# Patient Record
Sex: MALE | Race: WHITE | NOT HISPANIC OR LATINO
[De-identification: names, ages, dates, MRNs, and addresses within clinical notes are randomized per-mention and may not be internally consistent; named-entity substitution may affect disease eponyms.]

---

## 2017-03-20 ENCOUNTER — RX RENEWAL (OUTPATIENT)
Age: 82
End: 2017-03-20

## 2017-05-01 ENCOUNTER — NON-APPOINTMENT (OUTPATIENT)
Age: 82
End: 2017-05-01

## 2017-05-01 ENCOUNTER — APPOINTMENT (OUTPATIENT)
Dept: CARDIOLOGY | Facility: CLINIC | Age: 82
End: 2017-05-01

## 2017-05-01 VITALS
SYSTOLIC BLOOD PRESSURE: 147 MMHG | OXYGEN SATURATION: 94 % | BODY MASS INDEX: 28.23 KG/M2 | WEIGHT: 227 LBS | HEIGHT: 75 IN | HEART RATE: 68 BPM | DIASTOLIC BLOOD PRESSURE: 79 MMHG

## 2017-05-01 VITALS — SYSTOLIC BLOOD PRESSURE: 130 MMHG | DIASTOLIC BLOOD PRESSURE: 70 MMHG

## 2017-05-01 DIAGNOSIS — R01.1 CARDIAC MURMUR, UNSPECIFIED: ICD-10-CM

## 2017-05-01 DIAGNOSIS — I65.29 OCCLUSION AND STENOSIS OF UNSPECIFIED CAROTID ARTERY: ICD-10-CM

## 2017-05-04 ENCOUNTER — APPOINTMENT (OUTPATIENT)
Dept: CARDIOLOGY | Facility: CLINIC | Age: 82
End: 2017-05-04

## 2017-05-06 LAB
ALBUMIN SERPL ELPH-MCNC: 4.4 G/DL
ALP BLD-CCNC: 92 U/L
ALT SERPL-CCNC: 17 U/L
ANION GAP SERPL CALC-SCNC: 17 MMOL/L
AST SERPL-CCNC: 20 U/L
BILIRUB SERPL-MCNC: 0.4 MG/DL
BUN SERPL-MCNC: 25 MG/DL
CALCIUM SERPL-MCNC: 10.3 MG/DL
CHLORIDE SERPL-SCNC: 105 MMOL/L
CHOLEST SERPL-MCNC: 152 MG/DL
CHOLEST/HDLC SERPL: 3 RATIO
CO2 SERPL-SCNC: 22 MMOL/L
CREAT SERPL-MCNC: 1.58 MG/DL
GLUCOSE SERPL-MCNC: 98 MG/DL
HBA1C MFR BLD HPLC: 5.9 %
HDLC SERPL-MCNC: 50 MG/DL
LDLC SERPL CALC-MCNC: 79 MG/DL
POTASSIUM SERPL-SCNC: 5.3 MMOL/L
PROT SERPL-MCNC: 8.1 G/DL
SODIUM SERPL-SCNC: 144 MMOL/L
TRIGL SERPL-MCNC: 115 MG/DL

## 2017-07-07 ENCOUNTER — APPOINTMENT (OUTPATIENT)
Dept: CARDIOLOGY | Facility: CLINIC | Age: 82
End: 2017-07-07

## 2017-09-20 ENCOUNTER — RX RENEWAL (OUTPATIENT)
Age: 82
End: 2017-09-20

## 2018-09-04 ENCOUNTER — APPOINTMENT (OUTPATIENT)
Dept: ORTHOPEDIC SURGERY | Facility: CLINIC | Age: 83
End: 2018-09-04
Payer: MEDICARE

## 2018-09-04 VITALS
DIASTOLIC BLOOD PRESSURE: 75 MMHG | WEIGHT: 205 LBS | HEIGHT: 72 IN | BODY MASS INDEX: 27.77 KG/M2 | SYSTOLIC BLOOD PRESSURE: 124 MMHG | HEART RATE: 75 BPM

## 2018-09-04 DIAGNOSIS — M89.9 DISORDER OF BONE, UNSPECIFIED: ICD-10-CM

## 2018-09-04 PROCEDURE — 99204 OFFICE O/P NEW MOD 45 MIN: CPT

## 2018-09-07 ENCOUNTER — OUTPATIENT (OUTPATIENT)
Dept: OUTPATIENT SERVICES | Facility: HOSPITAL | Age: 83
LOS: 1 days | End: 2018-09-07
Payer: MEDICARE

## 2018-09-07 VITALS
SYSTOLIC BLOOD PRESSURE: 130 MMHG | WEIGHT: 194.01 LBS | HEART RATE: 71 BPM | TEMPERATURE: 97 F | DIASTOLIC BLOOD PRESSURE: 76 MMHG | RESPIRATION RATE: 14 BRPM | HEIGHT: 69.5 IN | OXYGEN SATURATION: 97 %

## 2018-09-07 DIAGNOSIS — M89.9 DISORDER OF BONE, UNSPECIFIED: ICD-10-CM

## 2018-09-07 DIAGNOSIS — I10 ESSENTIAL (PRIMARY) HYPERTENSION: ICD-10-CM

## 2018-09-07 DIAGNOSIS — Z84.89 FAMILY HISTORY OF OTHER SPECIFIED CONDITIONS: Chronic | ICD-10-CM

## 2018-09-07 DIAGNOSIS — M25.559 PAIN IN UNSPECIFIED HIP: Chronic | ICD-10-CM

## 2018-09-07 LAB
BLD GP AB SCN SERPL QL: NEGATIVE — SIGNIFICANT CHANGE UP
BUN SERPL-MCNC: 28 MG/DL — HIGH (ref 7–23)
CALCIUM SERPL-MCNC: 9.7 MG/DL — SIGNIFICANT CHANGE UP (ref 8.4–10.5)
CHLORIDE SERPL-SCNC: 99 MMOL/L — SIGNIFICANT CHANGE UP (ref 98–107)
CO2 SERPL-SCNC: 24 MMOL/L — SIGNIFICANT CHANGE UP (ref 22–31)
CREAT SERPL-MCNC: 1.79 MG/DL — HIGH (ref 0.5–1.3)
GLUCOSE SERPL-MCNC: 86 MG/DL — SIGNIFICANT CHANGE UP (ref 70–99)
HCT VFR BLD CALC: 39.1 % — SIGNIFICANT CHANGE UP (ref 39–50)
HGB BLD-MCNC: 12.5 G/DL — LOW (ref 13–17)
MCHC RBC-ENTMCNC: 29.5 PG — SIGNIFICANT CHANGE UP (ref 27–34)
MCHC RBC-ENTMCNC: 32 % — SIGNIFICANT CHANGE UP (ref 32–36)
MCV RBC AUTO: 92.2 FL — SIGNIFICANT CHANGE UP (ref 80–100)
NRBC # FLD: 0 — SIGNIFICANT CHANGE UP
PLATELET # BLD AUTO: 315 K/UL — SIGNIFICANT CHANGE UP (ref 150–400)
PMV BLD: 9.3 FL — SIGNIFICANT CHANGE UP (ref 7–13)
POTASSIUM SERPL-MCNC: 4.5 MMOL/L — SIGNIFICANT CHANGE UP (ref 3.5–5.3)
POTASSIUM SERPL-SCNC: 4.5 MMOL/L — SIGNIFICANT CHANGE UP (ref 3.5–5.3)
RBC # BLD: 4.24 M/UL — SIGNIFICANT CHANGE UP (ref 4.2–5.8)
RBC # FLD: 14.7 % — HIGH (ref 10.3–14.5)
RH IG SCN BLD-IMP: POSITIVE — SIGNIFICANT CHANGE UP
SODIUM SERPL-SCNC: 136 MMOL/L — SIGNIFICANT CHANGE UP (ref 135–145)
WBC # BLD: 8.92 K/UL — SIGNIFICANT CHANGE UP (ref 3.8–10.5)
WBC # FLD AUTO: 8.92 K/UL — SIGNIFICANT CHANGE UP (ref 3.8–10.5)

## 2018-09-07 PROCEDURE — 93010 ELECTROCARDIOGRAM REPORT: CPT

## 2018-09-07 NOTE — H&P PST ADULT - NEGATIVE GENERAL SYMPTOMS
no polydipsia/no fever/no polyphagia/no polyuria/no chills/no sweating/no anorexia/no weight gain/no weight loss/no fatigue

## 2018-09-07 NOTE — H&P PST ADULT - NSANTHOSAYNRD_GEN_A_CORE
No. ADRY screening performed.  STOP BANG Legend: 0-2 = LOW Risk; 3-4 = INTERMEDIATE Risk; 5-8 = HIGH Risk

## 2018-09-07 NOTE — H&P PST ADULT - NEGATIVE ENMT SYMPTOMS
no hearing difficulty/no nasal congestion/no nasal obstruction/no ear pain/no sinus symptoms/no nasal discharge/no recurrent cold sores/no abnormal taste sensation/no gum bleeding/no throat pain/no nose bleeds/no dry mouth/no vertigo/no dysphagia/no post-nasal discharge/no tinnitus

## 2018-09-07 NOTE — H&P PST ADULT - MUSCULOSKELETAL
details… detailed exam Right upper extremity/decreased ROM due to pain/no calf tenderness/no joint warmth/no joint erythema/normal strength

## 2018-09-07 NOTE — H&P PST ADULT - RS GEN PE MLT RESP DETAILS PC
respirations non-labored/good air movement/airway patent/no wheezes/no rales/clear to auscultation bilaterally/no rhonchi/breath sounds equal

## 2018-09-07 NOTE — H&P PST ADULT - NEGATIVE GENERAL GENITOURINARY SYMPTOMS
no dysuria/no bladder infections/no flank pain R/no incontinence/normal urinary frequency/no urinary hesitancy/no flank pain L/no hematuria

## 2018-09-07 NOTE — H&P PST ADULT - PROBLEM SELECTOR PLAN 1
Patient is scheduled for exploration wide resection right lower scapula scheduled on 9/14/2018.   Preop instructions, pepcid, surgical scrub provided. Pt stated understanding.    Pending medical evaluation - per surgeon request.   Pending cardiac clearance - Per medical Doctor's request - patient has an appointment 9/13. Abnormal EKG - from PST -( [facility] Parkinson [facility] ) case reviewed with Dr. Nieves- Pending Cardiac clearance, last echo results and stress results.  Comparison EKG in chart.     ADRY precautions, OR booking notified.  Per Patient - Patient stopped taking Asprin as on 9/6/2018. Patient stated that Dr. Ravi and Dr. Foreman informed him to stop.

## 2018-09-07 NOTE — H&P PST ADULT - NEGATIVE ALLERGY TYPES
no outdoor environmental allergies/no indoor environmental allergies/no reactions to food/no reactions to insect bites/no reactions to medicines/no reactions to animals

## 2018-09-07 NOTE — H&P PST ADULT - NEGATIVE NEUROLOGICAL SYMPTOMS
no headache/no paresthesias/no hemiparesis/no facial palsy/no transient paralysis/no syncope/no focal seizures/no tremors/no vertigo/no weakness/no generalized seizures/no confusion/no difficulty walking/no loss of sensation/no loss of consciousness

## 2018-09-07 NOTE — H&P PST ADULT - TOBACCO FREE, LONGEST PERIOD, PROFILE
Quit smoking cigareete in 1979- 3 packs a day for 10 years Quit smoking cigarettes in 1979- 3 packs a day for 10 years

## 2018-09-07 NOTE — H&P PST ADULT - NEGATIVE OPHTHALMOLOGIC SYMPTOMS
no blurred vision L/no blurred vision R/no discharge L/no diplopia/no photophobia/no irritation L/no pain R/no irritation R/no discharge R/no pain L/no lacrimation L/no lacrimation R

## 2018-09-07 NOTE — H&P PST ADULT - HISTORY OF PRESENT ILLNESS
85 year old male with a history of " mass" on the right lower scapula. Patient is s/p X-ray and MRI with abnormal results. Patient was referred to Dr. Ravi for an evaluation. Patient is scheduled for exploration wide resection right lower scapula scheduled on 9/14/2018.

## 2018-09-07 NOTE — H&P PST ADULT - PMH
Hypertension Arteriosclerotic heart disease (ASHD)    Coronary artery disease    Disorder of bone, unspecified    Dyslipidemia    Hypertension

## 2018-09-07 NOTE — H&P PST ADULT - NEGATIVE MUSCULOSKELETAL SYMPTOMS
no muscle weakness/no back pain/no leg pain R/no myalgia/no muscle cramps/no neck pain/no leg pain L/no joint swelling

## 2018-09-13 ENCOUNTER — NON-APPOINTMENT (OUTPATIENT)
Age: 83
End: 2018-09-13

## 2018-09-13 ENCOUNTER — APPOINTMENT (OUTPATIENT)
Dept: CARDIOLOGY | Facility: CLINIC | Age: 83
End: 2018-09-13
Payer: MEDICARE

## 2018-09-13 VITALS
DIASTOLIC BLOOD PRESSURE: 78 MMHG | WEIGHT: 192 LBS | HEIGHT: 72 IN | HEART RATE: 80 BPM | SYSTOLIC BLOOD PRESSURE: 151 MMHG | OXYGEN SATURATION: 86 % | BODY MASS INDEX: 26.01 KG/M2

## 2018-09-13 VITALS — DIASTOLIC BLOOD PRESSURE: 70 MMHG | SYSTOLIC BLOOD PRESSURE: 130 MMHG

## 2018-09-13 DIAGNOSIS — I25.10 ATHEROSCLEROTIC HEART DISEASE OF NATIVE CORONARY ARTERY W/OUT ANGINA PECTORIS: ICD-10-CM

## 2018-09-13 DIAGNOSIS — Z01.810 ENCOUNTER FOR PREPROCEDURAL CARDIOVASCULAR EXAMINATION: ICD-10-CM

## 2018-09-13 DIAGNOSIS — R94.31 ENCOUNTER FOR PREPROCEDURAL CARDIOVASCULAR EXAMINATION: ICD-10-CM

## 2018-09-13 DIAGNOSIS — I35.0 NONRHEUMATIC AORTIC (VALVE) STENOSIS: ICD-10-CM

## 2018-09-13 DIAGNOSIS — I10 ESSENTIAL (PRIMARY) HYPERTENSION: ICD-10-CM

## 2018-09-13 DIAGNOSIS — E78.5 HYPERLIPIDEMIA, UNSPECIFIED: ICD-10-CM

## 2018-09-13 PROBLEM — M89.9 DISORDER OF BONE, UNSPECIFIED: Chronic | Status: ACTIVE | Noted: 2018-09-07

## 2018-09-13 LAB
ALBUMIN SERPL ELPH-MCNC: 4 G/DL
ALP BLD-CCNC: 100 U/L
ALT SERPL-CCNC: 13 U/L
ANION GAP SERPL CALC-SCNC: 15 MMOL/L
AST SERPL-CCNC: 20 U/L
BILIRUB SERPL-MCNC: 0.3 MG/DL
BUN SERPL-MCNC: 23 MG/DL
CALCIUM SERPL-MCNC: 10.5 MG/DL
CHLORIDE SERPL-SCNC: 97 MMOL/L
CHOLEST SERPL-MCNC: 134 MG/DL
CHOLEST/HDLC SERPL: 2.6 RATIO
CO2 SERPL-SCNC: 24 MMOL/L
CREAT SERPL-MCNC: 1.56 MG/DL
GLUCOSE SERPL-MCNC: 100 MG/DL
HBA1C MFR BLD HPLC: 5.9 %
HDLC SERPL-MCNC: 52 MG/DL
LDLC SERPL CALC-MCNC: 62 MG/DL
POTASSIUM SERPL-SCNC: 5.8 MMOL/L
PROT SERPL-MCNC: 8 G/DL
SODIUM SERPL-SCNC: 136 MMOL/L
TRIGL SERPL-MCNC: 102 MG/DL

## 2018-09-13 PROCEDURE — 99215 OFFICE O/P EST HI 40 MIN: CPT

## 2018-09-13 PROCEDURE — 93000 ELECTROCARDIOGRAM COMPLETE: CPT

## 2018-09-13 RX ORDER — FOLIC ACID 20 MG
CAPSULE ORAL
Refills: 0 | Status: ACTIVE | COMMUNITY

## 2018-09-13 RX ORDER — CHROMIUM 200 MCG
TABLET ORAL
Refills: 0 | Status: ACTIVE | COMMUNITY

## 2018-09-13 RX ORDER — MULTIVITAMIN
TABLET ORAL
Refills: 0 | Status: ACTIVE | COMMUNITY

## 2018-09-14 ENCOUNTER — APPOINTMENT (OUTPATIENT)
Dept: ORTHOPEDIC SURGERY | Facility: HOSPITAL | Age: 83
End: 2018-09-14

## 2018-09-15 ENCOUNTER — APPOINTMENT (OUTPATIENT)
Dept: CARDIOLOGY | Facility: CLINIC | Age: 83
End: 2018-09-15
Payer: MEDICARE

## 2018-09-15 PROCEDURE — 93306 TTE W/DOPPLER COMPLETE: CPT

## 2018-09-17 ENCOUNTER — APPOINTMENT (OUTPATIENT)
Dept: CARDIOLOGY | Facility: CLINIC | Age: 83
End: 2018-09-17
Payer: MEDICARE

## 2018-09-17 PROCEDURE — 78452 HT MUSCLE IMAGE SPECT MULT: CPT

## 2018-09-17 PROCEDURE — 93015 CV STRESS TEST SUPVJ I&R: CPT

## 2018-09-17 PROCEDURE — A9500: CPT

## 2018-10-03 ENCOUNTER — TRANSCRIPTION ENCOUNTER (OUTPATIENT)
Age: 83
End: 2018-10-03

## 2018-10-03 NOTE — ASU PATIENT PROFILE, ADULT - PMH
Arteriosclerotic heart disease (ASHD)    Coronary artery disease    Disorder of bone, unspecified    Dyslipidemia    Hypertension

## 2018-10-04 ENCOUNTER — INPATIENT (INPATIENT)
Facility: HOSPITAL | Age: 83
LOS: 3 days | Discharge: HOME | End: 2018-10-08
Attending: ORTHOPAEDIC SURGERY | Admitting: ORTHOPAEDIC SURGERY
Payer: MEDICARE

## 2018-10-04 ENCOUNTER — RESULT REVIEW (OUTPATIENT)
Age: 83
End: 2018-10-04

## 2018-10-04 ENCOUNTER — APPOINTMENT (OUTPATIENT)
Dept: ORTHOPEDIC SURGERY | Facility: HOSPITAL | Age: 83
End: 2018-10-04

## 2018-10-04 VITALS
RESPIRATION RATE: 18 BRPM | DIASTOLIC BLOOD PRESSURE: 69 MMHG | TEMPERATURE: 98 F | SYSTOLIC BLOOD PRESSURE: 163 MMHG | HEIGHT: 69.5 IN | WEIGHT: 194.01 LBS | HEART RATE: 76 BPM | OXYGEN SATURATION: 93 %

## 2018-10-04 DIAGNOSIS — M89.9 DISORDER OF BONE, UNSPECIFIED: ICD-10-CM

## 2018-10-04 DIAGNOSIS — M25.559 PAIN IN UNSPECIFIED HIP: Chronic | ICD-10-CM

## 2018-10-04 DIAGNOSIS — Z84.89 FAMILY HISTORY OF OTHER SPECIFIED CONDITIONS: Chronic | ICD-10-CM

## 2018-10-04 LAB
BASE EXCESS BLDA CALC-SCNC: -0.7 MMOL/L — SIGNIFICANT CHANGE UP
BLD GP AB SCN SERPL QL: NEGATIVE — SIGNIFICANT CHANGE UP
CA-I BLDA-SCNC: 1.21 MMOL/L — SIGNIFICANT CHANGE UP (ref 1.15–1.29)
GLUCOSE BLDA-MCNC: 116 MG/DL — HIGH (ref 70–99)
HCO3 BLDA-SCNC: 24 MMOL/L — SIGNIFICANT CHANGE UP (ref 22–26)
HCT VFR BLDA CALC: 35.7 % — LOW (ref 39–51)
HGB BLDA-MCNC: 11.6 G/DL — LOW (ref 13–17)
PCO2 BLDA: 41 MMHG — SIGNIFICANT CHANGE UP (ref 35–48)
PH BLDA: 7.38 PH — SIGNIFICANT CHANGE UP (ref 7.35–7.45)
PO2 BLDA: 237 MMHG — HIGH (ref 83–108)
POTASSIUM BLDA-SCNC: 4.7 MMOL/L — HIGH (ref 3.4–4.5)
RH IG SCN BLD-IMP: POSITIVE — SIGNIFICANT CHANGE UP
SAO2 % BLDA: 99.4 % — HIGH (ref 95–99)
SODIUM BLDA-SCNC: 130 MMOL/L — LOW (ref 136–146)

## 2018-10-04 PROCEDURE — 88309 TISSUE EXAM BY PATHOLOGIST: CPT | Mod: 26

## 2018-10-04 PROCEDURE — 88341 IMHCHEM/IMCYTCHM EA ADD ANTB: CPT | Mod: 26

## 2018-10-04 PROCEDURE — 88342 IMHCHEM/IMCYTCHM 1ST ANTB: CPT | Mod: 26

## 2018-10-04 PROCEDURE — 88305 TISSUE EXAM BY PATHOLOGIST: CPT | Mod: 26

## 2018-10-04 PROCEDURE — 88331 PATH CONSLTJ SURG 1 BLK 1SPC: CPT | Mod: 26

## 2018-10-04 RX ORDER — AMLODIPINE BESYLATE 2.5 MG/1
10 TABLET ORAL DAILY
Qty: 0 | Refills: 0 | Status: DISCONTINUED | OUTPATIENT
Start: 2018-10-04 | End: 2018-10-05

## 2018-10-04 RX ORDER — CEFAZOLIN SODIUM 1 G
2000 VIAL (EA) INJECTION EVERY 8 HOURS
Qty: 0 | Refills: 0 | Status: COMPLETED | OUTPATIENT
Start: 2018-10-05 | End: 2018-10-05

## 2018-10-04 RX ORDER — OXYCODONE HYDROCHLORIDE 5 MG/1
10 TABLET ORAL EVERY 4 HOURS
Qty: 0 | Refills: 0 | Status: DISCONTINUED | OUTPATIENT
Start: 2018-10-04 | End: 2018-10-05

## 2018-10-04 RX ORDER — SODIUM CHLORIDE 9 MG/ML
1000 INJECTION, SOLUTION INTRAVENOUS
Qty: 0 | Refills: 0 | Status: DISCONTINUED | OUTPATIENT
Start: 2018-10-04 | End: 2018-10-05

## 2018-10-04 RX ORDER — DOCUSATE SODIUM 100 MG
100 CAPSULE ORAL THREE TIMES A DAY
Qty: 0 | Refills: 0 | Status: DISCONTINUED | OUTPATIENT
Start: 2018-10-04 | End: 2018-10-08

## 2018-10-04 RX ORDER — HYDROMORPHONE HYDROCHLORIDE 2 MG/ML
0.25 INJECTION INTRAMUSCULAR; INTRAVENOUS; SUBCUTANEOUS
Qty: 0 | Refills: 0 | Status: DISCONTINUED | OUTPATIENT
Start: 2018-10-04 | End: 2018-10-05

## 2018-10-04 RX ORDER — ATORVASTATIN CALCIUM 80 MG/1
20 TABLET, FILM COATED ORAL AT BEDTIME
Qty: 0 | Refills: 0 | Status: DISCONTINUED | OUTPATIENT
Start: 2018-10-04 | End: 2018-10-08

## 2018-10-04 RX ORDER — METOPROLOL TARTRATE 50 MG
50 TABLET ORAL DAILY
Qty: 0 | Refills: 0 | Status: DISCONTINUED | OUTPATIENT
Start: 2018-10-04 | End: 2018-10-05

## 2018-10-04 RX ORDER — ACETAMINOPHEN 500 MG
650 TABLET ORAL EVERY 6 HOURS
Qty: 0 | Refills: 0 | Status: DISCONTINUED | OUTPATIENT
Start: 2018-10-04 | End: 2018-10-05

## 2018-10-04 RX ORDER — OXYCODONE HYDROCHLORIDE 5 MG/1
5 TABLET ORAL EVERY 4 HOURS
Qty: 0 | Refills: 0 | Status: DISCONTINUED | OUTPATIENT
Start: 2018-10-04 | End: 2018-10-05

## 2018-10-04 RX ORDER — ONDANSETRON 8 MG/1
4 TABLET, FILM COATED ORAL EVERY 6 HOURS
Qty: 0 | Refills: 0 | Status: DISCONTINUED | OUTPATIENT
Start: 2018-10-04 | End: 2018-10-08

## 2018-10-04 RX ORDER — MORPHINE SULFATE 50 MG/1
4 CAPSULE, EXTENDED RELEASE ORAL EVERY 4 HOURS
Qty: 0 | Refills: 0 | Status: DISCONTINUED | OUTPATIENT
Start: 2018-10-04 | End: 2018-10-05

## 2018-10-04 RX ORDER — ASPIRIN/CALCIUM CARB/MAGNESIUM 324 MG
81 TABLET ORAL DAILY
Qty: 0 | Refills: 0 | Status: DISCONTINUED | OUTPATIENT
Start: 2018-10-04 | End: 2018-10-08

## 2018-10-04 RX ORDER — LATANOPROST 0.05 MG/ML
1 SOLUTION/ DROPS OPHTHALMIC; TOPICAL AT BEDTIME
Qty: 0 | Refills: 0 | Status: DISCONTINUED | OUTPATIENT
Start: 2018-10-04 | End: 2018-10-08

## 2018-10-04 RX ORDER — OXYCODONE HYDROCHLORIDE 5 MG/1
1 TABLET ORAL
Qty: 30 | Refills: 0 | OUTPATIENT
Start: 2018-10-04 | End: 2018-10-08

## 2018-10-04 RX ORDER — ONDANSETRON 8 MG/1
4 TABLET, FILM COATED ORAL ONCE
Qty: 0 | Refills: 0 | Status: DISCONTINUED | OUTPATIENT
Start: 2018-10-04 | End: 2018-10-05

## 2018-10-04 RX ORDER — MAGNESIUM HYDROXIDE 400 MG/1
30 TABLET, CHEWABLE ORAL DAILY
Qty: 0 | Refills: 0 | Status: DISCONTINUED | OUTPATIENT
Start: 2018-10-04 | End: 2018-10-08

## 2018-10-04 RX ADMIN — HYDROMORPHONE HYDROCHLORIDE 0.25 MILLIGRAM(S): 2 INJECTION INTRAMUSCULAR; INTRAVENOUS; SUBCUTANEOUS at 23:35

## 2018-10-04 RX ADMIN — SODIUM CHLORIDE 50 MILLILITER(S): 9 INJECTION, SOLUTION INTRAVENOUS at 23:41

## 2018-10-04 RX ADMIN — HYDROMORPHONE HYDROCHLORIDE 0.25 MILLIGRAM(S): 2 INJECTION INTRAMUSCULAR; INTRAVENOUS; SUBCUTANEOUS at 23:57

## 2018-10-04 RX ADMIN — HYDROMORPHONE HYDROCHLORIDE 0.25 MILLIGRAM(S): 2 INJECTION INTRAMUSCULAR; INTRAVENOUS; SUBCUTANEOUS at 23:50

## 2018-10-04 RX ADMIN — HYDROMORPHONE HYDROCHLORIDE 0.25 MILLIGRAM(S): 2 INJECTION INTRAMUSCULAR; INTRAVENOUS; SUBCUTANEOUS at 23:47

## 2018-10-04 NOTE — ASU DISCHARGE PLAN (ADULT/PEDIATRIC). - MEDICATION SUMMARY - MEDICATIONS TO TAKE
I will START or STAY ON the medications listed below when I get home from the hospital:    aspirin 81 mg oral tablet  -- 1 tab(s) by mouth once a day last dose 9/6  -- Indication: For home med    oxyCODONE 5 mg oral tablet  -- 1 tab(s) by mouth every 4 to 6 hours, As Needed -for severe pain MDD:6 tabs  -- Caution federal law prohibits the transfer of this drug to any person other  than the person for whom it was prescribed.  It is very important that you take or use this exactly as directed.  Do not skip doses or discontinue unless directed by your doctor.  May cause drowsiness.  Alcohol may intensify this effect.  Use care when operating dangerous machinery.  This prescription cannot be refilled.  Using more of this medication than prescribed may cause serious breathing problems.    -- Indication: For pain prn    Lipitor 20 mg oral tablet  -- 1 tab(s) by mouth once a day (at bedtime)  -- Indication: For home med    Zetia 10 mg oral tablet  -- 1 tab(s) by mouth once a day (at bedtime)  -- Indication: For home med    Toprol-XL 50 mg oral tablet, extended release  -- 1 tab(s) by mouth once a day  -- Indication: For home med    Norvasc 10 mg oral tablet  -- 1 tab(s) by mouth once a day  -- Indication: For home med    CoQ10 300 mg oral capsule  -- 1 cap(s) by mouth once a day last dose 9/6  -- Indication: For home med    Travatan 0.004% ophthalmic solution  -- 1 drop(s) to each affected eye once a day (in the evening)  -- Indication: For home med    Multiple Vitamins oral tablet  -- 1 tab(s) by mouth once a day last dose 9/6  -- Indication: For home med    folic acid 1 mg oral tablet  -- 1 tab(s) by mouth once a day  -- Indication: For home med    Vitamin C  -- orally once a day  -- Indication: For home med    Vitamin D3  -- orally once a day  -- Indication: For home med

## 2018-10-04 NOTE — ASU DISCHARGE PLAN (ADULT/PEDIATRIC). - SPECIAL INSTRUCTIONS
please call office for follow up appointment; please rest ice the affected area; keep dressing clean dry intact; do not get incision wet for 5 days; keep incision site covered at all times; sling for comfort

## 2018-10-04 NOTE — BRIEF OPERATIVE NOTE - PROCEDURE
<<-----Click on this checkbox to enter Procedure Excision of right scapula, open approach  10/04/2018    Active  [facility]STOCKTON

## 2018-10-04 NOTE — ASU DISCHARGE PLAN (ADULT/PEDIATRIC). - NOTIFY
Numbness, color, or temperature change to extremity/Pain not relieved by Medications/Swelling that continues/Persistent Nausea and Vomiting/Bleeding that does not stop

## 2018-10-05 ENCOUNTER — TRANSCRIPTION ENCOUNTER (OUTPATIENT)
Age: 83
End: 2018-10-05

## 2018-10-05 DIAGNOSIS — E87.5 HYPERKALEMIA: ICD-10-CM

## 2018-10-05 DIAGNOSIS — I25.10 ATHEROSCLEROTIC HEART DISEASE OF NATIVE CORONARY ARTERY WITHOUT ANGINA PECTORIS: ICD-10-CM

## 2018-10-05 DIAGNOSIS — I10 ESSENTIAL (PRIMARY) HYPERTENSION: ICD-10-CM

## 2018-10-05 DIAGNOSIS — N17.9 ACUTE KIDNEY FAILURE, UNSPECIFIED: ICD-10-CM

## 2018-10-05 LAB
APPEARANCE UR: CLEAR — SIGNIFICANT CHANGE UP
BASOPHILS # BLD AUTO: 0.01 K/UL — SIGNIFICANT CHANGE UP (ref 0–0.2)
BASOPHILS NFR BLD AUTO: 0.1 % — SIGNIFICANT CHANGE UP (ref 0–2)
BILIRUB UR-MCNC: NEGATIVE — SIGNIFICANT CHANGE UP
BLOOD UR QL VISUAL: NEGATIVE — SIGNIFICANT CHANGE UP
BUN SERPL-MCNC: 28 MG/DL — HIGH (ref 7–23)
BUN SERPL-MCNC: 33 MG/DL — HIGH (ref 7–23)
BUN SERPL-MCNC: 38 MG/DL — HIGH (ref 7–23)
CALCIUM SERPL-MCNC: 8.6 MG/DL — SIGNIFICANT CHANGE UP (ref 8.4–10.5)
CALCIUM SERPL-MCNC: 8.9 MG/DL — SIGNIFICANT CHANGE UP (ref 8.4–10.5)
CALCIUM SERPL-MCNC: 9.5 MG/DL — SIGNIFICANT CHANGE UP (ref 8.4–10.5)
CHLORIDE SERPL-SCNC: 95 MMOL/L — LOW (ref 98–107)
CHLORIDE SERPL-SCNC: 95 MMOL/L — LOW (ref 98–107)
CHLORIDE SERPL-SCNC: 97 MMOL/L — LOW (ref 98–107)
CO2 SERPL-SCNC: 20 MMOL/L — LOW (ref 22–31)
CO2 SERPL-SCNC: 20 MMOL/L — LOW (ref 22–31)
CO2 SERPL-SCNC: 21 MMOL/L — LOW (ref 22–31)
COLOR SPEC: YELLOW — SIGNIFICANT CHANGE UP
CREAT ?TM UR-MCNC: 144.4 MG/DL — SIGNIFICANT CHANGE UP
CREAT SERPL-MCNC: 2.14 MG/DL — HIGH (ref 0.5–1.3)
CREAT SERPL-MCNC: 2.62 MG/DL — HIGH (ref 0.5–1.3)
CREAT SERPL-MCNC: 2.66 MG/DL — HIGH (ref 0.5–1.3)
EOSINOPHIL # BLD AUTO: 0 K/UL — SIGNIFICANT CHANGE UP (ref 0–0.5)
EOSINOPHIL NFR BLD AUTO: 0 % — SIGNIFICANT CHANGE UP (ref 0–6)
EPI CELLS # UR: SIGNIFICANT CHANGE UP
GLUCOSE SERPL-MCNC: 111 MG/DL — HIGH (ref 70–99)
GLUCOSE SERPL-MCNC: 176 MG/DL — HIGH (ref 70–99)
GLUCOSE SERPL-MCNC: 194 MG/DL — HIGH (ref 70–99)
GLUCOSE UR-MCNC: NEGATIVE — SIGNIFICANT CHANGE UP
HCT VFR BLD CALC: 29.5 % — LOW (ref 39–50)
HCT VFR BLD CALC: 33.5 % — LOW (ref 39–50)
HGB BLD-MCNC: 10.8 G/DL — LOW (ref 13–17)
HGB BLD-MCNC: 9.5 G/DL — LOW (ref 13–17)
IMM GRANULOCYTES # BLD AUTO: 0.08 # — SIGNIFICANT CHANGE UP
IMM GRANULOCYTES NFR BLD AUTO: 0.4 % — SIGNIFICANT CHANGE UP (ref 0–1.5)
KETONES UR-MCNC: 10 — SIGNIFICANT CHANGE UP
LEUKOCYTE ESTERASE UR-ACNC: SIGNIFICANT CHANGE UP
LYMPHOCYTES # BLD AUTO: 1.05 K/UL — SIGNIFICANT CHANGE UP (ref 1–3.3)
LYMPHOCYTES # BLD AUTO: 5.9 % — LOW (ref 13–44)
MCHC RBC-ENTMCNC: 29.4 PG — SIGNIFICANT CHANGE UP (ref 27–34)
MCHC RBC-ENTMCNC: 29.5 PG — SIGNIFICANT CHANGE UP (ref 27–34)
MCHC RBC-ENTMCNC: 32.2 % — SIGNIFICANT CHANGE UP (ref 32–36)
MCHC RBC-ENTMCNC: 32.2 % — SIGNIFICANT CHANGE UP (ref 32–36)
MCV RBC AUTO: 91.3 FL — SIGNIFICANT CHANGE UP (ref 80–100)
MCV RBC AUTO: 91.6 FL — SIGNIFICANT CHANGE UP (ref 80–100)
MONOCYTES # BLD AUTO: 0.96 K/UL — HIGH (ref 0–0.9)
MONOCYTES NFR BLD AUTO: 5.4 % — SIGNIFICANT CHANGE UP (ref 2–14)
MUCOUS THREADS # UR AUTO: SIGNIFICANT CHANGE UP
NEUTROPHILS # BLD AUTO: 15.68 K/UL — HIGH (ref 1.8–7.4)
NEUTROPHILS NFR BLD AUTO: 88.2 % — HIGH (ref 43–77)
NITRITE UR-MCNC: NEGATIVE — SIGNIFICANT CHANGE UP
NRBC # FLD: 0 — SIGNIFICANT CHANGE UP
NRBC # FLD: 0 — SIGNIFICANT CHANGE UP
PH UR: 6 — SIGNIFICANT CHANGE UP (ref 5–8)
PLATELET # BLD AUTO: 261 K/UL — SIGNIFICANT CHANGE UP (ref 150–400)
PLATELET # BLD AUTO: 307 K/UL — SIGNIFICANT CHANGE UP (ref 150–400)
PMV BLD: 9 FL — SIGNIFICANT CHANGE UP (ref 7–13)
PMV BLD: 9.2 FL — SIGNIFICANT CHANGE UP (ref 7–13)
POTASSIUM SERPL-MCNC: 5.5 MMOL/L — HIGH (ref 3.5–5.3)
POTASSIUM SERPL-MCNC: 6.1 MMOL/L — HIGH (ref 3.5–5.3)
POTASSIUM SERPL-MCNC: 6.5 MMOL/L — CRITICAL HIGH (ref 3.5–5.3)
POTASSIUM SERPL-SCNC: 5.5 MMOL/L — HIGH (ref 3.5–5.3)
POTASSIUM SERPL-SCNC: 6.1 MMOL/L — HIGH (ref 3.5–5.3)
POTASSIUM SERPL-SCNC: 6.5 MMOL/L — CRITICAL HIGH (ref 3.5–5.3)
PROT UR-MCNC: 100 — HIGH
RBC # BLD: 3.22 M/UL — LOW (ref 4.2–5.8)
RBC # BLD: 3.67 M/UL — LOW (ref 4.2–5.8)
RBC # FLD: 14.2 % — SIGNIFICANT CHANGE UP (ref 10.3–14.5)
RBC # FLD: 14.3 % — SIGNIFICANT CHANGE UP (ref 10.3–14.5)
RBC CASTS # UR COMP ASSIST: SIGNIFICANT CHANGE UP (ref 0–?)
SODIUM SERPL-SCNC: 131 MMOL/L — LOW (ref 135–145)
SODIUM SERPL-SCNC: 132 MMOL/L — LOW (ref 135–145)
SODIUM SERPL-SCNC: 132 MMOL/L — LOW (ref 135–145)
SODIUM UR-SCNC: 49 MMOL/L — SIGNIFICANT CHANGE UP
SP GR SPEC: 1.03 — SIGNIFICANT CHANGE UP (ref 1–1.04)
UROBILINOGEN FLD QL: NORMAL — SIGNIFICANT CHANGE UP
WBC # BLD: 15.63 K/UL — HIGH (ref 3.8–10.5)
WBC # BLD: 17.78 K/UL — HIGH (ref 3.8–10.5)
WBC # FLD AUTO: 15.63 K/UL — HIGH (ref 3.8–10.5)
WBC # FLD AUTO: 17.78 K/UL — HIGH (ref 3.8–10.5)
WBC UR QL: SIGNIFICANT CHANGE UP (ref 0–?)

## 2018-10-05 PROCEDURE — 76770 US EXAM ABDO BACK WALL COMP: CPT | Mod: 26

## 2018-10-05 PROCEDURE — 99223 1ST HOSP IP/OBS HIGH 75: CPT

## 2018-10-05 PROCEDURE — 93010 ELECTROCARDIOGRAM REPORT: CPT

## 2018-10-05 RX ORDER — INFLUENZA VIRUS VACCINE 15; 15; 15; 15 UG/.5ML; UG/.5ML; UG/.5ML; UG/.5ML
0.5 SUSPENSION INTRAMUSCULAR ONCE
Qty: 0 | Refills: 0 | Status: DISCONTINUED | OUTPATIENT
Start: 2018-10-05 | End: 2018-10-08

## 2018-10-05 RX ORDER — DEXTROSE 50 % IN WATER 50 %
50 SYRINGE (ML) INTRAVENOUS ONCE
Qty: 0 | Refills: 0 | Status: COMPLETED | OUTPATIENT
Start: 2018-10-05 | End: 2018-10-05

## 2018-10-05 RX ORDER — SODIUM POLYSTYRENE SULFONATE 4.1 MEQ/G
30 POWDER, FOR SUSPENSION ORAL ONCE
Qty: 0 | Refills: 0 | Status: COMPLETED | OUTPATIENT
Start: 2018-10-05 | End: 2018-10-05

## 2018-10-05 RX ORDER — OXYCODONE HYDROCHLORIDE 5 MG/1
2.5 TABLET ORAL EVERY 4 HOURS
Qty: 0 | Refills: 0 | Status: DISCONTINUED | OUTPATIENT
Start: 2018-10-05 | End: 2018-10-08

## 2018-10-05 RX ORDER — INFLUENZA VIRUS VACCINE 15; 15; 15; 15 UG/.5ML; UG/.5ML; UG/.5ML; UG/.5ML
0.5 SUSPENSION INTRAMUSCULAR ONCE
Qty: 0 | Refills: 0 | Status: DISCONTINUED | OUTPATIENT
Start: 2018-10-05 | End: 2018-10-05

## 2018-10-05 RX ORDER — METOPROLOL TARTRATE 50 MG
50 TABLET ORAL DAILY
Qty: 0 | Refills: 0 | Status: DISCONTINUED | OUTPATIENT
Start: 2018-10-05 | End: 2018-10-08

## 2018-10-05 RX ORDER — MORPHINE SULFATE 50 MG/1
2 CAPSULE, EXTENDED RELEASE ORAL EVERY 4 HOURS
Qty: 0 | Refills: 0 | Status: DISCONTINUED | OUTPATIENT
Start: 2018-10-05 | End: 2018-10-08

## 2018-10-05 RX ORDER — SODIUM CHLORIDE 9 MG/ML
500 INJECTION, SOLUTION INTRAVENOUS ONCE
Qty: 0 | Refills: 0 | Status: COMPLETED | OUTPATIENT
Start: 2018-10-05 | End: 2018-10-05

## 2018-10-05 RX ORDER — FAMOTIDINE 10 MG/ML
20 INJECTION INTRAVENOUS
Qty: 0 | Refills: 0 | Status: DISCONTINUED | OUTPATIENT
Start: 2018-10-05 | End: 2018-10-08

## 2018-10-05 RX ORDER — SENNA PLUS 8.6 MG/1
2 TABLET ORAL AT BEDTIME
Qty: 0 | Refills: 0 | Status: DISCONTINUED | OUTPATIENT
Start: 2018-10-05 | End: 2018-10-08

## 2018-10-05 RX ORDER — INSULIN HUMAN 100 [IU]/ML
10 INJECTION, SOLUTION SUBCUTANEOUS ONCE
Qty: 0 | Refills: 0 | Status: COMPLETED | OUTPATIENT
Start: 2018-10-05 | End: 2018-10-05

## 2018-10-05 RX ORDER — SODIUM CHLORIDE 9 MG/ML
1000 INJECTION INTRAMUSCULAR; INTRAVENOUS; SUBCUTANEOUS
Qty: 0 | Refills: 0 | Status: DISCONTINUED | OUTPATIENT
Start: 2018-10-05 | End: 2018-10-08

## 2018-10-05 RX ORDER — ACETAMINOPHEN 500 MG
650 TABLET ORAL EVERY 6 HOURS
Qty: 0 | Refills: 0 | Status: COMPLETED | OUTPATIENT
Start: 2018-10-05 | End: 2018-10-07

## 2018-10-05 RX ORDER — POLYETHYLENE GLYCOL 3350 17 G/17G
17 POWDER, FOR SOLUTION ORAL DAILY
Qty: 0 | Refills: 0 | Status: DISCONTINUED | OUTPATIENT
Start: 2018-10-05 | End: 2018-10-08

## 2018-10-05 RX ORDER — OXYCODONE HYDROCHLORIDE 5 MG/1
5 TABLET ORAL EVERY 4 HOURS
Qty: 0 | Refills: 0 | Status: DISCONTINUED | OUTPATIENT
Start: 2018-10-05 | End: 2018-10-08

## 2018-10-05 RX ORDER — AMLODIPINE BESYLATE 2.5 MG/1
10 TABLET ORAL DAILY
Qty: 0 | Refills: 0 | Status: DISCONTINUED | OUTPATIENT
Start: 2018-10-05 | End: 2018-10-08

## 2018-10-05 RX ADMIN — OXYCODONE HYDROCHLORIDE 5 MILLIGRAM(S): 5 TABLET ORAL at 23:57

## 2018-10-05 RX ADMIN — AMLODIPINE BESYLATE 10 MILLIGRAM(S): 2.5 TABLET ORAL at 05:13

## 2018-10-05 RX ADMIN — Medication 81 MILLIGRAM(S): at 12:02

## 2018-10-05 RX ADMIN — SENNA PLUS 2 TABLET(S): 8.6 TABLET ORAL at 21:29

## 2018-10-05 RX ADMIN — Medication 100 MILLIGRAM(S): at 12:02

## 2018-10-05 RX ADMIN — SODIUM POLYSTYRENE SULFONATE 30 GRAM(S): 4.1 POWDER, FOR SUSPENSION ORAL at 14:52

## 2018-10-05 RX ADMIN — OXYCODONE HYDROCHLORIDE 5 MILLIGRAM(S): 5 TABLET ORAL at 13:01

## 2018-10-05 RX ADMIN — OXYCODONE HYDROCHLORIDE 5 MILLIGRAM(S): 5 TABLET ORAL at 12:02

## 2018-10-05 RX ADMIN — Medication 100 MILLIGRAM(S): at 12:01

## 2018-10-05 RX ADMIN — Medication 650 MILLIGRAM(S): at 17:36

## 2018-10-05 RX ADMIN — FAMOTIDINE 20 MILLIGRAM(S): 10 INJECTION INTRAVENOUS at 17:36

## 2018-10-05 RX ADMIN — Medication 650 MILLIGRAM(S): at 23:57

## 2018-10-05 RX ADMIN — Medication 100 MILLIGRAM(S): at 05:13

## 2018-10-05 RX ADMIN — Medication 650 MILLIGRAM(S): at 12:02

## 2018-10-05 RX ADMIN — OXYCODONE HYDROCHLORIDE 10 MILLIGRAM(S): 5 TABLET ORAL at 00:45

## 2018-10-05 RX ADMIN — OXYCODONE HYDROCHLORIDE 5 MILLIGRAM(S): 5 TABLET ORAL at 19:18

## 2018-10-05 RX ADMIN — Medication 100 MILLIGRAM(S): at 21:29

## 2018-10-05 RX ADMIN — ATORVASTATIN CALCIUM 20 MILLIGRAM(S): 80 TABLET, FILM COATED ORAL at 21:29

## 2018-10-05 RX ADMIN — Medication 50 MILLILITER(S): at 15:53

## 2018-10-05 RX ADMIN — OXYCODONE HYDROCHLORIDE 10 MILLIGRAM(S): 5 TABLET ORAL at 06:10

## 2018-10-05 RX ADMIN — INSULIN HUMAN 10 UNIT(S): 100 INJECTION, SOLUTION SUBCUTANEOUS at 15:53

## 2018-10-05 RX ADMIN — POLYETHYLENE GLYCOL 3350 17 GRAM(S): 17 POWDER, FOR SOLUTION ORAL at 12:02

## 2018-10-05 RX ADMIN — Medication 100 MILLIGRAM(S): at 04:28

## 2018-10-05 RX ADMIN — OXYCODONE HYDROCHLORIDE 10 MILLIGRAM(S): 5 TABLET ORAL at 00:13

## 2018-10-05 RX ADMIN — SODIUM CHLORIDE 1000 MILLILITER(S): 9 INJECTION, SOLUTION INTRAVENOUS at 10:12

## 2018-10-05 RX ADMIN — OXYCODONE HYDROCHLORIDE 10 MILLIGRAM(S): 5 TABLET ORAL at 05:13

## 2018-10-05 RX ADMIN — Medication 50 MILLIGRAM(S): at 05:13

## 2018-10-05 NOTE — CONSULT NOTE ADULT - PROBLEM SELECTOR RECOMMENDATION 9
Pt w/o EKG changes  Likely due to GERMAN. Cannot exclude RTA, type 4 (but given acuity --> lower on differential at this time).   - give regular insulin 10 units/amp D50 x1 dose  - kayexalate  - repeat BMP this evening

## 2018-10-05 NOTE — DISCHARGE NOTE ADULT - PLAN OF CARE
resection of neoplasmy weight bear as tolerated right upper extremity   resume same diet as prior to surgery   Dr Ravi 1 week call for appt [phone] pain control -> pain med may cause drowsiness, refrain from activities require decision making; physical therapy to help resume activities of daily living weight bear as tolerated right upper extremity   call Surgeon's office to make appointment in 1 week Dr. Ravi [phone] must follow up with a Nephrologist call Internist to monitor elevated Creatinine and referral to see the specialist - Nephologist

## 2018-10-05 NOTE — CONSULT NOTE ADULT - PROBLEM SELECTOR RECOMMENDATION 4
- continue home regimen of metoprolol succinate 50mg daily  - continue home regimen of atorvastatin 20mg daily

## 2018-10-05 NOTE — OCCUPATIONAL THERAPY INITIAL EVALUATION ADULT - PLANNED THERAPY INTERVENTIONS, OT EVAL
fine motor coordination training/bed mobility training/ROM/strengthening/ADL retraining/balance training

## 2018-10-05 NOTE — DISCHARGE NOTE ADULT - CONDITIONS AT DISCHARGE
stable stable. Pt  right scapular incision with dressing intact, sling in place for support. positive NV status, fingers pink warm and mobile. VS stable Afebrile. pt faisal po diet, voiding without difficulty. Pain assessed Q4HR, pt reports relief of post-op discomfort with prn medications.

## 2018-10-05 NOTE — DISCHARGE NOTE ADULT - MEDICATION SUMMARY - MEDICATIONS TO TAKE
I will START or STAY ON the medications listed below when I get home from the hospital:    oxyCODONE 5 mg oral tablet  -- 1 tab(s) by mouth every 6 hours as needed for moderate to severe  begin tapering off as pain decreases  MDD:6 tabs   -- Caution federal law prohibits the transfer of this drug to any person other  than the person for whom it was prescribed.  It is very important that you take or use this exactly as directed.  Do not skip doses or discontinue unless directed by your doctor.  May cause drowsiness.  Alcohol may intensify this effect.  Use care when operating dangerous machinery.  This prescription cannot be refilled.  Using more of this medication than prescribed may cause serious breathing problems.    -- Indication: For pain control for moderate/severe pain    acetaminophen 325 mg oral tablet  -- 2 tab(s) by mouth every 6 hours as needed for Mild pain  begin tapering off as pain decreases  MDD Twelve tabs  -- Indication: For pain control for mild pain    aspirin 81 mg oral tablet  -- 1 tab(s) by mouth once a day MUST TAKE WITH FOOD  -- Indication: For Home med MUST TAKE WITH FOOD    Lipitor 20 mg oral tablet  -- 1 tab(s) by mouth once a day (at bedtime)  -- Indication: For Home med    Zetia 10 mg oral tablet  -- 1 tab(s) by mouth once a day (at bedtime)  -- Indication: For Home med    Toprol-XL 50 mg oral tablet, extended release  -- 1 tab(s) by mouth once a day  -- Indication: For Home med    Norvasc 10 mg oral tablet  -- 1 tab(s) by mouth once a day  -- Indication: For Home med    senna oral tablet  -- 2 tab(s) by mouth once a day (at bedtime)  over the counter for constipation caused by pain meds, hold off taking if loose BM  -- Indication: For bowel stimulant    docusate sodium 100 mg oral capsule  -- 1 cap(s) by mouth 3 times a day'  over the counter for constipation caused by pain meds    -- Indication: For stool softener    CoQ10 300 mg oral capsule  -- 1 cap(s) by mouth once a day  -- Indication: For Home med    Travatan 0.004% ophthalmic solution  -- 1 drop(s) to each affected eye once a day (in the evening)  -- Indication: For Home med    Multiple Vitamins oral tablet  -- 1 tab(s) by mouth once a day   -- Indication: For Home med    folic acid 1 mg oral tablet  -- 1 tab(s) by mouth once a day  -- Indication: For Home med    Vitamin C  -- orally once a day  -- Indication: For Home med    Vitamin D3  -- orally once a day  -- Indication: For Home med

## 2018-10-05 NOTE — DISCHARGE NOTE ADULT - INSTRUCTIONS
weight bear as tolerated right upper extremity in sling   resume same diet as prior to surgery   Dr Ravi 1 week call for appt [phone] Maintain scapular incision and dressing clean and dry with sling in place for elevation and support. Call MD with any signs of infection ie fever, redness or drainage, or with signs of bleeding, increased unrelieved pain or persistent nausea. Continue to drink plenty of fluids and avoid strenuous activity and constipation which may be a side effect from taking narcotic pain meds. Follow-up in MD and PMD office as instructed. follow activity restrictions as per surgeon and PT. resume same diet as prior to surgery

## 2018-10-05 NOTE — OCCUPATIONAL THERAPY INITIAL EVALUATION ADULT - RANGE OF MOTION EXAMINATION, UPPER EXTREMITY
Right UE--->shoulder not tested, elbow/wrist/hand AROM WFL/Left UE Active ROM was WFL (within functional limits)

## 2018-10-05 NOTE — DISCHARGE NOTE ADULT - HOSPITAL COURSE
84 yo is s/p  above without any intraoperative complications.  Pt is weight bear as tolerated right upper extremity in sling doing well and stable for discharge home. call surgeon's office one week to make appt. D/C sutures/staples POD 14 Pt on ECASA 81 mg po daily for anticoagulation 86yo male is s/p elective Right scapular partial resection of neoplasm 10/4/2018 without any intraoperative complications.  Patient is doing well and stable for discharge.  Patient is tolerating physical therapy: weight bearing to Right arm, in sling for comfort.  Keep dressing on as is until day of staple removal.  Staples/sutures to be removed in Dr. Ravi's office 14 days from date of surgery.  Patient is on home med Aspirin (MUST TAKE WITH FOOD) for anticoagulation.  Orthopaedic Surgeon Dr. Ravi would like patient to call private MD/Internist for appointment postop to maintain continuity of care.  Follow up with Dr. Ravi's office in 1-2 weeks. 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  Patient is doing well and stable for discharge.  Patient is tolerating physical therapy: weight bearing to Right arm, in sling for comfort.  Keep dressing on as is until day of staple removal.  Staples/sutures to be removed in Dr. Ravi's office 14 days from date of surgery.  Patient is on home med Aspirin (MUST TAKE WITH FOOD) for anticoagulation.  Orthopaedic Surgeon Dr. Ravi would like patient to call private MD/Internist for appointment postop to maintain continuity of care, as well as a Nephrologist for elevated Creatinine.  Follow up with Dr. Ravi's office in 1-2 weeks.

## 2018-10-05 NOTE — CHART NOTE - NSCHARTNOTEFT_GEN_A_CORE
Orthopedic Surgery Postop Check  S: Patient underwent  and tolerated procedure without  issue and sent to PACU.  Patient denies chest pain, shortness of breath, nausea, vomiting, lightheadedness, or dizziness.  Pain was well controlled.      O:T(C): 36.5 (10-04-18 @ 22:00), Max: 36.8 (10-04-18 @ 17:40)  HR: 72 (10-05-18 @ 00:00) (70 - 76)  BP: 140/65 (10-05-18 @ 00:00) (140/65 - 163/69)  RR: 12 (10-05-18 @ 00:00) (12 - 20)  SpO2: 97% (10-05-18 @ 00:00) (93% - 98%)  Wt(kg): --    Gen: NAD  RUE   Dressing C/D/I  AIN/PIN/U intact  SILT M/U/R  WWP distally              Assessment/Plan:  85y Male s/p Excision of right scapula, open approach    Pain control  Reg Diet  DVT PPX  PT/OOB  Incentive spirometry  Dispo Planning

## 2018-10-05 NOTE — PHYSICAL THERAPY INITIAL EVALUATION ADULT - GENERAL OBSERVATIONS, REHAB EVAL
Consult received, chart reviewed. Patient received supine in bed, NAD, + XX, +XX, +XX. Patient agreed to EVALUATION from Physical Therapist. Consult received, chart reviewed. Patient received supine in bed, NAD, +Right UE sling. Patient agreed to EVALUATION from Physical Therapist.

## 2018-10-05 NOTE — PHYSICAL THERAPY INITIAL EVALUATION ADULT - PLANNED THERAPY INTERVENTIONS, PT EVAL
balance training/bed mobility training/transfer training/ROM/strengthening/gait training/postural re-education/stair training

## 2018-10-05 NOTE — PHYSICAL THERAPY INITIAL EVALUATION ADULT - CRITERIA FOR SKILLED THERAPEUTIC INTERVENTIONS
predicted duration of therapy intervention/impairments found/therapy frequency/rehab potential/anticipated equipment needs at discharge/anticipated discharge recommendation

## 2018-10-05 NOTE — CONSULT NOTE ADULT - ASSESSMENT
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

## 2018-10-05 NOTE — DISCHARGE NOTE ADULT - NS AS DC FOLLOWUP STROKE INST
Smoking Cessation/Medline and carenotes for surgical procedure as well as DC Medications and side effects literature for patient reference.

## 2018-10-05 NOTE — CHART NOTE - NSCHARTNOTEFT_GEN_A_CORE
Reviewed repeat BMP and urine test results. Improvement in hyperkalemia s/p medical therapy. Cr now at 2.66. FENa noted to be 0.7%, suggestive of prerenal etiology and pt with mild hyponatremia. Given pt was previously NPO before surgery and low FENa, will do trial of IV fluids. D/c lactated ringers. Start IV normal saline at 100cc/hr. Repeat BMP in the AM.

## 2018-10-05 NOTE — CONSULT NOTE ADULT - SUBJECTIVE AND OBJECTIVE BOX
CHIEF COMPLAINT: Patient is a 85y old  Male who presents with a chief complaint of s/p resection right scapular neoplasm 10/4/18 (05 Oct 2018 06:27)    HPI:   Mr. 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 Patient reports hx of hyperkalemia in the past. He states his PMD was recommending he follow-up with a nephrologist for further w/u. However, he pursued recent procedure instead. Patient is not on ACEi or ARB as an outpatient. Patient reports hx of urinary hesitancy but reports complete emptying with urination. No fever, chills. No gross hematuria. Pain is well controlled      Allergies  No Known Allergies    HOME MEDICATIONS: [x] Reviewed    MEDICATIONS  (STANDING):  acetaminophen   Tablet .. 650 milliGRAM(s) Oral every 6 hours  amLODIPine   Tablet 10 milliGRAM(s) Oral daily  aspirin enteric coated 81 milliGRAM(s) Oral daily  atorvastatin 20 milliGRAM(s) Oral at bedtime  docusate sodium 100 milliGRAM(s) Oral three times a day  famotidine    Tablet 20 milliGRAM(s) Oral two times a day  influenza  Vaccine (HIGH DOSE) 0.5 milliLiter(s) IntraMuscular once  lactated ringers. 1000 milliLiter(s) (50 mL/Hr) IV Continuous <Continuous>  latanoprost 0.005% Ophthalmic Solution 1 Drop(s) Both EYES at bedtime  metoprolol succinate ER 50 milliGRAM(s) Oral daily  polyethylene glycol 3350 17 Gram(s) Oral daily  senna 2 Tablet(s) Oral at bedtime    MEDICATIONS  (PRN):  magnesium hydroxide Suspension 30 milliLiter(s) Oral daily PRN Constipation  morphine  - Injectable 2 milliGRAM(s) IV Push every 4 hours PRN breakthrough pain  ondansetron Injectable 4 milliGRAM(s) IV Push every 6 hours PRN Nausea and/or Vomiting  oxyCODONE    IR 5 milliGRAM(s) Oral every 4 hours PRN Moderate to Severe pain  oxyCODONE    IR 2.5 milliGRAM(s) Oral every 4 hours PRN Mild Pain (1 - 3)      PAST MEDICAL & SURGICAL HISTORY:  Disorder of bone, unspecified  Coronary artery disease  Dyslipidemia  Arteriosclerotic heart disease (ASHD)  Hypertension  Hip pain: Right hip replacement in 2008  FH: CABG (coronary artery bypass surgery): 1994  [x ] Reviewed     SOCIAL HISTORY:  [x] Substance abuse: None  [x] Alcohol use: none  [x] Tobacco: none    FAMILY HISTORY:  No pertinent family history in first degree relatives  [x] No pertinent family history in first degree relatives     REVIEW OF SYSTEMS:  [x] All other ROS negative  [  ] Unable to obtain due to poor mental status    Vital Signs Last 24 Hrs  T(C): 36.8 (05 Oct 2018 17:07), Max: 36.8 (05 Oct 2018 16:25)  T(F): 98.3 (05 Oct 2018 17:07), Max: 98.3 (05 Oct 2018 17:07)  HR: 75 (05 Oct 2018 17:07) (70 - 86)  BP: 120/65 (05 Oct 2018 17:07) (115/58 - 158/66)  BP(mean): --  RR: 17 (05 Oct 2018 17:07) (12 - 20)  SpO2: 96% (05 Oct 2018 17:07) (94% - 99%)    PHYSICAL EXAM:  GENERAL: Elderly man in NAD  EYES: EOMI, normal conjunctiva and sclera clear  RESPIRATORY: Clear to auscultation bilaterally; No rales, rhonchi, wheezing, or rubs  CARDIOVASCULAR: Regular rate and rhythm; No murmurs, rubs, or gallops  GASTROINTESTINAL: Soft, Nontender, Nondistended; Bowel sounds present  BACK: R posterior shoulder dressing c/d/i and Hemovac drain in place  EXTREMITIES:  2+ Peripheral Pulses, No clubbing, cyanosis, or edema. R arm in sling  NERVOUS SYSTEM:  Alert & Oriented X3; Moving all extremities, except R arm which has limited ROM due to R sling. No gross sensory deficits      LABS:                        9.5    15.63 )-----------( 261      ( 05 Oct 2018 13:30 )             29.5     Hemoglobin: 9.5 g/dL (10-05 @ 13:30)  Hemoglobin: 10.8 g/dL (10-05 @ 07:54)    10-05    132<L>  |  95<L>  |  33<H>  ----------------------------<  194<H>  6.5<HH>   |  21<L>  |  2.62<H>    Ca    8.6      05 Oct 2018 13:30        EKG:   Personally Reviewed:  [x] YES   10/5 EKG done - NSR with sinus arrhythmia. HR 70. LAD. No peaked T waves. normal DC and QRS intervals. Q waves in leads II, III, aVF (unchanged from prior EKG 9/2018).    Imaging:   Personally Reviewed:  [x] YES               [ ] Consultant(s) Notes Reviewed  [x] Care Discussed with Consultants/Other Providers: Ortho PA - discussed

## 2018-10-05 NOTE — DISCHARGE NOTE ADULT - CARE PROVIDER_API CALL
Esau Ravi), Orthopedics  [address]  [place] [address]  Phone: [phone]  Fax: [phone]

## 2018-10-05 NOTE — DISCHARGE NOTE ADULT - ADDITIONAL INSTRUCTIONS
weight bear as tolerated right upper extremity   resume same diet as prior to surgery   Dr Ravi 1 week call for appt [phone] post surgical care  86yo male is s/p elective Right scapular partial resection of neoplasm 10/4/2018 without any intraoperative complications.  Patient is doing well and stable for discharge.  Patient is tolerating physical therapy: weight bearing to Right arm, in sling for comfort.  Keep dressing on as is until day of staple removal.  Staples/sutures to be removed in Dr. Ravi's office 14 days from date of surgery.  Patient is on home med Aspirin (MUST TAKE WITH FOOD) for anticoagulation.  Orthopaedic Surgeon Dr. Ravi would like patient to call private MD/Internist for appointment postop to maintain continuity of care.  Follow up with Dr. Ravi's office in 1-2 weeks. post surgical care  84yo male is s/p elective Right scapular partial resection of neoplasm 10/4/2018 without any intraoperative complications.  Patient is doing well and stable for discharge.  Patient is tolerating physical therapy: weight bearing to Right arm, in sling for comfort.  Keep dressing on as is until day of staple removal.  Staples/sutures to be removed in Dr. Ravi's office 14 days from date of surgery.  Patient is on home med Aspirin (MUST TAKE WITH FOOD) for anticoagulation.  Orthopaedic Surgeon Dr. Ravi would like patient to call private MD/Internist for appointment postop to maintain continuity of care, as well as a Nephrologist for elevated Creatinine.  Follow up with Dr. Ravi's office in 1-2 weeks.

## 2018-10-05 NOTE — PHYSICAL THERAPY INITIAL EVALUATION ADULT - ADDITIONAL COMMENTS
Pt has straight cane if needed.     Patient left sitting in chair bedside, NAD, call bell in reach, all lines/devices intact, RN aware.

## 2018-10-05 NOTE — DISCHARGE NOTE ADULT - PATIENT PORTAL LINK FT
You can access the [facility] Portal, offered by [facility], by registering with the following website: [email or web address]

## 2018-10-05 NOTE — DISCHARGE NOTE ADULT - CARE PLAN
Principal Discharge DX:	Disorder of bone, unspecified  Goal:	resection of neoplasmy  Assessment and plan of treatment:	weight bear as tolerated right upper extremity   resume same diet as prior to surgery   Dr Ravi 1 week call for appt [phone] Principal Discharge DX:	Disorder of bone, unspecified  Goal:	pain control -> pain med may cause drowsiness, refrain from activities require decision making; physical therapy to help resume activities of daily living  Assessment and plan of treatment:	weight bear as tolerated right upper extremity   call Surgeon's office to make appointment in 1 week Dr. Ravi [phone] Principal Discharge DX:	Disorder of bone, unspecified  Goal:	pain control -> pain med may cause drowsiness, refrain from activities require decision making; physical therapy to help resume activities of daily living  Assessment and plan of treatment:	weight bear as tolerated right upper extremity   call Surgeon's office to make appointment in 1 week Dr. Ravi [phone]  Secondary Diagnosis:	Creatinine elevation  Goal:	must follow up with a Nephrologist  Assessment and plan of treatment:	call Internist to monitor elevated Creatinine and referral to see the specialist - Nephologist

## 2018-10-05 NOTE — CONSULT NOTE ADULT - PROBLEM SELECTOR RECOMMENDATION 2
Unclear etiology. Reviewed intraop report - no documented hypotensive episodes, no administration of overt nephrotoxic meds. Bedside bladder sono unremarkable  - strict I&Os  - obtain renal and bladder US  - hold further IV hydration for now  - obtain urine studies - urine sodium, urine cr, urine potassium

## 2018-10-06 LAB
BUN SERPL-MCNC: 43 MG/DL — HIGH (ref 7–23)
CALCIUM SERPL-MCNC: 8.6 MG/DL — SIGNIFICANT CHANGE UP (ref 8.4–10.5)
CHLORIDE SERPL-SCNC: 99 MMOL/L — SIGNIFICANT CHANGE UP (ref 98–107)
CO2 SERPL-SCNC: 24 MMOL/L — SIGNIFICANT CHANGE UP (ref 22–31)
CREAT SERPL-MCNC: 2.66 MG/DL — HIGH (ref 0.5–1.3)
GLUCOSE SERPL-MCNC: 109 MG/DL — HIGH (ref 70–99)
POTASSIUM SERPL-MCNC: 5.3 MMOL/L — SIGNIFICANT CHANGE UP (ref 3.5–5.3)
POTASSIUM SERPL-SCNC: 5.3 MMOL/L — SIGNIFICANT CHANGE UP (ref 3.5–5.3)
POTASSIUM UR-SCNC: 59 MMOL/L — SIGNIFICANT CHANGE UP
SODIUM SERPL-SCNC: 135 MMOL/L — SIGNIFICANT CHANGE UP (ref 135–145)

## 2018-10-06 PROCEDURE — 99233 SBSQ HOSP IP/OBS HIGH 50: CPT

## 2018-10-06 RX ADMIN — SODIUM CHLORIDE 100 MILLILITER(S): 9 INJECTION INTRAMUSCULAR; INTRAVENOUS; SUBCUTANEOUS at 21:31

## 2018-10-06 RX ADMIN — LATANOPROST 1 DROP(S): 0.05 SOLUTION/ DROPS OPHTHALMIC; TOPICAL at 21:31

## 2018-10-06 RX ADMIN — OXYCODONE HYDROCHLORIDE 5 MILLIGRAM(S): 5 TABLET ORAL at 23:50

## 2018-10-06 RX ADMIN — OXYCODONE HYDROCHLORIDE 5 MILLIGRAM(S): 5 TABLET ORAL at 12:50

## 2018-10-06 RX ADMIN — Medication 50 MILLIGRAM(S): at 05:30

## 2018-10-06 RX ADMIN — Medication 650 MILLIGRAM(S): at 11:58

## 2018-10-06 RX ADMIN — Medication 650 MILLIGRAM(S): at 23:50

## 2018-10-06 RX ADMIN — Medication 81 MILLIGRAM(S): at 11:59

## 2018-10-06 RX ADMIN — OXYCODONE HYDROCHLORIDE 5 MILLIGRAM(S): 5 TABLET ORAL at 05:29

## 2018-10-06 RX ADMIN — SODIUM CHLORIDE 100 MILLILITER(S): 9 INJECTION INTRAMUSCULAR; INTRAVENOUS; SUBCUTANEOUS at 11:59

## 2018-10-06 RX ADMIN — FAMOTIDINE 20 MILLIGRAM(S): 10 INJECTION INTRAVENOUS at 05:30

## 2018-10-06 RX ADMIN — OXYCODONE HYDROCHLORIDE 5 MILLIGRAM(S): 5 TABLET ORAL at 06:00

## 2018-10-06 RX ADMIN — AMLODIPINE BESYLATE 10 MILLIGRAM(S): 2.5 TABLET ORAL at 05:30

## 2018-10-06 RX ADMIN — OXYCODONE HYDROCHLORIDE 5 MILLIGRAM(S): 5 TABLET ORAL at 19:00

## 2018-10-06 RX ADMIN — Medication 100 MILLIGRAM(S): at 05:30

## 2018-10-06 RX ADMIN — ATORVASTATIN CALCIUM 20 MILLIGRAM(S): 80 TABLET, FILM COATED ORAL at 21:30

## 2018-10-06 RX ADMIN — FAMOTIDINE 20 MILLIGRAM(S): 10 INJECTION INTRAVENOUS at 17:22

## 2018-10-06 RX ADMIN — OXYCODONE HYDROCHLORIDE 5 MILLIGRAM(S): 5 TABLET ORAL at 11:59

## 2018-10-06 RX ADMIN — OXYCODONE HYDROCHLORIDE 5 MILLIGRAM(S): 5 TABLET ORAL at 18:27

## 2018-10-06 RX ADMIN — POLYETHYLENE GLYCOL 3350 17 GRAM(S): 17 POWDER, FOR SOLUTION ORAL at 11:58

## 2018-10-06 RX ADMIN — Medication 650 MILLIGRAM(S): at 17:22

## 2018-10-06 RX ADMIN — Medication 650 MILLIGRAM(S): at 05:30

## 2018-10-06 RX ADMIN — OXYCODONE HYDROCHLORIDE 5 MILLIGRAM(S): 5 TABLET ORAL at 00:40

## 2018-10-07 LAB
BLD GP AB SCN SERPL QL: NEGATIVE — SIGNIFICANT CHANGE UP
BUN SERPL-MCNC: 38 MG/DL — HIGH (ref 7–23)
CALCIUM SERPL-MCNC: 8.4 MG/DL — SIGNIFICANT CHANGE UP (ref 8.4–10.5)
CHLORIDE SERPL-SCNC: 100 MMOL/L — SIGNIFICANT CHANGE UP (ref 98–107)
CO2 SERPL-SCNC: 22 MMOL/L — SIGNIFICANT CHANGE UP (ref 22–31)
CREAT SERPL-MCNC: 2.08 MG/DL — HIGH (ref 0.5–1.3)
GLUCOSE SERPL-MCNC: 92 MG/DL — SIGNIFICANT CHANGE UP (ref 70–99)
HCT VFR BLD CALC: 24.4 % — LOW (ref 39–50)
HGB BLD-MCNC: 7.9 G/DL — LOW (ref 13–17)
MCHC RBC-ENTMCNC: 29.7 PG — SIGNIFICANT CHANGE UP (ref 27–34)
MCHC RBC-ENTMCNC: 32.4 % — SIGNIFICANT CHANGE UP (ref 32–36)
MCV RBC AUTO: 91.7 FL — SIGNIFICANT CHANGE UP (ref 80–100)
NRBC # FLD: 0 — SIGNIFICANT CHANGE UP
PLATELET # BLD AUTO: 209 K/UL — SIGNIFICANT CHANGE UP (ref 150–400)
PMV BLD: 9.5 FL — SIGNIFICANT CHANGE UP (ref 7–13)
POTASSIUM SERPL-MCNC: 4.5 MMOL/L — SIGNIFICANT CHANGE UP (ref 3.5–5.3)
POTASSIUM SERPL-SCNC: 4.5 MMOL/L — SIGNIFICANT CHANGE UP (ref 3.5–5.3)
RBC # BLD: 2.66 M/UL — LOW (ref 4.2–5.8)
RBC # FLD: 14.6 % — HIGH (ref 10.3–14.5)
RH IG SCN BLD-IMP: POSITIVE — SIGNIFICANT CHANGE UP
SODIUM SERPL-SCNC: 135 MMOL/L — SIGNIFICANT CHANGE UP (ref 135–145)
WBC # BLD: 11.92 K/UL — HIGH (ref 3.8–10.5)
WBC # FLD AUTO: 11.92 K/UL — HIGH (ref 3.8–10.5)

## 2018-10-07 PROCEDURE — 99233 SBSQ HOSP IP/OBS HIGH 50: CPT

## 2018-10-07 RX ADMIN — Medication 100 MILLIGRAM(S): at 21:58

## 2018-10-07 RX ADMIN — OXYCODONE HYDROCHLORIDE 5 MILLIGRAM(S): 5 TABLET ORAL at 22:28

## 2018-10-07 RX ADMIN — OXYCODONE HYDROCHLORIDE 5 MILLIGRAM(S): 5 TABLET ORAL at 21:58

## 2018-10-07 RX ADMIN — Medication 50 MILLIGRAM(S): at 05:51

## 2018-10-07 RX ADMIN — OXYCODONE HYDROCHLORIDE 5 MILLIGRAM(S): 5 TABLET ORAL at 00:20

## 2018-10-07 RX ADMIN — SODIUM CHLORIDE 100 MILLILITER(S): 9 INJECTION INTRAMUSCULAR; INTRAVENOUS; SUBCUTANEOUS at 20:44

## 2018-10-07 RX ADMIN — AMLODIPINE BESYLATE 10 MILLIGRAM(S): 2.5 TABLET ORAL at 05:51

## 2018-10-07 RX ADMIN — ATORVASTATIN CALCIUM 20 MILLIGRAM(S): 80 TABLET, FILM COATED ORAL at 21:58

## 2018-10-07 RX ADMIN — OXYCODONE HYDROCHLORIDE 5 MILLIGRAM(S): 5 TABLET ORAL at 07:13

## 2018-10-07 RX ADMIN — Medication 100 MILLIGRAM(S): at 05:51

## 2018-10-07 RX ADMIN — FAMOTIDINE 20 MILLIGRAM(S): 10 INJECTION INTRAVENOUS at 17:48

## 2018-10-07 RX ADMIN — Medication 650 MILLIGRAM(S): at 05:51

## 2018-10-07 RX ADMIN — OXYCODONE HYDROCHLORIDE 5 MILLIGRAM(S): 5 TABLET ORAL at 17:49

## 2018-10-07 RX ADMIN — Medication 100 MILLIGRAM(S): at 12:44

## 2018-10-07 RX ADMIN — OXYCODONE HYDROCHLORIDE 5 MILLIGRAM(S): 5 TABLET ORAL at 13:12

## 2018-10-07 RX ADMIN — OXYCODONE HYDROCHLORIDE 5 MILLIGRAM(S): 5 TABLET ORAL at 07:43

## 2018-10-07 RX ADMIN — LATANOPROST 1 DROP(S): 0.05 SOLUTION/ DROPS OPHTHALMIC; TOPICAL at 21:59

## 2018-10-07 RX ADMIN — SENNA PLUS 2 TABLET(S): 8.6 TABLET ORAL at 21:58

## 2018-10-07 RX ADMIN — FAMOTIDINE 20 MILLIGRAM(S): 10 INJECTION INTRAVENOUS at 05:51

## 2018-10-07 RX ADMIN — OXYCODONE HYDROCHLORIDE 5 MILLIGRAM(S): 5 TABLET ORAL at 12:44

## 2018-10-07 RX ADMIN — Medication 81 MILLIGRAM(S): at 12:44

## 2018-10-07 RX ADMIN — OXYCODONE HYDROCHLORIDE 5 MILLIGRAM(S): 5 TABLET ORAL at 18:30

## 2018-10-08 VITALS
TEMPERATURE: 98 F | RESPIRATION RATE: 18 BRPM | DIASTOLIC BLOOD PRESSURE: 58 MMHG | SYSTOLIC BLOOD PRESSURE: 136 MMHG | OXYGEN SATURATION: 100 % | HEART RATE: 81 BPM

## 2018-10-08 DIAGNOSIS — D50.0 IRON DEFICIENCY ANEMIA SECONDARY TO BLOOD LOSS (CHRONIC): ICD-10-CM

## 2018-10-08 LAB
BUN SERPL-MCNC: 28 MG/DL — HIGH (ref 7–23)
CALCIUM SERPL-MCNC: 8.5 MG/DL — SIGNIFICANT CHANGE UP (ref 8.4–10.5)
CHLORIDE SERPL-SCNC: 102 MMOL/L — SIGNIFICANT CHANGE UP (ref 98–107)
CO2 SERPL-SCNC: 22 MMOL/L — SIGNIFICANT CHANGE UP (ref 22–31)
CREAT SERPL-MCNC: 1.6 MG/DL — HIGH (ref 0.5–1.3)
GLUCOSE SERPL-MCNC: 88 MG/DL — SIGNIFICANT CHANGE UP (ref 70–99)
HCT VFR BLD CALC: 24.8 % — LOW (ref 39–50)
HCT VFR BLD CALC: 27.3 % — LOW (ref 39–50)
HGB BLD-MCNC: 7.9 G/DL — LOW (ref 13–17)
HGB BLD-MCNC: 8.9 G/DL — LOW (ref 13–17)
MCHC RBC-ENTMCNC: 28.7 PG — SIGNIFICANT CHANGE UP (ref 27–34)
MCHC RBC-ENTMCNC: 29.8 PG — SIGNIFICANT CHANGE UP (ref 27–34)
MCHC RBC-ENTMCNC: 31.9 % — LOW (ref 32–36)
MCHC RBC-ENTMCNC: 32.6 % — SIGNIFICANT CHANGE UP (ref 32–36)
MCV RBC AUTO: 90.2 FL — SIGNIFICANT CHANGE UP (ref 80–100)
MCV RBC AUTO: 91.3 FL — SIGNIFICANT CHANGE UP (ref 80–100)
NRBC # FLD: 0 — SIGNIFICANT CHANGE UP
NRBC # FLD: 0 — SIGNIFICANT CHANGE UP
PLATELET # BLD AUTO: 216 K/UL — SIGNIFICANT CHANGE UP (ref 150–400)
PLATELET # BLD AUTO: 244 K/UL — SIGNIFICANT CHANGE UP (ref 150–400)
PMV BLD: 9.2 FL — SIGNIFICANT CHANGE UP (ref 7–13)
PMV BLD: 9.4 FL — SIGNIFICANT CHANGE UP (ref 7–13)
POTASSIUM SERPL-MCNC: 4.7 MMOL/L — SIGNIFICANT CHANGE UP (ref 3.5–5.3)
POTASSIUM SERPL-SCNC: 4.7 MMOL/L — SIGNIFICANT CHANGE UP (ref 3.5–5.3)
RBC # BLD: 2.75 M/UL — LOW (ref 4.2–5.8)
RBC # BLD: 2.99 M/UL — LOW (ref 4.2–5.8)
RBC # FLD: 15.8 % — HIGH (ref 10.3–14.5)
RBC # FLD: 16.1 % — HIGH (ref 10.3–14.5)
SODIUM SERPL-SCNC: 137 MMOL/L — SIGNIFICANT CHANGE UP (ref 135–145)
WBC # BLD: 10.97 K/UL — HIGH (ref 3.8–10.5)
WBC # BLD: 9.51 K/UL — SIGNIFICANT CHANGE UP (ref 3.8–10.5)
WBC # FLD AUTO: 10.97 K/UL — HIGH (ref 3.8–10.5)
WBC # FLD AUTO: 9.51 K/UL — SIGNIFICANT CHANGE UP (ref 3.8–10.5)

## 2018-10-08 PROCEDURE — 99238 HOSP IP/OBS DSCHRG MGMT 30/<: CPT

## 2018-10-08 PROCEDURE — 99232 SBSQ HOSP IP/OBS MODERATE 35: CPT

## 2018-10-08 RX ORDER — ASPIRIN/CALCIUM CARB/MAGNESIUM 324 MG
1 TABLET ORAL
Qty: 0 | Refills: 0 | COMMUNITY

## 2018-10-08 RX ORDER — DOCUSATE SODIUM 100 MG
1 CAPSULE ORAL
Qty: 0 | Refills: 0 | COMMUNITY
Start: 2018-10-08

## 2018-10-08 RX ORDER — SENNA PLUS 8.6 MG/1
2 TABLET ORAL
Qty: 0 | Refills: 0 | COMMUNITY
Start: 2018-10-08

## 2018-10-08 RX ORDER — UBIDECARENONE 100 MG
1 CAPSULE ORAL
Qty: 0 | Refills: 0 | COMMUNITY

## 2018-10-08 RX ORDER — OXYCODONE HYDROCHLORIDE 5 MG/1
1 TABLET ORAL
Qty: 28 | Refills: 0 | OUTPATIENT
Start: 2018-10-08 | End: 2018-10-14

## 2018-10-08 RX ORDER — INFLUENZA VIRUS VACCINE 15; 15; 15; 15 UG/.5ML; UG/.5ML; UG/.5ML; UG/.5ML
0.5 SUSPENSION INTRAMUSCULAR ONCE
Qty: 0 | Refills: 0 | Status: COMPLETED | OUTPATIENT
Start: 2018-10-08 | End: 2018-10-08

## 2018-10-08 RX ADMIN — FAMOTIDINE 20 MILLIGRAM(S): 10 INJECTION INTRAVENOUS at 05:36

## 2018-10-08 RX ADMIN — OXYCODONE HYDROCHLORIDE 5 MILLIGRAM(S): 5 TABLET ORAL at 06:05

## 2018-10-08 RX ADMIN — Medication 100 MILLIGRAM(S): at 05:36

## 2018-10-08 RX ADMIN — Medication 50 MILLIGRAM(S): at 05:36

## 2018-10-08 RX ADMIN — OXYCODONE HYDROCHLORIDE 5 MILLIGRAM(S): 5 TABLET ORAL at 05:35

## 2018-10-08 RX ADMIN — OXYCODONE HYDROCHLORIDE 5 MILLIGRAM(S): 5 TABLET ORAL at 12:47

## 2018-10-08 RX ADMIN — INFLUENZA VIRUS VACCINE 0.5 MILLILITER(S): 15; 15; 15; 15 SUSPENSION INTRAMUSCULAR at 13:29

## 2018-10-08 RX ADMIN — AMLODIPINE BESYLATE 10 MILLIGRAM(S): 2.5 TABLET ORAL at 05:35

## 2018-10-08 NOTE — PROGRESS NOTE ADULT - PROBLEM SELECTOR PLAN 2
Trending down but likely multifactorial in setting of recent procedure, CKD  s/p 1U PRBCs today   f/u post transfusion CBC. Trend CBC  Monitor hemodynamics
Trending down but likely multifactorial in setting of recent procedure, CKD  s/p 1Unit PRBCs on 10/7  H/H now stable  Monitor hemodynamics
Unclear etiology. No documented hypotensive episodes, no administration of overt nephrotoxic meds.   - strict I&Os  - pending renal and bladder US  - c/w IV hydration. Encourage PO hydration  -trend BMP. If not improving consider renal eval

## 2018-10-08 NOTE — PROGRESS NOTE ADULT - PROBLEM SELECTOR PLAN 5
- continue home regimen of amlodipine 10mg daily  - monitor BP.
BPs stable  c/w amlodipine 10mg daily

## 2018-10-08 NOTE — PROGRESS NOTE ADULT - PROBLEM SELECTOR PLAN 6
continue home regimen of metoprolol succinate 50mg daily  - continue home regimen of atorvastatin 20mg daily.
stable c/w ASA, metoprolol, and  atorvastatin.

## 2018-10-08 NOTE — PROGRESS NOTE ADULT - PROBLEM SELECTOR PLAN 1
S/p  right scapula exploration and partial resection. S/p drain removal  -management per ortho  -pain control  -f/u surgical path
Resolved . Likely due to GERMAN. S/p regular insulin 10 units/amp D50 x1 dose and kayexalate  - continue to monitor BMP
S/p  right scapula exploration and partial resection. S/p drain removal  -management per ortho  -pain control  -f/u surgical path

## 2018-10-08 NOTE — PROGRESS NOTE ADULT - PROBLEM SELECTOR PROBLEM 4
Acute kidney injury superimposed on CKD
Acute kidney injury superimposed on CKD
Coronary artery disease

## 2018-10-08 NOTE — PROGRESS NOTE ADULT - PROBLEM SELECTOR PLAN 4
Improving . Unclear etiology. No documented hypotensive episodes, no administration of overt nephrotoxic meds.   - strict I&Os  - renal and bladder US on 10/5 reviewed and showed  Increased cortical echogenicity bilaterally, which may be secondary to medical renal disease and Bilateral renal cysts.  - c/w IV hydration. Encourage PO hydration as much as possible   -trend BMP.
Improving . Unclear etiology. No documented hypotensive episodes, no administration of overt nephrotoxic meds.   - strict I&Os  - renal and bladder US on 10/5 reviewed and showed cortical echogenicity bilaterally, which may be secondary to medical renal disease and Bilateral renal cysts.  - c/w IV hydration. Encourage PO hydration as much as possible   -trend creatine.
continue home regimen of metoprolol succinate 50mg daily  - continue home regimen of atorvastatin 20mg daily.

## 2018-10-08 NOTE — PROGRESS NOTE ADULT - PROBLEM SELECTOR PLAN 3
Resolved . Likely due to GERMAN. S/p regular insulin 10 units/amp D50 x1 dose and kayexalate  - continue to monitor BMP
Resolved .   Likely due to GERMAN. S/p regular insulin 10 units/amp D50 x1 dose and kayexalate  monitoring K.
- continue home regimen of amlodipine 10mg daily  - monitor BP.

## 2018-10-08 NOTE — PROGRESS NOTE ADULT - ASSESSMENT
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

## 2018-10-08 NOTE — PROGRESS NOTE ADULT - SUBJECTIVE AND OBJECTIVE BOX
Ortho Progress Note    S: Patient seen and examined. No acute events overnight. Pain well controlled with current regimen. Denies lightheadedness/dizziness, CP/SOB, numbness/tingling in hands    O:  Physical Exam:  Gen: Laying in bed, NAD, alert and oriented.   Resp: Unlabored breathing  RUE:  in sling  SILT AIN/PIN/M/U/R  Motor intact AIN/PIN/U  nontender to palpation  shoulder dressing c/d/i  HMV in place     Vital Signs Last 24 Hrs  T(C): 36.4 (05 Oct 2018 05:03), Max: 36.8 (04 Oct 2018 17:40)  T(F): 97.5 (05 Oct 2018 05:03), Max: 98.3 (04 Oct 2018 17:40)  HR: 78 (05 Oct 2018 05:03) (70 - 86)  BP: 127/68 (05 Oct 2018 05:03) (116/98 - 163/69)  BP(mean): --  RR: 17 (05 Oct 2018 05:03) (12 - 20)  SpO2: 95% (05 Oct 2018 05:03) (93% - 98%)    A/P: 85M POD1 R partial scapula resection    Neuro: Pain control  Resp: IS  GI: Regular diet, bowel reg  MSK: RUE WBAT in sling  Heme: DVT PPX, asa
Ortho Progress Note    S: Patient seen and examined. No acute events overnight. Pain well controlled with current regimen. Denies lightheadedness/dizziness, CP/SOB, numbness/tingling in hands    O:  Physical Exam:  Gen: Laying in bed, NAD, alert and oriented.   Resp: Unlabored breathing  RUE:  in sling  SILT AIN/PIN/M/U/R  Motor intact AIN/PIN/U  nontender to palpation  shoulder dressing c/d/i  HMV in place     Vital Signs Last 24 Hrs  T(C): 36.4 (06 Oct 2018 21:27), Max: 37.2 (06 Oct 2018 13:32)  T(F): 97.6 (06 Oct 2018 21:27), Max: 99 (06 Oct 2018 13:32)  HR: 75 (06 Oct 2018 21:27) (72 - 88)  BP: 127/49 (06 Oct 2018 21:27) (121/54 - 135/69)  BP(mean): --  RR: 16 (06 Oct 2018 21:27) (14 - 17)  SpO2: 97% (06 Oct 2018 21:27) (93% - 98%)                                     9.5    15.63 )-----------( 261      ( 05 Oct 2018 13:30 )             29.5     10-06    135  |  99  |  43<H>  ----------------------------<  109<H>  5.3   |  24  |  2.66<H>    Ca    8.6      06 Oct 2018 07:18          A/P: 85M POD2 R partial scapula resection    Neuro: Pain control  Resp: IS  GI: Regular diet, bowel reg  MSK: RUE WBAT in sling  Heme: DVT PPX, asa
Ortho Progress Note    S: Patient seen and examined. No acute events overnight. Pain well controlled with current regimen. Denies lightheadedness/dizziness, CP/SOB, numbness/tingling in hands. Tolerating diet     O:  Physical Exam:  Gen: Laying in bed, NAD, alert and oriented.   Resp: Unlabored breathing  RUE:  in sling  SILT AIN/PIN/M/U/R  Motor intact AIN/PIN/U  nontender to palpation  shoulder dressing c/d/i  2+ radial pulse  2+ ulnar pulse    Vital Signs Last 24 Hrs  T(C): 36.6 (08 Oct 2018 05:24), Max: 36.9 (07 Oct 2018 17:19)  T(F): 97.9 (08 Oct 2018 05:24), Max: 98.5 (07 Oct 2018 17:19)  HR: 89 (08 Oct 2018 05:24) (71 - 89)  BP: 146/77 (08 Oct 2018 05:24) (121/50 - 152/63)  BP(mean): --  RR:                       7.9    11.92 )-----------( 209      ( 07 Oct 2018 06:12 )             24.4   18 (08 Oct 2018 05:24) (18 - 18)  SpO2: 91% (08 Oct 2018 05:24) (89% - 99%)                       10-07    135  |  100  |  38<H>  ----------------------------<  92  4.5   |  22  |  2.08<H>    Ca    8.4      07 Oct 2018 06:12            A/P: 85M POD4 R partial scapula resection    Neuro: Pain control  Resp: IS  GI: Regular diet, bowel reg  MSK: RUE WBAT in sling  Heme: DVT PPX, asa  f/u K+  f/u H/H  Dispo: home today if labs wnl
Ortho Progress Note    S: Patient seen and examined. No acute events. Hyperkalemia 6.5 -->5.5 sp kayaxelate and insulin. Denies lightheadedness/dizziness, CP/SOB, numbness/tingling in hands    O:  Physical Exam:  Gen: Laying in bed, NAD, alert and oriented.   Resp: Unlabored breathing  RUE:  in sling  SILT AIN/PIN/M/U/R  Motor intact AIN/PIN/U  nontender to palpation  shoulder dressing c/d/i  HMV in place     ICU Vital Signs Last 24 Hrs  T(C): 36.6 (06 Oct 2018 05:24), Max: 36.8 (05 Oct 2018 16:25)  T(F): 97.9 (06 Oct 2018 05:24), Max: 98.3 (05 Oct 2018 17:07)  HR: 83 (06 Oct 2018 05:24) (70 - 83)  BP: 127/61 (06 Oct 2018 05:24) (115/58 - 130/72)  BP(mean): --  ABP: --  ABP(mean): --  RR: 14 (06 Oct 2018 05:24) (14 - 17)  SpO2: 93% (06 Oct 2018 05:24) (93% - 99%)    10-05    131<L>  |  95<L>  |  38<H>  ----------------------------<  111<H>  5.5<H>   |  20<L>  |  2.66<H>    Ca    9.5      05 Oct 2018 18:24                          9.5    15.63 )-----------( 261      ( 05 Oct 2018 13:30 )             29.5       A/P: 85M POD1 R partial scapula resection    Neuro: Pain control  Resp: IS  GI: Regular diet, bowel reg  MSK: RUE WBAT in sling  Heme: DVT PPX, asa  FU BMP
Patient is a 85y old  Male who presents with a chief complaint of Excision of neoplasm of right scapula (05 Oct 2018 18:14)      SUBJECTIVE / OVERNIGHT EVENTS: Pt denies pain, SOB, n/v     Review of Systems:     MEDICATIONS  (STANDING):  acetaminophen   Tablet .. 650 milliGRAM(s) Oral every 6 hours  amLODIPine   Tablet 10 milliGRAM(s) Oral daily  aspirin enteric coated 81 milliGRAM(s) Oral daily  atorvastatin 20 milliGRAM(s) Oral at bedtime  docusate sodium 100 milliGRAM(s) Oral three times a day  famotidine    Tablet 20 milliGRAM(s) Oral two times a day  influenza  Vaccine (HIGH DOSE) 0.5 milliLiter(s) IntraMuscular once  latanoprost 0.005% Ophthalmic Solution 1 Drop(s) Both EYES at bedtime  metoprolol succinate ER 50 milliGRAM(s) Oral daily  polyethylene glycol 3350 17 Gram(s) Oral daily  senna 2 Tablet(s) Oral at bedtime  sodium chloride 0.9%. 1000 milliLiter(s) (100 mL/Hr) IV Continuous <Continuous>    MEDICATIONS  (PRN):  bisacodyl Suppository 10 milliGRAM(s) Rectal daily PRN If no bowel movement  magnesium hydroxide Suspension 30 milliLiter(s) Oral daily PRN Constipation  morphine  - Injectable 2 milliGRAM(s) IV Push every 4 hours PRN breakthrough pain  ondansetron Injectable 4 milliGRAM(s) IV Push every 6 hours PRN Nausea and/or Vomiting  oxyCODONE    IR 5 milliGRAM(s) Oral every 4 hours PRN Moderate to Severe pain  oxyCODONE    IR 2.5 milliGRAM(s) Oral every 4 hours PRN Mild Pain (1 - 3)      PHYSICAL EXAM:    I&O's Summary    05 Oct 2018 07:  -  06 Oct 2018 07:00  --------------------------------------------------------  IN: 650 mL / OUT: 557.5 mL / NET: 92.5 mL    06 Oct 2018 07:  -  06 Oct 2018 15:43  --------------------------------------------------------  IN: 0 mL / OUT: 472 mL / NET: -472 mL    Vital Signs Last 24 Hrs  T(C): 37.2 (06 Oct 2018 13:32), Max: 37.2 (06 Oct 2018 13:32)  T(F): 99 (06 Oct 2018 13:32), Max: 99 (06 Oct 2018 13:32)  HR: 85 (06 Oct 2018 13:32) (72 - 88)  BP: 132/66 (06 Oct 2018 13:32) (117/53 - 135/69)  BP(mean): --  RR: 17 (06 Oct 2018 13:32) (14 - 17)  SpO2: 95% (06 Oct 2018 13:32) (93% - 98%)    LABS:  CAPILLARY BLOOD GLUCOSE    PHYSICAL EXAM:  GENERAL:  NAD  EYES: EOMI, normal conjunctiva and sclera clear  RESPIRATORY: Clear to auscultation bilaterally; No rales, rhonchi, wheezing, or rubs  CARDIOVASCULAR: Regular rate and rhythm; No murmurs, rubs, or gallops  GASTROINTESTINAL: Soft, Nontender, Nondistended; Bowel sounds present  BACK: R posterior shoulder dressing c/d/i and Hemovac drain in place  EXTREMITIES:  2+ Peripheral Pulses, No clubbing, cyanosis, or edema. R arm in sling  NERVOUS SYSTEM:  Alert & Oriented X3; Moving all extremities, except R arm which has limited ROM due to R sling. No gross sensory deficits                              9.5    15.63 )-----------( 261      ( 05 Oct 2018 13:30 )             29.5     10-    135  |  99  |  43<H>  ----------------------------<  109<H>  5.3   |  24  |  2.66<H>    Ca    8.6      06 Oct 2018 07:18            Urinalysis Basic - ( 05 Oct 2018 18:24 )    Color: YELLOW / Appearance: CLEAR / S.029 / pH: 6.0  Gluc: NEGATIVE / Ketone: 10  / Bili: NEGATIVE / Urobili: NORMAL   Blood: NEGATIVE / Protein: 100 / Nitrite: NEGATIVE   Leuk Esterase: TRACE / RBC: 2-5 / WBC 5-10   Sq Epi: x / Non Sq Epi: OCC / Bacteria: x        RADIOLOGY & ADDITIONAL TESTS:    Imaging Personally Reviewed:    Consultant(s) Notes Reviewed:      Care Discussed with Consultants/Other Providers:
Patient is a 85y old  Male who presents with a chief complaint of Scapula exploration (07 Oct 2018 17:08)      SUBJECTIVE / OVERNIGHT EVENTS:  Patient has no new complaints. Denies cp, SOB, abdominal pain, N/V/D     MEDICATIONS  (STANDING):  amLODIPine   Tablet 10 milliGRAM(s) Oral daily  aspirin enteric coated 81 milliGRAM(s) Oral daily  atorvastatin 20 milliGRAM(s) Oral at bedtime  docusate sodium 100 milliGRAM(s) Oral three times a day  famotidine    Tablet 20 milliGRAM(s) Oral two times a day  latanoprost 0.005% Ophthalmic Solution 1 Drop(s) Both EYES at bedtime  metoprolol succinate ER 50 milliGRAM(s) Oral daily  polyethylene glycol 3350 17 Gram(s) Oral daily  senna 2 Tablet(s) Oral at bedtime    MEDICATIONS  (PRN):  bisacodyl Suppository 10 milliGRAM(s) Rectal daily PRN If no bowel movement  magnesium hydroxide Suspension 30 milliLiter(s) Oral daily PRN Constipation  morphine  - Injectable 2 milliGRAM(s) IV Push every 4 hours PRN breakthrough pain  ondansetron Injectable 4 milliGRAM(s) IV Push every 6 hours PRN Nausea and/or Vomiting  oxyCODONE    IR 5 milliGRAM(s) Oral every 4 hours PRN Moderate to Severe pain  oxyCODONE    IR 2.5 milliGRAM(s) Oral every 4 hours PRN Mild Pain (1 - 3)      Vital Signs Last 24 Hrs  T(C): 36.7 (08 Oct 2018 09:29), Max: 36.9 (07 Oct 2018 17:19)  T(F): 98.1 (08 Oct 2018 09:29), Max: 98.5 (07 Oct 2018 17:19)  HR: 81 (08 Oct 2018 09:29) (81 - 89)  BP: 136/58 (08 Oct 2018 09:29) (136/58 - 152/63)  BP(mean): --  RR: 18 (08 Oct 2018 09:29) (18 - 18)  SpO2: 100% (08 Oct 2018 09:29) (89% - 100%)  CAPILLARY BLOOD GLUCOSE        I&O's Summary    07 Oct 2018 07:01  -  08 Oct 2018 07:00  --------------------------------------------------------  IN: 0 mL / OUT: 1550 mL / NET: -1550 mL    08 Oct 2018 07:01  -  08 Oct 2018 14:37  --------------------------------------------------------  IN: 0 mL / OUT: 125 mL / NET: -125 mL        PHYSICAL EXAM:  GENERAL: NAD, well-developed  HEAD:  Atraumatic, Normocephalic  EYES: EOMI, PERRLA, conjunctiva and sclera clear  NECK: Supple, No JVD  CHEST/LUNG: Clear to auscultation bilaterally; No wheeze  HEART: Regular rate and rhythm; No murmurs, rubs, or gallops  ABDOMEN: Soft, Nontender, Nondistended; Bowel sounds present  EXTREMITIES:  2+ Peripheral Pulses, No clubbing, cyanosis, or edema  PSYCH: AAOx3  NEUROLOGY: non-focal  SKIN: No rashes or lesions    LABS:                        8.9    10.97 )-----------( 244      ( 08 Oct 2018 10:55 )             27.3     10-08    137  |  102  |  28<H>  ----------------------------<  88  4.7   |  22  |  1.60<H>    Ca    8.5      08 Oct 2018 06:00                RADIOLOGY & ADDITIONAL TESTS:    Imaging Personally Reviewed:    Consultant(s) Notes Reviewed:      Care Discussed with Consultants/Other Providers: ortho
post subtotal resection right lower scapula for malignant tumor of undetermined etiology, no complication. on exam this morning table comfortable to be followed by nephrologist, to be seen in follow up in two weeks.
Patient is a 85y old  Male who presents with a chief complaint of Back neoplasm (06 Oct 2018 15:42)      SUBJECTIVE / OVERNIGHT EVENTS:  Patient denies CP, SOB, n/v. Pain controlled     Review of Systems:     MEDICATIONS  (STANDING):  amLODIPine   Tablet 10 milliGRAM(s) Oral daily  aspirin enteric coated 81 milliGRAM(s) Oral daily  atorvastatin 20 milliGRAM(s) Oral at bedtime  docusate sodium 100 milliGRAM(s) Oral three times a day  famotidine    Tablet 20 milliGRAM(s) Oral two times a day  influenza  Vaccine (HIGH DOSE) 0.5 milliLiter(s) IntraMuscular once  latanoprost 0.005% Ophthalmic Solution 1 Drop(s) Both EYES at bedtime  metoprolol succinate ER 50 milliGRAM(s) Oral daily  polyethylene glycol 3350 17 Gram(s) Oral daily  senna 2 Tablet(s) Oral at bedtime  sodium chloride 0.9%. 1000 milliLiter(s) (100 mL/Hr) IV Continuous <Continuous>    MEDICATIONS  (PRN):  bisacodyl Suppository 10 milliGRAM(s) Rectal daily PRN If no bowel movement  magnesium hydroxide Suspension 30 milliLiter(s) Oral daily PRN Constipation  morphine  - Injectable 2 milliGRAM(s) IV Push every 4 hours PRN breakthrough pain  ondansetron Injectable 4 milliGRAM(s) IV Push every 6 hours PRN Nausea and/or Vomiting  oxyCODONE    IR 5 milliGRAM(s) Oral every 4 hours PRN Moderate to Severe pain  oxyCODONE    IR 2.5 milliGRAM(s) Oral every 4 hours PRN Mild Pain (1 - 3)      PHYSICAL EXAM:    I&O's Summary    06 Oct 2018 07:  -  07 Oct 2018 07:00  --------------------------------------------------------  IN: 0 mL / OUT: 1767 mL / NET: -1767 mL    07 Oct 2018 07:  -  07 Oct 2018 17:08  --------------------------------------------------------  IN: 0 mL / OUT: 350 mL / NET: -350 mL  Vital Signs Last 24 Hrs  T(C): 36.7 (07 Oct 2018 13:11), Max: 36.9 (06 Oct 2018 18:27)  T(F): 98 (07 Oct 2018 13:11), Max: 98.5 (06 Oct 2018 18:27)  HR: 80 (07 Oct 2018 13:11) (71 - 86)  BP: 141/52 (07 Oct 2018 13:11) (119/63 - 141/52)  BP(mean): --  RR: 18 (07 Oct 2018 13:11) (16 - 18)  SpO2: 99% (07 Oct 2018 13:11) (92% - 99%)    PHYSICAL EXAM:  GENERAL:  NAD  EYES: EOMI, normal conjunctiva and sclera clear  RESPIRATORY: Clear to auscultation bilaterally; No rales, rhonchi, wheezing, or rubs  CARDIOVASCULAR: Regular rate and rhythm; No murmurs, rubs, or gallops  GASTROINTESTINAL: Soft, Nontender, Nondistended; Bowel sounds present  BACK: R posterior shoulder dressing c/d/i   EXTREMITIES:  2+ Peripheral Pulses, No clubbing, cyanosis, or edema. R arm in sling  NERVOUS SYSTEM:  Alert & Oriented X3; Moving all extremities, except R arm which has limited ROM due to R sling. No gross sensory deficits      LABS:  CAPILLARY BLOOD GLUCOSE                              7.9    11.92 )-----------( 209      ( 07 Oct 2018 06:12 )             24.4     10-07    135  |  100  |  38<H>  ----------------------------<  92  4.5   |  22  |  2.08<H>    Ca    8.4      07 Oct 2018 06:12            Urinalysis Basic - ( 05 Oct 2018 18:24 )    Color: YELLOW / Appearance: CLEAR / S.029 / pH: 6.0  Gluc: NEGATIVE / Ketone: 10  / Bili: NEGATIVE / Urobili: NORMAL   Blood: NEGATIVE / Protein: 100 / Nitrite: NEGATIVE   Leuk Esterase: TRACE / RBC: 2-5 / WBC 5-10   Sq Epi: x / Non Sq Epi: OCC / Bacteria: x        RADIOLOGY & ADDITIONAL TESTS:    Imaging Personally Reviewed:    Consultant(s) Notes Reviewed:      Care Discussed with Consultants/Other Providers:

## 2018-10-10 LAB — SURGICAL PATHOLOGY STUDY: SIGNIFICANT CHANGE UP

## 2018-10-16 ENCOUNTER — APPOINTMENT (OUTPATIENT)
Dept: ORTHOPEDIC SURGERY | Facility: CLINIC | Age: 83
End: 2018-10-16
Payer: MEDICARE

## 2018-10-16 PROCEDURE — 99024 POSTOP FOLLOW-UP VISIT: CPT

## 2018-11-14 ENCOUNTER — OUTPATIENT (OUTPATIENT)
Dept: OUTPATIENT SERVICES | Facility: HOSPITAL | Age: 83
LOS: 1 days | Discharge: HOME | End: 2018-11-14

## 2018-11-14 DIAGNOSIS — M25.559 PAIN IN UNSPECIFIED HIP: Chronic | ICD-10-CM

## 2018-11-14 DIAGNOSIS — C34.90 MALIGNANT NEOPLASM OF UNSPECIFIED PART OF UNSPECIFIED BRONCHUS OR LUNG: ICD-10-CM

## 2018-11-14 DIAGNOSIS — Z84.89 FAMILY HISTORY OF OTHER SPECIFIED CONDITIONS: Chronic | ICD-10-CM

## 2018-11-16 DIAGNOSIS — C34.90 MALIGNANT NEOPLASM OF UNSPECIFIED PART OF UNSPECIFIED BRONCHUS OR LUNG: ICD-10-CM

## 2018-11-19 ENCOUNTER — APPOINTMENT (OUTPATIENT)
Age: 83
End: 2018-11-19

## 2018-11-19 ENCOUNTER — APPOINTMENT (OUTPATIENT)
Dept: HEMATOLOGY ONCOLOGY | Facility: CLINIC | Age: 83
End: 2018-11-19
Payer: MEDICARE

## 2018-11-19 ENCOUNTER — RESULT REVIEW (OUTPATIENT)
Age: 83
End: 2018-11-19

## 2018-11-19 VITALS
HEIGHT: 72 IN | DIASTOLIC BLOOD PRESSURE: 87 MMHG | BODY MASS INDEX: 25.23 KG/M2 | WEIGHT: 186.29 LBS | TEMPERATURE: 98 F | HEART RATE: 81 BPM | SYSTOLIC BLOOD PRESSURE: 128 MMHG | OXYGEN SATURATION: 98 %

## 2018-11-19 LAB
ALBUMIN SERPL ELPH-MCNC: 3.7 G/DL
ALP BLD-CCNC: 101 U/L
ALT SERPL-CCNC: 12 U/L
ANION GAP SERPL CALC-SCNC: 15 MMOL/L
APTT BLD: 32.1 SEC
AST SERPL-CCNC: 14 U/L
BASOPHILS # BLD AUTO: 0.1 K/UL — SIGNIFICANT CHANGE UP (ref 0–0.2)
BASOPHILS NFR BLD AUTO: 0.8 % — SIGNIFICANT CHANGE UP (ref 0–2)
BILIRUB SERPL-MCNC: 0.2 MG/DL
BUN SERPL-MCNC: 19 MG/DL
CALCIUM SERPL-MCNC: 9.6 MG/DL
CHLORIDE SERPL-SCNC: 102 MMOL/L
CO2 SERPL-SCNC: 26 MMOL/L
CREAT SERPL-MCNC: 1.38 MG/DL
EOSINOPHIL # BLD AUTO: 0.4 K/UL — SIGNIFICANT CHANGE UP (ref 0–0.5)
EOSINOPHIL NFR BLD AUTO: 3.8 % — SIGNIFICANT CHANGE UP (ref 0–6)
GLUCOSE SERPL-MCNC: 128 MG/DL
HBV CORE IGG+IGM SER QL: NONREACTIVE
HBV SURFACE AB SER QL: NONREACTIVE
HBV SURFACE AG SER QL: NONREACTIVE
HCT VFR BLD CALC: 39.4 % — SIGNIFICANT CHANGE UP (ref 39–50)
HCV AB SER QL: NONREACTIVE
HCV S/CO RATIO: 0.12 S/CO
HGB BLD-MCNC: 12 G/DL — LOW (ref 13–17)
INR PPP: 1.03 RATIO
LYMPHOCYTES # BLD AUTO: 3.3 K/UL — SIGNIFICANT CHANGE UP (ref 1–3.3)
LYMPHOCYTES # BLD AUTO: 31.9 % — SIGNIFICANT CHANGE UP (ref 13–44)
MCHC RBC-ENTMCNC: 28.3 PG — SIGNIFICANT CHANGE UP (ref 27–34)
MCHC RBC-ENTMCNC: 30.4 GM/DL — LOW (ref 32–36)
MCV RBC AUTO: 92.9 FL — SIGNIFICANT CHANGE UP (ref 80–100)
MONOCYTES # BLD AUTO: 0.7 K/UL — SIGNIFICANT CHANGE UP (ref 0–0.9)
MONOCYTES NFR BLD AUTO: 6.6 % — SIGNIFICANT CHANGE UP (ref 2–14)
NEUTROPHILS # BLD AUTO: 5.9 K/UL — SIGNIFICANT CHANGE UP (ref 1.8–7.4)
NEUTROPHILS NFR BLD AUTO: 56.9 % — SIGNIFICANT CHANGE UP (ref 43–77)
PLATELET # BLD AUTO: 337 K/UL — SIGNIFICANT CHANGE UP (ref 150–400)
POTASSIUM SERPL-SCNC: 4.2 MMOL/L
PROT SERPL-MCNC: 6.9 G/DL
PT BLD: 11.7 SEC
RBC # BLD: 4.24 M/UL — SIGNIFICANT CHANGE UP (ref 4.2–5.8)
RBC # FLD: 14.8 % — HIGH (ref 10.3–14.5)
SODIUM SERPL-SCNC: 143 MMOL/L
WBC # BLD: 10.4 K/UL — SIGNIFICANT CHANGE UP (ref 3.8–10.5)
WBC # FLD AUTO: 10.4 K/UL — SIGNIFICANT CHANGE UP (ref 3.8–10.5)

## 2018-11-19 PROCEDURE — 99204 OFFICE O/P NEW MOD 45 MIN: CPT

## 2018-11-19 NOTE — HISTORY OF PRESENT ILLNESS
[de-identified] : Mr. 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 he had developed right scapular pain with mass effect as initial presentation. S/p wide resection, subtotal scapulectomy  of right distal scapula,  10/4/2018, with Dr. Ravi.. An MRI scan demonstrated large destructive lytic lesion with a soft tissue mass occupying the entire distal half of the scapula measuring about 8 cm x 8 cm x 8 cm. Negative margins. The sample was IHC positive for CK7 and TTF-1 (weak to moderate), which favors a metastatic adenocarcinoma of lung origin. The PD-L1 showed a tumor proportion score of 10%. The patient had a PET scan on 10/29/18 that showed mass in left lung.  [de-identified] : path - adenoCa lung, PDL-1 10%. [de-identified] : Patient is here for initial consultation. He originally thought scapula pain was muscular, but he was able to feel a lump. He has been feeling well since surgery and is regaining ROM. He reports SOB over the summer. His cardiologist found that one of his CABG grafts occluded.. He reports mild HADLEY after walking up stairs, but exercises on treadmill daily. He has a history of smoking 3 packs a day, but has quit. He denies any pain on left side of chest. He reports losing weight, but believes it was because the severe pain caused a loss of appetite. He denies using any pain medication post surgery.

## 2018-11-19 NOTE — PHYSICAL EXAM
[Normal] : affect appropriate [de-identified] : Seroma at site of right scapulectomy is palpable as a mass effect in right upper posterior thorax.

## 2018-11-19 NOTE — ADDENDUM
[FreeTextEntry1] : I, Naz Mendez, acted solely as a scribe for Dr. Devon Nunn on this date 11/19/18.\par

## 2018-11-19 NOTE — REVIEW OF SYSTEMS
[Fatigue] : fatigue [SOB on Exertion] : shortness of breath during exertion [Negative] : Allergic/Immunologic [FreeTextEntry2] : Mild [FreeTextEntry6] : Mild

## 2018-11-19 NOTE — ASSESSMENT
[FreeTextEntry1] : Adenocarcinoma of lung, PD-1 10%, metastatic to bone, S/P right scapula resection, in a fit 84 y/o former smoker.\par Performance status acceptable for combination of carboplatin/pemetrexed/Keytruda.

## 2018-11-20 DIAGNOSIS — Z51.11 ENCOUNTER FOR ANTINEOPLASTIC CHEMOTHERAPY: ICD-10-CM

## 2018-11-20 RX ORDER — PROCHLORPERAZINE MALEATE 10 MG/1
10 TABLET ORAL EVERY 6 HOURS
Qty: 120 | Refills: 5 | Status: ACTIVE | COMMUNITY
Start: 2018-11-20 | End: 1900-01-01

## 2018-11-20 RX ORDER — ONDANSETRON 8 MG/1
8 TABLET, ORALLY DISINTEGRATING ORAL EVERY 8 HOURS
Qty: 90 | Refills: 3 | Status: ACTIVE | COMMUNITY
Start: 2018-11-20 | End: 1900-01-01

## 2018-12-07 ENCOUNTER — RESULT REVIEW (OUTPATIENT)
Age: 83
End: 2018-12-07

## 2018-12-07 ENCOUNTER — APPOINTMENT (OUTPATIENT)
Age: 83
End: 2018-12-07

## 2018-12-07 ENCOUNTER — LABORATORY RESULT (OUTPATIENT)
Age: 83
End: 2018-12-07

## 2018-12-07 LAB
BASOPHILS # BLD AUTO: 0.1 K/UL — SIGNIFICANT CHANGE UP (ref 0–0.2)
BASOPHILS NFR BLD AUTO: 1 % — SIGNIFICANT CHANGE UP (ref 0–2)
EOSINOPHIL # BLD AUTO: 0.4 K/UL — SIGNIFICANT CHANGE UP (ref 0–0.5)
EOSINOPHIL NFR BLD AUTO: 4.4 % — SIGNIFICANT CHANGE UP (ref 0–6)
HCT VFR BLD CALC: 37.5 % — LOW (ref 39–50)
HGB BLD-MCNC: 12.1 G/DL — LOW (ref 13–17)
LYMPHOCYTES # BLD AUTO: 3 K/UL — SIGNIFICANT CHANGE UP (ref 1–3.3)
LYMPHOCYTES # BLD AUTO: 31 % — SIGNIFICANT CHANGE UP (ref 13–44)
MCHC RBC-ENTMCNC: 29.2 PG — SIGNIFICANT CHANGE UP (ref 27–34)
MCHC RBC-ENTMCNC: 32.4 GM/DL — SIGNIFICANT CHANGE UP (ref 32–36)
MCV RBC AUTO: 90.2 FL — SIGNIFICANT CHANGE UP (ref 80–100)
MONOCYTES # BLD AUTO: 0.6 K/UL — SIGNIFICANT CHANGE UP (ref 0–0.9)
MONOCYTES NFR BLD AUTO: 6 % — SIGNIFICANT CHANGE UP (ref 2–14)
NEUTROPHILS # BLD AUTO: 5.6 K/UL — SIGNIFICANT CHANGE UP (ref 1.8–7.4)
NEUTROPHILS NFR BLD AUTO: 57.6 % — SIGNIFICANT CHANGE UP (ref 43–77)
PLATELET # BLD AUTO: 347 K/UL — SIGNIFICANT CHANGE UP (ref 150–400)
RBC # BLD: 4.16 M/UL — LOW (ref 4.2–5.8)
RBC # FLD: 14.1 % — SIGNIFICANT CHANGE UP (ref 10.3–14.5)
WBC # BLD: 9.7 K/UL — SIGNIFICANT CHANGE UP (ref 3.8–10.5)
WBC # FLD AUTO: 9.7 K/UL — SIGNIFICANT CHANGE UP (ref 3.8–10.5)

## 2018-12-07 RX ORDER — ASCORBIC ACID 60 MG
0 TABLET,CHEWABLE ORAL
Qty: 0 | Refills: 0 | COMMUNITY

## 2018-12-10 DIAGNOSIS — R11.2 NAUSEA WITH VOMITING, UNSPECIFIED: ICD-10-CM

## 2018-12-16 ENCOUNTER — OUTPATIENT (OUTPATIENT)
Dept: OUTPATIENT SERVICES | Facility: HOSPITAL | Age: 83
LOS: 1 days | Discharge: HOME | End: 2018-12-16

## 2018-12-16 DIAGNOSIS — M25.559 PAIN IN UNSPECIFIED HIP: Chronic | ICD-10-CM

## 2018-12-16 DIAGNOSIS — Z84.89 FAMILY HISTORY OF OTHER SPECIFIED CONDITIONS: Chronic | ICD-10-CM

## 2018-12-16 DIAGNOSIS — C34.90 MALIGNANT NEOPLASM OF UNSPECIFIED PART OF UNSPECIFIED BRONCHUS OR LUNG: ICD-10-CM

## 2018-12-20 ENCOUNTER — FORM ENCOUNTER (OUTPATIENT)
Age: 83
End: 2018-12-20

## 2018-12-21 ENCOUNTER — OUTPATIENT (OUTPATIENT)
Dept: OUTPATIENT SERVICES | Facility: HOSPITAL | Age: 83
LOS: 1 days | End: 2018-12-21
Payer: MEDICARE

## 2018-12-21 ENCOUNTER — RESULT REVIEW (OUTPATIENT)
Age: 83
End: 2018-12-21

## 2018-12-21 ENCOUNTER — APPOINTMENT (OUTPATIENT)
Dept: HEMATOLOGY ONCOLOGY | Facility: CLINIC | Age: 83
End: 2018-12-21
Payer: MEDICARE

## 2018-12-21 VITALS
TEMPERATURE: 98.5 F | OXYGEN SATURATION: 98 % | DIASTOLIC BLOOD PRESSURE: 66 MMHG | BODY MASS INDEX: 24.11 KG/M2 | WEIGHT: 178 LBS | HEART RATE: 97 BPM | SYSTOLIC BLOOD PRESSURE: 126 MMHG | HEIGHT: 72 IN

## 2018-12-21 DIAGNOSIS — M25.559 PAIN IN UNSPECIFIED HIP: Chronic | ICD-10-CM

## 2018-12-21 DIAGNOSIS — Z84.89 FAMILY HISTORY OF OTHER SPECIFIED CONDITIONS: Chronic | ICD-10-CM

## 2018-12-21 DIAGNOSIS — C34.90 MALIGNANT NEOPLASM OF UNSPECIFIED PART OF UNSPECIFIED BRONCHUS OR LUNG: ICD-10-CM

## 2018-12-21 LAB
ALBUMIN SERPL ELPH-MCNC: 3.3 G/DL
ALP BLD-CCNC: 121 U/L
ALT SERPL-CCNC: 113 U/L
ANION GAP SERPL CALC-SCNC: 12 MMOL/L
AST SERPL-CCNC: 47 U/L
BASOPHILS # BLD AUTO: 0 K/UL — SIGNIFICANT CHANGE UP (ref 0–0.2)
BILIRUB SERPL-MCNC: 0.2 MG/DL
BUN SERPL-MCNC: 28 MG/DL
CALCIUM SERPL-MCNC: 8.8 MG/DL
CHLORIDE SERPL-SCNC: 102 MMOL/L
CO2 SERPL-SCNC: 21 MMOL/L
CREAT SERPL-MCNC: 1.12 MG/DL
EOSINOPHIL # BLD AUTO: 0 K/UL — SIGNIFICANT CHANGE UP (ref 0–0.5)
EOSINOPHIL NFR BLD AUTO: 1 % — SIGNIFICANT CHANGE UP (ref 0–6)
GIANT PLATELETS BLD QL SMEAR: PRESENT — SIGNIFICANT CHANGE UP
GLUCOSE SERPL-MCNC: 93 MG/DL
HCT VFR BLD CALC: 34.6 % — LOW (ref 39–50)
HGB BLD-MCNC: 11.2 G/DL — LOW (ref 13–17)
LG PLATELETS BLD QL AUTO: SLIGHT — SIGNIFICANT CHANGE UP
LYMPHOCYTES # BLD AUTO: 2.1 K/UL — SIGNIFICANT CHANGE UP (ref 1–3.3)
LYMPHOCYTES # BLD AUTO: 77 % — HIGH (ref 13–44)
MCHC RBC-ENTMCNC: 28.4 PG — SIGNIFICANT CHANGE UP (ref 27–34)
MCHC RBC-ENTMCNC: 32.5 GM/DL — SIGNIFICANT CHANGE UP (ref 32–36)
MCV RBC AUTO: 87.5 FL — SIGNIFICANT CHANGE UP (ref 80–100)
MONOCYTES # BLD AUTO: 0.4 K/UL — SIGNIFICANT CHANGE UP (ref 0–0.9)
MONOCYTES NFR BLD AUTO: 12 % — SIGNIFICANT CHANGE UP (ref 2–14)
NEUTROPHILS # BLD AUTO: 0.3 K/UL — LOW (ref 1.8–7.4)
NEUTROPHILS NFR BLD AUTO: 8 % — LOW (ref 43–77)
PLAT MORPH BLD: ABNORMAL
PLATELET # BLD AUTO: 39 K/UL — LOW (ref 150–400)
POTASSIUM SERPL-SCNC: 5.2 MMOL/L
PROT SERPL-MCNC: 6 G/DL
RBC # BLD: 3.95 M/UL — LOW (ref 4.2–5.8)
RBC # FLD: 12.8 % — SIGNIFICANT CHANGE UP (ref 10.3–14.5)
RBC BLD AUTO: NORMAL — SIGNIFICANT CHANGE UP
SODIUM SERPL-SCNC: 135 MMOL/L
VARIANT LYMPHS # BLD: 2 % — SIGNIFICANT CHANGE UP (ref 0–6)
WBC # BLD: 2.7 K/UL — LOW (ref 3.8–10.5)
WBC # FLD AUTO: 2.7 K/UL — LOW (ref 3.8–10.5)

## 2018-12-21 PROCEDURE — 99213 OFFICE O/P EST LOW 20 MIN: CPT

## 2018-12-21 PROCEDURE — 71046 X-RAY EXAM CHEST 2 VIEWS: CPT | Mod: 26

## 2018-12-21 RX ORDER — VITAMIN E 268 MG
CAPSULE ORAL
Refills: 0 | Status: ACTIVE | COMMUNITY

## 2018-12-24 NOTE — PHYSICAL EXAM
[Restricted in physically strenuous activity but ambulatory and able to carry out work of a light or sedentary nature] : Status 1- Restricted in physically strenuous activity but ambulatory and able to carry out work of a light or sedentary nature, e.g., light house work, office work [Normal] : affect appropriate [de-identified] : minor nose bleed with clot in right nares

## 2018-12-24 NOTE — ASSESSMENT
[FreeTextEntry1] : Adenocarcinoma of lung, PD-1 10%, metastatic to bone, S/P right scapula resection, in a fit 84 y/o former smoker.\par Now S/P first combination of carboplatin/pemetrexed/Keytruda. He is now neutropenic and thrombocytopenic, with epistaxis. Case discussed with Dr. Nunn, first will have him stop the Aspirin, second, Dr. Nunn will adjust dose of next chemotherapy, and add Onpro, no antibiotics at this time. Because of the new cough and history of left pleural effusion, , I sent him for a CXR, which showed only a small Lt pleural effusion. He is aware to go to the ER if epistaxis gets worse, and to call if Temp over 100.5.

## 2018-12-24 NOTE — HISTORY OF PRESENT ILLNESS
[de-identified] : Mr. 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 he had developed right scapular pain with mass effect as initial presentation. S/p wide resection, subtotal scapulectomy of right distal scapula, 10/4/2018, with Dr. Ravi.. An MRI scan demonstrated large destructive lytic lesion with a soft tissue mass occupying the entire distal half of the scapula measuring about 8 cm x 8 cm x 8 cm. Negative margins. The sample was IHC positive for CK7 and TTF-1 (weak to moderate), which favors a metastatic adenocarcinoma of lung origin. The PD-L1 showed a tumor proportion score of 10%. The patient had a PET scan on 10/29/18 that showed mass in left lung. \par \par \par path - adenoCa lung, PDL-1 10%. \par  [de-identified] : He is now S/P first treatment with Carboplatin, Alimta and keytruda. He tolerated the treatment well,  but c/o fatigue and nose bleeds

## 2018-12-28 ENCOUNTER — RESULT REVIEW (OUTPATIENT)
Age: 83
End: 2018-12-28

## 2018-12-28 ENCOUNTER — LABORATORY RESULT (OUTPATIENT)
Age: 83
End: 2018-12-28

## 2018-12-28 ENCOUNTER — APPOINTMENT (OUTPATIENT)
Age: 83
End: 2018-12-28

## 2018-12-28 LAB
BASOPHILS # BLD AUTO: 0.1 K/UL — SIGNIFICANT CHANGE UP (ref 0–0.2)
BASOPHILS NFR BLD AUTO: 0.6 % — SIGNIFICANT CHANGE UP (ref 0–2)
EOSINOPHIL # BLD AUTO: 0.2 K/UL — SIGNIFICANT CHANGE UP (ref 0–0.5)
EOSINOPHIL NFR BLD AUTO: 2.3 % — SIGNIFICANT CHANGE UP (ref 0–6)
HCT VFR BLD CALC: 33.8 % — LOW (ref 39–50)
HGB BLD-MCNC: 11.2 G/DL — LOW (ref 13–17)
LYMPHOCYTES # BLD AUTO: 2.1 K/UL — SIGNIFICANT CHANGE UP (ref 1–3.3)
LYMPHOCYTES # BLD AUTO: 23.4 % — SIGNIFICANT CHANGE UP (ref 13–44)
MCHC RBC-ENTMCNC: 28.7 PG — SIGNIFICANT CHANGE UP (ref 27–34)
MCHC RBC-ENTMCNC: 33.2 GM/DL — SIGNIFICANT CHANGE UP (ref 32–36)
MCV RBC AUTO: 86.3 FL — SIGNIFICANT CHANGE UP (ref 80–100)
MONOCYTES # BLD AUTO: 1.2 K/UL — HIGH (ref 0–0.9)
MONOCYTES NFR BLD AUTO: 13 % — SIGNIFICANT CHANGE UP (ref 2–14)
NEUTROPHILS # BLD AUTO: 5.6 K/UL — SIGNIFICANT CHANGE UP (ref 1.8–7.4)
NEUTROPHILS NFR BLD AUTO: 60.8 % — SIGNIFICANT CHANGE UP (ref 43–77)
PLATELET # BLD AUTO: 287 K/UL — SIGNIFICANT CHANGE UP (ref 150–400)
RBC # BLD: 3.92 M/UL — LOW (ref 4.2–5.8)
RBC # FLD: 12.5 % — SIGNIFICANT CHANGE UP (ref 10.3–14.5)
WBC # BLD: 9.1 K/UL — SIGNIFICANT CHANGE UP (ref 3.8–10.5)
WBC # FLD AUTO: 9.1 K/UL — SIGNIFICANT CHANGE UP (ref 3.8–10.5)

## 2018-12-31 ENCOUNTER — RX RENEWAL (OUTPATIENT)
Age: 83
End: 2018-12-31

## 2018-12-31 DIAGNOSIS — Z51.11 ENCOUNTER FOR ANTINEOPLASTIC CHEMOTHERAPY: ICD-10-CM

## 2018-12-31 DIAGNOSIS — R11.2 NAUSEA WITH VOMITING, UNSPECIFIED: ICD-10-CM

## 2018-12-31 DIAGNOSIS — R21 RASH AND OTHER NONSPECIFIC SKIN ERUPTION: ICD-10-CM

## 2018-12-31 RX ORDER — HYDROCORTISONE 25 MG/G
2.5 CREAM TOPICAL
Qty: 30 | Refills: 1 | Status: ACTIVE | COMMUNITY
Start: 2018-12-31 | End: 1900-01-01

## 2019-01-07 ENCOUNTER — APPOINTMENT (OUTPATIENT)
Dept: HEMATOLOGY ONCOLOGY | Facility: CLINIC | Age: 84
End: 2019-01-07
Payer: MEDICARE

## 2019-01-07 ENCOUNTER — RESULT REVIEW (OUTPATIENT)
Age: 84
End: 2019-01-07

## 2019-01-07 VITALS
HEIGHT: 72 IN | OXYGEN SATURATION: 97 % | DIASTOLIC BLOOD PRESSURE: 69 MMHG | SYSTOLIC BLOOD PRESSURE: 111 MMHG | WEIGHT: 164.02 LBS | HEART RATE: 89 BPM | TEMPERATURE: 97.5 F | BODY MASS INDEX: 22.22 KG/M2

## 2019-01-07 DIAGNOSIS — C34.90 MALIGNANT NEOPLASM OF UNSPECIFIED PART OF UNSPECIFIED BRONCHUS OR LUNG: ICD-10-CM

## 2019-01-07 LAB
ACANTHOCYTES BLD QL SMEAR: SLIGHT — SIGNIFICANT CHANGE UP
ANISOCYTOSIS BLD QL: SLIGHT — SIGNIFICANT CHANGE UP
BASOPHILS # BLD AUTO: 0 K/UL — SIGNIFICANT CHANGE UP (ref 0–0.2)
EOSINOPHIL # BLD AUTO: 0 K/UL — SIGNIFICANT CHANGE UP (ref 0–0.5)
EOSINOPHIL NFR BLD AUTO: 2 % — SIGNIFICANT CHANGE UP (ref 0–6)
HCT VFR BLD CALC: 32.5 % — LOW (ref 39–50)
HGB BLD-MCNC: 10.5 G/DL — LOW (ref 13–17)
HYPOCHROMIA BLD QL: SLIGHT — SIGNIFICANT CHANGE UP
LG PLATELETS BLD QL AUTO: SLIGHT — SIGNIFICANT CHANGE UP
LYMPHOCYTES # BLD AUTO: 1.2 K/UL — SIGNIFICANT CHANGE UP (ref 1–3.3)
LYMPHOCYTES # BLD AUTO: 62 % — HIGH (ref 13–44)
MCHC RBC-ENTMCNC: 27.7 PG — SIGNIFICANT CHANGE UP (ref 27–34)
MCHC RBC-ENTMCNC: 32.5 GM/DL — SIGNIFICANT CHANGE UP (ref 32–36)
MCV RBC AUTO: 85.4 FL — SIGNIFICANT CHANGE UP (ref 80–100)
MICROCYTES BLD QL: SLIGHT — SIGNIFICANT CHANGE UP
MONOCYTES # BLD AUTO: 0.3 K/UL — SIGNIFICANT CHANGE UP (ref 0–0.9)
MONOCYTES NFR BLD AUTO: 17 % — HIGH (ref 2–14)
MYELOCYTES NFR BLD: 2 % — HIGH (ref 0–0)
NEUTROPHILS # BLD AUTO: 0.4 K/UL — LOW (ref 1.8–7.4)
NEUTROPHILS NFR BLD AUTO: 17 % — LOW (ref 43–77)
PLAT MORPH BLD: NORMAL — SIGNIFICANT CHANGE UP
PLATELET # BLD AUTO: 166 K/UL — SIGNIFICANT CHANGE UP (ref 150–400)
POIKILOCYTOSIS BLD QL AUTO: SLIGHT — SIGNIFICANT CHANGE UP
RBC # BLD: 3.8 M/UL — LOW (ref 4.2–5.8)
RBC # FLD: 12.6 % — SIGNIFICANT CHANGE UP (ref 10.3–14.5)
RBC BLD AUTO: SIGNIFICANT CHANGE UP
WBC # BLD: 1.8 K/UL — LOW (ref 3.8–10.5)
WBC # FLD AUTO: 1.8 K/UL — LOW (ref 3.8–10.5)

## 2019-01-07 PROCEDURE — 99214 OFFICE O/P EST MOD 30 MIN: CPT

## 2019-01-07 RX ORDER — FLUOXETINE HYDROCHLORIDE 20 MG/1
20 TABLET ORAL
Qty: 30 | Refills: 2 | Status: ACTIVE | COMMUNITY
Start: 2019-01-07 | End: 1900-01-01

## 2019-01-07 NOTE — HISTORY OF PRESENT ILLNESS
[de-identified] : Mr. 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 he had developed right scapular pain with mass effect as initial presentation. S/p wide resection, subtotal scapulectomy of right distal scapula, 10/4/2018, with Dr. Ravi.. An MRI scan demonstrated large destructive lytic lesion with a soft tissue mass occupying the entire distal half of the scapula measuring about 8 cm x 8 cm x 8 cm. Negative margins. The sample was IHC positive for CK7 and TTF-1 (weak to moderate), which favors a metastatic adenocarcinoma of lung origin. The PD-L1 showed a tumor proportion score of 10%. The patient had a PET scan on 10/29/18 that showed mass in left lung.  [de-identified] : No further nose bleeds, he is now S/P second cycle o f carboplatin and Alimta and Pembrolizumab. He feels very fatigued, no appetite, and food has no taste,no pain. He does not want to continue with the treatment. He feels depressed and is willing to start on an antidepressant, He is asking about Medical marijuana for appetite stimulation

## 2019-01-07 NOTE — ASSESSMENT
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 He is fatigued, HGB - 10.5, plat-166,000, ANC-400. For now, i asked him to monitor his temps, to call if over 100.4. Will check his CMP tomorrow, For now will cancel his appt for 1-18-19 and will discuss with Dr. Nunn when he returns.Case discussed with Dr. Jose, Ok to start prozac and have asked for an appt with Dr. Henderson  for medical marijuana.

## 2019-01-08 ENCOUNTER — RESULT REVIEW (OUTPATIENT)
Age: 84
End: 2019-01-08

## 2019-01-08 LAB
ALBUMIN SERPL ELPH-MCNC: 3.1 G/DL
ALP BLD-CCNC: 279 U/L
ALT SERPL-CCNC: 145 U/L
ANION GAP SERPL CALC-SCNC: 13 MMOL/L
AST SERPL-CCNC: 94 U/L
BILIRUB SERPL-MCNC: 0.3 MG/DL
BUN SERPL-MCNC: 34 MG/DL
CALCIUM SERPL-MCNC: 8.8 MG/DL
CHLORIDE SERPL-SCNC: 100 MMOL/L
CO2 SERPL-SCNC: 22 MMOL/L
CREAT SERPL-MCNC: 1.63 MG/DL
GLUCOSE SERPL-MCNC: 135 MG/DL
POTASSIUM SERPL-SCNC: 5.4 MMOL/L
PROT SERPL-MCNC: 6.1 G/DL
SODIUM SERPL-SCNC: 135 MMOL/L

## 2019-01-13 ENCOUNTER — INPATIENT (INPATIENT)
Facility: HOSPITAL | Age: 84
LOS: 2 days | Discharge: HOME | DRG: 193 | End: 2019-01-16
Attending: HOSPITALIST | Admitting: HOSPITALIST
Payer: MEDICARE

## 2019-01-13 VITALS
RESPIRATION RATE: 15 BRPM | HEART RATE: 88 BPM | WEIGHT: 164.02 LBS | OXYGEN SATURATION: 96 % | TEMPERATURE: 97 F | HEIGHT: 72 IN | SYSTOLIC BLOOD PRESSURE: 159 MMHG | DIASTOLIC BLOOD PRESSURE: 82 MMHG

## 2019-01-13 DIAGNOSIS — F32.9 MAJOR DEPRESSIVE DISORDER, SINGLE EPISODE, UNSPECIFIED: ICD-10-CM

## 2019-01-13 DIAGNOSIS — D16.00 BENIGN NEOPLASM OF SCAPULA AND LONG BONES OF UNSPECIFIED UPPER LIMB: Chronic | ICD-10-CM

## 2019-01-13 DIAGNOSIS — J18.9 PNEUMONIA, UNSPECIFIED ORGANISM: ICD-10-CM

## 2019-01-13 DIAGNOSIS — C34.90 MALIGNANT NEOPLASM OF UNSPECIFIED PART OF UNSPECIFIED BRONCHUS OR LUNG: ICD-10-CM

## 2019-01-13 DIAGNOSIS — M25.559 PAIN IN UNSPECIFIED HIP: Chronic | ICD-10-CM

## 2019-01-13 DIAGNOSIS — I25.10 ATHEROSCLEROTIC HEART DISEASE OF NATIVE CORONARY ARTERY WITHOUT ANGINA PECTORIS: ICD-10-CM

## 2019-01-13 DIAGNOSIS — I10 ESSENTIAL (PRIMARY) HYPERTENSION: ICD-10-CM

## 2019-01-13 DIAGNOSIS — Z98.890 OTHER SPECIFIED POSTPROCEDURAL STATES: Chronic | ICD-10-CM

## 2019-01-13 DIAGNOSIS — D64.9 ANEMIA, UNSPECIFIED: ICD-10-CM

## 2019-01-13 DIAGNOSIS — Z29.9 ENCOUNTER FOR PROPHYLACTIC MEASURES, UNSPECIFIED: ICD-10-CM

## 2019-01-13 DIAGNOSIS — Z84.89 FAMILY HISTORY OF OTHER SPECIFIED CONDITIONS: Chronic | ICD-10-CM

## 2019-01-13 DIAGNOSIS — R74.8 ABNORMAL LEVELS OF OTHER SERUM ENZYMES: ICD-10-CM

## 2019-01-13 DIAGNOSIS — E78.5 HYPERLIPIDEMIA, UNSPECIFIED: ICD-10-CM

## 2019-01-13 DIAGNOSIS — N18.9 CHRONIC KIDNEY DISEASE, UNSPECIFIED: ICD-10-CM

## 2019-01-13 DIAGNOSIS — T81.82XA EMPHYSEMA (SUBCUTANEOUS) RESULTING FROM A PROCEDURE, INITIAL ENCOUNTER: ICD-10-CM

## 2019-01-13 LAB
ALBUMIN SERPL ELPH-MCNC: 2.1 G/DL — LOW (ref 3.3–5)
ALP SERPL-CCNC: 1085 U/L — HIGH (ref 40–120)
ALT FLD-CCNC: 664 U/L — HIGH (ref 12–78)
ANION GAP SERPL CALC-SCNC: 9 MMOL/L — SIGNIFICANT CHANGE UP (ref 5–17)
APPEARANCE UR: ABNORMAL
AST SERPL-CCNC: 427 U/L — HIGH (ref 15–37)
BASOPHILS # BLD AUTO: 0 K/UL — SIGNIFICANT CHANGE UP (ref 0–0.2)
BASOPHILS NFR BLD AUTO: 0 % — SIGNIFICANT CHANGE UP (ref 0–2)
BILIRUB SERPL-MCNC: 0.7 MG/DL — SIGNIFICANT CHANGE UP (ref 0.2–1.2)
BILIRUB UR-MCNC: ABNORMAL
BUN SERPL-MCNC: 36 MG/DL — HIGH (ref 7–23)
CALCIUM SERPL-MCNC: 8.8 MG/DL — SIGNIFICANT CHANGE UP (ref 8.5–10.1)
CHLORIDE SERPL-SCNC: 104 MMOL/L — SIGNIFICANT CHANGE UP (ref 96–108)
CO2 SERPL-SCNC: 25 MMOL/L — SIGNIFICANT CHANGE UP (ref 22–31)
COLOR SPEC: YELLOW — SIGNIFICANT CHANGE UP
CREAT SERPL-MCNC: 1.6 MG/DL — HIGH (ref 0.5–1.3)
CRP SERPL-MCNC: 11.82 MG/DL — HIGH (ref 0–0.4)
DIFF PNL FLD: ABNORMAL
EOSINOPHIL # BLD AUTO: 0 K/UL — SIGNIFICANT CHANGE UP (ref 0–0.5)
EOSINOPHIL NFR BLD AUTO: 0 % — SIGNIFICANT CHANGE UP (ref 0–6)
FLU A RESULT: SIGNIFICANT CHANGE UP
FLU A RESULT: SIGNIFICANT CHANGE UP
FLUAV AG NPH QL: SIGNIFICANT CHANGE UP
FLUBV AG NPH QL: SIGNIFICANT CHANGE UP
GLUCOSE SERPL-MCNC: 110 MG/DL — HIGH (ref 70–99)
GLUCOSE UR QL: NEGATIVE — SIGNIFICANT CHANGE UP
HCT VFR BLD CALC: 31.1 % — LOW (ref 39–50)
HGB BLD-MCNC: 10.2 G/DL — LOW (ref 13–17)
KETONES UR-MCNC: ABNORMAL
LACTATE SERPL-SCNC: 1.3 MMOL/L — SIGNIFICANT CHANGE UP (ref 0.7–2)
LEUKOCYTE ESTERASE UR-ACNC: ABNORMAL
LYMPHOCYTES # BLD AUTO: 1.75 K/UL — SIGNIFICANT CHANGE UP (ref 1–3.3)
LYMPHOCYTES # BLD AUTO: 35 % — SIGNIFICANT CHANGE UP (ref 13–44)
MCHC RBC-ENTMCNC: 27.9 PG — SIGNIFICANT CHANGE UP (ref 27–34)
MCHC RBC-ENTMCNC: 32.8 GM/DL — SIGNIFICANT CHANGE UP (ref 32–36)
MCV RBC AUTO: 85 FL — SIGNIFICANT CHANGE UP (ref 80–100)
MONOCYTES # BLD AUTO: 0.45 K/UL — SIGNIFICANT CHANGE UP (ref 0–0.9)
MONOCYTES NFR BLD AUTO: 9 % — SIGNIFICANT CHANGE UP (ref 2–14)
NEUTROPHILS # BLD AUTO: 2.15 K/UL — SIGNIFICANT CHANGE UP (ref 1.8–7.4)
NEUTROPHILS NFR BLD AUTO: 34 % — LOW (ref 43–77)
NITRITE UR-MCNC: NEGATIVE — SIGNIFICANT CHANGE UP
PH UR: 5 — SIGNIFICANT CHANGE UP (ref 5–8)
PLATELET # BLD AUTO: 42 K/UL — LOW (ref 150–400)
POTASSIUM SERPL-MCNC: 5 MMOL/L — SIGNIFICANT CHANGE UP (ref 3.5–5.3)
POTASSIUM SERPL-SCNC: 5 MMOL/L — SIGNIFICANT CHANGE UP (ref 3.5–5.3)
PROCALCITONIN SERPL-MCNC: 0.96 NG/ML — HIGH (ref 0–0.04)
PROT SERPL-MCNC: 7.1 G/DL — SIGNIFICANT CHANGE UP (ref 6–8.3)
PROT UR-MCNC: 75 MG/DL
RBC # BLD: 3.66 M/UL — LOW (ref 4.2–5.8)
RBC # FLD: 13.5 % — SIGNIFICANT CHANGE UP (ref 10.3–14.5)
RSV RESULT: SIGNIFICANT CHANGE UP
RSV RNA RESP QL NAA+PROBE: SIGNIFICANT CHANGE UP
SODIUM SERPL-SCNC: 138 MMOL/L — SIGNIFICANT CHANGE UP (ref 135–145)
SP GR SPEC: 1.01 — SIGNIFICANT CHANGE UP (ref 1.01–1.02)
UROBILINOGEN FLD QL: 1
WBC # BLD: 5 K/UL — SIGNIFICANT CHANGE UP (ref 3.8–10.5)
WBC # FLD AUTO: 5 K/UL — SIGNIFICANT CHANGE UP (ref 3.8–10.5)

## 2019-01-13 PROCEDURE — 99223 1ST HOSP IP/OBS HIGH 75: CPT | Mod: AI,GC

## 2019-01-13 PROCEDURE — 71275 CT ANGIOGRAPHY CHEST: CPT | Mod: 26

## 2019-01-13 PROCEDURE — 71045 X-RAY EXAM CHEST 1 VIEW: CPT | Mod: 26

## 2019-01-13 PROCEDURE — 99285 EMERGENCY DEPT VISIT HI MDM: CPT

## 2019-01-13 PROCEDURE — 93010 ELECTROCARDIOGRAM REPORT: CPT

## 2019-01-13 RX ORDER — METOPROLOL TARTRATE 50 MG
50 TABLET ORAL DAILY
Qty: 0 | Refills: 0 | Status: DISCONTINUED | OUTPATIENT
Start: 2019-01-13 | End: 2019-01-16

## 2019-01-13 RX ORDER — AZITHROMYCIN 500 MG/1
500 TABLET, FILM COATED ORAL EVERY 24 HOURS
Qty: 0 | Refills: 0 | Status: DISCONTINUED | OUTPATIENT
Start: 2019-01-14 | End: 2019-01-15

## 2019-01-13 RX ORDER — AZITHROMYCIN 500 MG/1
500 TABLET, FILM COATED ORAL ONCE
Qty: 0 | Refills: 0 | Status: COMPLETED | OUTPATIENT
Start: 2019-01-13 | End: 2019-01-13

## 2019-01-13 RX ORDER — SENNA PLUS 8.6 MG/1
2 TABLET ORAL AT BEDTIME
Qty: 0 | Refills: 0 | Status: DISCONTINUED | OUTPATIENT
Start: 2019-01-13 | End: 2019-01-16

## 2019-01-13 RX ORDER — CHOLECALCIFEROL (VITAMIN D3) 125 MCG
1000 CAPSULE ORAL DAILY
Qty: 0 | Refills: 0 | Status: DISCONTINUED | OUTPATIENT
Start: 2019-01-13 | End: 2019-01-16

## 2019-01-13 RX ORDER — HYDROCORTISONE 1 %
1 OINTMENT (GRAM) TOPICAL THREE TIMES A DAY
Qty: 0 | Refills: 0 | Status: DISCONTINUED | OUTPATIENT
Start: 2019-01-13 | End: 2019-01-16

## 2019-01-13 RX ORDER — HYDROCORTISONE 1 %
1 OINTMENT (GRAM) TOPICAL
Qty: 0 | Refills: 0 | COMMUNITY

## 2019-01-13 RX ORDER — UBIDECARENONE 100 MG
1 CAPSULE ORAL
Qty: 0 | Refills: 0 | COMMUNITY

## 2019-01-13 RX ORDER — CEFTRIAXONE 500 MG/1
1 INJECTION, POWDER, FOR SOLUTION INTRAMUSCULAR; INTRAVENOUS EVERY 24 HOURS
Qty: 0 | Refills: 0 | Status: DISCONTINUED | OUTPATIENT
Start: 2019-01-14 | End: 2019-01-16

## 2019-01-13 RX ORDER — ONDANSETRON 8 MG/1
8 TABLET, FILM COATED ORAL THREE TIMES A DAY
Qty: 0 | Refills: 0 | Status: DISCONTINUED | OUTPATIENT
Start: 2019-01-13 | End: 2019-01-16

## 2019-01-13 RX ORDER — CEFTRIAXONE 500 MG/1
1 INJECTION, POWDER, FOR SOLUTION INTRAMUSCULAR; INTRAVENOUS ONCE
Qty: 0 | Refills: 0 | Status: COMPLETED | OUTPATIENT
Start: 2019-01-13 | End: 2019-01-13

## 2019-01-13 RX ORDER — ALBUTEROL 90 UG/1
2.5 AEROSOL, METERED ORAL EVERY 8 HOURS
Qty: 0 | Refills: 0 | Status: DISCONTINUED | OUTPATIENT
Start: 2019-01-13 | End: 2019-01-16

## 2019-01-13 RX ORDER — SODIUM CHLORIDE 9 MG/ML
1000 INJECTION INTRAMUSCULAR; INTRAVENOUS; SUBCUTANEOUS ONCE
Qty: 0 | Refills: 0 | Status: COMPLETED | OUTPATIENT
Start: 2019-01-13 | End: 2019-01-13

## 2019-01-13 RX ORDER — HEPARIN SODIUM 5000 [USP'U]/ML
5000 INJECTION INTRAVENOUS; SUBCUTANEOUS EVERY 8 HOURS
Qty: 0 | Refills: 0 | Status: DISCONTINUED | OUTPATIENT
Start: 2019-01-13 | End: 2019-01-14

## 2019-01-13 RX ORDER — FLUOXETINE HCL 10 MG
20 CAPSULE ORAL DAILY
Qty: 0 | Refills: 0 | Status: DISCONTINUED | OUTPATIENT
Start: 2019-01-13 | End: 2019-01-16

## 2019-01-13 RX ORDER — LATANOPROST 0.05 MG/ML
1 SOLUTION/ DROPS OPHTHALMIC; TOPICAL AT BEDTIME
Qty: 0 | Refills: 0 | Status: DISCONTINUED | OUTPATIENT
Start: 2019-01-13 | End: 2019-01-16

## 2019-01-13 RX ORDER — AMLODIPINE BESYLATE 2.5 MG/1
10 TABLET ORAL DAILY
Qty: 0 | Refills: 0 | Status: DISCONTINUED | OUTPATIENT
Start: 2019-01-13 | End: 2019-01-16

## 2019-01-13 RX ORDER — MORPHINE SULFATE 50 MG/1
1 CAPSULE, EXTENDED RELEASE ORAL ONCE
Qty: 0 | Refills: 0 | Status: DISCONTINUED | OUTPATIENT
Start: 2019-01-13 | End: 2019-01-13

## 2019-01-13 RX ORDER — ASPIRIN/CALCIUM CARB/MAGNESIUM 324 MG
81 TABLET ORAL DAILY
Qty: 0 | Refills: 0 | Status: DISCONTINUED | OUTPATIENT
Start: 2019-01-13 | End: 2019-01-14

## 2019-01-13 RX ORDER — FOLIC ACID 0.8 MG
1 TABLET ORAL DAILY
Qty: 0 | Refills: 0 | Status: DISCONTINUED | OUTPATIENT
Start: 2019-01-13 | End: 2019-01-16

## 2019-01-13 RX ORDER — ONDANSETRON 8 MG/1
1 TABLET, FILM COATED ORAL
Qty: 0 | Refills: 0 | COMMUNITY

## 2019-01-13 RX ORDER — ACETAMINOPHEN 500 MG
650 TABLET ORAL EVERY 6 HOURS
Qty: 0 | Refills: 0 | Status: DISCONTINUED | OUTPATIENT
Start: 2019-01-13 | End: 2019-01-13

## 2019-01-13 RX ORDER — PROCHLORPERAZINE MALEATE 5 MG
10 TABLET ORAL EVERY 6 HOURS
Qty: 0 | Refills: 0 | Status: DISCONTINUED | OUTPATIENT
Start: 2019-01-13 | End: 2019-01-13

## 2019-01-13 RX ORDER — ATORVASTATIN CALCIUM 80 MG/1
20 TABLET, FILM COATED ORAL AT BEDTIME
Qty: 0 | Refills: 0 | Status: DISCONTINUED | OUTPATIENT
Start: 2019-01-13 | End: 2019-01-14

## 2019-01-13 RX ADMIN — MORPHINE SULFATE 1 MILLIGRAM(S): 50 CAPSULE, EXTENDED RELEASE ORAL at 22:25

## 2019-01-13 RX ADMIN — ATORVASTATIN CALCIUM 20 MILLIGRAM(S): 80 TABLET, FILM COATED ORAL at 22:49

## 2019-01-13 RX ADMIN — CEFTRIAXONE 1 GRAM(S): 500 INJECTION, POWDER, FOR SOLUTION INTRAMUSCULAR; INTRAVENOUS at 17:07

## 2019-01-13 RX ADMIN — SODIUM CHLORIDE 1000 MILLILITER(S): 9 INJECTION INTRAMUSCULAR; INTRAVENOUS; SUBCUTANEOUS at 16:55

## 2019-01-13 RX ADMIN — HEPARIN SODIUM 5000 UNIT(S): 5000 INJECTION INTRAVENOUS; SUBCUTANEOUS at 22:49

## 2019-01-13 RX ADMIN — LATANOPROST 1 DROP(S): 0.05 SOLUTION/ DROPS OPHTHALMIC; TOPICAL at 22:48

## 2019-01-13 RX ADMIN — Medication 20 MILLIGRAM(S): at 23:22

## 2019-01-13 RX ADMIN — MORPHINE SULFATE 1 MILLIGRAM(S): 50 CAPSULE, EXTENDED RELEASE ORAL at 19:53

## 2019-01-13 RX ADMIN — AZITHROMYCIN 255 MILLIGRAM(S): 500 TABLET, FILM COATED ORAL at 17:07

## 2019-01-13 RX ADMIN — SODIUM CHLORIDE 1000 MILLILITER(S): 9 INJECTION INTRAMUSCULAR; INTRAVENOUS; SUBCUTANEOUS at 13:30

## 2019-01-13 RX ADMIN — ALBUTEROL 2.5 MILLIGRAM(S): 90 AEROSOL, METERED ORAL at 23:09

## 2019-01-13 RX ADMIN — CEFTRIAXONE 100 GRAM(S): 500 INJECTION, POWDER, FOR SOLUTION INTRAMUSCULAR; INTRAVENOUS at 16:08

## 2019-01-13 NOTE — H&P ADULT - NSHPSOCIALHISTORY_GEN_ALL_CORE
Lives at home, ambulates independently but recently using a cane and walker to ambulate, able to perform all ADLs normally  Former smoker: smoked for 40 years, 2-3 PPD, quit in 1979  Occasional EtOH user  Denies illicit drug use

## 2019-01-13 NOTE — ED ADULT NURSE REASSESSMENT NOTE - NS ED NURSE REASSESS COMMENT FT1
Assumed care for pt awake alert and oriented. OOB with assistance. Vss, afebrile. placed temporarily on droplet isolation until flu result is back.  will continue to monitor

## 2019-01-13 NOTE — H&P ADULT - PROBLEM SELECTOR PLAN 4
-likely secondary due to chemo treatment  -will continue to trend  -avoid hepatotoxic agents, tylenol??***** -likely secondary to chemo treatment  -will continue to trend  -avoid hepatotoxic agents, tylenol??***** -likely secondary to chemo treatment  -will continue to trend  -avoid hepatotoxic agents

## 2019-01-13 NOTE — H&P ADULT - NSHPPHYSICALEXAM_GEN_ALL_CORE
Physical Exam  General: No apparent distress  Head: normocephalic, atraumatic  Eyes: EOMI, anicteric, PERRLA  ENT: moist mucous membranes, no pharyngeal exudates  Heart: RR, S1, S2, systolic murmur  Chest: decreased breath sounds diffusely, ELIZABETH no breath sounds heard  Abd: BS+, soft, NT, ND  Back: no CVA tenderness  Extr: no edema or cyanosis  Neuro: AA&Ox3, sensation to light touch intact, 5/5 strength b/l LE, 5/5 strength RUE, 3-4/5 strength LUE  Psych: normal affect

## 2019-01-13 NOTE — ED PROVIDER NOTE - MEDICAL DECISION MAKING DETAILS
hx lung ca, last course of chemo 2 weeks ago, weak , diarrhea, sob, will obtain labs, cxr, ekg, give ivf

## 2019-01-13 NOTE — ED PROVIDER NOTE - PROGRESS NOTE DETAILS
call out to Dr Yu, awaiting call back spoke with Dr Yu, case discussed, advised cannot exclude pneumonia, will see patient, iv abx ordered spoke with Dr Morris , hospitalist, case discussed, results discussed, will admit patient

## 2019-01-13 NOTE — ED ADULT NURSE NOTE - OBJECTIVE STATEMENT
received pt in bed #11a Pt A&O from home undergoing chemo for lung cancer states had his 2nd chemo 2 weeks ago & since then has been gradually  feeling worse c/o SOB, cough, weakness, no appetite, body aches & diarrhea. Pt denies and urinary symptoms denies fever/chills Pt seen by RYAN Avery

## 2019-01-13 NOTE — CONSULT NOTE ADULT - PROBLEM SELECTOR RECOMMENDATION 2
poss PNA  rocephine and zithro  check viral panel  cx  serial labs  will check biomarkers  cough rx regimen  nebs for copd  o2 support  keep sat > 88 pct  nutrition

## 2019-01-13 NOTE — ED PROVIDER NOTE - PMH
Arteriosclerotic heart disease (ASHD)    Coronary artery disease    Disorder of bone, unspecified    Dyslipidemia    Hypertension    Lung cancer

## 2019-01-13 NOTE — CONSULT NOTE ADULT - SUBJECTIVE AND OBJECTIVE BOX
Date/Time Patient Seen:  		  Referring MD:   Data Reviewed	       Patient is a 85y old  Male who presents with a chief complaint of     Subjective/HPI    seen and examined  vs and meds reviewed  labs and imaging reviewed    s/p chemo 2 cycles    pt had last chemo on Monday    SOB  HADLEY  nausea  weakness  fatigue  poor appetite      ED Provider Note [Charted Location: [facility] ED] [Authored: 13-Jan-2019 13:30]- for Visit: [id number], Complete, Revised, Signed in Full, General    HISTORY OF PRESENT ILLNESS:    High Risk Travel:  International Travel? No(1)    · Chief Complaint: The patient is a 85y Male complaining of weakness.  · HPI Objective Statement: 85 y male presents with generalized weakness x 2 weeks, states he completed course of chemo 2 weeks ago,  and has felt weak since then, decreased appetite, cough, sob, + diarrhea, no nausea, no vomiting,  no chest pain. no abdominal pain. no change in urine output.  states he was in hospital in October 4-5 days for scapula surgery, there they found lung cancer.  former smoker.  Onc Dr Yakov Nunn , PMD Александр Foreman.       	PMH:  Arteriosclerotic heart disease (ASHD)    	Coronary artery disease    	Disorder of bone, unspecified    	Dyslipidemia    	Hypertension    Lung cancer  · Presenting Symptoms: SHORTNESS OF BREATH, WEAKNESS, diarrhea, decreased appetite  · Associated Symptoms: generalized weakness  · Negative Findings: no chills, no dizziness, no fever, no numbness, no pain, no tingling, no vomiting  · Significant Negative Findings: no fever, no  chest pain  · Location: generalized  · Radiation: none  · Timing: constant  · Duration: week(s)  2  · Quality: generalized weakness  · Severity: MODERATE  · Aggravating Factors: none  · Relieving Factors: none       PAST MEDICAL & SURGICAL HISTORY:  Lung cancer  Disorder of bone, unspecified  Coronary artery disease  Dyslipidemia  Arteriosclerotic heart disease (ASHD)  Hypertension  Hip pain: Right hip replacement in 2008  FH: CABG (coronary artery bypass surgery): 1994        Medication list         MEDICATIONS  (STANDING):  azithromycin  IVPB 500 milliGRAM(s) IV Intermittent Once    MEDICATIONS  (PRN):         Vitals log        ICU Vital Signs Last 24 Hrs  T(C): 36.6 (13 Jan 2019 16:05), Max: 36.6 (13 Jan 2019 16:05)  T(F): 97.8 (13 Jan 2019 16:05), Max: 97.8 (13 Jan 2019 16:05)  HR: 77 (13 Jan 2019 16:05) (77 - 88)  BP: 140/75 (13 Jan 2019 16:05) (140/75 - 159/82)  BP(mean): --  ABP: --  ABP(mean): --  RR: 16 (13 Jan 2019 16:05) (15 - 16)  SpO2: 95% (13 Jan 2019 16:05) (95% - 96%)           Input and Output:  I&O's Detail      Lab Data                        10.2   5.00  )-----------( 42       ( 13 Jan 2019 13:46 )             31.1     01-13    138  |  104  |  36<H>  ----------------------------<  110<H>  5.0   |  25  |  1.60<H>    Ca    8.8      13 Jan 2019 13:46    TPro  7.1  /  Alb  2.1<L>  /  TBili  0.7  /  DBili  x   /  AST  427<H>  /  ALT  664<H>  /  AlkPhos  1085<H>  01-13            Review of Systems	      Objective     Physical Examination    heart s1s2  lung dec BS  abd soft  cn grossly int    Pertinent Lab findings & Imaging      Shi:  NO   Adequate UO     I&O's Detail           Discussed with:     Cultures:	        Radiology      EXAM:  CT ANGIO CHEST (W)AW IC                            PROCEDURE DATE:  01/13/2019          INTERPRETATION:  CLINICAL INFORMATION:  Cough and shortness of breath.   History of lung cancer.    PROCEDURE:  Using multislice helical technique,  CT angiography, 1.5 mm   sections were obtained  following the intravenous administration of 68   ccs of Omnipaque 350.  Multiplanar MIP reconstructed images were obtained.    FINDINGS:      Comparison: Chest radiograph 1/13/2019 and CT scan of the chest 6/5/2013.    There is no evidence of intraluminal filling defects to suggest central   or interlobar pulmonary emboli.   No segmental pulmonary artery filling defect is noted.    There is arteriosclerotic calcification of thoracic aorta with coronary   artery calcifications.  No pericardial effusion is noted.    No enlarged mediastinal or hilar lymphadenopathy is noted.    There is extensive centrilobular and paraseptal emphysematous changes,   most pronounced in the upper lung zones bilaterally.  There is probable wedge resection left upper lobe.  There is focal soft tissue density at the anterior medial aspect of the   left lung apex. There is adjacent airspace consolidation and focal   bronchiectasis.    The central airways remain patent; no central endobronchial lesion is   noted.    No significant pleural effusion is noted.  No pneumothorax is noted.    Limited images that include the upper abdomen demonstrate distended   gallbladder with gallstone.  There are indeterminate hypodense lesions at the upper poles of the   kidneys bilaterally.    There are multilevel degenerative changes of the thoracic spine.    Impression:    Probable wedge resection left upper lobe with soft tissue opacity   adjacent medial left lung apex. Focal airspace consolidation and   bronchiectasis medial left upper lobe.  Findings could represent the sequelae of prior treatment, however, cannot   exclude recurrent or residual tumor.  Recommend further clinical correlation and comparison with more recent   prior CT scan of the chest.    No CT evidence for acute pulmonary embolism.    Other findings as discussed above.                          MARIELLE GRIMALDO M.D., ATTENDING RADIOLOGIST  This document has been electronically signed. Jan 13 2019  3:27PM

## 2019-01-13 NOTE — ED ADULT NURSE NOTE - NSIMPLEMENTINTERV_GEN_ALL_ED
Implemented All Universal Safety Interventions:  [place] to call system. Call bell, personal items and telephone within reach. Instruct patient to call for assistance. Room bathroom lighting operational. Non-slip footwear when patient is off stretcher. Physically safe environment: no spills, clutter or unnecessary equipment. Stretcher in lowest position, wheels locked, appropriate side rails in place.

## 2019-01-13 NOTE — H&P ADULT - HISTORY OF PRESENT ILLNESS
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  Patient states that he has had difficulty walking and could not move out of bed, usually able to ambulate independently but started using a cane about 1.5 weeks ago and a walker about 3 days ago.  States that he has decreased appetite but this has been ongoing fo the past few months since his surgery, but unable to eat anything since yesterday.  Denies nausea/vomiting, denies numbness/tingling.  Patient also admits to increased lacrimation and rhinorrhea ongoing for about 1 month, associated with productive cough with yellow to clear phlegm, no blood.  Patient had latest chemo therapy session on 12/28/18 and since then has not felt himself, has some shortness of breath but no increased dyspnea on exertion.  Patient also admits to diarrhea and constipation, no blood in stool.  Denies abdominal pain, urinary frequency, dysuria, or urinary retention.  Patient has had 2 chemo sessions outpatient (on 12/7/18 and 12/28/18, each 5 hours long, patient had hives reaction on 2nd session not sure which medication it was but treated with hydrocortisone and got better).  Patient was suppose to have 3rd session this Friday however met with oncologist PA last Monday and stated that he no longer wanted to undergo current chemo treatment regimen, and is going to discuss further options outpatient with Oncologist. 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  Patient states that he has had difficulty walking and could not move out of bed, usually able to ambulate independently but started using a cane about 1.5 weeks ago and a walker about 3 days ago.  States that he has decreased appetite but this has been ongoing fo the past few months since his surgery, but unable to eat anything since yesterday.  Denies nausea/vomiting, denies numbness/tingling.  Patient also admits to increased lacrimation and rhinorrhea ongoing for about 1 month, associated with productive cough with yellow to clear phlegm, no blood.  Patient had latest chemo therapy session on 12/28/18 and since then has not felt himself, has some shortness of breath but no increased dyspnea on exertion.  Patient also admits to diarrhea and constipation, no blood in stool.  Denies abdominal pain, urinary frequency, dysuria, or urinary retention.  Patient has had 2 chemo sessions outpatient (on 12/7/18 and 12/28/18, each 5 hours long, patient had hives reaction on 2nd session not sure which medication it was but treated with hydrocortisone and got better).  Patient was suppose to have 3rd session this Friday however met with oncologist PA last Monday and stated that he no longer wanted to undergo current chemo treatment regimen, and is going to discuss further options outpatient with Oncologist.      In the ED, T 97.1, HR 88, /82, RR 1, SpO2 96% on RA.  Labs significant for H/H 10.2/31.1 (baseline around 11-12 as per chart review), WBC 5, Band count 5%, lactate 1.3, BUN/Cr 36/1.6, albumin 2.1, alk phos 1085, , , procal 1.08, UA grossly negative.      EKG: NSR with possible Vu-Parkinson-White Syndrome (similar to EKG from 10/5/18)  CXR: There is mild elevation of left hemidiaphragm. Again noted is a poorly defined nodular opacity left midlung zone overlying left fifth and sixth ribs. Again noted is subsegmental atelectasis with possible trace pleural  effusion at the left costophrenic angle.  The right lung is clear.  CTA: Probable wedge resection left upper lobe with soft tissue opacity adjacent medial left lung apex. Focal airspace consolidation and bronchiectasis medial left upper lobe. Findings could represent the sequelae of prior treatment, however, cannot exclude recurrent or residual tumor.  No PE.    In the ED, patient received azithro x1, ceftriaxone x1, 1L NS bolus. Pulm consulted in the ED.

## 2019-01-13 NOTE — H&P ADULT - NSHPREVIEWOFSYSTEMS_GEN_ALL_CORE
Review of Systems  General: no fever, chills  Eyes: no vision changes, admits to lacrimation  ENT: admits to nasal dripping, no hearing changes, nasal congestions, or sore throat  CV: no chest pain or palpitations  Pulm: no SOB, no wheezing, admits to cough, no hemoptysis  Abd/GI: no nausea or vomiting, admits to diarrhea and constipation, no abd pain  : no dysuria, hematuria, urinary frequency  MSK: admits to muscle pain and weakness  Neuro: admits to weakness, no syncope or dizziness  Psych: no anxiety or depression  Endo: no heat or cold intolerance

## 2019-01-13 NOTE — H&P ADULT - PROBLEM SELECTOR PLAN 6
-BUN/Cr 36/1.6 on admission  -avoid nephrotoxic agents  -improved compared to past as per chart review

## 2019-01-13 NOTE — H&P ADULT - PROBLEM SELECTOR PLAN 2
-s/p 2 chemo sessions outpatient   -patient follows Dr. Yakov Nunn outpatient, does not like current regimen of treatment and to discuss further options regarding cancer treatment  -patient on compazine 10mg q6hrs PRN and zofran q8hrs PRN, will hold compazine for now and continue with Zofran

## 2019-01-13 NOTE — ED PROVIDER NOTE - ATTENDING CONTRIBUTION TO CARE
pt c/o generalized weakness/fatigue, decreased appetite, sob, loose stools since 2nd chemo dose 2 weeks ago for lung ca. no fever, ha, cp, abd pain, vomiting, rash  wd, wn male, nad, dry mucous membranes, s1s2 rrr, lungs cta, abd soft, nt, nd, skin warm dry

## 2019-01-13 NOTE — ED ADULT NURSE NOTE - PMH
Arteriosclerotic heart disease (ASHD)    Coronary artery disease    Disorder of bone, unspecified    Dyslipidemia    Hypertension Arteriosclerotic heart disease (ASHD)    Coronary artery disease    Disorder of bone, unspecified    Dyslipidemia    Hypertension    Lung cancer

## 2019-01-13 NOTE — ED ADULT TRIAGE NOTE - CHIEF COMPLAINT QUOTE
from home c/o weakness x 2 weeks w hx of lung cancer  started chemo 5 wks ago finished 2 wks ago   FS = 98 as per EMS

## 2019-01-13 NOTE — H&P ADULT - PROBLEM SELECTOR PLAN 5
-hemodynamically stable  -transfuse PRN Hemoglobin <7  -continue to monitor, patient s/p 1L NS bolus in the ED, could be dilutional  -baseline around 11-12 as per chart review

## 2019-01-13 NOTE — H&P ADULT - PSH
Benign neoplasm of scapula  R distal scapula resection  FH: CABG (coronary artery bypass surgery)  1994  Hip pain  Right hip replacement in 2008

## 2019-01-13 NOTE — H&P ADULT - ATTENDING COMMENTS
Patient seen and examined, case was discussed with resident, agree with above plan with the following. Flu was negative. PE was ruled out on CT. Suspected possible superimposed PNA. Pt is on immunotherapy and chemotherapy for his lung cancer. Sometimes you see pseudoprogression on imaging when patients are on immunotherapy like Keytruda. For now treat for PNA and may consider oncology consult if concern for worsening or progression of disease. Rest agree as above.

## 2019-01-13 NOTE — H&P ADULT - PROBLEM SELECTOR PLAN 1
-admit to F  -patient afebrile and without leukocytosis but is immunocompromised 2/2 chemo and cancer   -CXR: There is mild elevation of left hemidiaphragm. Again noted is a poorly defined nodular opacity left midlung zone overlying left fifth and sixth ribs. Again noted is subsegmental atelectasis with possible trace pleural  effusion at the left costophrenic angle.  The right lung is clear.  -CTA: Probable wedge resection left upper lobe with soft tissue opacity adjacent medial left lung apex. Focal airspace consolidation and bronchiectasis medial left upper lobe. Findings could represent the sequelae of prior treatment, however, cannot exclude recurrent or residual tumor.  No PE.  -s/p azithro x1 and ceftriaxone x1  -will c/w azithro and ceftriazone to cover for CAP  -procal 1.08  -proBNP 1766  -albuterol 2.5mg nebulizer q8hrs, Robitussin 200mg q6hrs PRN cough  -f/u CRP, repeat procal, Legionella antigen   -f/u UCx and BCx  -f/u flu and RSV panel  -Pulm consulted, recs appreciated

## 2019-01-14 LAB
ALBUMIN SERPL ELPH-MCNC: 1.7 G/DL — LOW (ref 3.3–5)
ALP SERPL-CCNC: 919 U/L — HIGH (ref 40–120)
ALT FLD-CCNC: 479 U/L — HIGH (ref 12–78)
ANION GAP SERPL CALC-SCNC: 10 MMOL/L — SIGNIFICANT CHANGE UP (ref 5–17)
AST SERPL-CCNC: 248 U/L — HIGH (ref 15–37)
BILIRUB SERPL-MCNC: 0.5 MG/DL — SIGNIFICANT CHANGE UP (ref 0.2–1.2)
BUN SERPL-MCNC: 28 MG/DL — HIGH (ref 7–23)
CALCIUM SERPL-MCNC: 8.3 MG/DL — LOW (ref 8.5–10.1)
CHLORIDE SERPL-SCNC: 106 MMOL/L — SIGNIFICANT CHANGE UP (ref 96–108)
CO2 SERPL-SCNC: 23 MMOL/L — SIGNIFICANT CHANGE UP (ref 22–31)
CREAT SERPL-MCNC: 1.1 MG/DL — SIGNIFICANT CHANGE UP (ref 0.5–1.3)
CULTURE RESULTS: SIGNIFICANT CHANGE UP
GLUCOSE SERPL-MCNC: 80 MG/DL — SIGNIFICANT CHANGE UP (ref 70–99)
HCT VFR BLD CALC: 24.9 % — LOW (ref 39–50)
HCT VFR BLD CALC: 27.3 % — LOW (ref 39–50)
HGB BLD-MCNC: 8.1 G/DL — LOW (ref 13–17)
HGB BLD-MCNC: 8.9 G/DL — LOW (ref 13–17)
LEGIONELLA AG UR QL: NEGATIVE — SIGNIFICANT CHANGE UP
MCHC RBC-ENTMCNC: 27.7 PG — SIGNIFICANT CHANGE UP (ref 27–34)
MCHC RBC-ENTMCNC: 32.6 GM/DL — SIGNIFICANT CHANGE UP (ref 32–36)
MCV RBC AUTO: 85 FL — SIGNIFICANT CHANGE UP (ref 80–100)
NRBC # BLD: 0 /100 WBCS — SIGNIFICANT CHANGE UP (ref 0–0)
PLATELET # BLD AUTO: 42 K/UL — LOW (ref 150–400)
POTASSIUM SERPL-MCNC: 4.5 MMOL/L — SIGNIFICANT CHANGE UP (ref 3.5–5.3)
POTASSIUM SERPL-SCNC: 4.5 MMOL/L — SIGNIFICANT CHANGE UP (ref 3.5–5.3)
PROT SERPL-MCNC: 5.9 G/DL — LOW (ref 6–8.3)
RBC # BLD: 3.21 M/UL — LOW (ref 4.2–5.8)
RBC # FLD: 13.6 % — SIGNIFICANT CHANGE UP (ref 10.3–14.5)
SODIUM SERPL-SCNC: 139 MMOL/L — SIGNIFICANT CHANGE UP (ref 135–145)
SPECIMEN SOURCE: SIGNIFICANT CHANGE UP
WBC # BLD: 5.07 K/UL — SIGNIFICANT CHANGE UP (ref 3.8–10.5)
WBC # FLD AUTO: 5.07 K/UL — SIGNIFICANT CHANGE UP (ref 3.8–10.5)

## 2019-01-14 PROCEDURE — 99222 1ST HOSP IP/OBS MODERATE 55: CPT

## 2019-01-14 PROCEDURE — 99232 SBSQ HOSP IP/OBS MODERATE 35: CPT | Mod: GC

## 2019-01-14 PROCEDURE — 76700 US EXAM ABDOM COMPLETE: CPT | Mod: 26

## 2019-01-14 RX ORDER — IBUPROFEN 200 MG
400 TABLET ORAL EVERY 6 HOURS
Qty: 0 | Refills: 0 | Status: DISCONTINUED | OUTPATIENT
Start: 2019-01-14 | End: 2019-01-16

## 2019-01-14 RX ADMIN — Medication 400 MILLIGRAM(S): at 18:08

## 2019-01-14 RX ADMIN — HEPARIN SODIUM 5000 UNIT(S): 5000 INJECTION INTRAVENOUS; SUBCUTANEOUS at 06:43

## 2019-01-14 RX ADMIN — AZITHROMYCIN 255 MILLIGRAM(S): 500 TABLET, FILM COATED ORAL at 18:08

## 2019-01-14 RX ADMIN — Medication 1000 UNIT(S): at 12:27

## 2019-01-14 RX ADMIN — Medication 400 MILLIGRAM(S): at 18:45

## 2019-01-14 RX ADMIN — ALBUTEROL 2.5 MILLIGRAM(S): 90 AEROSOL, METERED ORAL at 07:55

## 2019-01-14 RX ADMIN — Medication 1 MILLIGRAM(S): at 12:27

## 2019-01-14 RX ADMIN — AMLODIPINE BESYLATE 10 MILLIGRAM(S): 2.5 TABLET ORAL at 06:43

## 2019-01-14 RX ADMIN — LATANOPROST 1 DROP(S): 0.05 SOLUTION/ DROPS OPHTHALMIC; TOPICAL at 21:29

## 2019-01-14 RX ADMIN — CEFTRIAXONE 100 GRAM(S): 500 INJECTION, POWDER, FOR SOLUTION INTRAMUSCULAR; INTRAVENOUS at 17:05

## 2019-01-14 RX ADMIN — Medication 20 MILLIGRAM(S): at 12:27

## 2019-01-14 RX ADMIN — Medication 50 MILLIGRAM(S): at 06:43

## 2019-01-14 RX ADMIN — ALBUTEROL 2.5 MILLIGRAM(S): 90 AEROSOL, METERED ORAL at 20:40

## 2019-01-14 NOTE — DIETITIAN INITIAL EVALUATION ADULT. - ORAL INTAKE PTA
Pt reports decreased appetite x3 months prior to admission. Pt is S/P neoplasm of back right scapula exploration and partial resection in October 2018. Pt attributes decreased po intake to lack of appetite, taste changes associated with chemotherapy. Per H&P on Vitamin D, folic acid PTA.

## 2019-01-14 NOTE — GOALS OF CARE CONVERSATION - PERSONAL ADVANCE DIRECTIVE - CONVERSATION DETAILS
met pt, has hcp at home, contacted pt wife, will provide copy of hcp. pt has spoken to family of his wishes: pt is a full code, does not want to live on machines. contact # given

## 2019-01-14 NOTE — PHYSICAL THERAPY INITIAL EVALUATION ADULT - PERTINENT HX OF CURRENT PROBLEM, REHAB EVAL
86 yo M presenting to the ED for SOB, generalized weakness and muscle aches x 2 weeks.  Patient states that he has had difficulty walking and could not move out of bed, usually able to ambulate independently but started using a cane about 1.5 weeks ago then walker about 3 days ago. Patient also admits to diarrhea and constipation.  CXR: mild elevation of left hemidiaphragm. right lung is clear.

## 2019-01-14 NOTE — CONSULT NOTE ADULT - SUBJECTIVE AND OBJECTIVE BOX
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  Patient states that he has had difficulty walking and could not move out of bed, usually able to ambulate independently but started using a cane about 1.5 weeks ago and a walker about 3 days ago.  States that he has decreased appetite but this has been ongoing fo the past few months since his surgery, but unable to eat anything since yesterday.  Denies nausea/vomiting, denies numbness/tingling.  Patient also admits to increased lacrimation and rhinorrhea ongoing for about 1 month, associated with productive cough with yellow to clear phlegm, no blood.  Patient had latest chemo therapy session on 18 and since then has not felt himself, has some shortness of breath but no increased dyspnea on exertion.  Patient also admits to diarrhea and constipation, no blood in stool.  Denies abdominal pain, urinary frequency, dysuria, or urinary retention.  Patient has had 2 chemo sessions outpatient (on 18 and 18, each 5 hours long, patient had hives reaction on 2nd session not sure which medication it was but treated with hydrocortisone and got better).  Patient was suppose to have 3rd session this Friday however met with oncologist PA last Monday and stated that he no longer wanted to undergo current chemo treatment regimen, and is going to discuss further options outpatient with Oncologist.      In the ED, T 97.1, HR 88, /82, RR 1, SpO2 96% on RA.  Labs significant for H/H 10.2/31.1 (baseline around 11-12 as per chart review), WBC 5, Band count 5%, lactate 1.3, BUN/Cr 36/1.6, albumin 2.1, alk phos 1085, , , procal 1.08, UA grossly negative.      EKG: NSR with possible Vu-Parkinson-White Syndrome (similar to EKG from 10/5/18)  CXR: There is mild elevation of left hemidiaphragm. Again noted is a poorly defined nodular opacity left midlung zone overlying left fifth and sixth ribs. Again noted is subsegmental atelectasis with possible trace pleural  effusion at the left costophrenic angle.  The right lung is clear.  CTA: Probable wedge resection left upper lobe with soft tissue opacity adjacent medial left lung apex. Focal airspace consolidation and bronchiectasis medial left upper lobe. Findings could represent the sequelae of prior treatment, however, cannot exclude recurrent or residual tumor.  No PE.    In the ED, patient received azithro x1, ceftriaxone x1, 1L NS bolus. Pulm consulted in the ED.     Review of Systems:  Review of Systems: Review of Systems  General: no fever, chills  Eyes: no vision changes, admits to lacrimation  ENT: admits to nasal dripping, no hearing changes, nasal congestions, or sore throat  CV: no chest pain or palpitations  Pulm: no SOB, no wheezing, admits to cough, no hemoptysis  Abd/GI: no nausea or vomiting, admits to diarrhea and constipation, no abd pain  : no dysuria, hematuria, urinary frequency  MSK: admits to muscle pain and weakness  Neuro: admits to weakness, no syncope or dizziness  Psych: no anxiety or depression Endo: no heat or cold intolerance	  deniea any history of inc lfts in the past    Allergies:  No Known Allergies      Medications:  ALBUTerol    0.083% 2.5 milliGRAM(s) Nebulizer every 8 hours  amLODIPine   Tablet 10 milliGRAM(s) Oral daily  azithromycin  IVPB 500 milliGRAM(s) IV Intermittent every 24 hours  cefTRIAXone   IVPB 1 Gram(s) IV Intermittent every 24 hours  cholecalciferol 1000 Unit(s) Oral daily  FLUoxetine 20 milliGRAM(s) Oral daily  folic acid 1 milliGRAM(s) Oral daily  guaiFENesin   Syrup  (Sugar-Free) 200 milliGRAM(s) Oral every 6 hours PRN  hydrocortisone 1% Cream 1 Application(s) Topical three times a day  latanoprost 0.005% Ophthalmic Solution 1 Drop(s) Both EYES at bedtime  metoprolol succinate ER 50 milliGRAM(s) Oral daily  ondansetron   Disintegrating Tablet 8 milliGRAM(s) Oral three times a day PRN  senna 2 Tablet(s) Oral at bedtime      PMHX/PSHX:  Lung cancer  Disorder of bone, unspecified  Coronary artery disease  Dyslipidemia  Arteriosclerotic heart disease (ASHD)  Hypertension  Benign neoplasm of scapula  S/P skin neoplasm resection  Hip pain  FH: CABG (coronary artery bypass surgery)      Family history:  No pertinent family history in first degree relatives      Social History:     ROS:     General:  No wt loss, fevers, chills, night sweats, fatigue,   Eyes:  Good vision, no reported pain  ENT:  No sore throat, pain, runny nose, dysphagia  CV:  No pain, palpitations, hypo/hypertension  Resp:  No dyspnea, cough, tachypnea, wheezing  GI:  No pain, No nausea, No vomiting, No diarrhea, No constipation, No weight loss, No fever, No pruritis, No rectal bleeding, No tarry stools, No dysphagia,  :  No pain, bleeding, incontinence, nocturia  Muscle:  No pain, weakness  Neuro:  No weakness, tingling, memory problems  Psych:  No fatigue, insomnia, mood problems, depression  Endocrine:  No polyuria, polydipsia, cold/heat intolerance  Heme:  No petechiae, ecchymosis, easy bruisability  Skin:  No rash, tattoos, scars, edema      PHYSICAL EXAM:   Vital Signs:  Vital Signs Last 24 Hrs  T(C): 36.6 (2019 04:40), Max: 36.6 (2019 16:05)  T(F): 97.8 (2019 04:40), Max: 97.8 (2019 16:05)  HR: 95 (2019 08:45) (77 - 95)  BP: 139/78 (2019 08:45) (126/67 - 156/81)  BP(mean): --  RR: 18 (2019 04:40) (16 - 18)  SpO2: 96% (2019 08:45) (95% - 96%)  Daily     Daily     GENERAL:  Appears stated age, well-groomed, well-nourished, no distress  HEENT:  NC/AT,  conjunctivae clear and pink, no thyromegaly, nodules, adenopathy, no JVD, sclera -anicteric  CHEST:  Full & symmetric excursion, no increased effort, breath sounds clear  HEART:  Regular rhythm, S1, S2, no murmur/rub/S3/S4, no abdominal bruit, no edema  ABDOMEN:  Soft, non-tender, non-distended, normoactive bowel sounds,  no masses ,no hepato-splenomegaly, no signs of chronic liver disease  EXTEREMITIES:  no cyanosis,clubbing or edema  SKIN:  No rash/erythema/ecchymoses/petechiae/wounds/abscess/warm/dry  NEURO:  Alert, oriented, no asterixis, no tremor, no encephalopathy    LABS:                        8.9    5.07  )-----------( 42       ( 2019 08:17 )             27.3     -    139  |  106  |  28<H>  ----------------------------<  80  4.5   |  23  |  1.10    Ca    8.3<L>      2019 08:17    TPro  5.9<L>  /  Alb  1.7<L>  /  TBili  0.5  /  DBili  x   /  AST  248<H>  /  ALT  479<H>  /  AlkPhos  919<H>      LIVER FUNCTIONS - ( 2019 08:17 )  Alb: 1.7 g/dL / Pro: 5.9 g/dL / ALK PHOS: 919 U/L / ALT: 479 U/L / AST: 248 U/L / GGT: x             Urinalysis Basic - ( 2019 14:44 )    Color: Yellow / Appearance: Slightly Turbid / S.015 / pH: x  Gluc: x / Ketone: Trace  / Bili: Small / Urobili: 1   Blood: x / Protein: 75 mg/dL / Nitrite: Negative   Leuk Esterase: Trace / RBC: 3-5 /HPF / WBC 6-10   Sq Epi: x / Non Sq Epi: Few / Bacteria: Moderate          Imaging:

## 2019-01-14 NOTE — DIETITIAN INITIAL EVALUATION ADULT. - OTHER INFO
Pt seen for nutrition assessment for pressure injury >stage 2 however inappropriate referral given pt without documented pressure injury in medical chart. Per chart, pt is 86 Y/O M with PMH of CAD, CABG, HLD, HTN, CKD, Lung cancer S/P chemotherapy admitted for possible PNA. GI consulted for elevated LFTs. Per pt, poor appetite persists in house, consumed OJ, milk, coffee this AM only. Pt with recent significant wt loss as per chart review and pt report (40 pounds or 20.6% since September 2018). Pt denies N/V/diarrhea/constipation, last BM today 1/14. Pt denied difficulties chewing/swallowing, NKFA.

## 2019-01-14 NOTE — DIETITIAN INITIAL EVALUATION ADULT. - PHYSICAL APPEARANCE
Nutrition focused physical exam conducted - Pt without extensive signs of muscle/fat loss but does have age-related changes and depletion in light of recent significant wt loss (40 pounds x4 months). Pt does have mild depletion in temples, clavicle, and rib region but appears adequately nourished in other areas.

## 2019-01-14 NOTE — PROGRESS NOTE ADULT - ASSESSMENT
Assessment and Plan:  - Pneumonia: afebrile and without leukocytosis but is immunocompromised 2/2 chemo and cancer. continue azithro and ceftriaxone.   -  Lung Cancer: s/p 2 chemo sessions outpatient, patient follows Dr. Yakov Nunn outpatient. Will consult Heme/onc, apprec recs.   - CAD: c/w ASA 81mg QD.   - Elevated liver enzymes: trending down. F/u liver u/s and haptic panel. apprec GI recommendations.   - Anemia: stable. Monitor daily labs  - CKD: avoid nephrotoxic agents, improvement in BUN/Cr, trend labs  - HTN: c/w Toprol ER 5omg QD and Norvasc 10mg QD  - Depression: c/w fluoxetine 20mg QD  - Proph: Padua score 5, heparin 5000mg q8hrs, PT eval recommends [facility] Assessment and Plan:  - Pneumonia: afebrile and without leukocytosis but is immunocompromised 2/2 chemo and cancer. continue azithro and ceftriaxone.   -  Lung Cancer: s/p 2 chemo sessions outpatient, patient follows Dr. Yakov Nunn outpatient. Will consult Heme/onc, apprec recs.   - CAD: c/w ASA 81mg QD.   - Elevated liver enzymes: trending down. F/u liver u/s and haptic panel. apprec GI recommendations.   - Anemia: stable. Monitor daily labs  - CKD: avoid nephrotoxic agents, improvement in BUN/Cr, trend labs  - HTN: c/w Toprol ER 5omg QD and Norvasc 10mg QD  - Depression: c/w fluoxetine 20mg QD  - Proph: Padua score 5, D/C'd heparin due to low platelets, b/l SCD's PT eval recommends [facility] Assessment and Plan:  - Pneumonia: afebrile and without leukocytosis but is immunocompromised 2/2 chemo and cancer. continue azithro and ceftriaxone.   -  Lung Cancer: s/p 2 chemo sessions outpatient, patient follows Dr. Yakov Nunn outpatient. Will consult Heme/onc, apprec recs.   - CAD: c/w ASA 81mg QD.   - Elevated liver enzymes: trending down. F/u liver u/s and haptic panel. apprec GI recommendations.   - Anemia: stable. Monitor daily labs  - CKD: avoid nephrotoxic agents, improvement in BUN/Cr, trend labs  - HTN: c/w Toprol ER 5omg QD and Norvasc 10mg QD  - Depression: c/w fluoxetine 20mg QD  - Proph: Padua score 5, D/C'd heparin due to low platelets, no pharmacological AC at this time for low platelets, b/l SCD's, PT eval recommends [facility] Assessment and Plan:  - Pneumonia: afebrile and without leukocytosis but is immunocompromised 2/2 chemo and cancer. continue azithro and ceftriaxone.   -  Lung Cancer: s/p 2 chemo sessions outpatient, patient follows Dr. Yakov Nunn outpatient. Heme/onc consulted- apprec recs.   - CAD: D/C'd aspirin as patient refused. Patient was told by his heme/onc outpatient physician to not take aspirin due to his low platelet count.   - Elevated liver enzymes: trending down. F/u liver u/s and haptic panel. GI consulted and apprec recommendations.   - Anemia: stable. Monitor daily labs  - CKD: avoid nephrotoxic agents, improvement in BUN/Cr, trend labs  - HTN: c/w Toprol ER 5omg QD and Norvasc 10mg QD  - Depression: c/w fluoxetine 20mg QD  - Proph: Padua score 5, D/C'd heparin due to low platelets, no pharmacological AC at this time for low platelets, b/l SCD's, PT eval recommends [facility]

## 2019-01-14 NOTE — PROGRESS NOTE ADULT - ATTENDING COMMENTS
Pt. seen and evaluated for possible pneumonia.  Pt. is in no distress.  Tolerating IV antibiotics.  Assessed by physical therapy who is recommending [facility].  Physical examination as above.      Plan:  -Pneumonia:  Continue Azithromycin and Ceftriaxone.  Continue Albuterol Neb Q8h and Guaifenesin 200mg PO Q6h PRN.  Check blood cx.  Pulmonary f/u  -Lung CA and thrombocytopenia:  Will avoid antiplatelet agents or anticoagulation.  Heme/Onc consult  -CAD:  continue Toprol XL 50mg PO daily.  Off ASA due to thrombocytopenia  -Elevated LFTs:  Holding statin.  LFTs trending down.  Check Liver US and hepatitis panel.  GI f/u  -Anemia:  Check iron studies, folate, b12, retic ct, tsh, and stool OB.  -HTN:  continue Norvasc 10mg PO daily and Toprol XL 50mg PO daily  -CKD stage 3:  stable.   -HLD:  holding statin given elevated LFTs.  -Depression:  continue Prozac 20mg PO daily  -VTE ppx: SCD

## 2019-01-14 NOTE — DIETITIAN INITIAL EVALUATION ADULT. - ENERGY NEEDS
Wt: 154.2 pounds (admission), Ht: 72 inches, BMI: 21.0 kg/m2, IBW: 178 pounds +/-10%, %IBW: 86.6%  no edema or pressure injuries noted

## 2019-01-14 NOTE — DIETITIAN INITIAL EVALUATION ADULT. - NS AS NUTRI INTERV MEDICAL AND FOOD SUPPLEMENTS
Add on Ensure Enlive BID for additional 700 kcal, 40 grams of protein. Pt amenable to having supplement.

## 2019-01-14 NOTE — CONSULT NOTE ADULT - SUBJECTIVE AND OBJECTIVE BOX
[facility]         Aziza Valdivia, Edmond Cisneros, Melanie, Randee Qureshi        [phone] (office)    Reason for Consult: abnormal EKG    Interval HPI: Patient seen and examined at bedside. No acute events overnight.     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  Patient states that he has had difficulty walking and could not move out of bed, usually able to ambulate independently but started using a cane about 1.5 weeks ago and a walker about 3 days ago.  States that he has decreased appetite but this has been ongoing fo the past few months since his surgery, but unable to eat anything since yesterday.  Denies nausea/vomiting, denies numbness/tingling.  Patient also admits to increased lacrimation and rhinorrhea ongoing for about 1 month, associated with productive cough with yellow to clear phlegm, no blood.  Patient had latest chemo therapy session on 12/28/18 and since then has not felt himself, has some shortness of breath but no increased dyspnea on exertion.  Patient also admits to diarrhea and constipation, no blood in stool.  Denies abdominal pain, urinary frequency, dysuria, or urinary retention.  Patient has had 2 chemo sessions outpatient (on 12/7/18 and 12/28/18, each 5 hours long, patient had hives reaction on 2nd session not sure which medication it was but treated with hydrocortisone and got better).  Patient was suppose to have 3rd session this Friday however met with oncologist PA last Monday and stated that he no longer wanted to undergo current chemo treatment regimen, and is going to discuss further options outpatient with Oncologist.      In the ED, T 97.1, HR 88, /82, RR 1, SpO2 96% on RA.  Labs significant for H/H 10.2/31.1 (baseline around 11-12 as per chart review), WBC 5, Band count 5%, lactate 1.3, BUN/Cr 36/1.6, albumin 2.1, alk phos 1085, , , procal 1.08, UA grossly negative.      EKG: NSR with possible Vu-Parkinson-White Syndrome (similar to EKG from 10/5/18)  CXR: There is mild elevation of left hemidiaphragm. Again noted is a poorly defined nodular opacity left midlung zone overlying left fifth and sixth ribs. Again noted is subsegmental atelectasis with possible trace pleural  effusion at the left costophrenic angle.  The right lung is clear.  CTA: Probable wedge resection left upper lobe with soft tissue opacity adjacent medial left lung apex. Focal airspace consolidation and bronchiectasis medial left upper lobe. Findings could represent the sequelae of prior treatment, however, cannot exclude recurrent or residual tumor.  No PE.    In the ED, patient received azithro x1, ceftriaxone x1, 1L NS bolus. Pulm consulted in the ED. (13 Jan 2019 16:57)    PAST MEDICAL & SURGICAL HISTORY:  Lung cancer  Disorder of bone, unspecified  Coronary artery disease  Dyslipidemia  Arteriosclerotic heart disease (ASHD)  Hypertension  Benign neoplasm of scapula: R distal scapula resection  Hip pain: Right hip replacement in 2008  FH: CABG (coronary artery bypass surgery): 1994    SOCIAL HISTORY:  Former smoker: smoked for 40 years, 2-3 PPD, quit in 1979    FAMILY HISTORY:  No pertinent family history of MI, CVA, arrhythmias.     Home Medications:  acetaminophen 325 mg oral tablet: 2 tab(s) orally every 6 hours as needed for Mild pain  begin tapering off as pain decreases  MDD Twelve tabs (13 Jan 2019 22:55)  aspirin 81 mg oral tablet: 1 tab(s) orally once a day MUST TAKE WITH FOOD (13 Jan 2019 22:55)  FLUoxetine 20 mg oral capsule: 1 cap(s) orally once a day (13 Jan 2019 22:55)  folic acid 1 mg oral tablet: 1 tab(s) orally once a day (13 Jan 2019 22:55)  Lipitor 20 mg oral tablet: 1 tab(s) orally once a day (at bedtime) (13 Jan 2019 22:55)  Norvasc 10 mg oral tablet: 1 tab(s) orally once a day (13 Jan 2019 22:55)  prochlorperazine 10 mg oral capsule, extended release: 10 milligram(s) orally every 6 hours, As Needed (13 Jan 2019 22:55)  Toprol-XL 50 mg oral tablet, extended release: 1 tab(s) orally once a day (13 Jan 2019 22:55)  Travatan 0.004% ophthalmic solution: 1 drop(s) to each affected eye once a day (in the evening) (13 Jan 2019 22:55)  Vitamin D3: orally once a day (13 Jan 2019 22:55)  Zetia 10 mg oral tablet: 1 tab(s) orally once a day (at bedtime) (13 Jan 2019 22:55)    MEDICATIONS  (STANDING):  ALBUTerol    0.083% 2.5 milliGRAM(s) Nebulizer every 8 hours  amLODIPine   Tablet 10 milliGRAM(s) Oral daily  aspirin  chewable 81 milliGRAM(s) Oral daily  azithromycin  IVPB 500 milliGRAM(s) IV Intermittent every 24 hours  cefTRIAXone   IVPB 1 Gram(s) IV Intermittent every 24 hours  cholecalciferol 1000 Unit(s) Oral daily  FLUoxetine 20 milliGRAM(s) Oral daily  folic acid 1 milliGRAM(s) Oral daily  heparin  Injectable 5000 Unit(s) SubCutaneous every 8 hours  hydrocortisone 1% Cream 1 Application(s) Topical three times a day  latanoprost 0.005% Ophthalmic Solution 1 Drop(s) Both EYES at bedtime  metoprolol succinate ER 50 milliGRAM(s) Oral daily  senna 2 Tablet(s) Oral at bedtime    MEDICATIONS  (PRN):  guaiFENesin   Syrup  (Sugar-Free) 200 milliGRAM(s) Oral every 6 hours PRN Cough  ondansetron   Disintegrating Tablet 8 milliGRAM(s) Oral three times a day PRN Nausea and/or Vomiting    Allergies  No Known Allergies    REVIEW OF SYSTEMS:   General: no fever, chills  Eyes: no vision changes, admits to lacrimation  ENT: admits to nasal dripping, no hearing changes, nasal congestions, or sore throat  CV: no chest pain or palpitations  Pulm: no SOB, no wheezing, admits to cough, no hemoptysis  Abd/GI: no nausea or vomiting, admits to diarrhea and constipation, no abd pain  : no dysuria, hematuria, urinary frequency  MSK: admits to muscle pain and weakness  Neuro: admits to weakness, no syncope or dizziness  Psych: no anxiety or depression  Endo: no heat or cold intolerance    VITAL SIGNS:   Vital Signs Last 24 Hrs  T(C): 36.6 (14 Jan 2019 04:40), Max: 36.6 (13 Jan 2019 16:05)  T(F): 97.8 (14 Jan 2019 04:40), Max: 97.8 (13 Jan 2019 16:05)  HR: 95 (14 Jan 2019 08:45) (77 - 95)  BP: 139/78 (14 Jan 2019 08:45) (126/67 - 159/82)  RR: 18 (14 Jan 2019 04:40) (15 - 18)  SpO2: 96% (14 Jan 2019 08:45) (95% - 96%)    I&O's Summary    13 Jan 2019 07:01  -  14 Jan 2019 07:00  --------------------------------------------------------  IN: 0 mL / OUT: 200 mL / NET: -200 mL    PHYSICAL EXAM:  General: No apparent distress  Head: normocephalic, atraumatic  Eyes: EOMI, anicteric, PERRLA  ENT: moist mucous membranes, no pharyngeal exudates  Heart: RRR, S1, S2, systolic murmur  Chest: decreased breath sounds diffusely, ELIZABETH no breath sounds heard  Abd: BS+, soft, NT, ND  Extr: no edema or cyanosis  Neuro: AA&Ox3, sensation to light touch intact, 5/5 strength b/l LE, 5/5 strength RUE, 3-4/5 strength LUE  Psych: normal affect    LABS: All Labs Reviewed:                        8.9    5.07  )-----------( 42       ( 14 Jan 2019 08:17 )             27.3                         10.2   5.00  )-----------( 42       ( 13 Jan 2019 13:46 )             31.1     14 Jan 2019 08:17    139    |  106    |  28     ----------------------------<  80     4.5     |  23     |  1.10   13 Jan 2019 13:46    138    |  104    |  36     ----------------------------<  110    5.0     |  25     |  1.60     Ca    8.3        14 Jan 2019 08:17  Ca    8.8        13 Jan 2019 13:46    TPro  5.9    /  Alb  1.7    /  TBili  0.5    /  DBili  x      /  AST  248    /  ALT  479    /  AlkPhos  919    14 Jan 2019 08:17  TPro  7.1    /  Alb  2.1    /  TBili  0.7    /  DBili  x      /  AST  427    /  ALT  664    /  AlkPhos  1085   13 Jan 2019 13:46    Blood Culture:   01-13 @ 13:46  Pro Bnp 1766    01-13 @ 13:46  TSH: 1.34    EKG: NSR with Karlie-Parkinson-White arrhythmia seen   Also seen on previous EKGs done in 10/4/2108

## 2019-01-14 NOTE — DIETITIAN INITIAL EVALUATION ADULT. - NS AS NUTRI INTERV MEALS SNACK
Continue current diet DASH/TLC as it remains appropriate at this time. Encourage po intake of nutrient dense meals/snacks. RD to provide food preferences to increase po intake.

## 2019-01-14 NOTE — PROGRESS NOTE ADULT - PROBLEM SELECTOR PLAN 1
lung cancer - emphysema - atelectasis - frailty - Adeno Ca -   supportive medical regimen  cont Albuterol TID atc  cont emp ABX regimen - Rocephin and Zithro  biomarkers - procalcitonin and crp supportive infectious process  tylenol PRN  robitussin PRN  monitor vs and HD and Sat  pt is s/p 2 cycles of chemo  pall care eval - MOLST discussion

## 2019-01-14 NOTE — CONSULT NOTE ADULT - PROBLEM SELECTOR RECOMMENDATION 9
avoid all non essential hepatotoxic drugs	  Patient presented with persistent abnormal LFT  Obtain ABD sonogram and mri if inc lcfts  Check viral hepatitis panel  SOLANGE,ASMA,AMA  Iron studies, celiac serologies    Potential liver biopsy in future may be needed if persistent elevated liver function.    diet and wt control  avoid all hepatotoxic drugs also  avoid alcohol and herbel medication.  follow up liver function test.
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

## 2019-01-14 NOTE — CHART NOTE - NSCHARTNOTEFT_GEN_A_CORE
Upon Nutritional Assessment by the Registered Dietitian your patient was determined to meet criteria / has evidence of the following diagnosis/diagnoses:          [ ]  Mild Protein Calorie Malnutrition        [ ]  Moderate Protein Calorie Malnutrition        [X] Severe Protein Calorie Malnutrition        [ ] Unspecified Protein Calorie Malnutrition        [ ] Underweight / BMI <19        [ ] Morbid Obesity / BMI > 40      Findings as based on:  [X] Comprehensive nutrition assessment   [X] Nutrition Focused Physical Exam - Nutrition focused physical exam conducted - Pt without extensive signs of muscle/fat loss but does have age-related changes and depletion in light of recent significant wt loss (40 pounds x4 months). Pt does have mild depletion in temples, clavicle, interosseous and rib region but appears adequately nourished in other areas.  [X] Other: Malnutrition (chronic, severe) related to inadequate energy/protein intake in setting of chronic illness as evidenced by wt loss 20.6% x4 months, <75% of nutrition needs > 1month, mild signs of muscle/fat.     Nutrition Plan/Recommendations:      1) Continue current diet DASH/TLC as it remains appropriate at this time. Encourage po intake of nutrient dense meals/snacks. RD to provide food preferences to increase po intake.   2) Add on Ensure Enlive BID for additional 700 kcal, 40 grams of protein. Pt amenable to having supplement. Pending verification made to Attending MD.   3) Monitor po intake, weights, BMs, skin integrity, tolerance of diet consistency, and nutrition related labs.   4) RD to remain available.       PROVIDER Section:     By signing this assessment you are acknowledging and agree with the diagnosis/diagnoses assigned by the Registered Dietitian    Yarelis Shanks RD

## 2019-01-14 NOTE — PROGRESS NOTE ADULT - SUBJECTIVE AND OBJECTIVE BOX
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  S/P skin neoplasm resection  Hip pain: Right hip replacement in 2008  FH: CABG (coronary artery bypass surgery): 1994        Medication list         MEDICATIONS  (STANDING):  ALBUTerol    0.083% 2.5 milliGRAM(s) Nebulizer every 8 hours  amLODIPine   Tablet 10 milliGRAM(s) Oral daily  aspirin  chewable 81 milliGRAM(s) Oral daily  atorvastatin 20 milliGRAM(s) Oral at bedtime  azithromycin  IVPB 500 milliGRAM(s) IV Intermittent every 24 hours  cefTRIAXone   IVPB 1 Gram(s) IV Intermittent every 24 hours  cholecalciferol 1000 Unit(s) Oral daily  FLUoxetine 20 milliGRAM(s) Oral daily  folic acid 1 milliGRAM(s) Oral daily  heparin  Injectable 5000 Unit(s) SubCutaneous every 8 hours  hydrocortisone 1% Cream 1 Application(s) Topical three times a day  latanoprost 0.005% Ophthalmic Solution 1 Drop(s) Both EYES at bedtime  metoprolol succinate ER 50 milliGRAM(s) Oral daily  senna 2 Tablet(s) Oral at bedtime    MEDICATIONS  (PRN):  guaiFENesin   Syrup  (Sugar-Free) 200 milliGRAM(s) Oral every 6 hours PRN Cough  ondansetron   Disintegrating Tablet 8 milliGRAM(s) Oral three times a day PRN Nausea and/or Vomiting         Vitals log        ICU Vital Signs Last 24 Hrs  T(C): 36.6 (14 Jan 2019 04:40), Max: 36.6 (13 Jan 2019 16:05)  T(F): 97.8 (14 Jan 2019 04:40), Max: 97.8 (13 Jan 2019 16:05)  HR: 83 (14 Jan 2019 04:40) (77 - 88)  BP: 126/67 (14 Jan 2019 04:40) (126/67 - 159/82)  BP(mean): --  ABP: --  ABP(mean): --  RR: 18 (14 Jan 2019 04:40) (15 - 18)  SpO2: 95% (14 Jan 2019 04:40) (95% - 96%)           Input and Output:  I&O's Detail    13 Jan 2019 07:01  -  14 Jan 2019 07:00  --------------------------------------------------------  IN:  Total IN: 0 mL    OUT:    Voided: 200 mL  Total OUT: 200 mL    Total NET: -200 mL          Lab Data                        10.2   5.00  )-----------( 42       ( 13 Jan 2019 13:46 )             31.1     01-13    138  |  104  |  36<H>  ----------------------------<  110<H>  5.0   |  25  |  1.60<H>    Ca    8.8      13 Jan 2019 13:46    TPro  7.1  /  Alb  2.1<L>  /  TBili  0.7  /  DBili  x   /  AST  427<H>  /  ALT  664<H>  /  AlkPhos  1085<H>  01-13            Review of Systems	      Objective     Physical Examination    heart s1s2  lung dec BS  abd soft  on room air  verbal  alert      Pertinent Lab findings & Imaging      Jose Guadalupe:  NO   Adequate UO     I&O's Detail    13 Jan 2019 07:01  -  14 Jan 2019 07:00  --------------------------------------------------------  IN:  Total IN: 0 mL    OUT:    Voided: 200 mL  Total OUT: 200 mL    Total NET: -200 mL               Discussed with:     Cultures:	        Radiology

## 2019-01-14 NOTE — PROGRESS NOTE ADULT - SUBJECTIVE AND OBJECTIVE BOX
CHIEF COMPLAINT/INTERVAL HISTORY: Patient seen and examined at bedside. Patient in no acute distress. Patient does not share any complaints at this time.     REVIEW OF SYSTEMS:  No fever, CP, SOB or abdominal pain.    Vital Signs Last 24 Hrs  T(C): 36.6 (2019 04:40), Max: 36.6 (2019 16:05)  T(F): 97.8 (2019 04:40), Max: 97.8 (2019 16:05)  HR: 95 (2019 08:45) (77 - 95)  BP: 139/78 (2019 08:45) (126/67 - 159/82)  BP(mean): --  RR: 18 (2019 04:40) (15 - 18)  SpO2: 96% (2019 08:45) (95% - 96%)    PHYSICAL EXAM:  GENERAL: NAD  HEENT: EOMI, hearing normal, conjunctiva and sclera clear  Chest: CTA b/l, breath sounds equal b/l, no wheezing, rales, or rhonchi  CV: S1S2, RRR, no murmurs, rubs, or gallops  GI: soft, +BS, NT/ND  Musculoskeletal: no edema  Psychiatric: affect nL, mood nL  Skin: warm and dry    LABS:                        8.9    5.07  )-----------( 42       ( 2019 08:17 )             27.3     14    139  |  106  |  28<H>  ----------------------------<  80  4.5   |  23  |  1.10    Ca    8.3<L>      2019 08:17    TPro  5.9<L>  /  Alb  1.7<L>  /  TBili  0.5  /  DBili  x   /  AST  248<H>  /  ALT  479<H>  /  AlkPhos  919<H>        Urinalysis Basic - ( 2019 14:44 )    Color: Yellow / Appearance: Slightly Turbid / S.015 / pH: x  Gluc: x / Ketone: Trace  / Bili: Small / Urobili: 1   Blood: x / Protein: 75 mg/dL / Nitrite: Negative   Leuk Esterase: Trace / RBC: 3-5 /HPF / WBC 6-10   Sq Epi: x / Non Sq Epi: Few / Bacteria: Moderate    < from: CT Angio Chest w/ IV Cont (19 @ 15:01) >  Probable wedge resection left upper lobe with soft tissue opacity   adjacent medial left lung apex. Focal airspace consolidation and   bronchiectasis medial left upper lobe.  Findings could represent the sequelae of prior treatment, however, cannot   exclude recurrent or residual tumor.  Recommend further clinical correlation and comparison with more recent  prior CT scan of the chest.    No CT evidence for acute pulmonary embolism.    < end of copied text > CHIEF COMPLAINT/INTERVAL HISTORY: Patient seen and examined at bedside. Patient in no acute distress. Patient does not share any complaints at this time. Patient stated that his heme/onc said that he is not to be taking aspirin due to his low platelet count.     REVIEW OF SYSTEMS:  No fever, CP, SOB or abdominal pain.    Vital Signs Last 24 Hrs  T(C): 36.6 (2019 04:40), Max: 36.6 (2019 16:05)  T(F): 97.8 (2019 04:40), Max: 97.8 (2019 16:05)  HR: 95 (2019 08:45) (77 - 95)  BP: 139/78 (2019 08:45) (126/67 - 159/82)  BP(mean): --  RR: 18 (2019 04:40) (15 - 18)  SpO2: 96% (2019 08:45) (95% - 96%)    PHYSICAL EXAM:  GENERAL: NAD  HEENT: EOMI, hearing normal, conjunctiva and sclera clear  Chest: CTA b/l, breath sounds equal b/l, no wheezing, rales, or rhonchi  CV: S1S2, RRR, no murmurs, rubs, or gallops  GI: soft, +BS, NT/ND  Musculoskeletal: no edema  Psychiatric: affect nL, mood nL  Skin: warm and dry    LABS:                        8.9    5.07  )-----------( 42       ( 2019 08:17 )             27.3     -14    139  |  106  |  28<H>  ----------------------------<  80  4.5   |  23  |  1.10    Ca    8.3<L>      2019 08:17    TPro  5.9<L>  /  Alb  1.7<L>  /  TBili  0.5  /  DBili  x   /  AST  248<H>  /  ALT  479<H>  /  AlkPhos  919<H>        Urinalysis Basic - ( 2019 14:44 )    Color: Yellow / Appearance: Slightly Turbid / S.015 / pH: x  Gluc: x / Ketone: Trace  / Bili: Small / Urobili: 1   Blood: x / Protein: 75 mg/dL / Nitrite: Negative   Leuk Esterase: Trace / RBC: 3-5 /HPF / WBC 6-10   Sq Epi: x / Non Sq Epi: Few / Bacteria: Moderate    < from: CT Angio Chest w/ IV Cont (19 @ 15:01) >  Probable wedge resection left upper lobe with soft tissue opacity   adjacent medial left lung apex. Focal airspace consolidation and   bronchiectasis medial left upper lobe.  Findings could represent the sequelae of prior treatment, however, cannot   exclude recurrent or residual tumor.  Recommend further clinical correlation and comparison with more recent  prior CT scan of the chest.    No CT evidence for acute pulmonary embolism.    < end of copied text > CHIEF COMPLAINT/INTERVAL HISTORY: Patient seen and examined at bedside. Patient in no acute distress. Patient does not share any complaints at this time. Patient stated that his heme/onc said that he is not to be taking aspirin due to his low platelet count.     REVIEW OF SYSTEMS:  No fever, CP, SOB or abdominal pain.    Vital Signs Last 24 Hrs  T(C): 36.6 (2019 04:40), Max: 36.6 (2019 16:05)  T(F): 97.8 (2019 04:40), Max: 97.8 (2019 16:05)  HR: 95 (2019 08:45) (77 - 95)  BP: 139/78 (2019 08:45) (126/67 - 159/82)  BP(mean): --  RR: 18 (2019 04:40) (15 - 18)  SpO2: 96% (2019 08:45) (95% - 96%)    PHYSICAL EXAM:  GENERAL: NAD  HEENT: EOMI, hearing normal, conjunctiva and sclera clear  Chest: Diminished breath sounds, no wheezing, rales, or rhonchi  CV: S1S2, RRR, no murmurs, rubs, or gallops  GI: soft, +BS, NT/ND  Musculoskeletal: no edema  Psychiatric: affect nL, mood nL  Skin: warm and dry    LABS:                        8.9    5.07  )-----------( 42       ( 2019 08:17 )             27.3     -14    139  |  106  |  28<H>  ----------------------------<  80  4.5   |  23  |  1.10    Ca    8.3<L>      2019 08:17    TPro  5.9<L>  /  Alb  1.7<L>  /  TBili  0.5  /  DBili  x   /  AST  248<H>  /  ALT  479<H>  /  AlkPhos  919<H>        Urinalysis Basic - ( 2019 14:44 )    Color: Yellow / Appearance: Slightly Turbid / S.015 / pH: x  Gluc: x / Ketone: Trace  / Bili: Small / Urobili: 1   Blood: x / Protein: 75 mg/dL / Nitrite: Negative   Leuk Esterase: Trace / RBC: 3-5 /HPF / WBC 6-10   Sq Epi: x / Non Sq Epi: Few / Bacteria: Moderate    < from: CT Angio Chest w/ IV Cont (19 @ 15:01) >  Probable wedge resection left upper lobe with soft tissue opacity   adjacent medial left lung apex. Focal airspace consolidation and   bronchiectasis medial left upper lobe.  Findings could represent the sequelae of prior treatment, however, cannot   exclude recurrent or residual tumor.  Recommend further clinical correlation and comparison with more recent  prior CT scan of the chest.    No CT evidence for acute pulmonary embolism.    < end of copied text >

## 2019-01-14 NOTE — CONSULT NOTE ADULT - ASSESSMENT
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    Abnormal EKG  -Patient had delta waves seen on EKG, however not new, seen on previous EKG in 10/2018  -No acute changes on EKG compared to previous.  -As per patient, he had cath in 1992 and 1993, no stents placed and therefore had CABG in 1994  -Patient had double bypass CABG in 1994, as per patient he sees Dr. Ervin outpatient and was noted to have possible collapse of inferior graft causing EKG changes  -Patient had stress test done last year, normal as per patient  -Monitor and replete lytes, keep K>4, Mg>2.    Ischemia  -Patient is not complaining of any cardiac symptoms at this time    Fluid Status  -Serum proBNP 1766  -Echo from ___ showed ______  -No meaningful evidence of volume overload on physical exam  -No cardiomegaly or pleural effusion seen on CXR or CTA, respectively    Blood Pressure  -BP well controlled, monitor routine hemodynamics  -Continue amlodipine 10mg QD and metoprolol succinate 50mg QD  -Other cardiovascular workup will depend on clinical course  -All other workup per primary team  -Will continue to follow 85 year old male PMH coronary artery disease s/p CABG 1994, HLD, HTN, CKD, 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    Abnormal EKG  -Patient had delta waves seen on EKG, however not new, seen on previous EKG in 10/2018  -No acute changes on EKG compared to previous.  -As per patient, he had cath in 1992 and 1993, no stents placed and therefore had double bypass CABG in 1994  -Sees Dr. Ervin outpatient and was noted to have possible collapse of inferior graft causing EKG changes  -Patient had stress test done last year, normal as per patient  -Monitor and replete lytes, keep K>4, Mg>2.    Ischemia  -Patient is not complaining of any cardiac symptoms at this time    Fluid Status  -Serum proBNP 1766  -Echo from ___ showed ______  -No meaningful evidence of volume overload on physical exam  -No cardiomegaly or pleural effusion seen on CXR or CTA, respectively    Blood Pressure  -BP well controlled, monitor routine hemodynamics  -Continue amlodipine 10mg QD and metoprolol succinate 50mg QD  -Other cardiovascular workup will depend on clinical course  -All other workup per primary team  -Will continue to follow 85 year old male PMH coronary artery disease s/p CABG 1994, HLD, HTN, CKD, 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    Abnormal EKG  -Patient had delta waves seen on EKG, however not new, seen on previous EKG in 10/2018  -No acute changes on EKG compared to previous.  -As per patient, he had cath in 1992 and 1993, no stents placed and therefore had double bypass CABG in 1994  -Sees Dr. Ervin outpatient and on EKG on 9/2018 noted to have new Inferior MI pattern, stress test performed showed inferior and inferolateral MI and echo showed 50% EF (all done in 9/2018)  -Monitor and replete lytes, keep K>4, Mg>2.  -Weakness likely secondary to cancer diagnosis and post chemo side-effects, low suspicion for cardiac related weakness and SOB    Ischemia  -Patient is not complaining of any cardiac symptoms at this time    Fluid Status  -Serum proBNP 1766  -Echo from 9/2018 showed EF 50%  -No meaningful evidence of volume overload on physical exam  -No cardiomegaly or pleural effusion seen on CXR or CTA, respectively    Blood Pressure  -BP well controlled, monitor routine hemodynamics  -Continue amlodipine 10mg QD and metoprolol succinate 50mg QD  -Other cardiovascular workup will depend on clinical course  -All other workup per primary team  -Will continue to follow 85 year old male PMH coronary artery disease s/p CABG 1994, HLD, HTN, CKD, 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      -Patient had delta waves seen on EKG, however not new, seen on previous EKG in 10/2018. No acute changes on EKG compared to previous.  -Sees Dr. Ervin outpatient and on EKG on 9/2018 noted to have new Inferior MI pattern, stress test performed showed inferior and inferolateral MI and though echo showed 50% EF, the nuclear stress ef was ~30, which may be more reliable  -it seems unlikely that his sxs of weakness and malaise are from  ischemia   -Patient is not complaining of any cardiac symptoms at this time  -no need to cycle ce at this time    -Weakness likely secondary to cancer diagnosis and post chemo side-effects, low suspicion for cardiac related weakness and SOB  -unlikely that his sxs are related to volume overload.  in fact he seems relatively intravascularly dry, despite the elevated bnp level and presumed icm  -he has gotten better after fluid, and not worse, as would be expected if he were in fact in hf    Blood Pressure  -BP well controlled, monitor routine hemodynamics  -Continue amlodipine 10mg QD and metoprolol succinate 50mg QD  -Other cardiovascular workup will depend on clinical course  -All other workup per primary team  -Will continue to follow

## 2019-01-15 LAB
AFP-TM SERPL-MCNC: 5.3 NG/ML — SIGNIFICANT CHANGE UP
ALBUMIN SERPL ELPH-MCNC: 1.8 G/DL — LOW (ref 3.3–5)
ALP SERPL-CCNC: 879 U/L — HIGH (ref 40–120)
ALT FLD-CCNC: 427 U/L — HIGH (ref 12–78)
ANION GAP SERPL CALC-SCNC: 10 MMOL/L — SIGNIFICANT CHANGE UP (ref 5–17)
APAP SERPL-MCNC: 2 UG/ML — LOW (ref 10–30)
AST SERPL-CCNC: 172 U/L — HIGH (ref 15–37)
BILIRUB DIRECT SERPL-MCNC: 0.3 MG/DL — HIGH (ref 0.05–0.2)
BILIRUB INDIRECT FLD-MCNC: 0.2 MG/DL — SIGNIFICANT CHANGE UP (ref 0.2–1)
BILIRUB SERPL-MCNC: 0.5 MG/DL — SIGNIFICANT CHANGE UP (ref 0.2–1.2)
BUN SERPL-MCNC: 23 MG/DL — SIGNIFICANT CHANGE UP (ref 7–23)
CALCIUM SERPL-MCNC: 8.9 MG/DL — SIGNIFICANT CHANGE UP (ref 8.5–10.1)
CHLORIDE SERPL-SCNC: 106 MMOL/L — SIGNIFICANT CHANGE UP (ref 96–108)
CO2 SERPL-SCNC: 23 MMOL/L — SIGNIFICANT CHANGE UP (ref 22–31)
CREAT SERPL-MCNC: 1.1 MG/DL — SIGNIFICANT CHANGE UP (ref 0.5–1.3)
GLUCOSE SERPL-MCNC: 92 MG/DL — SIGNIFICANT CHANGE UP (ref 70–99)
HAV IGM SER-ACNC: SIGNIFICANT CHANGE UP
HBV CORE IGM SER-ACNC: SIGNIFICANT CHANGE UP
HBV SURFACE AG SER-ACNC: SIGNIFICANT CHANGE UP
HCT VFR BLD CALC: 29.2 % — LOW (ref 39–50)
HCV AB S/CO SERPL IA: 0.1 S/CO — SIGNIFICANT CHANGE UP
HCV AB SERPL-IMP: SIGNIFICANT CHANGE UP
HGB BLD-MCNC: 9.5 G/DL — LOW (ref 13–17)
IRON SATN MFR SERPL: 59 % — HIGH (ref 16–55)
IRON SATN MFR SERPL: 82 UG/DL — SIGNIFICANT CHANGE UP (ref 45–165)
MAGNESIUM SERPL-MCNC: 1.6 MG/DL — SIGNIFICANT CHANGE UP (ref 1.6–2.6)
MCHC RBC-ENTMCNC: 28.1 PG — SIGNIFICANT CHANGE UP (ref 27–34)
MCHC RBC-ENTMCNC: 32.5 GM/DL — SIGNIFICANT CHANGE UP (ref 32–36)
MCV RBC AUTO: 86.4 FL — SIGNIFICANT CHANGE UP (ref 80–100)
NRBC # BLD: 0 /100 WBCS — SIGNIFICANT CHANGE UP (ref 0–0)
PLATELET # BLD AUTO: 73 K/UL — LOW (ref 150–400)
POTASSIUM SERPL-MCNC: 4.3 MMOL/L — SIGNIFICANT CHANGE UP (ref 3.5–5.3)
POTASSIUM SERPL-SCNC: 4.3 MMOL/L — SIGNIFICANT CHANGE UP (ref 3.5–5.3)
PROT SERPL-MCNC: 6.8 G/DL — SIGNIFICANT CHANGE UP (ref 6–8.3)
RBC # BLD: 3.38 M/UL — LOW (ref 4.2–5.8)
RBC # FLD: 13.5 % — SIGNIFICANT CHANGE UP (ref 10.3–14.5)
SODIUM SERPL-SCNC: 139 MMOL/L — SIGNIFICANT CHANGE UP (ref 135–145)
TIBC SERPL-MCNC: 138 UG/DL — LOW (ref 220–430)
UIBC SERPL-MCNC: 56 UG/DL — LOW (ref 110–370)
WBC # BLD: 7.68 K/UL — SIGNIFICANT CHANGE UP (ref 3.8–10.5)
WBC # FLD AUTO: 7.68 K/UL — SIGNIFICANT CHANGE UP (ref 3.8–10.5)

## 2019-01-15 PROCEDURE — 99232 SBSQ HOSP IP/OBS MODERATE 35: CPT

## 2019-01-15 PROCEDURE — 74183 MRI ABD W/O CNTR FLWD CNTR: CPT | Mod: 26

## 2019-01-15 PROCEDURE — 99232 SBSQ HOSP IP/OBS MODERATE 35: CPT | Mod: GC

## 2019-01-15 RX ORDER — HEPARIN SODIUM 5000 [USP'U]/ML
5000 INJECTION INTRAVENOUS; SUBCUTANEOUS EVERY 12 HOURS
Qty: 0 | Refills: 0 | Status: DISCONTINUED | OUTPATIENT
Start: 2019-01-15 | End: 2019-01-16

## 2019-01-15 RX ORDER — PANTOPRAZOLE SODIUM 20 MG/1
40 TABLET, DELAYED RELEASE ORAL
Qty: 0 | Refills: 0 | Status: DISCONTINUED | OUTPATIENT
Start: 2019-01-15 | End: 2019-01-16

## 2019-01-15 RX ORDER — PREGABALIN 225 MG/1
1000 CAPSULE ORAL ONCE
Qty: 0 | Refills: 0 | Status: COMPLETED | OUTPATIENT
Start: 2019-01-15 | End: 2019-01-15

## 2019-01-15 RX ORDER — AZITHROMYCIN 500 MG/1
500 TABLET, FILM COATED ORAL EVERY 24 HOURS
Qty: 0 | Refills: 0 | Status: DISCONTINUED | OUTPATIENT
Start: 2019-01-15 | End: 2019-01-16

## 2019-01-15 RX ADMIN — PREGABALIN 1000 MICROGRAM(S): 225 CAPSULE ORAL at 22:45

## 2019-01-15 RX ADMIN — Medication 50 MILLIGRAM(S): at 06:20

## 2019-01-15 RX ADMIN — Medication 400 MILLIGRAM(S): at 20:30

## 2019-01-15 RX ADMIN — AZITHROMYCIN 500 MILLIGRAM(S): 500 TABLET, FILM COATED ORAL at 18:08

## 2019-01-15 RX ADMIN — ALBUTEROL 2.5 MILLIGRAM(S): 90 AEROSOL, METERED ORAL at 23:44

## 2019-01-15 RX ADMIN — Medication 80 MILLIGRAM(S): at 22:46

## 2019-01-15 RX ADMIN — Medication 400 MILLIGRAM(S): at 06:19

## 2019-01-15 RX ADMIN — Medication 20 MILLIGRAM(S): at 12:08

## 2019-01-15 RX ADMIN — ALBUTEROL 2.5 MILLIGRAM(S): 90 AEROSOL, METERED ORAL at 07:35

## 2019-01-15 RX ADMIN — Medication 1 MILLIGRAM(S): at 12:08

## 2019-01-15 RX ADMIN — CEFTRIAXONE 100 GRAM(S): 500 INJECTION, POWDER, FOR SOLUTION INTRAMUSCULAR; INTRAVENOUS at 15:28

## 2019-01-15 RX ADMIN — AMLODIPINE BESYLATE 10 MILLIGRAM(S): 2.5 TABLET ORAL at 06:19

## 2019-01-15 RX ADMIN — Medication 1000 UNIT(S): at 12:08

## 2019-01-15 RX ADMIN — LATANOPROST 1 DROP(S): 0.05 SOLUTION/ DROPS OPHTHALMIC; TOPICAL at 22:46

## 2019-01-15 RX ADMIN — Medication 400 MILLIGRAM(S): at 21:23

## 2019-01-15 NOTE — PROGRESS NOTE ADULT - SUBJECTIVE AND OBJECTIVE BOX
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  S/P skin neoplasm resection  Hip pain: Right hip replacement in 2008  FH: CABG (coronary artery bypass surgery): 1994        Medication list         MEDICATIONS  (STANDING):  ALBUTerol    0.083% 2.5 milliGRAM(s) Nebulizer every 8 hours  amLODIPine   Tablet 10 milliGRAM(s) Oral daily  azithromycin  IVPB 500 milliGRAM(s) IV Intermittent every 24 hours  cefTRIAXone   IVPB 1 Gram(s) IV Intermittent every 24 hours  cholecalciferol 1000 Unit(s) Oral daily  FLUoxetine 20 milliGRAM(s) Oral daily  folic acid 1 milliGRAM(s) Oral daily  hydrocortisone 1% Cream 1 Application(s) Topical three times a day  latanoprost 0.005% Ophthalmic Solution 1 Drop(s) Both EYES at bedtime  metoprolol succinate ER 50 milliGRAM(s) Oral daily  senna 2 Tablet(s) Oral at bedtime    MEDICATIONS  (PRN):  guaiFENesin   Syrup  (Sugar-Free) 200 milliGRAM(s) Oral every 6 hours PRN Cough  ibuprofen  Tablet. 400 milliGRAM(s) Oral every 6 hours PRN Mild Pain (1 - 3)  ondansetron   Disintegrating Tablet 8 milliGRAM(s) Oral three times a day PRN Nausea and/or Vomiting         Vitals log        ICU Vital Signs Last 24 Hrs  T(C): 36.6 (15 [place] 2019 04:37), Max: 36.6 (14 Jan 2019 20:21)  T(F): 97.9 (15 [place] 2019 04:37), Max: 97.9 (14 Jan 2019 20:21)  HR: 71 (15 [place] 2019 06:19) (69 - 95)  BP: 153/65 (15 [place] 2019 06:19) (102/56 - 153/65)  BP(mean): --  ABP: --  ABP(mean): --  RR: 17 (15 [place] 2019 04:37) (17 - 18)  SpO2: 96% (15 [place] 2019 04:37) (95% - 97%)           Input and Output:  I&O's Detail    14 Jan 2019 07:01  -  15 Jan 2019 07:00  --------------------------------------------------------  IN:    Solution: 50 mL    Solution: 250 mL  Total IN: 300 mL    OUT:    Voided: 650 mL  Total OUT: 650 mL    Total NET: -350 mL          Lab Data                        8.1    x     )-----------( x        ( 14 Jan 2019 17:53 )             24.9     01-14    139  |  106  |  28<H>  ----------------------------<  80  4.5   |  23  |  1.10    Ca    8.3<L>      14 Jan 2019 08:17    TPro  5.9<L>  /  Alb  1.7<L>  /  TBili  0.5  /  DBili  .20  /  AST  248<H>  /  ALT  479<H>  /  AlkPhos  919<H>  01-14            Review of Systems	      Objective     Physical Examination    heart s1s2  lung dec BS      Pertinent Lab findings & Imaging      Jose Guadalupe:  NO   Adequate UO     I&O's Detail    14 Jan 2019 07:01  -  15 [place] 2019 07:00  --------------------------------------------------------  IN:    Solution: 50 mL    Solution: 250 mL  Total IN: 300 mL    OUT:    Voided: 650 mL  Total OUT: 650 mL    Total NET: -350 mL               Discussed with:     Cultures:	        Radiology

## 2019-01-15 NOTE — CONSULT NOTE ADULT - ASSESSMENT
[ASSESSMENT and  PLAN]  I have discussed the above plan of care with patient/family in detail. They expressed understanding of the treatment plan . Risks, benefits and alternatives discussed in detail. I have asked if they have any questions or concerns and appropriately addressed them; all questions answered to their satisfactions and in lay terms.     > xxxxxx minutes spent in direct patient care, examining and counseling patient,  reviewing  the notes, lab data/ imaging , discussion with multidisciplinary team. 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   s/p 2 cycles of Carboplatin-Alimta-Keytruda.     Admitted with generalized weakness, muscle aches, ? pneumonia.  Noted to have marked elevated LFTs.   AlkPhos 1085, ,   CKD /GERMAN with Cr 1.6    CTA chest with no PE.   Distended GB with stones.     Possible tylenol toxicity due to excessive use, 5000mg APAP/day.  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   Anemia due to cancer  Anemia due to CKD  Anemia due to recent chemo.       RECOMMENDATIONS  Start prednisone 80mg q24hr. (currently ordered for 10d)   If decline in LFTs in 3d taper to 60mg daily x 7-10d thereafter slow taper to 40mg.   MRCP per GI  protonix 40mg daily.   Await tylenol levels.   Chemotx to be held.   Supportive measures.   monitor Cr. Monitor LFTs daily.   monitor CBC, as risk for increased toxicity from Alimta given increased Cr.     Continue Folic acid.   B12 1000mcg once.   SCD for DVT prophylaxis.   Can consider SQ heparin if Plts stable.          I have discussed the above plan of care with patient in detail. They expressed understanding of the treatment plan . Risks, benefits and alternatives discussed in detail. I have asked if they have any questions or concerns and appropriately addressed them; all questions answered to their satisfactions and in lay terms.

## 2019-01-15 NOTE — PROGRESS NOTE ADULT - SUBJECTIVE AND OBJECTIVE BOX
[facility] -- Aziza Valdivia, Blayne, Edmond, Titus Navarro Savella  Office # [phone]      Follow Up:    abnormal ecg  Subjective/Observations:   No events overnight resting comfortably in bed.  No complaints of chest pain, dyspnea, or palpitations reported. No signs of orthopnea or PND.     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  Hip pain: Right hip replacement in 2008  FH: CABG (coronary artery bypass surgery): 1994      MEDICATIONS  (STANDING):  ALBUTerol    0.083% 2.5 milliGRAM(s) Nebulizer every 8 hours  amLODIPine   Tablet 10 milliGRAM(s) Oral daily  azithromycin  IVPB 500 milliGRAM(s) IV Intermittent every 24 hours  cefTRIAXone   IVPB 1 Gram(s) IV Intermittent every 24 hours  cholecalciferol 1000 Unit(s) Oral daily  FLUoxetine 20 milliGRAM(s) Oral daily  folic acid 1 milliGRAM(s) Oral daily  hydrocortisone 1% Cream 1 Application(s) Topical three times a day  latanoprost 0.005% Ophthalmic Solution 1 Drop(s) Both EYES at bedtime  metoprolol succinate ER 50 milliGRAM(s) Oral daily  senna 2 Tablet(s) Oral at bedtime    MEDICATIONS  (PRN):  guaiFENesin   Syrup  (Sugar-Free) 200 milliGRAM(s) Oral every 6 hours PRN Cough  ibuprofen  Tablet. 400 milliGRAM(s) Oral every 6 hours PRN Mild Pain (1 - 3)  ondansetron   Disintegrating Tablet 8 milliGRAM(s) Oral three times a day PRN Nausea and/or Vomiting      Allergies    No Known Allergies    Intolerances        Vital Signs Last 24 Hrs  T(C): 36.9 (15 Zelalem 2019 13:01), Max: 36.9 (15 Zelalem 2019 13:01)  T(F): 98.4 (15 Zelalem 2019 13:01), Max: 98.4 (15 [place] 2019 13:01)  HR: 83 (15 [place]al[place] 2019 13:01) (69 - 83)  BP: 118/73 (15 [place]al[place] 2019 13:01) (102/56 - 153/65)  BP(mean): --  RR: 17 (15 [place]alem 2019 13:01) (17 - 18)  SpO2: 98% (15 Zelalem 2019 13:01) (95% - 98%)    I&O's Summary    14 Jan 2019 07:01  -  15 Zelalem 2019 07:00  --------------------------------------------------------  IN: 300 mL / OUT: 650 mL / NET: -350 mL          PHYSICAL EXAM:  TELE: Off tele   Constitutional: NAD, awake and alert, well-developed  HEENT: Moist Mucous Membranes, Anicteric  Pulmonary: Non-labored, breath sounds with L sided diminished at base  No  wheezes, crackles or rhonchi   Cardiovascular: Regular, S1 and S2 nl, No murmurs, rubs, gallops or clicks  Gastrointestinal: Bowel Sounds present, soft, nontender.   Lymph: No lymphadenopathy. No peripheral edema.  Skin: No visible rashes or ulcers.  Psych:  Mood & affect appropriate    LABS: All Labs Reviewed:                        9.5    7.68  )-----------( 73       ( 15 Zelalem 2019 09:04 )             29.2                         8.1    x     )-----------( x        ( 14 Jan 2019 17:53 )             24.9                         8.9    5.07  )-----------( 42       ( 14 Jan 2019 08:17 )             27.3     15 Zelalem 2019 09:04    139    |  106    |  23     ----------------------------<  92     4.3     |  23     |  1.10   14 Jan 2019 08:17    139    |  106    |  28     ----------------------------<  80     4.5     |  23     |  1.10   13 Jan 2019 13:46    138    |  104    |  36     ----------------------------<  110    5.0     |  25     |  1.60     Ca    8.9        15 [place]alem 2019 09:04  Ca    8.3        14 Jan 2019 08:17  Ca    8.8        13 Jan 2019 13:46  Mg     1.6       15 [place]alem 2019 09:04    TPro  6.8    /  Alb  1.8    /  TBili  0.5    /  DBili  .30    /  AST  172    /  ALT  427    /  AlkPhos  879    15 Zelalem 2019 09:04  TPro  5.9    /  Alb  1.7    /  TBili  0.5    /  DBili  .20    /  AST  248    /  ALT  479    /  AlkPhos  919    14 Jan 2019 08:17  TPro  7.1    /  Alb  2.1    /  TBili  0.7    /  DBili  x      /  AST  427    /  ALT  664    /  AlkPhos  1085   13 Jan 2019 13:46             ECG:  < from: 12 Lead ECG (01.13.19 @ 13:35) >  Ventricular Rate 80 BPM    Atrial Rate 80 BPM    P-R Interval 136 ms    QRS Duration 108 ms    Q-T Interval 408 ms    QTC Calculation(Bezet) 470 ms    P Axis 45 degrees    R Axis -17 degrees    T Axis 68 degrees    Diagnosis Line Normal sinus rhythm with sinus arrhythmia  Karlie-Parkinson-White  Abnormal ECG    Confirmed by Brayden Fish ([id number]) on 1/14/2019 10:14:12 AM    < end of copied text >    Echo:    Radiology:  < from: CT Angio Chest w/ IV Cont (01.13.19 @ 15:01) >  EXAM:  CT ANGIO CHEST (W)AW IC                            PROCEDURE DATE:  01/13/2019          INTERPRETATION:  CLINICAL INFORMATION:  Cough and shortness of breath.   History of lung cancer.    PROCEDURE:  Using multislice helical technique,  CT angiography, 1.5 mm   sections were obtained  following the intravenous administration of 68   ccs of Omnipaque 350.  Multiplanar MIP reconstructed images were obtained.    FINDINGS:      Comparison: Chest radiograph 1/13/2019 and CT scan of the chest 6/5/2013.    There is no evidence of intraluminal filling defects to suggest central   or interlobar pulmonary emboli.   No segmental pulmonary artery filling defect is noted.    There is arteriosclerotic calcification of thoracic aorta with coronary   artery calcifications.  No pericardial effusion is noted.    No enlarged mediastinal or hilar lymphadenopathy is noted.    There is extensive centrilobular and paraseptal emphysematous changes,   most pronounced in the upper lung zones bilaterally.  There is probable wedge resection left upper lobe.  There is focal soft tissue density at the anterior medial aspect of the   left lung apex. There is adjacent airspace consolidation and focal   bronchiectasis.    The central airways remain patent; no central endobronchial lesion is   noted.    No significant pleural effusion is noted.  No pneumothorax is noted.    Limited images that include the upper abdomen demonstrate distended   gallbladder with gallstone.  There are indeterminate hypodense lesions at the upper poles of the   kidneys bilaterally.    There are multilevel degenerative changes of the thoracic spine.    Impression:    Probable wedge resection left upper lobe with soft tissue opacity   adjacent medial left lung apex. Focal airspace consolidation and   bronchiectasis medial left upper lobe.  Findings could represent the sequelae of prior treatment, however, cannot   exclude recurrent or residual tumor.  Recommend further clinical correlation and comparison with more recent  prior CT scan of the chest.    No CT evidence for acute pulmonary embolism.    Other findings as discussed above.                          MARIELLE GRIMALDO M.D., ATTENDING RADIOLOGIST  This document has been electronically signed. Jan 13 2019  3:27PM          < end of copied text >           Victor M Ding ANP   Cardiology [facility] -- Aziza Valdivia, Blayne, Edmond, Titus Navarro Savella  Office # [phone]      Follow Up:    abnormal ecg    Subjective/Observations:   No events overnight resting comfortably in bed.  No complaints of chest pain, dyspnea, or palpitations reported. No signs of orthopnea or PND.     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  Hip pain: Right hip replacement in 2008  FH: CABG (coronary artery bypass surgery): 1994      MEDICATIONS  (STANDING):  ALBUTerol    0.083% 2.5 milliGRAM(s) Nebulizer every 8 hours  amLODIPine   Tablet 10 milliGRAM(s) Oral daily  azithromycin  IVPB 500 milliGRAM(s) IV Intermittent every 24 hours  cefTRIAXone   IVPB 1 Gram(s) IV Intermittent every 24 hours  cholecalciferol 1000 Unit(s) Oral daily  FLUoxetine 20 milliGRAM(s) Oral daily  folic acid 1 milliGRAM(s) Oral daily  hydrocortisone 1% Cream 1 Application(s) Topical three times a day  latanoprost 0.005% Ophthalmic Solution 1 Drop(s) Both EYES at bedtime  metoprolol succinate ER 50 milliGRAM(s) Oral daily  senna 2 Tablet(s) Oral at bedtime    MEDICATIONS  (PRN):  guaiFENesin   Syrup  (Sugar-Free) 200 milliGRAM(s) Oral every 6 hours PRN Cough  ibuprofen  Tablet. 400 milliGRAM(s) Oral every 6 hours PRN Mild Pain (1 - 3)  ondansetron   Disintegrating Tablet 8 milliGRAM(s) Oral three times a day PRN Nausea and/or Vomiting      Allergies    No Known Allergies    Intolerances        Vital Signs Last 24 Hrs  T(C): 36.9 (15 Zelalem 2019 13:01), Max: 36.9 (15 Zelalem 2019 13:01)  T(F): 98.4 (15 Zelalem 2019 13:01), Max: 98.4 (15 Zelalem 2019 13:01)  HR: 83 (15 Zelalem 2019 13:01) (69 - 83)  BP: 118/73 (15 Zelalem 2019 13:01) (102/56 - 153/65)  BP(mean): --  RR: 17 (15 Zelalem 2019 13:01) (17 - 18)  SpO2: 98% (15 Zelalem 2019 13:01) (95% - 98%)    I&O's Summary    14 Jan 2019 07:01  -  15 Zelalem 2019 07:00  --------------------------------------------------------  IN: 300 mL / OUT: 650 mL / NET: -350 mL          PHYSICAL EXAM:  TELE: Off tele   Constitutional: NAD, awake and alert, well-developed  HEENT: Moist Mucous Membranes, Anicteric  Pulmonary: Decreased breath sounds b/l. No rales, crackles or wheeze appreciated.   Cardiovascular: Regular, S1 and S2 nl, No murmurs, rubs, gallops or clicks  Gastrointestinal: Bowel Sounds present, soft, nontender.   Lymph: No lymphadenopathy. No peripheral edema.  Skin: No visible rashes or ulcers.  Psych:  Mood & affect appropriate    LABS: All Labs Reviewed:                        9.5    7.68  )-----------( 73       ( 15 Zelalem 2019 09:04 )             29.2                         8.1    x     )-----------( x        ( 14 Jan 2019 17:53 )             24.9                         8.9    5.07  )-----------( 42       ( 14 Jan 2019 08:17 )             27.3     15 Zelalem 2019 09:04    139    |  106    |  23     ----------------------------<  92     4.3     |  23     |  1.10   14 Jan 2019 08:17    139    |  106    |  28     ----------------------------<  80     4.5     |  23     |  1.10   13 Jan 2019 13:46    138    |  104    |  36     ----------------------------<  110    5.0     |  25     |  1.60     Ca    8.9        15 Zelalem 2019 09:04  Ca    8.3        14 Jan 2019 08:17  Ca    8.8        13 Jan 2019 13:46  Mg     1.6       15 Zelalem 2019 09:04    TPro  6.8    /  Alb  1.8    /  TBili  0.5    /  DBili  .30    /  AST  172    /  ALT  427    /  AlkPhos  879    15 Zelalem 2019 09:04  TPro  5.9    /  Alb  1.7    /  TBili  0.5    /  DBili  .20    /  AST  248    /  ALT  479    /  AlkPhos  919    14 Jan 2019 08:17  TPro  7.1    /  Alb  2.1    /  TBili  0.7    /  DBili  x      /  AST  427    /  ALT  664    /  AlkPhos  1085   13 Jan 2019 13:46             ECG:  < from: 12 Lead ECG (01.13.19 @ 13:35) >  Ventricular Rate 80 BPM    Atrial Rate 80 BPM    P-R Interval 136 ms    QRS Duration 108 ms    Q-T Interval 408 ms    QTC Calculation(Bezet) 470 ms    P Axis 45 degrees    R Axis -17 degrees    T Axis 68 degrees    Diagnosis Line Normal sinus rhythm with sinus arrhythmia  Karlie-Parkinson-White  Abnormal ECG    Confirmed by Brayden Fish ([id number]) on 1/14/2019 10:14:12 AM    < end of copied text >    Echo:    Radiology:  < from: CT Angio Chest w/ IV Cont (01.13.19 @ 15:01) >  EXAM:  CT ANGIO CHEST (W)AW IC                            PROCEDURE DATE:  01/13/2019          INTERPRETATION:  CLINICAL INFORMATION:  Cough and shortness of breath.   History of lung cancer.    PROCEDURE:  Using multislice helical technique,  CT angiography, 1.5 mm   sections were obtained  following the intravenous administration of 68   ccs of Omnipaque 350.  Multiplanar MIP reconstructed images were obtained.    FINDINGS:      Comparison: Chest radiograph 1/13/2019 and CT scan of the chest 6/5/2013.    There is no evidence of intraluminal filling defects to suggest central   or interlobar pulmonary emboli.   No segmental pulmonary artery filling defect is noted.    There is arteriosclerotic calcification of thoracic aorta with coronary   artery calcifications.  No pericardial effusion is noted.    No enlarged mediastinal or hilar lymphadenopathy is noted.    There is extensive centrilobular and paraseptal emphysematous changes,   most pronounced in the upper lung zones bilaterally.  There is probable wedge resection left upper lobe.  There is focal soft tissue density at the anterior medial aspect of the   left lung apex. There is adjacent airspace consolidation and focal   bronchiectasis.    The central airways remain patent; no central endobronchial lesion is   noted.    No significant pleural effusion is noted.  No pneumothorax is noted.    Limited images that include the upper abdomen demonstrate distended   gallbladder with gallstone.  There are indeterminate hypodense lesions at the upper poles of the   kidneys bilaterally.    There are multilevel degenerative changes of the thoracic spine.    Impression:    Probable wedge resection left upper lobe with soft tissue opacity   adjacent medial left lung apex. Focal airspace consolidation and   bronchiectasis medial left upper lobe.  Findings could represent the sequelae of prior treatment, however, cannot   exclude recurrent or residual tumor.  Recommend further clinical correlation and comparison with more recent  prior CT scan of the chest.    No CT evidence for acute pulmonary embolism.    Other findings as discussed above.                          MARIELLE GRIMALDO M.D., ATTENDING RADIOLOGIST  This document has been electronically signed. Jan 13 2019  3:27PM          < end of copied text >           Victor M Ding ANP   Cardiology

## 2019-01-15 NOTE — PROGRESS NOTE ADULT - ATTENDING COMMENTS
Pt. seen and evaluated for possible pneumonia and elevated liver enzymes.  Pt. reports feeling better today.  Is in no distress.  Tolerating IV antibiotics.  Denies having abdominal pain and LFT's trending down.  Physical examination as above.      Plan:  -Pneumonia:  Blood cx NGTD. Continue Azithromycin and Ceftriaxone.  Continue Albuterol Neb Q8h and Guaifenesin 200mg PO Q6h PRN.  Pulmonary f/u  -Lung CA and thrombocytopenia:  Will avoid antiplatelet agents or anticoagulation.  Thrombocytopenia improved.  Heme/Onc consult  -CAD:  continue Toprol XL 50mg PO daily.  Off ASA due to thrombocytopenia  -Elevated LFTs:  Holding statin.  LFTs trending down.  Acute hepatitis panel negative.  Abdominal US: Cholelithiasis. Dilated common duct.   GI f/u  -Anemia:  Check iron studies, folate, b12, retic ct, and stool OB.  -HTN:  continue Norvasc 10mg PO daily and Toprol XL 50mg PO daily  -CKD stage 3:  stable.   -HLD:  holding statin given elevated LFTs.  -Depression:  continue Prozac 20mg PO daily  -VTE ppx: SCD Pt. seen and evaluated for possible pneumonia and elevated liver enzymes.  Pt. reports feeling better today.  Is in no distress.  Tolerating IV antibiotics.  Denies having abdominal pain and LFT's trending down.  Physical examination as above.      Plan:  -Pneumonia:  Blood cx NGTD. Continue Azithromycin and Ceftriaxone.  Continue Albuterol Neb Q8h and Guaifenesin 200mg PO Q6h PRN.  Pulmonary f/u  -Lung CA and thrombocytopenia:  Will avoid antiplatelet agents or anticoagulation.  Thrombocytopenia improved.  Heme/Onc consult  -CAD:  continue Toprol XL 50mg PO daily.  Off ASA due to thrombocytopenia  -Elevated LFTs:  Holding statin.  LFTs trending down.  Acute hepatitis panel negative.  Abdominal US: Cholelithiasis. Dilated common duct.  Check MRCP. GI f/u  -Anemia:  Check iron studies, folate, b12, retic ct, and stool OB.  -HTN:  continue Norvasc 10mg PO daily and Toprol XL 50mg PO daily  -CKD stage 3:  stable.   -HLD:  holding statin given elevated LFTs.  -Depression:  continue Prozac 20mg PO daily  -VTE ppx: SCD

## 2019-01-15 NOTE — PHARMACOTHERAPY INTERVENTION NOTE - COMMENTS
Reached out to Dr. Oquendo as patient has been receiving three days of IV antibiotics for PNA.  WBC has returned to normal and legionella was negative.  Suggested de-escalating ceftriaxone to PO antibiotics and discontinuing azithromycin.  He agreed to switching the azithromycin to PO but preferred to keep the ceftriaxone as IV as the patient should be discharged home tomorrow.

## 2019-01-15 NOTE — PROGRESS NOTE ADULT - ASSESSMENT
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      -Patient had delta waves seen on EKG, however not new, seen on previous EKG in 10/2018. No acute changes on EKG compared to previous.  -Sees Dr. Ervin outpatient and on EKG on 9/2018 noted to have new Inferior MI pattern, stress test performed showed inferior and inferolateral MI and though echo showed 50% EF, the nuclear stress ef was ~30, which may be more reliable  -it seems unlikely that his sxs of weakness and malaise are from  ischemia   -Patient is not complaining of any cardiac symptoms at this time  -no need to cycle ce at this time    -Weakness likely secondary to cancer diagnosis and post chemo side-effects, low suspicion for cardiac related weakness and SOB  -unlikely that his sxs are related to volume overload.  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      -Patient had delta waves seen on EKG, however not new, seen on previous EKG in 10/2018. No acute changes on EKG compared to previous.  -Sees Dr. Ervin outpatient and on EKG on 9/2018 noted to have new Inferior MI pattern, stress test performed showed inferior and inferolateral MI and though echo showed 50% EF, the nuclear stress ef was ~30, which may be more reliable  -it seems unlikely that his sxs of weakness and malaise are from  ischemia   -Patient is not complaining of any cardiac symptoms at this time  -no need to cycle ce at this time    -Weakness likely secondary to cancer diagnosis and post chemo side-effects, low suspicion for cardiac related weakness and SOB  -unlikely that his sxs are related to volume overload.  in fact he seems relatively intravascularly dry, despite the elevated bnp level and presumed icm  -he has gotten better after fluid, and not worse, as would be expected if he were in fact in hf    Blood Pressure  -BP well controlled, monitor routine hemodynamics  -Continue amlodipine 10mg QD and metoprolol succinate 50mg QD  -Other cardiovascular workup will depend on clinical course  -All other workup per primary team  -Will continue to follow    Victor M SPENCER  Cardiology

## 2019-01-15 NOTE — PROGRESS NOTE ADULT - PROBLEM SELECTOR PLAN 1
lung ca  atelectasis  poss lower resp tract infection  oral and skin care  cont NEBS and emp dual ABX regimen - zithro and rocephin  cvs regimen  GI recs  tolerating room air at rest, increase activity as tolerated  ct scan reviewed  GOC documented - pt is full code, supportive regimen, DC planning, CM notes reviewed  will follow up with his team - in [place], onc and pulm,

## 2019-01-15 NOTE — PROGRESS NOTE ADULT - PROBLEM SELECTOR PLAN 1
possibly 2/2 malignancy, vs med induced, vs other  US limited study, showed gs and dilated cbd  lfts overall downtrending  f/u acute hepatitis panel  trend labs  avoid hepatotoxins  may need mrcp for further eval, plan to be dw attg, will follow possibly 2/2 malignancy, vs med induced, vs other  US limited study, showed gs and dilated cbd  lfts overall downtrending  f/u acute hepatitis panel  check afp  trend labs  avoid hepatotoxins  may need mrcp for further eval, plan to be dw attg, will follow

## 2019-01-15 NOTE — CONSULT NOTE ADULT - SUBJECTIVE AND OBJECTIVE BOX
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  Patient states that he has had difficulty walking and could not move out of bed, usually able to ambulate independently but started using a cane about 1.5 weeks ago and a walker about 3 days ago.  States that he has decreased appetite but this has been ongoing fo the past few months since his surgery, but unable to eat anything since yesterday.  Denies nausea/vomiting, denies numbness/tingling.  Patient also admits to increased lacrimation and rhinorrhea ongoing for about 1 month, associated with productive cough with yellow to clear phlegm, no blood.  Patient had latest chemo therapy session on 18 and since then has not felt himself, has some shortness of breath but no increased dyspnea on exertion.  Patient also admits to diarrhea and constipation, no blood in stool.  Denies abdominal pain, urinary frequency, dysuria, or urinary retention.  Patient has had 2 chemo sessions outpatient (on 18 and 18, each 5 hours long, patient had hives reaction on 2nd session not sure which medication it was but treated with hydrocortisone and got better).  Patient was suppose to have 3rd session this Friday however met with oncologist PA last Monday and stated that he no longer wanted to undergo current chemo treatment regimen, and is going to discuss further options outpatient with Oncologist.      In the ED, T 97.1, HR 88, /82, RR 1, SpO2 96% on RA.  Labs significant for H/H 10.2/31.1 (baseline around 11-12 as per chart review), WBC 5, Band count 5%, lactate 1.3, BUN/Cr 36/1.6, albumin 2.1, alk phos 1085, , , procal 1.08, UA grossly negative.      EKG: NSR with possible Vu-Parkinson-White Syndrome (similar to EKG from 10/5/18)  CXR: There is mild elevation of left hemidiaphragm. Again noted is a poorly defined nodular opacity left midlung zone overlying left fifth and sixth ribs. Again noted is subsegmental atelectasis with possible trace pleural  effusion at the left costophrenic angle.  The right lung is clear.  CTA: Probable wedge resection left upper lobe with soft tissue opacity adjacent medial left lung apex. Focal airspace consolidation and bronchiectasis medial left upper lobe. Findings could represent the sequelae of prior treatment, however, cannot exclude recurrent or residual tumor.  No PE.    In the ED, patient received azithro x1, ceftriaxone x1, 1L NS bolus. Pulm consulted in the ED. (2019 16:57)    Heme-Onc asked to see pt for hx of lung cancer.   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 Followed by Dr Yakov Nunn.   Treated with Carboplatin-Alimta-Keytruda. s/p 2 cycles of chemo, last 19.   Denies hx of abn LFTs, does not recall last time had LFTs performed.  States has been taking ~ 10 pills of 500mg tylenol daily since ~ mid November for R scapula pain.   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  Hip pain: Right hip replacement in   FH: CABG (coronary artery bypass surgery): 1994      HEALTH ISSUES - PROBLEM Dx:  Need for prophylactic measure: Need for prophylactic measure  Depression: Depression  HLD (hyperlipidemia): HLD (hyperlipidemia)  HTN (hypertension): HTN (hypertension)  Anemia: Anemia  Elevated liver enzymes: Elevated liver enzymes  Coronary artery disease: Coronary artery disease  CKD (chronic kidney disease): CKD (chronic kidney disease)  Arteriosclerotic heart disease (ASHD): Arteriosclerotic heart disease (ASHD)  Emphysema (subcutaneous) (surgical) resulting from a procedure: Emphysema (subcutaneous) (surgical) resulting from a procedure  Pneumonia: Pneumonia  Lung cancer: Lung cancer      FAMILY HISTORY:  No pertinent family history in first degree relatives  mother   father     [SOCIAL HISTORY: ]     smoking:  ex-smoker 2-3PPD from 14yo (1946 - )     EtOH:  prior 2 scotch-whiskey per day from 29yo to 81yo.     illicit drugs:  denies     occupation:  retired  ([employer])     marital status:  , wife 86yo     Other: 3 children.   1st dtr Chayo 59yo, In [place]  2nd Son Pete, lives in [place], [place]  3rd son Stuart, lives in [place], [place]    [ALLERGIES/INTOLERANCES:]  Allergies       No Known Allergies  Intolerances    [MEDICATIONS]  MEDICATIONS  (STANDING):  ALBUTerol    0.083% 2.5 milliGRAM(s) Nebulizer every 8 hours  amLODIPine   Tablet 10 milliGRAM(s) Oral daily  azithromycin  IVPB 500 milliGRAM(s) IV Intermittent every 24 hours  cefTRIAXone   IVPB 1 Gram(s) IV Intermittent every 24 hours  cholecalciferol 1000 Unit(s) Oral daily  FLUoxetine 20 milliGRAM(s) Oral daily  folic acid 1 milliGRAM(s) Oral daily  hydrocortisone 1% Cream 1 Application(s) Topical three times a day  latanoprost 0.005% Ophthalmic Solution 1 Drop(s) Both EYES at bedtime  metoprolol succinate ER 50 milliGRAM(s) Oral daily  senna 2 Tablet(s) Oral at bedtime    MEDICATIONS  (PRN):  guaiFENesin   Syrup  (Sugar-Free) 200 milliGRAM(s) Oral every 6 hours PRN Cough  ibuprofen  Tablet. 400 milliGRAM(s) Oral every 6 hours PRN Mild Pain (1 - 3)  ondansetron   Disintegrating Tablet 8 milliGRAM(s) Oral three times a day PRN Nausea and/or Vomiting    [REVIEW OF SYSTEMS: ]  CONSTITUTIONAL: normal, no fever, no shakes, no chills . +anorexia, +fatigue  EYES: No eye pain, no visual disturbances, no discharge  ENMT:  no discharge  NECK: No pain, no stiffness  BREASTS: No pain, no masses, no nipple discharge  RESPIRATORY: No cough, no wheezing, no chills, no hemoptysis; No shortness of breath  CARDIOVASCULAR: No chest pain, no palpitations, no dizziness, no leg swelling  GASTROINTESTINAL: No abdominal or epigastric pain. No nausea, no vomiting, no hematemesis; No diarrhea , no constipation. No melena, no hematochezia.  GENITOURINARY: No dysuria, no frequency, no hematuria, no incontinence  NEUROLOGICAL: No headaches, no memory loss, no loss of strength, no numbness, no tremors  SKIN: No itching, no burning, no rashes, no lesions   LYMPH NODES: No enlarged glands  ENDOCRINE: No heat or cold intolerance; No hair loss  MUSCULOSKELETAL: No joint pain or swelling; No muscle, no back, no extremity pain  PSYCHIATRIC: No depression, no anxiety, no mood swings, no difficulty sleeping  HEME/LYMPH: No easy bruising, no bleeding gums    [VITALS SIGNS 24hrs]  Vital Signs Last 24 Hrs  T(C): 36.6 (15 Zelalem 2019 04:37), Max: 36.6 (2019 20:21)  T(F): 97.9 (15 Zelalem 2019 04:37), Max: 97.9 (2019 20:21)  HR: 71 (15 Zelalem 2019 06:19) (69 - 79)  BP: 153/65 (15 Zelalem 2019 06:19) (102/56 - 153/65)  BP(mean): --  RR: 17 (15 Zelalem 2019 04:37) (17 - 18)  SpO2: 96% (15 Zelalem 2019 04:37) (95% - 96%)    [PHYSICAL EXAM]  General: adult in NAD,  WN,  WD.  HEENT: clear oropharynx, anicteric sclera, pink conjunctivae.  Neck: supple, no masses.  CV: normal S1S2, no murmur, no rubs, no gallops.  Lungs: clear to auscultation, no wheezes, no rales, no rhonchi.  Abdomen: soft, non-tender, non-distended, no hepatosplenomegaly, normal BS, no guarding.  Ext: no clubbing, no cyanosis, no edema.  Skin: no rashes,  no petechiae, no venous stasis changes.  Neuro: alert and oriented X3, no focal motor deficits.  LN: no SC NIDIA.    [LABS:]                        9.5    7.68  )-----------( 73       ( 15 Zelalem 2019 09:04 )             29.2     CBC Full  -  ( 15 Zelalem 2019 09:04 )  WBC Count : 7.68 K/uL  Hemoglobin : 9.5 g/dL  Hematocrit : 29.2 %  Platelet Count - Automated : 73 K/uL  Mean Cell Volume : 86.4 fl  Mean Cell Hemoglobin : 28.1 pg  Mean Cell Hemoglobin Concentration : 32.5 gm/dL    01-15  139  |  106  |  23  ----------------------------<  92  4.3   |  23  |  1.10  Ca    8.9      15 Zelalem 2019 09:04  Mg     1.6     01-15  TPro  6.8  /  Alb  1.8<L>  /  TBili  0.5  /  DBili  .30<H>  /  AST  172<H>  /  ALT  427<H>  /  AlkPhos  879<H>  01-15    LIVER FUNCTIONS - ( 15 Zelalem 2019 09:04 )  Alb: 1.8 g/dL / Pro: 6.8 g/dL / ALK PHOS: 879 U/L / ALT: 427 U/L / AST: 172 U/L / GGT: x           Urinalysis Basic - ( 2019 14:44 )  Color: Yellow / Appearance: Slightly Turbid / S.015 / pH: x  Gluc: x / Ketone: Trace  / Bili: Small / Urobili: 1   Blood: x / Protein: 75 mg/dL / Nitrite: Negative   Leuk Esterase: Trace / RBC: 3-5 /HPF / WBC 6-10   Sq Epi: x / Non Sq Epi: Few / Bacteria: Moderate    WBC  TREND (5 Days)  WBC Count: 7.68 K/uL (01-15 @ 09:04)  WBC Count: 5.07 K/uL ( @ 08:17)  WBC Count: 5.00 K/uL ( @ 13:46)    HGB  TREND (5 Days)  Hemoglobin: 9.5 g/dL (01-15 @ 09:04)  Hemoglobin: 8.1 g/dL ( @ 17:53)  Hemoglobin: 8.9 g/dL ( @ 08:17)  Hemoglobin: 10.2 g/dL ( @ 13:46)    HCT  TREND (5 Days)  Hematocrit: 29.2 % (01-15 @ 09:04)  Hematocrit: 24.9 % ( @ 17:53)  Hematocrit: 27.3 % ( @ 08:17)  Hematocrit: 31.1 % ( @ 13:46)    PLT  TREND (5 Days)  Platelet Count - Automated: 73 K/uL (01-15 @ 09:04)  Platelet Count - Automated: 42 K/uL ( @ 08:17)  Platelet Count - Automated: 42 K/uL ( @ 13:46)        [PATHOLOGY]     Surgical Pathology Report (10.04.18 @ 20:58)    Surgical Pathology Report:   ACCESSION No:  [id number]  WILDA PORTILLO                       7  Addendum Report    Addendum  Performing Laboratory: [place], [place]  Specimen #: [id number]  Date Reported by Outside Laboratory: 2018  Date Report Faxed to Submitting Physician: 2018  Submitting Physician: Renaldo Stevens MD  Body Site: Bone  Specimen Type:  Slide  Block ID:  1J  Interpretation:  Tumor Type:  Lung adenocarcinoma    PATHOLOGIST COMMENTS  Babar Gomez M.D., Medical Director 2018   For this patient case, we recently discovered an issue impacting our ability to accurately assess the Tumor  Mutational Smithwick (TMB). For this reason but in order to not delay all results, we are issuing this qualified report, which includes all mutations identified with confidence but with TMB reported as ‘‘Cannot Be Determined’’. We are attempting to repeat sequencing of this patient’s sample and we will provide  results of the repeat testing in an amended report as soon as they are available.    REPORT UPDATES  Amended Report 2018  This Amended Report has been issued to reflect a change to the Tumor Mutational Smithwick status from "Cannot Be Determined" to "TMB-Intermediate" upon reanalysis and to include actionability for the TMB-Intermediate status; this version of the report has   "PASS" quality metrics, whereas the previous version was  "QUALIFIED" to indicate the possibility of reduced sensitivity of alteration detection.    NO REPORTABLE ALTERATIONS WITH  DIAGNOSTIC (CDx) CLAIMS  See professional services section for additional information  OTHER ALTERATIONS & BIOMARKERS IDENTIFIED      Microsatellite status MS-Stable §  Tumor Mutational Smithwick 19 Muts/Mb §  APC E241*  ATR A5377_J0140>F*  BRCA1 T77fs*11  CDKN2A loss §  CDKN2B loss §  MTAP loss §  MYCN amplification §  PMS2 PMS2([id number]) rearrangement exon 10 §  TP53 H179Y    § Refer to appendix for limitation statements related to detection of any copy number alterations, gene rearrangements, MSI or TMB result in this section.   Please refer to appendix for Explanation of Clinical Significance Classification and for variants of unknown significance (VUS).    This test was performed and interpreted by [facility]. [facility], [address] [place] [address], [place] [address]. [phone].    Verified by: RENALDO STEVENS M.D.  (Electronic Signature)  Reported on: 18 16:35 EST, [address] [place] [address] [place] [address]  _________________________________________________________________    Addendum Report    Performing Laboratory: [place], [place]  Specimen #: FMI Case # [id number]  Date Reported by Outside Laboratory: 18  Date Report Faxed to Submitting Physician: 18  Submitting Physician: Renaldo Stevens MD  Body Site: Bone  Specimen Type:  slide  Block ID:  1J  Interpretation:  Tumor Type:  Lung adenocarcinoma  PATHOLOGIST COMMENTS  Babar Gomez M.D., Medical Director 2018  NO REPORTABLE ALTERATIONS WITH  DIAGNOSTIC (CDx) CLAIMS  Sensitivity for the detection of alterations is reduced     Genomic Alteration Identified:  BRCA1 T77fs*11  MYCN amplification - equivocal†  APC E241*  ATR T6134_A9516>F*  CDKN2A/B loss  MTAP loss  PMS2 rearrangement exon 10  TP53 H179Y  Biomarker Findings:  Microsatellite Status - MS-Stable  Tumor Mutational Smithwick - Cannot Be Determined  Additional Disease-Relevant Genes with No Reportable Alterations    Detected:  EGFR, KRAS, ALK, BRAF, MET, RET, ERBB2, ROS1    Outside report electronically signed by: Dr. Horacio Gomez    This test was performed and interpreted by [facility]. [address] [place] [address], [address] [place] [id number]. [phone].    Verified by: RENALDO STEVENS M.D.  (Electronic Signature)  Reported on: 18 13:45 EST, [address] [place] [address]  _________________________________________________________________      Specimen #: FMI Case # [id number]  Date Reported by Outside Laboratory: 10/24/2018  Date Report Faxed to Submitting Physician: 10/25/2018  Submitting Physician: Renaldo Stevens MD  Specimen Type:  FFPE Slide-Bone  Block ID:  1J    PD-L1 IMMUNOHISTOCHEMISTRY (IHC) ANALYSIS (Dako 22C3 pharmDx™)  Tumor Proportion Score (TPS) (%)  10    RESULTS CRITERA  o The specimen should be considered to have PD-L1 expression if TPS? 1% and high PD-L1 expression if TPS ? 50%.    Non-Small Cell Lung Cancer (NSCLC)          o PD-L1 IHC 22C3  pharmDx™ is indicated as is indicated as an aid in identifying NSCLC patients for treatment with KEYTRUDA® (pembrolizumab). See the KEYTRUDA® product label for expression cutoff values guiding therapy in specific clinical circumstances.    This test was performed and interpreted by [facility]. [address] [place] [address], [place] [address]. [phone].    Verified by: RENALDO STEVENS M.D.  (Electronic Signature)  Reported on: 10/31/18 10:38 EDT, [address] [place] [address]  [id number]  _________________________________________________________________      Addendum    IHC is positive for CK7 and TTF-1 (weak to moderate).  It is negative for CK20, PAX-8, CDX-2, PSA and PSAP. This favors a metastatic adenocarcinoma of lung origin.  Tissue will be sent for [facility] testing after EDTA slice is submitted for processing; an addendum report will follow.    Slide(s) with built in immunohistochemical study control(s) and negative control associated with this case has/have been verified by the sign out pathologist.  For slide(s) without built in controls positive control slides has/have been reviewed and approved by immunohistochemistry lab These immunohistochemical/ in-situ hybridization tests have been developed and their performance characteristics determined by [facility], Department of Pathology,  Division of Immunopathology, [address] [place] [address], [place] [address].  It has not been cleared or approved by the U.S. Food and Drug Administration.  The FDA has determined that such clearance or approval is not necessary.  This test is used for clinical purposes.  The laboratory is certified under the CLIA-88 as qualified to perform high complexity clinical testing.      Surgical Final Report  Final Diagnosis    1.  Bone and soft tissue, right distal scapula, wide resection  - Metastatic moderately differentiated adenocarcinoma  Comment: IHC pending to assess site of origin; an addendum report  will follow.  Inked anterior margin is positive for tumor.    2.  Soft tissue, deep margin, excision  - Negative for tumor    3.  Soft tissue, deep medial, excision  - Negative for tumor    4.  Soft tissue, deep lateral, excision  - Negative for tumor    Intraoperative Consultation  1. Wide resection right distal scapula  - Poorly differentiated malignant neoplasm defer to permanents for final diagnosis  By Dr. Rodriguez    Clinical History: Disorder of bone  Specimen(s) Submitted  1-Wide resection right disatl scapula.  2-Deep margin.  3-Deep medial.  4-Deep lateral.    Gross Description  1.The specimen is received fresh for intraoperative consultation and the specimen container is labeled: Wide resection right distal scapula.  It consists of an unoriented 9.3 x 8.5 x 4.2 cm portion of scapula with attached tan-pink soft tissue, brown muscle, and yellow lobulated adipose tissue.  There is a 7.1 x 1.7 cm bone resection margin (inked red).  The soft tissue resection margin is inked black at frozen sectioning.  The presumed posterior surface is inked black and the presumed anterior surface is over inked blue.   Sectioning reveals a 7.7 x 4.5 x 3.6 cm well-circumscribed white soft tumor involving the bone, abutting the presumed anterior margin, 0.1 cm from the presumed posterior resection margin, and 1.7 cm from the bone resection margin. Photographs are taken. A slice of the specimen is placed in EDTA.  Select slices are  placed in Decal. Representative sections submitted. 10 cassettes as follows:  FSA = frozen section remnant;   A-B = bone resection margin inked red;   C-D = tumor within bone;   E = tumor to presumed posterior margin;   F = tumor to presumed anterior margin;   G = tumor to bone, orange ink designates area subjacent to bone resection margin;   H-I = tumor to bone    RO 10/08/18 18:52    2. The specimen is received in formalin and the specimen container is labeled: Deep margin.  It consists of a 1.7 x 1.2 x 0.7 cm unoriented portion of tan-brown muscle.  The specimen is inked black.  The specimen is trisected to reveal an unremarkable brown cut surface.  Entirely submitted.  One cassette.    3. The specimen is received in formalin and the specimen container is labeled: Deep medial.  It consists of a 1.8 x 1.4 x 0.6 cm portion of unoriented tan-brown muscle with attached yellow lobulated adipose tissue.  The specimen is inked black. The specimen is serially sectioned to reveal an unremarkable brown cut surface.  Entirely submitted.  One cassette.     4. The specimen is received in formalin and the specimen container is labeled: Deep lateral.  It consists of a 2.0 x 1.3 x 0.6 cm portion of yellow-tan soft tissue.  The specimen is inked black.  The specimen is serially sectioned to reveal an unremarkable yellow-tan cut surface.  Entirely submitted.  One cassette.    In addition to other data that may appear on the specimen containers, all labels have been inspected to confirm the presence of the patient's name and date of birth.  Fady Youngblood 10/08/2018 13:07      [RADIOLOGY & ADDITIONAL STUDIES:]    < from: CT Angio Chest w/ IV Cont (19 @ 15:01) >  EXAM:  CT ANGIO CHEST (W)AW IC                        PROCEDURE DATE:  2019      INTERPRETATION:  CLINICAL INFORMATION:  Cough and shortness of breath.   History of lung cancer.    PROCEDURE:  Using multislice helical technique,  CT angiography, 1.5 mm sections were obtained  following the intravenous administration of 68 ccs of Omnipaque 350.  Multiplanar MIP reconstructed images were obtained.    FINDINGS:      Comparison: Chest radiograph 2019 and CT scan of the chest 2013.    There is no evidence of intraluminal filling defects to suggest central or interlobar pulmonary emboli.   No segmental pulmonary artery filling defect is noted.    There is arteriosclerotic calcification of thoracic aorta with coronary artery calcifications.  No pericardial effusion is noted.    No enlarged mediastinal or hilar lymphadenopathy is noted.    There is extensive centrilobular and paraseptal emphysematous changes, most pronounced in the upper lung zones bilaterally.  There is probable wedge resection left upper lobe.  There is focal soft tissue density at the anterior medial aspect of the left lung apex. There is adjacent airspace consolidation and focal bronchiectasis.    The central airways remain patent; no central endobronchial lesion is noted.    No significant pleural effusion is noted.  No pneumothorax is noted.    Limited images that include the upper abdomen demonstrate distended gallbladder with gallstone.  There are indeterminate hypodense lesions at the upper poles of the kidneys bilaterally.    There are multilevel degenerative changes of the thoracic spine.    Impression:  Probable wedge resection left upper lobe with soft tissue opacity adjacent medial left lung apex. Focal airspace consolidation and bronchiectasis medial left upper lobe.  Findings could represent the sequelae of prior treatment, however, cannot exclude recurrent or residual tumor.  Recommend further clinical correlation and comparison with more recent prior CT scan of the chest.    No CT evidence for acute pulmonary embolism.    Other findings as discussed above.  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  Patient states that he has had difficulty walking and could not move out of bed, usually able to ambulate independently but started using a cane about 1.5 weeks ago and a walker about 3 days ago.  States that he has decreased appetite but this has been ongoing fo the past few months since his surgery, but unable to eat anything since yesterday.  Denies nausea/vomiting, denies numbness/tingling.  Patient also admits to increased lacrimation and rhinorrhea ongoing for about 1 month, associated with productive cough with yellow to clear phlegm, no blood.  Patient had latest chemo therapy session on 18 and since then has not felt himself, has some shortness of breath but no increased dyspnea on exertion.  Patient also admits to diarrhea and constipation, no blood in stool.  Denies abdominal pain, urinary frequency, dysuria, or urinary retention.  Patient has had 2 chemo sessions outpatient (on 18 and 18, each 5 hours long, patient had hives reaction on 2nd session not sure which medication it was but treated with hydrocortisone and got better).  Patient was suppose to have 3rd session this Friday however met with oncologist PA last Monday and stated that he no longer wanted to undergo current chemo treatment regimen, and is going to discuss further options outpatient with Oncologist.      In the ED, T 97.1, HR 88, /82, RR 1, SpO2 96% on RA.  Labs significant for H/H 10.2/31.1 (baseline around 11-12 as per chart review), WBC 5, Band count 5%, lactate 1.3, BUN/Cr 36/1.6, albumin 2.1, alk phos 1085, , , procal 1.08, UA grossly negative.      EKG: NSR with possible Vu-Parkinson-White Syndrome (similar to EKG from 10/5/18)  CXR: There is mild elevation of left hemidiaphragm. Again noted is a poorly defined nodular opacity left midlung zone overlying left fifth and sixth ribs. Again noted is subsegmental atelectasis with possible trace pleural  effusion at the left costophrenic angle.  The right lung is clear.  CTA: Probable wedge resection left upper lobe with soft tissue opacity adjacent medial left lung apex. Focal airspace consolidation and bronchiectasis medial left upper lobe. Findings could represent the sequelae of prior treatment, however, cannot exclude recurrent or residual tumor.  No PE.    In the ED, patient received azithro x1, ceftriaxone x1, 1L NS bolus. Pulm consulted in the ED. (2019 16:57)    Heme-Onc asked to see pt for hx of lung cancer.   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 Followed by Dr Yakov Nunn.   Treated with Carboplatin-Alimta-Keytruda. s/p 2 cycles of chemo, last 19.   Denies hx of abn LFTs, does not recall last time had LFTs performed.  States has been taking ~ 10 pills of 500mg tylenol daily since ~ mid November for R scapula pain.   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  Hip pain: Right hip replacement in   FH: CABG (coronary artery bypass surgery): 1994      HEALTH ISSUES - PROBLEM Dx:  Need for prophylactic measure: Need for prophylactic measure  Depression: Depression  HLD (hyperlipidemia): HLD (hyperlipidemia)  HTN (hypertension): HTN (hypertension)  Anemia: Anemia  Elevated liver enzymes: Elevated liver enzymes  Coronary artery disease: Coronary artery disease  CKD (chronic kidney disease): CKD (chronic kidney disease)  Arteriosclerotic heart disease (ASHD): Arteriosclerotic heart disease (ASHD)  Emphysema (subcutaneous) (surgical) resulting from a procedure: Emphysema (subcutaneous) (surgical) resulting from a procedure  Pneumonia: Pneumonia  Lung cancer: Lung cancer      FAMILY HISTORY:  No pertinent family history in first degree relatives  mother   father     [SOCIAL HISTORY: ]     smoking:  ex-smoker 2-3PPD from 12yo (1946 - )     EtOH:  prior 2 scotch-whiskey per day from 31yo to 81yo.     illicit drugs:  denies     occupation:  retired  ([employer])     marital status:  , wife 84yo     Other: 3 children.   1st dtr Chayo 59yo, In [place]  2nd Son Pete, lives in [place], [place]  3rd son Stuart, lives in [place], [place]    [ALLERGIES/INTOLERANCES:]  Allergies       No Known Allergies  Intolerances    [MEDICATIONS]  MEDICATIONS  (STANDING):  ALBUTerol    0.083% 2.5 milliGRAM(s) Nebulizer every 8 hours  amLODIPine   Tablet 10 milliGRAM(s) Oral daily  azithromycin  IVPB 500 milliGRAM(s) IV Intermittent every 24 hours  cefTRIAXone   IVPB 1 Gram(s) IV Intermittent every 24 hours  cholecalciferol 1000 Unit(s) Oral daily  FLUoxetine 20 milliGRAM(s) Oral daily  folic acid 1 milliGRAM(s) Oral daily  hydrocortisone 1% Cream 1 Application(s) Topical three times a day  latanoprost 0.005% Ophthalmic Solution 1 Drop(s) Both EYES at bedtime  metoprolol succinate ER 50 milliGRAM(s) Oral daily  senna 2 Tablet(s) Oral at bedtime    MEDICATIONS  (PRN):  guaiFENesin   Syrup  (Sugar-Free) 200 milliGRAM(s) Oral every 6 hours PRN Cough  ibuprofen  Tablet. 400 milliGRAM(s) Oral every 6 hours PRN Mild Pain (1 - 3)  ondansetron   Disintegrating Tablet 8 milliGRAM(s) Oral three times a day PRN Nausea and/or Vomiting    [REVIEW OF SYSTEMS: ]  CONSTITUTIONAL: normal, no fever, no shakes, no chills . +anorexia, +fatigue  EYES: No eye pain, no visual disturbances, no discharge  ENMT:  no discharge  NECK: No pain, no stiffness  BREASTS: No pain, no masses, no nipple discharge  RESPIRATORY: No cough, no wheezing, no chills, no hemoptysis; No shortness of breath  CARDIOVASCULAR: No chest pain, no palpitations, no dizziness, no leg swelling  GASTROINTESTINAL: No abdominal or epigastric pain. No nausea, no vomiting, no hematemesis; No diarrhea , no constipation. No melena, no hematochezia.  GENITOURINARY: No dysuria, no frequency, no hematuria, no incontinence  NEUROLOGICAL: No headaches, no memory loss, no loss of strength, no numbness, no tremors  SKIN: No itching, no burning, no rashes, no lesions   LYMPH NODES: No enlarged glands  ENDOCRINE: No heat or cold intolerance; No hair loss  MUSCULOSKELETAL: No joint pain or swelling; No muscle, no back, no extremity pain  PSYCHIATRIC: No depression, no anxiety, no mood swings, no difficulty sleeping  HEME/LYMPH: No easy bruising, no bleeding gums    [VITALS SIGNS 24hrs]  Vital Signs Last 24 Hrs  T(C): 36.6 (15 Zelalem 2019 04:37), Max: 36.6 (2019 20:21)  T(F): 97.9 (15 Zelalem 2019 04:37), Max: 97.9 (2019 20:21)  HR: 71 (15 Zelalem 2019 06:19) (69 - 79)  BP: 153/65 (15 Zelalem 2019 06:19) (102/56 - 153/65)  BP(mean): --  RR: 17 (15 Zelalem 2019 04:37) (17 - 18)  SpO2: 96% (15 Zelalem 2019 04:37) (95% - 96%)    [PHYSICAL EXAM]  General: adult in NAD,  WN,  WD.  HEENT: clear oropharynx, anicteric sclera, pink conjunctivae.  Neck: supple, no masses.  CV: normal S1S2, no murmur, no rubs, no gallops.  Lungs: clear to auscultation, no wheezes, no rales, no rhonchi.  Abdomen: soft, non-tender, non-distended, no hepatosplenomegaly, normal BS, no guarding.  Ext: no clubbing, no cyanosis, no edema.  Skin: no rashes,  no petechiae, no venous stasis changes.  Neuro: alert and oriented X3, no focal motor deficits.  LN: no SC NIDIA.    [LABS:]                        9.5    7.68  )-----------( 73       ( 15 Zelalem 2019 09:04 )             29.2     CBC Full  -  ( 15 Zelalem 2019 09:04 )  WBC Count : 7.68 K/uL  Hemoglobin : 9.5 g/dL  Hematocrit : 29.2 %  Platelet Count - Automated : 73 K/uL  Mean Cell Volume : 86.4 fl  Mean Cell Hemoglobin : 28.1 pg  Mean Cell Hemoglobin Concentration : 32.5 gm/dL    01-15  139  |  106  |  23  ----------------------------<  92  4.3   |  23  |  1.10  Ca    8.9      15 Zelalem 2019 09:04  Mg     1.6     01-15  TPro  6.8  /  Alb  1.8<L>  /  TBili  0.5  /  DBili  .30<H>  /  AST  172<H>  /  ALT  427<H>  /  AlkPhos  879<H>  01-15    LIVER FUNCTIONS - ( 15 Zelalem 2019 09:04 )  Alb: 1.8 g/dL / Pro: 6.8 g/dL / ALK PHOS: 879 U/L / ALT: 427 U/L / AST: 172 U/L / GGT: x           Urinalysis Basic - ( 2019 14:44 )  Color: Yellow / Appearance: Slightly Turbid / S.015 / pH: x  Gluc: x / Ketone: Trace  / Bili: Small / Urobili: 1   Blood: x / Protein: 75 mg/dL / Nitrite: Negative   Leuk Esterase: Trace / RBC: 3-5 /HPF / WBC 6-10   Sq Epi: x / Non Sq Epi: Few / Bacteria: Moderate    WBC  TREND (5 Days)  WBC Count: 7.68 K/uL (01-15 @ 09:04)  WBC Count: 5.07 K/uL ( @ 08:17)  WBC Count: 5.00 K/uL ( @ 13:46)    HGB  TREND (5 Days)  Hemoglobin: 9.5 g/dL (01-15 @ 09:04)  Hemoglobin: 8.1 g/dL ( @ 17:53)  Hemoglobin: 8.9 g/dL ( @ 08:17)  Hemoglobin: 10.2 g/dL ( @ 13:46)    HCT  TREND (5 Days)  Hematocrit: 29.2 % (01-15 @ 09:04)  Hematocrit: 24.9 % ( @ 17:53)  Hematocrit: 27.3 % ( @ 08:17)  Hematocrit: 31.1 % ( @ 13:46)    PLT  TREND (5 Days)  Platelet Count - Automated: 73 K/uL (01-15 @ 09:04)  Platelet Count - Automated: 42 K/uL ( @ 08:17)  Platelet Count - Automated: 42 K/uL ( @ 13:46)        [PATHOLOGY]     Surgical Pathology Report (10.04.18 @ 20:58)    Surgical Pathology Report:   ACCESSION No:  [id number]  WILDA PORTILLO                       7  Addendum Report  Performing Laboratory: [place], [place]  Specimen #: [id number]  Date Reported by Outside Laboratory: 2018  Date Report Faxed to Submitting Physician: 2018  Submitting Physician: Renaldo Perez MD  Body Site: Bone  Specimen Type:  Slide  Block ID:  1J  Interpretation:  Tumor Type:  Lung adenocarcinoma    PATHOLOGIST COMMENTS  Babar Gomez M.D., Medical Director 2018   For this patient case, we recently discovered an issue impacting our ability to accurately assess the Tumor Mutational [place] (TMB). For this reason but in order to not delay all results, we are issuing this qualified report, which includes all mutations identified with confidence but with TMB reported as ‘‘Cannot Be Determined’’. We are attempting to repeat sequencing of this patient’s sample and we will provide results of the repeat testing in an amended report as soon as they are available.    REPORT UPDATES: Amended Report 2018  This Amended Report has been issued to reflect a change to the Tumor Mutational [place] status from "Cannot Be Determined" to "TMB-Intermediate" upon reanalysis and to include actionability for the TMB-Intermediate status; this version of the report has   "PASS" quality metrics, whereas the previous version was "QUALIFIED" to indicate the possibility of reduced sensitivity of alteration detection.    NO REPORTABLE ALTERATIONS WITH  DIAGNOSTIC (CDx) CLAIMS  See professional services section for additional information  OTHER ALTERATIONS & BIOMARKERS IDENTIFIED    Microsatellite status MS-Stable §  Tumor Mutational [place] 19 Muts/Mb §  APC E241*  ATR T4811_O6108>F*  BRCA1 T77fs*11  CDKN2A loss §  CDKN2B loss §  MTAP loss §  MYCN amplification §  PMS2 PMS2([id number]) rearrangement exon 10 §  TP53 H179Y    § Refer to appendix for limitation statements related to detection of any copy number alterations, gene rearrangements, MSI or TMB result in this section.   Please refer to appendix for Explanation of Clinical Significance Classification and for variants of unknown significance (VUS).  This test was performed and interpreted by [facility]. [facility], [address] [place] [address], [place] [address]. [phone].      Addendum Report  Performing Laboratory: [place], [place]  Specimen #: FMI Case # [id number]  Date Reported by Outside Laboratory: 18  Date Report Faxed to Submitting Physician: 18  Submitting Physician: Renaldo Perez MD  Body Site: Bone  Specimen Type:  slide  Block ID:  1J  Interpretation:  Tumor Type:  Lung adenocarcinoma  PATHOLOGIST COMMENTS  Babar Gomez M.D., Medical Director 2018  NO REPORTABLE ALTERATIONS WITH  DIAGNOSTIC (CDx) CLAIMS  Sensitivity for the detection of alterations is reduced     Genomic Alteration Identified:  BRCA1 T77fs*11  MYCN amplification - equivocal†  APC E241*  ATR T5399_Q4074>F*  CDKN2A/B loss  MTAP loss  PMS2 rearrangement exon 10  TP53 H179Y  Biomarker Findings:  Microsatellite Status - MS-Stable  Tumor Mutational [place] - Cannot Be Determined  Additional Disease-Relevant Genes with No Reportable Alterations    Detected:  EGFR, KRAS, ALK, BRAF, MET, RET, ERBB2, ROS1    Outside report electronically signed by: Dr. Horacio Gomez  This test was performed and interpreted by [facility] [address] [place] [address]. [phone].      Specimen #: FMI Case # [id number]  Date Reported by Outside Laboratory: 10/24/2018  Date Report Faxed to Submitting Physician: 10/25/2018  Submitting Physician: Renaldo Perez MD  Specimen Type:  FFPE Slide-Bone  Block ID:  1J    PD-L1 IMMUNOHISTOCHEMISTRY (IHC) ANALYSIS (Dako 22C3 pharmDx™)  Tumor Proportion Score (TPS) (%)  10    RESULTS CRITERA  o The specimen should be considered to have PD-L1 expression if TPS? 1% and high PD-L1 expression if TPS ? 50%.    Non-Small Cell Lung Cancer (NSCLC)          o PD-L1 IHC 22C3  pharmDx™ is indicated as is indicated as an aid in identifying NSCLC patients for treatment with KEYTRUDA® (pembrolizumab). See the KEYTRUDA® product label for expression cutoff values guiding therapy in specific clinical circumstances.    This test was performed and interpreted by [facility] [address] [address]. [phone].      Addendum  IHC is positive for CK7 and TTF-1 (weak to moderate).  It is negative for CK20, PAX-8, CDX-2, PSA and PSAP. This favors a metastatic adenocarcinoma of lung origin.  Tissue will be sent for Foundation testing after EDTA slice is submitted for processing; an addendum report will follow.    Slide(s) with built in immunohistochemical study control(s) and negative control associated with this case has/have been verified by the sign out pathologist.  For slide(s) without built in controls positive control slides has/have been reviewed and approved by immunohistochemistry lab These immunohistochemical/ in-situ hybridization tests have been developed and their performance characteristics determined by [facility], Department of Pathology,  Division of Immunopathology, [address] [place] [address].  It has not been cleared or approved by the U.S. Food and Drug Administration.  The FDA has determined that such clearance or approval is not necessary.  This test is used for clinical purposes.  The laboratory is certified under the CLIA-88 as qualified to perform high complexity clinical testing.      Surgical Final Report  Final Diagnosis  1.  Bone and soft tissue, right distal scapula, wide resection  - Metastatic moderately differentiated adenocarcinoma  Comment: IHC pending to assess site of origin; an addendum report  will follow.  Inked anterior margin is positive for tumor.  2.  Soft tissue, deep margin, excision  - Negative for tumor  3.  Soft tissue, deep medial, excision  - Negative for tumor  4.  Soft tissue, deep lateral, excision  - Negative for tumor    Intraoperative Consultation  1. Wide resection right distal scapula  - Poorly differentiated malignant neoplasm defer to permanents for final diagnosis  By Dr. Rodriguez    Clinical History: Disorder of bone  Specimen(s) Submitted  1-Wide resection right disatl scapula.  2-Deep margin.  3-Deep medial.  4-Deep lateral.    Gross Description  1.The specimen is received fresh for intraoperative consultation and the specimen container is labeled: Wide resection right distal scapula.  It consists of an unoriented 9.3 x 8.5 x 4.2 cm portion of scapula with attached tan-pink soft tissue, brown muscle, and yellow lobulated adipose tissue.  There is a 7.1 x 1.7 cm bone resection margin (inked red).  The soft tissue resection margin is inked black at frozen sectioning.  The presumed posterior surface is inked black and the presumed anterior surface is over inked blue.   Sectioning reveals a 7.7 x 4.5 x 3.6 cm well-circumscribed white soft tumor involving the bone, abutting the presumed anterior margin, 0.1 cm from the presumed posterior resection margin, and 1.7 cm from the bone resection margin. Photographs are taken. A slice of the specimen is placed in EDTA.  Select slices are  placed in Decal. Representative sections submitted. 10 cassettes as follows:  FSA = frozen section remnant;   A-B = bone resection margin inked red;   C-D = tumor within bone;   E = tumor to presumed posterior margin;   F = tumor to presumed anterior margin;   G = tumor to bone, orange ink designates area subjacent to bone resection margin;   H-I = tumor to bone    RO 10/08/18 18:52    2. The specimen is received in formalin and the specimen container is labeled: Deep margin.  It consists of a 1.7 x 1.2 x 0.7 cm unoriented portion of tan-brown muscle.  The specimen is inked black.  The specimen is trisected to reveal an unremarkable brown cut surface.  Entirely submitted.  One cassette.    3. The specimen is received in formalin and the specimen container is labeled: Deep medial.  It consists of a 1.8 x 1.4 x 0.6 cm portion of unoriented tan-brown muscle with attached yellow lobulated adipose tissue.  The specimen is inked black. The specimen is serially sectioned to reveal an unremarkable brown cut surface.  Entirely submitted.  One cassette.     4. The specimen is received in formalin and the specimen container is labeled: Deep lateral.  It consists of a 2.0 x 1.3 x 0.6 cm portion of yellow-tan soft tissue.  The specimen is inked black.  The specimen is serially sectioned to reveal an unremarkable yellow-tan cut surface.  Entirely submitted.  One cassette.    In addition to other data that may appear on the specimen containers, all labels have been inspected to confirm the presence of the patient's name and date of birth.  Fady Youngblood 10/08/2018 13:07      [RADIOLOGY & ADDITIONAL STUDIES:]    < from: CT Angio Chest w/ IV Cont (19 @ 15:01) >  EXAM:  CT ANGIO CHEST (W)AW IC                        PROCEDURE DATE:  2019      INTERPRETATION:  CLINICAL INFORMATION:  Cough and shortness of breath.   History of lung cancer.    PROCEDURE:  Using multislice helical technique,  CT angiography, 1.5 mm sections were obtained  following the intravenous administration of 68 ccs of Omnipaque 350.  Multiplanar MIP reconstructed images were obtained.    FINDINGS:      Comparison: Chest radiograph 2019 and CT scan of the chest 2013.    There is no evidence of intraluminal filling defects to suggest central or interlobar pulmonary emboli.   No segmental pulmonary artery filling defect is noted.    There is arteriosclerotic calcification of thoracic aorta with coronary artery calcifications.  No pericardial effusion is noted.    No enlarged mediastinal or hilar lymphadenopathy is noted.    There is extensive centrilobular and paraseptal emphysematous changes, most pronounced in the upper lung zones bilaterally.  There is probable wedge resection left upper lobe.  There is focal soft tissue density at the anterior medial aspect of the left lung apex. There is adjacent airspace consolidation and focal bronchiectasis.    The central airways remain patent; no central endobronchial lesion is noted.    No significant pleural effusion is noted.  No pneumothorax is noted.    Limited images that include the upper abdomen demonstrate distended gallbladder with gallstone.  There are indeterminate hypodense lesions at the upper poles of the kidneys bilaterally.    There are multilevel degenerative changes of the thoracic spine.    Impression:  Probable wedge resection left upper lobe with soft tissue opacity adjacent medial left lung apex. Focal airspace consolidation and bronchiectasis medial left upper lobe.  Findings could represent the sequelae of prior treatment, however, cannot exclude recurrent or residual tumor.  Recommend further clinical correlation and comparison with more recent prior CT scan of the chest.    No CT evidence for acute pulmonary embolism.    Other findings as discussed above.  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  Patient states that he has had difficulty walking and could not move out of bed, usually able to ambulate independently but started using a cane about 1.5 weeks ago and a walker about 3 days ago.  States that he has decreased appetite but this has been ongoing fo the past few months since his surgery, but unable to eat anything since yesterday.  Denies nausea/vomiting, denies numbness/tingling.  Patient also admits to increased lacrimation and rhinorrhea ongoing for about 1 month, associated with productive cough with yellow to clear phlegm, no blood.  Patient had latest chemo therapy session on 18 and since then has not felt himself, has some shortness of breath but no increased dyspnea on exertion.  Patient also admits to diarrhea and constipation, no blood in stool.  Denies abdominal pain, urinary frequency, dysuria, or urinary retention.  Patient has had 2 chemo sessions outpatient (on 18 and 18, each 5 hours long, patient had hives reaction on 2nd session not sure which medication it was but treated with hydrocortisone and got better).  Patient was suppose to have 3rd session this Friday however met with oncologist PA last Monday and stated that he no longer wanted to undergo current chemo treatment regimen, and is going to discuss further options outpatient with Oncologist.      In the ED, T 97.1, HR 88, /82, RR 1, SpO2 96% on RA.  Labs significant for H/H 10.2/31.1 (baseline around 11-12 as per chart review), WBC 5, Band count 5%, lactate 1.3, BUN/Cr 36/1.6, albumin 2.1, alk phos 1085, , , procal 1.08, UA grossly negative.      EKG: NSR with possible Vu-Parkinson-White Syndrome (similar to EKG from 10/5/18)  CXR: There is mild elevation of left hemidiaphragm. Again noted is a poorly defined nodular opacity left midlung zone overlying left fifth and sixth ribs. Again noted is subsegmental atelectasis with possible trace pleural  effusion at the left costophrenic angle.  The right lung is clear.  CTA: Probable wedge resection left upper lobe with soft tissue opacity adjacent medial left lung apex. Focal airspace consolidation and bronchiectasis medial left upper lobe. Findings could represent the sequelae of prior treatment, however, cannot exclude recurrent or residual tumor.  No PE.    In the ED, patient received azithro x1, ceftriaxone x1, 1L NS bolus. Pulm consulted in the ED. (2019 16:57)    Heme-Onc asked to see pt for hx of lung cancer.   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 Followed by Dr Yakov Nunn.   Treated with Carboplatin-Alimta-Keytruda. s/p 2 cycles of chemo, last 19.   Denies hx of abn LFTs, does not recall last time had LFTs performed.  States has been taking ~ 10 pills of 500mg tylenol daily since ~ mid November for R scapula pain.   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  Hip pain: Right hip replacement in   FH: CABG (coronary artery bypass surgery): 1994      HEALTH ISSUES - PROBLEM Dx:  Need for prophylactic measure: Need for prophylactic measure  Depression: Depression  HLD (hyperlipidemia): HLD (hyperlipidemia)  HTN (hypertension): HTN (hypertension)  Anemia: Anemia  Elevated liver enzymes: Elevated liver enzymes  Coronary artery disease: Coronary artery disease  CKD (chronic kidney disease): CKD (chronic kidney disease)  Arteriosclerotic heart disease (ASHD): Arteriosclerotic heart disease (ASHD)  Emphysema (subcutaneous) (surgical) resulting from a procedure: Emphysema (subcutaneous) (surgical) resulting from a procedure  Pneumonia: Pneumonia  Lung cancer: Lung cancer      FAMILY HISTORY:  No pertinent family history in first degree relatives  mother   father     [SOCIAL HISTORY: ]     smoking:  ex-smoker 2-3PPD from 12yo (1946 - )     EtOH:  prior 2 scotch-whiskey per day from 31yo to 81yo.     illicit drugs:  denies     occupation:  retired  ([employer])     marital status:  , wife 84yo     Other: 3 children.   1st dtr Chayo 61yo, In [place]  2nd Son Pete, lives in [place], [place]  3rd son Stuart, lives in [place], [place]    [ALLERGIES/INTOLERANCES:]  Allergies       No Known Allergies  Intolerances    [MEDICATIONS]  MEDICATIONS  (STANDING):  ALBUTerol    0.083% 2.5 milliGRAM(s) Nebulizer every 8 hours  amLODIPine   Tablet 10 milliGRAM(s) Oral daily  azithromycin  IVPB 500 milliGRAM(s) IV Intermittent every 24 hours  cefTRIAXone   IVPB 1 Gram(s) IV Intermittent every 24 hours  cholecalciferol 1000 Unit(s) Oral daily  FLUoxetine 20 milliGRAM(s) Oral daily  folic acid 1 milliGRAM(s) Oral daily  hydrocortisone 1% Cream 1 Application(s) Topical three times a day  latanoprost 0.005% Ophthalmic Solution 1 Drop(s) Both EYES at bedtime  metoprolol succinate ER 50 milliGRAM(s) Oral daily  senna 2 Tablet(s) Oral at bedtime    MEDICATIONS  (PRN):  guaiFENesin   Syrup  (Sugar-Free) 200 milliGRAM(s) Oral every 6 hours PRN Cough  ibuprofen  Tablet. 400 milliGRAM(s) Oral every 6 hours PRN Mild Pain (1 - 3)  ondansetron   Disintegrating Tablet 8 milliGRAM(s) Oral three times a day PRN Nausea and/or Vomiting    [REVIEW OF SYSTEMS: ]  CONSTITUTIONAL: normal, no fever, no shakes, no chills . +anorexia, +fatigue  EYES: No eye pain, no visual disturbances, no discharge  ENMT:  no discharge  NECK: No pain, no stiffness  BREASTS: No pain, no masses, no nipple discharge  RESPIRATORY: No cough, no wheezing, no chills, no hemoptysis; No shortness of breath  CARDIOVASCULAR: No chest pain, no palpitations, no dizziness, no leg swelling  GASTROINTESTINAL: No abdominal or epigastric pain. No nausea, no vomiting, no hematemesis; No diarrhea , no constipation. No melena, no hematochezia.  GENITOURINARY: No dysuria, no frequency, no hematuria, no incontinence  NEUROLOGICAL: No headaches, no memory loss, no loss of strength, no numbness, no tremors  SKIN: No itching, no burning, no rashes, no lesions   LYMPH NODES: No enlarged glands  ENDOCRINE: No heat or cold intolerance; No hair loss  MUSCULOSKELETAL: No joint pain or swelling; No muscle, no back, no extremity pain  PSYCHIATRIC: No depression, no anxiety, no mood swings, no difficulty sleeping  HEME/LYMPH: No easy bruising, no bleeding gums    [VITALS SIGNS 24hrs]  Vital Signs Last 24 Hrs  T(C): 36.6 (15 Zelalem 2019 04:37), Max: 36.6 (2019 20:21)  T(F): 97.9 (15 Zelalem 2019 04:37), Max: 97.9 (2019 20:21)  HR: 71 (15 Zelalem 2019 06:19) (69 - 79)  BP: 153/65 (15 Zelalem 2019 06:19) (102/56 - 153/65)  BP(mean): --  RR: 17 (15 Zelalem 2019 04:37) (17 - 18)  SpO2: 96% (15 Zelalem 2019 04:37) (95% - 96%)    [PHYSICAL EXAM]  General: adult in NAD,  WN,  WD.  HEENT: clear oropharynx, anicteric sclera, pink conjunctivae.  Neck: supple, no masses.  CV: normal S1S2, no murmur, no rubs, no gallops.  Lungs: clear to auscultation, no wheezes, no rales, no rhonchi.  Abdomen: soft, non-tender, non-distended, no hepatosplenomegaly, normal BS, no guarding.  Ext: no clubbing, no cyanosis, no edema.  Skin: no rashes,  no petechiae, no venous stasis changes.  Neuro: alert and oriented X3, no focal motor deficits.  LN: no SC NIDIA.    [LABS:]                        9.5    7.68  )-----------( 73       ( 15 Zelalem 2019 09:04 )             29.2     CBC Full  -  ( 15 Zelalem 2019 09:04 )  WBC Count : 7.68 K/uL  Hemoglobin : 9.5 g/dL  Hematocrit : 29.2 %  Platelet Count - Automated : 73 K/uL  Mean Cell Volume : 86.4 fl  Mean Cell Hemoglobin : 28.1 pg  Mean Cell Hemoglobin Concentration : 32.5 gm/dL    01-15  139  |  106  |  23  ----------------------------<  92  4.3   |  23  |  1.10  Ca    8.9      15 Zelalem 2019 09:04  Mg     1.6     01-15  TPro  6.8  /  Alb  1.8<L>  /  TBili  0.5  /  DBili  .30<H>  /  AST  172<H>  /  ALT  427<H>  /  AlkPhos  879<H>  01-15    LIVER FUNCTIONS - ( 15 Zelalem 2019 09:04 )  Alb: 1.8 g/dL / Pro: 6.8 g/dL / ALK PHOS: 879 U/L / ALT: 427 U/L / AST: 172 U/L / GGT: x           Urinalysis Basic - ( 2019 14:44 )  Color: Yellow / Appearance: Slightly Turbid / S.015 / pH: x  Gluc: x / Ketone: Trace  / Bili: Small / Urobili: 1   Blood: x / Protein: 75 mg/dL / Nitrite: Negative   Leuk Esterase: Trace / RBC: 3-5 /HPF / WBC 6-10   Sq Epi: x / Non Sq Epi: Few / Bacteria: Moderate    WBC  TREND (5 Days)  WBC Count: 7.68 K/uL (01-15 @ 09:04)  WBC Count: 5.07 K/uL ( @ 08:17)  WBC Count: 5.00 K/uL ( @ 13:46)    HGB  TREND (5 Days)  Hemoglobin: 9.5 g/dL (01-15 @ 09:04)  Hemoglobin: 8.1 g/dL ( @ 17:53)  Hemoglobin: 8.9 g/dL ( @ 08:17)  Hemoglobin: 10.2 g/dL ( @ 13:46)    HCT  TREND (5 Days)  Hematocrit: 29.2 % (01-15 @ 09:04)  Hematocrit: 24.9 % ( @ 17:53)  Hematocrit: 27.3 % ( @ 08:17)  Hematocrit: 31.1 % ( @ 13:46)    PLT  TREND (5 Days)  Platelet Count - Automated: 73 K/uL (01-15 @ 09:04)  Platelet Count - Automated: 42 K/uL ( @ 08:17)  Platelet Count - Automated: 42 K/uL ( @ 13:46)        [PATHOLOGY]     Surgical Pathology Report (10.04.18 @ 20:58)    Surgical Pathology Report:   ACCESSION No:  [id number]  WILDA PORTILLO                       7  Addendum Report  Performing Laboratory: [place], [place]  Specimen #: [id number]  Date Reported by Outside Laboratory: 2018  Date Report Faxed to Submitting Physician: 2018  Submitting Physician: Renaldo Perez MD  Body Site: Bone  Specimen Type:  Slide  Block ID:  1J  Interpretation:  Tumor Type:  Lung adenocarcinoma    PATHOLOGIST COMMENTS  Babar Gomez M.D., Medical Director 2018   For this patient case, we recently discovered an issue impacting our ability to accurately assess the Tumor Mutational Hawthorne (TMB). For this reason but in order to not delay all results, we are issuing this qualified report, which includes all mutations identified with confidence but with TMB reported as ‘‘Cannot Be Determined’’. We are attempting to repeat sequencing of this patient’s sample and we will provide results of the repeat testing in an amended report as soon as they are available.    REPORT UPDATES: Amended Report 2018  This Amended Report has been issued to reflect a change to the Tumor Mutational Hawthorne status from "Cannot Be Determined" to "TMB-Intermediate" upon reanalysis and to include actionability for the TMB-Intermediate status; this version of the report has   "PASS" quality metrics, whereas the previous version was "QUALIFIED" to indicate the possibility of reduced sensitivity of alteration detection.    NO REPORTABLE ALTERATIONS WITH  DIAGNOSTIC (CDx) CLAIMS  See professional services section for additional information  OTHER ALTERATIONS & BIOMARKERS IDENTIFIED    Microsatellite status MS-Stable §  Tumor Mutational Hawthorne 19 Muts/Mb §  APC E241*  ATR R7464_U0023>F*  BRCA1 T77fs*11  CDKN2A loss §  CDKN2B loss §  MTAP loss §  MYCN amplification §  PMS2 PMS2([id number]) rearrangement exon 10 §  TP53 H179Y    § Refer to appendix for limitation statements related to detection of any copy number alterations, gene rearrangements, MSI or TMB result in this section.   Please refer to appendix for Explanation of Clinical Significance Classification and for variants of unknown significance (VUS).  This test was performed and interpreted by [facility]. [facility] [place], [address] [place] [address], [place] [address]. [phone].      Addendum Report  Performing Laboratory: [place], [place]  Specimen #: FMI Case # [id number]  Date Reported by Outside Laboratory: 18  Date Report Faxed to Submitting Physician: 18  Submitting Physician: Renaldo ePrez MD  Body Site: Bone  Specimen Type:  slide  Block ID:  1J  Interpretation:  Tumor Type:  Lung adenocarcinoma  PATHOLOGIST COMMENTS  Babar Gomez M.D., Medical Director 2018  NO REPORTABLE ALTERATIONS WITH  DIAGNOSTIC (CDx) CLAIMS  Sensitivity for the detection of alterations is reduced     Genomic Alteration Identified:  BRCA1 T77fs*11  MYCN amplification - equivocal†  APC E241*  ATR [id number]>F*  CDKN2A/B loss  MTAP loss  PMS2 rearrangement exon 10  TP53 H179Y  Biomarker Findings:  Microsatellite Status - MS-Stable  Tumor Mutational Hawthorne - Cannot Be Determined  Additional Disease-Relevant Genes with No Reportable Alterations    Detected:  EGFR, KRAS, ALK, BRAF, MET, RET, ERBB2, ROS1    Outside report electronically signed by: Dr. Horacio Gomez  This test was performed and interpreted by [facility] [address] [place] [id number]. [phone].      Specimen #: I Case # [id number]  Date Reported by Outside Laboratory: 10/24/2018  Date Report Faxed to Submitting Physician: 10/25/2018  Submitting Physician: Renaldo Perez MD  Specimen Type:  FFPE Slide-Bone  Block ID:  1J    PD-L1 IMMUNOHISTOCHEMISTRY (IHC) ANALYSIS (Dako 22C3 pharmDx™)  Tumor Proportion Score (TPS) (%)  10    RESULTS CRITERA  o The specimen should be considered to have PD-L1 expression if TPS? 1% and high PD-L1 expression if TPS ? 50%.    Non-Small Cell Lung Cancer (NSCLC)          o PD-L1 IHC 22C3  pharmDx™ is indicated as is indicated as an aid in identifying NSCLC patients for treatment with KEYTRUDA® (pembrolizumab). See the KEYTRUDA® product label for expression cutoff values guiding therapy in specific clinical circumstances.    This test was performed and interpreted by [facility] [address] [address]. [phone].      Addendum  IHC is positive for CK7 and TTF-1 (weak to moderate).  It is negative for CK20, PAX-8, CDX-2, PSA and PSAP. This favors a metastatic adenocarcinoma of lung origin.  Tissue will be sent for Foundation testing after EDTA slice is submitted for processing; an addendum report will follow.    Slide(s) with built in immunohistochemical study control(s) and negative control associated with this case has/have been verified by the sign out pathologist.  For slide(s) without built in controls positive control slides has/have been reviewed and approved by immunohistochemistry lab These immunohistochemical/ in-situ hybridization tests have been developed and their performance characteristics determined by [facility], Department of Pathology,  Division of Immunopathology, [address] [place] [address].  It has not been cleared or approved by the U.S. Food and Drug Administration.  The FDA has determined that such clearance or approval is not necessary.  This test is used for clinical purposes.  The laboratory is certified under the CLIA-88 as qualified to perform high complexity clinical testing.      Surgical Final Report  Final Diagnosis  1.  Bone and soft tissue, right distal scapula, wide resection  - Metastatic moderately differentiated adenocarcinoma  Comment: IHC pending to assess site of origin; an addendum report  will follow.  Inked anterior margin is positive for tumor.  2.  Soft tissue, deep margin, excision  - Negative for tumor  3.  Soft tissue, deep medial, excision  - Negative for tumor  4.  Soft tissue, deep lateral, excision  - Negative for tumor    Intraoperative Consultation  1. Wide resection right distal scapula  - Poorly differentiated malignant neoplasm defer to permanents for final diagnosis  By Dr. Rodriguez    Clinical History: Disorder of bone  Specimen(s) Submitted  1-Wide resection right disatl scapula.  2-Deep margin.  3-Deep medial.  4-Deep lateral.    Gross Description  1.The specimen is received fresh for intraoperative consultation and the specimen container is labeled: Wide resection right distal scapula.  It consists of an unoriented 9.3 x 8.5 x 4.2 cm portion of scapula with attached tan-pink soft tissue, brown muscle, and yellow lobulated adipose tissue.  There is a 7.1 x 1.7 cm bone resection margin (inked red).  The soft tissue resection margin is inked black at frozen sectioning.  The presumed posterior surface is inked black and the presumed anterior surface is over inked blue.   Sectioning reveals a 7.7 x 4.5 x 3.6 cm well-circumscribed white soft tumor involving the bone, abutting the presumed anterior margin, 0.1 cm from the presumed posterior resection margin, and 1.7 cm from the bone resection margin. Photographs are taken. A slice of the specimen is placed in EDTA.  Select slices are  placed in Decal. Representative sections submitted. 10 cassettes as follows:  FSA = frozen section remnant;   A-B = bone resection margin inked red;   C-D = tumor within bone;   E = tumor to presumed posterior margin;   F = tumor to presumed anterior margin;   G = tumor to bone, orange ink designates area subjacent to bone resection margin;   H-I = tumor to bone    RO 10/08/18 18:52    2. The specimen is received in formalin and the specimen container is labeled: Deep margin.  It consists of a 1.7 x 1.2 x 0.7 cm unoriented portion of tan-brown muscle.  The specimen is inked black.  The specimen is trisected to reveal an unremarkable brown cut surface.  Entirely submitted.  One cassette.    3. The specimen is received in formalin and the specimen container is labeled: Deep medial.  It consists of a 1.8 x 1.4 x 0.6 cm portion of unoriented tan-brown muscle with attached yellow lobulated adipose tissue.  The specimen is inked black. The specimen is serially sectioned to reveal an unremarkable brown cut surface.  Entirely submitted.  One cassette.     4. The specimen is received in formalin and the specimen container is labeled: Deep lateral.  It consists of a 2.0 x 1.3 x 0.6 cm portion of yellow-tan soft tissue.  The specimen is inked black.  The specimen is serially sectioned to reveal an unremarkable yellow-tan cut surface.  Entirely submitted.  One cassette.    In addition to other data that may appear on the specimen containers, all labels have been inspected to confirm the presence of the patient's name and date of birth.  Fady Youngblood 10/08/2018 13:07    [RAD  RBC: normocytic, normochromic. no schistocytes/sickle/targets. Rare polychromatic-macrocytic RBC, no NRBC. Rare roleux.   WBC: mainly segmented neutrophils slight enlarged, some segs with dsyplastic nuclea, mild megaloblastic picture.   No blasts,   Plts: decreased on smear, rare large plts.         [RADIOLOGY & ADDITIONAL STUDIES:]    < from: CT Angio Chest w/ IV Cont (19 @ 15:01) >  EXAM:  CT ANGIO CHEST (W)AW IC                        PROCEDURE DATE:  2019      INTERPRETATION:  CLINICAL INFORMATION:  Cough and shortness of breath.   History of lung cancer.    PROCEDURE:  Using multislice helical technique,  CT angiography, 1.5 mm sections were obtained  following the intravenous administration of 68 ccs of Omnipaque 350.  Multiplanar MIP reconstructed images were obtained.    FINDINGS:      Comparison: Chest radiograph 2019 and CT scan of the chest 2013.    There is no evidence of intraluminal filling defects to suggest central or interlobar pulmonary emboli.   No segmental pulmonary artery filling defect is noted.    There is arteriosclerotic calcification of thoracic aorta with coronary artery calcifications.  No pericardial effusion is noted.    No enlarged mediastinal or hilar lymphadenopathy is noted.    There is extensive centrilobular and paraseptal emphysematous changes, most pronounced in the upper lung zones bilaterally.  There is probable wedge resection left upper lobe.  There is focal soft tissue density at the anterior medial aspect of the left lung apex. There is adjacent airspace consolidation and focal bronchiectasis.    The central airways remain patent; no central endobronchial lesion is noted.    No significant pleural effusion is noted.  No pneumothorax is noted.    Limited images that include the upper abdomen demonstrate distended gallbladder with gallstone.  There are indeterminate hypodense lesions at the upper poles of the kidneys bilaterally.    There are multilevel degenerative changes of the thoracic spine.    Impression:  Probable wedge resection left upper lobe with soft tissue opacity adjacent medial left lung apex. Focal airspace consolidation and bronchiectasis medial left upper lobe.  Findings could represent the sequelae of prior treatment, however, cannot exclude recurrent or residual tumor.  Recommend further clinical correlation and comparison with more recent prior CT scan of the chest.    No CT evidence for acute pulmonary embolism.    Other findings as discussed above.  < end of copied text >

## 2019-01-15 NOTE — PROGRESS NOTE ADULT - ASSESSMENT
Plan:  -Pneumonia:  Continue Azithromycin and Ceftriaxone.  Continue Albuterol Neb Q8h and Guaifenesin 200mg PO Q6h PRN. blood cx negative.  As per pulm, cont nebs and antibiotics.   -Lung CA and thrombocytopenia:  Will avoid antiplatelet agents or anticoagulation.  Heme/Onc consult  -CAD:  continue Toprol XL 50mg PO daily.  Off ASA due to thrombocytopenia  -Elevated LFTs:  Holding statin.  LFTs trending down.  Check Liver US and hepatitis panel.  GI f/u  -Anemia:  Check iron studies, folate, b12, retic ct, tsh, and stool OB.  -HTN:  continue Norvasc 10mg PO daily and Toprol XL 50mg PO daily  -CKD stage 3:  stable.   -HLD:  holding statin given elevated LFTs.  -Depression:  continue Prozac 20mg PO daily  -VTE ppx: SCD Plan:  -Pneumonia:  Continue Azithromycin and Ceftriaxone.  Continue Albuterol Neb Q8h and Guaifenesin 200mg PO Q6h PRN. blood cx negative.  As per pulm, cont nebs and antibiotics.   -Lung CA and thrombocytopenia:  Will avoid antiplatelet agents or anticoagulation.  Heme/Onc consult  -CAD:  continue Toprol XL 50mg PO daily.  Off ASA due to thrombocytopenia  -Elevated LFTs:  Holding statin.  LFTs trending down. Liver U/s showing cholelithiasis and dilated CBD. F/U hepatitis panel. As per GI, consider MRI of LFTs rise, f/u SOLANGE, ASMA, AMA and celiac studies.   -Anemia:  F/u iron studies, folate, b12, retic ct, tsh, and stool OB.  -HTN:  continue Norvasc 10mg PO daily and Toprol XL 50mg PO daily  -CKD stage 3:  stable.   -HLD:  holding statin given elevated LFTs.  -Depression:  continue Prozac 20mg PO daily  -VTE ppx: SCD Plan:  -Pneumonia:  Continue Azithromycin and Ceftriaxone.  Continue Albuterol Neb Q8h and Guaifenesin 200mg PO Q6h PRN. blood cx negative.  As per pulm, cont nebs and antibiotics.   -Lung CA and thrombocytopenia:  Will avoid antiplatelet agents or anticoagulation.  Heme/Onc consult  -CAD:  continue Toprol XL 50mg PO daily.  Off ASA due to thrombocytopenia  -Elevated LFTs:  Holding statin.  LFTs trending down. Liver U/s showing cholelithiasis and dilated CBD. F/U hepatitis panel. As per GI, consider MRI if LFTs rise, f/u SOLANGE, ASMA, AMA and celiac studies.   -Anemia:  F/u iron studies, folate, b12, retic ct, tsh, and stool OB.  -HTN:  continue Norvasc 10mg PO daily and Toprol XL 50mg PO daily  -CKD stage 3:  stable.   -HLD:  holding statin given elevated LFTs.  -Depression:  continue Prozac 20mg PO daily  -VTE ppx: SCD

## 2019-01-15 NOTE — PROGRESS NOTE ADULT - SUBJECTIVE AND OBJECTIVE BOX
INTERVAL HPI/OVERNIGHT EVENTS:  pt seen and examined  denies n/v/abd pain reports bm yesterday  afebrile overnight labs noted  per overnight rn no acute gi issues overnight    MEDICATIONS  (STANDING):  ALBUTerol    0.083% 2.5 milliGRAM(s) Nebulizer every 8 hours  amLODIPine   Tablet 10 milliGRAM(s) Oral daily  azithromycin  IVPB 500 milliGRAM(s) IV Intermittent every 24 hours  cefTRIAXone   IVPB 1 Gram(s) IV Intermittent every 24 hours  cholecalciferol 1000 Unit(s) Oral daily  FLUoxetine 20 milliGRAM(s) Oral daily  folic acid 1 milliGRAM(s) Oral daily  hydrocortisone 1% Cream 1 Application(s) Topical three times a day  latanoprost 0.005% Ophthalmic Solution 1 Drop(s) Both EYES at bedtime  metoprolol succinate ER 50 milliGRAM(s) Oral daily  senna 2 Tablet(s) Oral at bedtime    MEDICATIONS  (PRN):  guaiFENesin   Syrup  (Sugar-Free) 200 milliGRAM(s) Oral every 6 hours PRN Cough  ibuprofen  Tablet. 400 milliGRAM(s) Oral every 6 hours PRN Mild Pain (1 - 3)  ondansetron   Disintegrating Tablet 8 milliGRAM(s) Oral three times a day PRN Nausea and/or Vomiting      Allergies    No Known Allergies    Intolerances        Review of Systems:    General:  No wt loss, fevers, chills, night sweats, fatigue   Eyes:  Good vision, no reported pain  ENT:  No sore throat, pain, runny nose, dysphagia  CV:  No pain, palpitations, hypo/hypertension  Resp:  No dyspnea, cough, tachypnea, wheezing  GI:  No pain, No nausea, No vomiting, No diarrhea, No constipation, No weight loss, No fever, No pruritis, No rectal bleeding, No melena, No dysphagia  :  No pain, bleeding, incontinence, nocturia  Muscle:  No pain, weakness  Neuro:  No weakness, tingling, memory problems  Psych:  No fatigue, insomnia, mood problems, depression  Endocrine:  No polyuria, polydypsia, cold/heat intolerance  Heme:  No petechiae, ecchymosis, easy bruisability  Skin:  No rash, tattoos, scars, edema      Vital Signs Last 24 Hrs  T(C): 36.6 (15 Zelalem 2019 04:37), Max: 36.6 (2019 20:21)  T(F): 97.9 (15 Zelalem 2019 04:37), Max: 97.9 (2019 20:21)  HR: 71 (15 Zelalem 2019 06:19) (69 - 91)  BP: 153/65 (15 Zelalem 2019 06:19) (102/56 - 153/65)  BP(mean): --  RR: 17 (15 Zelalem 2019 04:37) (17 - 18)  SpO2: 96% (15 Zelalem 2019 04:37) (95% - 97%)    PHYSICAL EXAM:    Constitutional: NAD  HEENT: ncat  Neck: No LAD  Respiratory: dec bs  Cardiovascular: S1 and S2, RRR  Gastrointestinal: soft nt nd  Extremities: No peripheral edema  Vascular: 2+ peripheral pulses  Neurological: awake alert responds appropriately  Skin: No rashes      LABS:                        9.5    7.68  )-----------( x        ( 15 Zelalem 2019 09:04 )             29.2     01-15    139  |  106  |  23  ----------------------------<  92  4.3   |  23  |  1.10    Ca    8.9      15 Zelalem 2019 09:04  Mg     1.6     01-15    TPro  6.8  /  Alb  1.8<L>  /  TBili  0.5  /  DBili  .30<H>  /  AST  172<H>  /  ALT  427<H>  /  AlkPhos  879<H>  01-15      Urinalysis Basic - ( 2019 14:44 )    Color: Yellow / Appearance: Slightly Turbid / S.015 / pH: x  Gluc: x / Ketone: Trace  / Bili: Small / Urobili: 1   Blood: x / Protein: 75 mg/dL / Nitrite: Negative   Leuk Esterase: Trace / RBC: 3-5 /HPF / WBC 6-10   Sq Epi: x / Non Sq Epi: Few / Bacteria: Moderate        RADIOLOGY & ADDITIONAL TESTS:

## 2019-01-15 NOTE — PROGRESS NOTE ADULT - SUBJECTIVE AND OBJECTIVE BOX
CHIEF COMPLAINT/INTERVAL HISTORY: Patient seen and examined at bedside. Patient sitting comfortably, having breakfast, in no acute distress. Patient does not share any complaints at this time.      REVIEW OF SYSTEMS:  No fever, CP, SOB or abdominal pain.    Vital Signs Last 24 Hrs  T(C): 36.6 (15 [place] 2019 04:37), Max: 36.6 (2019 20:21)  T(F): 97.9 (15 [place] 2019 04:37), Max: 97.9 (2019 20:21)  HR: 71 (15 [place] 2019 06:19) (69 - 91)  BP: 153/65 (15 [place] 2019 06:19) (102/56 - 153/65)  BP(mean): --  RR: 17 (15 [place] 2019 04:37) (17 - 18)  SpO2: 96% (15 [place] 2019 04:37) (95% - 97%)    PHYSICAL EXAM:  GENERAL: NAD  HEENT: EOMI, hearing normal, conjunctiva and sclera clear  Chest: slight diminished breath sounds in left upper lobe, CTA throughout rest of lung exam  CV: S1S2, RRR,   GI: soft, +BS, NT/ND  Musculoskeletal: no edema  Psychiatric: affect nL, mood nL  Skin: warm and dry    LABS:                        9.5    7.68  )-----------( x        ( 15 [place] 2019 09:04 )             29.2     -14    139  |  106  |  28<H>  ----------------------------<  80  4.5   |  23  |  1.10    Ca    8.3<L>      2019 08:17    TPro  5.9<L>  /  Alb  1.7<L>  /  TBili  0.5  /  DBili  .20  /  AST  248<H>  /  ALT  479<H>  /  AlkPhos  919<H>        Urinalysis Basic - ( 2019 14:44 )    Color: Yellow / Appearance: Slightly Turbid / S.015 / pH: x  Gluc: x / Ketone: Trace  / Bili: Small / Urobili: 1   Blood: x / Protein: 75 mg/dL / Nitrite: Negative   Leuk Esterase: Trace / RBC: 3-5 /HPF / WBC 6-10   Sq Epi: x / Non Sq Epi: Few / Bacteria: Moderate CHIEF COMPLAINT/INTERVAL HISTORY: Patient seen and examined at bedside. Patient sitting comfortably, having breakfast, in no acute distress. Patient does not share any complaints at this time.      REVIEW OF SYSTEMS:  No fever, CP, SOB or abdominal pain.    Vital Signs Last 24 Hrs  T(C): 36.6 (15 [place] 2019 04:37), Max: 36.6 (2019 20:21)  T(F): 97.9 (15 [place] 2019 04:37), Max: 97.9 (2019 20:21)  HR: 71 (15 [place] 2019 06:19) (69 - 91)  BP: 153/65 (15 [place] 2019 06:19) (102/56 - 153/65)  BP(mean): --  RR: 17 (15 [place] 2019 04:37) (17 - 18)  SpO2: 96% (15 [place] 2019 04:37) (95% - 97%)    PHYSICAL EXAM:  GENERAL: NAD  HEENT: EOMI, hearing normal, conjunctiva and sclera clear  Chest: slight diminished breath sounds in left upper lobe, CTA throughout rest of lung exam  CV: S1S2, RRR,   GI: soft, +BS, NT/ND  Musculoskeletal: no edema  Psychiatric: affect nL, mood nL  Skin: warm and dry    LABS:                        9.5    7.68  )-----------( x        ( 15 [place] 2019 09:04 )             29.2     -14    139  |  106  |  28<H>  ----------------------------<  80  4.5   |  23  |  1.10    Ca    8.3<L>      2019 08:17    TPro  5.9<L>  /  Alb  1.7<L>  /  TBili  0.5  /  DBili  .20  /  AST  248<H>  /  ALT  479<H>  /  AlkPhos  919<H>        Urinalysis Basic - ( 2019 14:44 )    Color: Yellow / Appearance: Slightly Turbid / S.015 / pH: x  Gluc: x / Ketone: Trace  / Bili: Small / Urobili: 1   Blood: x / Protein: 75 mg/dL / Nitrite: Negative   Leuk Esterase: Trace / RBC: 3-5 /HPF / WBC 6-10   Sq Epi: x / Non Sq Epi: Few / Bacteria: Moderate    < from: US Abdomen Complete (19 @ 15:35) >  Impression: Limited study.  Cholelithiasis. Dilated common duct. Correlate with MRCP.  Additional findings as discussed.    < end of copied text > CHIEF COMPLAINT/INTERVAL HISTORY: Patient seen and examined at bedside. Patient sitting comfortably, having breakfast, in no acute distress. Patient does not share any complaints at this time. Patient refuses PT's recommendation to [facility]; patient would like to be discharged to home.       REVIEW OF SYSTEMS:  No fever, CP, SOB or abdominal pain.    Vital Signs Last 24 Hrs  T(C): 36.6 (15 [place] 2019 04:37), Max: 36.6 (2019 20:21)  T(F): 97.9 (15 [place] 2019 04:37), Max: 97.9 (2019 20:21)  HR: 71 (15 [place] 2019 06:19) (69 - 91)  BP: 153/65 (15 [place] 2019 06:19) (102/56 - 153/65)  BP(mean): --  RR: 17 (15 [place] 2019 04:37) (17 - 18)  SpO2: 96% (15 [place] 2019 04:37) (95% - 97%)    PHYSICAL EXAM:  GENERAL: NAD  HEENT: EOMI, hearing normal, conjunctiva and sclera clear  Chest: slight diminished breath sounds in left upper lobe, CTA throughout rest of lung exam  CV: S1S2, RRR,   GI: soft, +BS, NT/ND  Musculoskeletal: no edema  Psychiatric: affect nL, mood nL  Skin: warm and dry    LABS:                        9.5    7.68  )-----------( x        ( 15 [place] 2019 09:04 )             29.2     -14    139  |  106  |  28<H>  ----------------------------<  80  4.5   |  23  |  1.10    Ca    8.3<L>      2019 08:17    TPro  5.9<L>  /  Alb  1.7<L>  /  TBili  0.5  /  DBili  .20  /  AST  248<H>  /  ALT  479<H>  /  AlkPhos  919<H>        Urinalysis Basic - ( 2019 14:44 )    Color: Yellow / Appearance: Slightly Turbid / S.015 / pH: x  Gluc: x / Ketone: Trace  / Bili: Small / Urobili: 1   Blood: x / Protein: 75 mg/dL / Nitrite: Negative   Leuk Esterase: Trace / RBC: 3-5 /HPF / WBC 6-10   Sq Epi: x / Non Sq Epi: Few / Bacteria: Moderate    < from: US Abdomen Complete (19 @ 15:35) >  Impression: Limited study.  Cholelithiasis. Dilated common duct. Correlate with MRCP.  Additional findings as discussed.    < end of copied text >

## 2019-01-16 ENCOUNTER — TRANSCRIPTION ENCOUNTER (OUTPATIENT)
Age: 84
End: 2019-01-16

## 2019-01-16 VITALS
HEART RATE: 81 BPM | OXYGEN SATURATION: 98 % | SYSTOLIC BLOOD PRESSURE: 122 MMHG | TEMPERATURE: 97 F | DIASTOLIC BLOOD PRESSURE: 67 MMHG | RESPIRATION RATE: 16 BRPM

## 2019-01-16 LAB
ALBUMIN SERPL ELPH-MCNC: 1.8 G/DL — LOW (ref 3.3–5)
ALP SERPL-CCNC: 898 U/L — HIGH (ref 40–120)
ALT FLD-CCNC: 325 U/L — HIGH (ref 12–78)
ANION GAP SERPL CALC-SCNC: 8 MMOL/L — SIGNIFICANT CHANGE UP (ref 5–17)
APTT BLD: 29.9 SEC — SIGNIFICANT CHANGE UP (ref 28.5–37)
AST SERPL-CCNC: 107 U/L — HIGH (ref 15–37)
BILIRUB DIRECT SERPL-MCNC: 0.2 MG/DL — SIGNIFICANT CHANGE UP (ref 0.05–0.2)
BILIRUB INDIRECT FLD-MCNC: 0.2 MG/DL — SIGNIFICANT CHANGE UP (ref 0.2–1)
BILIRUB SERPL-MCNC: 0.4 MG/DL — SIGNIFICANT CHANGE UP (ref 0.2–1.2)
BUN SERPL-MCNC: 25 MG/DL — HIGH (ref 7–23)
CALCIUM SERPL-MCNC: 8.9 MG/DL — SIGNIFICANT CHANGE UP (ref 8.5–10.1)
CHLORIDE SERPL-SCNC: 104 MMOL/L — SIGNIFICANT CHANGE UP (ref 96–108)
CO2 SERPL-SCNC: 26 MMOL/L — SIGNIFICANT CHANGE UP (ref 22–31)
CREAT SERPL-MCNC: 1.3 MG/DL — SIGNIFICANT CHANGE UP (ref 0.5–1.3)
FERRITIN SERPL-MCNC: 2462 NG/ML — HIGH (ref 30–400)
FOLATE SERPL-MCNC: >20 NG/ML — SIGNIFICANT CHANGE UP
GLUCOSE SERPL-MCNC: 130 MG/DL — HIGH (ref 70–99)
HCT VFR BLD CALC: 28.9 % — LOW (ref 39–50)
HGB BLD-MCNC: 9.2 G/DL — LOW (ref 13–17)
INR BLD: 1.15 RATIO — SIGNIFICANT CHANGE UP (ref 0.88–1.16)
MAGNESIUM SERPL-MCNC: 1.6 MG/DL — SIGNIFICANT CHANGE UP (ref 1.6–2.6)
MCHC RBC-ENTMCNC: 27.5 PG — SIGNIFICANT CHANGE UP (ref 27–34)
MCHC RBC-ENTMCNC: 31.8 GM/DL — LOW (ref 32–36)
MCV RBC AUTO: 86.5 FL — SIGNIFICANT CHANGE UP (ref 80–100)
NRBC # BLD: 0 /100 WBCS — SIGNIFICANT CHANGE UP (ref 0–0)
PLATELET # BLD AUTO: 95 K/UL — LOW (ref 150–400)
POTASSIUM SERPL-MCNC: 5.3 MMOL/L — SIGNIFICANT CHANGE UP (ref 3.5–5.3)
POTASSIUM SERPL-SCNC: 5.3 MMOL/L — SIGNIFICANT CHANGE UP (ref 3.5–5.3)
PROT SERPL-MCNC: 6.8 G/DL — SIGNIFICANT CHANGE UP (ref 6–8.3)
PROTHROM AB SERPL-ACNC: 13.2 SEC — HIGH (ref 10–12.9)
RBC # BLD: 3.34 M/UL — LOW (ref 4.2–5.8)
RBC # FLD: 13.7 % — SIGNIFICANT CHANGE UP (ref 10.3–14.5)
SODIUM SERPL-SCNC: 138 MMOL/L — SIGNIFICANT CHANGE UP (ref 135–145)
VIT B12 SERPL-MCNC: >2000 PG/ML — HIGH (ref 232–1245)
WBC # BLD: 6.25 K/UL — SIGNIFICANT CHANGE UP (ref 3.8–10.5)
WBC # FLD AUTO: 6.25 K/UL — SIGNIFICANT CHANGE UP (ref 3.8–10.5)

## 2019-01-16 PROCEDURE — 87086 URINE CULTURE/COLONY COUNT: CPT

## 2019-01-16 PROCEDURE — 83540 ASSAY OF IRON: CPT

## 2019-01-16 PROCEDURE — 83605 ASSAY OF LACTIC ACID: CPT

## 2019-01-16 PROCEDURE — 97162 PT EVAL MOD COMPLEX 30 MIN: CPT

## 2019-01-16 PROCEDURE — 80076 HEPATIC FUNCTION PANEL: CPT

## 2019-01-16 PROCEDURE — 82105 ALPHA-FETOPROTEIN SERUM: CPT

## 2019-01-16 PROCEDURE — 94664 DEMO&/EVAL PT USE INHALER: CPT

## 2019-01-16 PROCEDURE — 94640 AIRWAY INHALATION TREATMENT: CPT

## 2019-01-16 PROCEDURE — 96365 THER/PROPH/DIAG IV INF INIT: CPT | Mod: XU

## 2019-01-16 PROCEDURE — 83550 IRON BINDING TEST: CPT

## 2019-01-16 PROCEDURE — 84145 PROCALCITONIN (PCT): CPT

## 2019-01-16 PROCEDURE — 85730 THROMBOPLASTIN TIME PARTIAL: CPT

## 2019-01-16 PROCEDURE — 86140 C-REACTIVE PROTEIN: CPT

## 2019-01-16 PROCEDURE — 85018 HEMOGLOBIN: CPT

## 2019-01-16 PROCEDURE — 99285 EMERGENCY DEPT VISIT HI MDM: CPT | Mod: 25

## 2019-01-16 PROCEDURE — 82746 ASSAY OF FOLIC ACID SERUM: CPT

## 2019-01-16 PROCEDURE — 80307 DRUG TEST PRSMV CHEM ANLYZR: CPT

## 2019-01-16 PROCEDURE — 87040 BLOOD CULTURE FOR BACTERIA: CPT

## 2019-01-16 PROCEDURE — 97116 GAIT TRAINING THERAPY: CPT

## 2019-01-16 PROCEDURE — 82607 VITAMIN B-12: CPT

## 2019-01-16 PROCEDURE — 85610 PROTHROMBIN TIME: CPT

## 2019-01-16 PROCEDURE — 83880 ASSAY OF NATRIURETIC PEPTIDE: CPT

## 2019-01-16 PROCEDURE — 87449 NOS EACH ORGANISM AG IA: CPT

## 2019-01-16 PROCEDURE — 99239 HOSP IP/OBS DSCHRG MGMT >30: CPT

## 2019-01-16 PROCEDURE — 80074 ACUTE HEPATITIS PANEL: CPT

## 2019-01-16 PROCEDURE — 99221 1ST HOSP IP/OBS SF/LOW 40: CPT

## 2019-01-16 PROCEDURE — 97530 THERAPEUTIC ACTIVITIES: CPT

## 2019-01-16 PROCEDURE — 85027 COMPLETE CBC AUTOMATED: CPT

## 2019-01-16 PROCEDURE — 36415 COLL VENOUS BLD VENIPUNCTURE: CPT

## 2019-01-16 PROCEDURE — 93005 ELECTROCARDIOGRAM TRACING: CPT

## 2019-01-16 PROCEDURE — A9579: CPT

## 2019-01-16 PROCEDURE — 74183 MRI ABD W/O CNTR FLWD CNTR: CPT

## 2019-01-16 PROCEDURE — 71275 CT ANGIOGRAPHY CHEST: CPT

## 2019-01-16 PROCEDURE — 85014 HEMATOCRIT: CPT

## 2019-01-16 PROCEDURE — 80053 COMPREHEN METABOLIC PANEL: CPT

## 2019-01-16 PROCEDURE — 87631 RESP VIRUS 3-5 TARGETS: CPT

## 2019-01-16 PROCEDURE — 71045 X-RAY EXAM CHEST 1 VIEW: CPT

## 2019-01-16 PROCEDURE — 83735 ASSAY OF MAGNESIUM: CPT

## 2019-01-16 PROCEDURE — 82728 ASSAY OF FERRITIN: CPT

## 2019-01-16 PROCEDURE — 80048 BASIC METABOLIC PNL TOTAL CA: CPT

## 2019-01-16 PROCEDURE — 81001 URINALYSIS AUTO W/SCOPE: CPT

## 2019-01-16 PROCEDURE — 94760 N-INVAS EAR/PLS OXIMETRY 1: CPT

## 2019-01-16 PROCEDURE — 84443 ASSAY THYROID STIM HORMONE: CPT

## 2019-01-16 PROCEDURE — 76700 US EXAM ABDOM COMPLETE: CPT

## 2019-01-16 PROCEDURE — 99232 SBSQ HOSP IP/OBS MODERATE 35: CPT

## 2019-01-16 RX ORDER — ATORVASTATIN CALCIUM 80 MG/1
1 TABLET, FILM COATED ORAL
Qty: 0 | Refills: 0 | COMMUNITY

## 2019-01-16 RX ORDER — PANTOPRAZOLE SODIUM 20 MG/1
1 TABLET, DELAYED RELEASE ORAL
Qty: 30 | Refills: 0
Start: 2019-01-16

## 2019-01-16 RX ORDER — ASPIRIN/CALCIUM CARB/MAGNESIUM 324 MG
1 TABLET ORAL
Qty: 0 | Refills: 0 | COMMUNITY

## 2019-01-16 RX ORDER — ACETAMINOPHEN 500 MG
2 TABLET ORAL
Qty: 0 | Refills: 0 | COMMUNITY

## 2019-01-16 RX ORDER — CEFPODOXIME PROXETIL 100 MG
1 TABLET ORAL
Qty: 8 | Refills: 0
Start: 2019-01-16

## 2019-01-16 RX ORDER — SENNA PLUS 8.6 MG/1
2 TABLET ORAL
Qty: 0 | Refills: 0 | DISCHARGE
Start: 2019-01-16

## 2019-01-16 RX ORDER — AZITHROMYCIN 500 MG/1
1 TABLET, FILM COATED ORAL
Qty: 2 | Refills: 0
Start: 2019-01-16

## 2019-01-16 RX ORDER — CARBOPLATIN 50 MG
0 VIAL (EA) INTRAVENOUS
Qty: 0 | Refills: 0 | COMMUNITY

## 2019-01-16 RX ORDER — EZETIMIBE 10 MG/1
1 TABLET ORAL
Qty: 0 | Refills: 0 | COMMUNITY

## 2019-01-16 RX ORDER — PEMETREXED 500 MG/20ML
0 INJECTION, SOLUTION, CONCENTRATE INTRAVENOUS
Qty: 0 | Refills: 0 | COMMUNITY

## 2019-01-16 RX ORDER — PEMBROLIZUMAB 25 MG/ML
0 INJECTION, SOLUTION INTRAVENOUS
Qty: 0 | Refills: 0 | COMMUNITY

## 2019-01-16 RX ADMIN — Medication 20 MILLIGRAM(S): at 11:51

## 2019-01-16 RX ADMIN — PANTOPRAZOLE SODIUM 40 MILLIGRAM(S): 20 TABLET, DELAYED RELEASE ORAL at 06:31

## 2019-01-16 RX ADMIN — AMLODIPINE BESYLATE 10 MILLIGRAM(S): 2.5 TABLET ORAL at 06:31

## 2019-01-16 RX ADMIN — ALBUTEROL 2.5 MILLIGRAM(S): 90 AEROSOL, METERED ORAL at 08:01

## 2019-01-16 RX ADMIN — Medication 1000 UNIT(S): at 11:51

## 2019-01-16 RX ADMIN — Medication 400 MILLIGRAM(S): at 06:31

## 2019-01-16 RX ADMIN — Medication 400 MILLIGRAM(S): at 07:20

## 2019-01-16 RX ADMIN — Medication 1 MILLIGRAM(S): at 11:51

## 2019-01-16 RX ADMIN — ALBUTEROL 2.5 MILLIGRAM(S): 90 AEROSOL, METERED ORAL at 15:45

## 2019-01-16 RX ADMIN — Medication 50 MILLIGRAM(S): at 06:31

## 2019-01-16 NOTE — DISCHARGE NOTE ADULT - HOSPITAL COURSE
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 CXR: There is mild elevation of left hemidiaphragm. Again noted is a poorly defined nodular opacity left midlung zone overlying left fifth and sixth ribs. Again noted is subsegmental atelectasis with possible trace pleural  effusion at the left costophrenic angle.  The right lung is clear.  -CTA: Probable wedge resection left upper lobe with soft tissue opacity adjacent medial left lung apex. Focal airspace consolidation and bronchiectasis medial left upper lobe. Findings could represent the sequelae of prior treatment, however, cannot exclude recurrent or residual tumor.  No PE. Pt started on Azithro and Ceftriaxone. Procalc elevated. Pt given albuterol and robitussin. Pt found to be thrombocytopenic, ASA held. Statin held due to elevated LFTs. Acute hep panel negative. US showed choletlithiasis, dilated common duct. MRCP done which showed cholelithiasis. Pt seen by GI. 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 LFTs trended downwards. Tylenol level low. Anemia work up done with no evidence of iron/b12 def. Thrombocytopenia due to recent chemotx. Pt remained stable for remainder of stay.  Patient improved clinically throughout hospital course. Patient seen and examined on day of discharge.    Vital Signs Last 24 Hrs  T(C): 36.2 (16 Jan 2019 12:36), Max: 36.9 (15 [place] 2019 20:31)  T(F): 97.2 (16 Jan 2019 12:36), Max: 98.4 (15 [place] 2019 20:31)  HR: 81 (16 Jan 2019 12:36) (73 - 88)  BP: 122/67 (16 Jan 2019 12:36) (120/66 - 132/76)  BP(mean): --  RR: 16 (16 Jan 2019 12:36) (16 - 17)  SpO2: 98% (16 Jan 2019 12:36) (96% - 98%)    Physical Exam:  PHYSICAL EXAM:  GENERAL: NAD  HEENT: EOMI, hearing normal, conjunctiva and sclera clear  Chest: slight diminished breath sounds in left upper lobe, CTA throughout rest of lung exam  CV: S1S2, RRR,   GI: soft, +BS, NT/ND  Musculoskeletal: no edema  Psychiatric: affect nL, mood nL  Skin: warm and dry    Patient is medically stable for discharge to home with outpatient follow up. 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 CXR: There is mild elevation of left hemidiaphragm. Again noted is a poorly defined nodular opacity left midlung zone overlying left fifth and sixth ribs. Again noted is subsegmental atelectasis with possible trace pleural  effusion at the left costophrenic angle.  The right lung is clear.  -CTA: Probable wedge resection left upper lobe with soft tissue opacity adjacent medial left lung apex. Focal airspace consolidation and bronchiectasis medial left upper lobe. Findings could represent the sequelae of prior treatment, however, cannot exclude recurrent or residual tumor.  No PE. Pt started on Azithro and Ceftriaxone. Procalc elevated. Pt given albuterol and robitussin. Pt found to be thrombocytopenic, ASA held. Statin held due to elevated LFTs. Acute hep panel negative. US showed choletlithiasis, dilated common duct. MRCP done which showed cholelithiasis. Pt seen by GI. 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 LFTs trended downwards. Tylenol level low. Anemia work up done with no evidence of iron/b12 def. Thrombocytopenia due to recent chemotx. Pt remained stable for remainder of stay.  Patient improved clinically throughout hospital course. Patient seen and examined on day of discharge.    Vital Signs Last 24 Hrs  T(C): 36.2 (16 Jan 2019 12:36), Max: 36.9 (15 [place] 2019 20:31)  T(F): 97.2 (16 Jan 2019 12:36), Max: 98.4 (15 [place] 2019 20:31)  HR: 81 (16 Jan 2019 12:36) (73 - 88)  BP: 122/67 (16 Jan 2019 12:36) (120/66 - 132/76)  BP(mean): --  RR: 16 (16 Jan 2019 12:36) (16 - 17)  SpO2: 98% (16 Jan 2019 12:36) (96% - 98%)    Physical Exam:  PHYSICAL EXAM:  GENERAL: NAD  HEENT: EOMI, hearing normal, conjunctiva and sclera clear  Chest: slight diminished breath sounds in left upper lobe, CTA throughout rest of lung exam  CV: S1S2, RRR,   GI: soft, +BS, NT/ND  Musculoskeletal: no edema  Psychiatric: affect nL, mood nL  Skin: warm and dry    Patient is medically stable for discharge to home with outpatient follow up.    Completion of discharge in 40 minutes.

## 2019-01-16 NOTE — PROGRESS NOTE ADULT - SUBJECTIVE AND OBJECTIVE BOX
INTERVAL HPI/OVERNIGHT EVENTS:  pt seen and examined  denies n/v/d/abd pain  per overnight rn no acute gi issues  afebrile overnight labs noted    MEDICATIONS  (STANDING):  ALBUTerol    0.083% 2.5 milliGRAM(s) Nebulizer every 8 hours  amLODIPine   Tablet 10 milliGRAM(s) Oral daily  azithromycin   Tablet 500 milliGRAM(s) Oral every 24 hours  cefTRIAXone   IVPB 1 Gram(s) IV Intermittent every 24 hours  cholecalciferol 1000 Unit(s) Oral daily  FLUoxetine 20 milliGRAM(s) Oral daily  folic acid 1 milliGRAM(s) Oral daily  heparin  Injectable 5000 Unit(s) SubCutaneous every 12 hours  hydrocortisone 1% Cream 1 Application(s) Topical three times a day  latanoprost 0.005% Ophthalmic Solution 1 Drop(s) Both EYES at bedtime  metoprolol succinate ER 50 milliGRAM(s) Oral daily  pantoprazole    Tablet 40 milliGRAM(s) Oral before breakfast  predniSONE   Tablet 80 milliGRAM(s) Oral every 24 hours  senna 2 Tablet(s) Oral at bedtime    MEDICATIONS  (PRN):  guaiFENesin   Syrup  (Sugar-Free) 200 milliGRAM(s) Oral every 6 hours PRN Cough  ibuprofen  Tablet. 400 milliGRAM(s) Oral every 6 hours PRN Mild Pain (1 - 3)  ondansetron   Disintegrating Tablet 8 milliGRAM(s) Oral three times a day PRN Nausea and/or Vomiting      Allergies    No Known Allergies    Intolerances          Vital Signs Last 24 Hrs  T(C): 36.6 (16 Jan 2019 05:07), Max: 36.9 (15 [place] 2019 13:01)  T(F): 97.8 (16 Jan 2019 05:07), Max: 98.4 (15 [place] 2019 13:01)  HR: 75 (16 Jan 2019 08:02) (73 - 88)  BP: 120/66 (16 Jan 2019 05:07) (118/73 - 132/76)  BP(mean): --  RR: 17 (16 Jan 2019 05:07) (17 - 17)  SpO2: 96% (16 Jan 2019 08:02) (96% - 98%)    PHYSICAL EXAM:    Constitutional: NAD  HEENT: ncat  Neck: No LAD  Respiratory: dec bs  Cardiovascular: S1 and S2, RRR  Gastrointestinal: soft nt nd  Extremities: No peripheral edema  Vascular: 2+ peripheral pulses  Neurological: awake alert responds appropriately  Skin: No rashes    LABS:                        9.2    6.25  )-----------( 95       ( 16 Jan 2019 08:46 )             28.9     01-16    138  |  104  |  25<H>  ----------------------------<  130<H>  5.3   |  26  |  1.30    Ca    8.9      16 Jan 2019 08:46  Mg     1.6     01-16    TPro  6.8  /  Alb  1.8<L>  /  TBili  0.4  /  DBili  .20  /  AST  107<H>  /  ALT  325<H>  /  AlkPhos  898<H>  01-16    PT/INR - ( 16 Jan 2019 08:46 )   PT: 13.2 sec;   INR: 1.15 ratio         PTT - ( 16 Jan 2019 08:46 )  PTT:29.9 sec      RADIOLOGY & ADDITIONAL TESTS:  < from: MR MRCP w/wo IV Cont (01.15.19 @ 17:11) >    EXAM:  MR MRCP WAW IC                            PROCEDURE DATE:  01/15/2019          INTERPRETATION:  [facility] RADIOLOGIST PRELIMINARY REPORT    EXAM:    MR Abdomen Without and With Contrast. Liver.     EXAM DATE/TIME:    1/15/2019 4:19 PM     CLINICAL HISTORY:    85 years old, male; Signs and symptoms; Abdominal tenderness; Patient   HX: Cbd   stone elevated lfts eval for liver mets; Additional info: Faxed CT and   sono   report.     TECHNIQUE:    MR Abdomen with and without intravenous contrast.Exam focused on the   liver.     CONTRAST:    7.5 ml of gadavist administered intravenously.     COMPARISON:    US ABDOMEN COMPLETE 1/14/2019 2:30 PM     FINDINGS:    Liver: Unremarkable liver. No masses. No intrahepatic biliary ductal   dilation.   Gallbladder and bile ducts:  The common bile duct measures up to 0.9 cm   and   tapers gradually to 0.4 cm near the pancreatic head. No   choledocholithiasis.   Few small gallstones.    Pancreas:  No pancreatic ductal dilation. No peripancreatic fluid.    Spleen:  The spleen is not enlarged and unremarkable appearing.    Adrenals:  Adrenal glands are unremarkable.    Kidneys:  Numerous cysts of the bilateral kidneys are compatible with   simple   cysts measuring up to 4 cm in the right kidney upper pole and 3.2 cm and   the   left kidney upper pole. No followup is required. No hydronephrosis. No   perinephric fluid. No suspicious renal mass.    Vasculature:  No abdominal aortic aneurysm.    Other findings:  Bowel: No bowel dilation or evidenceof mucosal   thickening.   Bones: No acute osseous findings are evident.     IMPRESSION:   Cholelithiasis. No choledocholithiasis or MRI evidence of biliary   obstruction.    Agree with above report                KAYLA WILKINS M.D., ATTENDING RADIOLOGIST  This document has been electronically signed. Zelal[place] 15 2019  7:11PM                < end of copied text >

## 2019-01-16 NOTE — PROGRESS NOTE ADULT - SUBJECTIVE AND OBJECTIVE BOX
[facility] -- Aziza Valdivia, Edmond Cisneros Pannella, Patel, Savella  Office # [phone]    Follow Up:  Weakness and SOB    Subjective/Observations: Seen laying in bed on RA in supine position.  States he feels good.  Denies SOB, HADLEY, or PND.  Denies CP or palpitations.    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  Hip pain: Right hip replacement in 2008  FH: CABG (coronary artery bypass surgery): 1994    MEDICATIONS  (STANDING):  ALBUTerol    0.083% 2.5 milliGRAM(s) Nebulizer every 8 hours  amLODIPine   Tablet 10 milliGRAM(s) Oral daily  azithromycin   Tablet 500 milliGRAM(s) Oral every 24 hours  cefTRIAXone   IVPB 1 Gram(s) IV Intermittent every 24 hours  cholecalciferol 1000 Unit(s) Oral daily  FLUoxetine 20 milliGRAM(s) Oral daily  folic acid 1 milliGRAM(s) Oral daily  heparin  Injectable 5000 Unit(s) SubCutaneous every 12 hours  hydrocortisone 1% Cream 1 Application(s) Topical three times a day  latanoprost 0.005% Ophthalmic Solution 1 Drop(s) Both EYES at bedtime  metoprolol succinate ER 50 milliGRAM(s) Oral daily  pantoprazole    Tablet 40 milliGRAM(s) Oral before breakfast  predniSONE   Tablet 80 milliGRAM(s) Oral every 24 hours  senna 2 Tablet(s) Oral at bedtime    MEDICATIONS  (PRN):  guaiFENesin   Syrup  (Sugar-Free) 200 milliGRAM(s) Oral every 6 hours PRN Cough  ibuprofen  Tablet. 400 milliGRAM(s) Oral every 6 hours PRN Mild Pain (1 - 3)  ondansetron   Disintegrating Tablet 8 milliGRAM(s) Oral three times a day PRN Nausea and/or Vomiting    Allergies    No Known Allergies    Intolerances    Vital Signs Last 24 Hrs  T(C): 36.6 (16 Jan 2019 05:07), Max: 36.9 (15 [place] 2019 13:01)  T(F): 97.8 (16 Jan 2019 05:07), Max: 98.4 (15 [place] 2019 13:01)  HR: 73 (16 Jan 2019 05:07) (73 - 88)  BP: 120/66 (16 Jan 2019 05:07) (118/73 - 132/76)  BP(mean): --  RR: 17 (16 Jan 2019 05:07) (17 - 17)  SpO2: 96% (16 Jan 2019 05:07) (96% - 98%)    I&O's Summary    15 [place] 2019 07:01  -  16 Jan 2019 07:00  --------------------------------------------------------  IN: 50 mL / OUT: 400 mL / NET: -350 mL    PHYSICAL EXAM:  TELE: Not on tele  Constitutional: NAD, awake and alert, well-developed  HEENT: Moist Mucous Membranes, Anicteric  Pulmonary: Non-labored, breath sounds are clear bilaterally, No wheezing, rales or rhonchi  Cardiovascular: Regular, S1 and S2, + murmurs.  No rubs, gallops or clicks  Gastrointestinal: Bowel Sounds present, soft, nontender.   Lymph: No peripheral edema. No lymphadenopathy.  Skin: No visible rashes or ulcers.  Psych:  Mood & affect appropriate    LABS: All Labs Reviewed:                        9.5    7.68  )-----------( 73       ( 15 [place] 2019 09:04 )             29.2                         8.1    x     )-----------( x        ( 14 Jan 2019 17:53 )             24.9                         8.9    5.07  )-----------( 42       ( 14 Jan 2019 08:17 )             27.3     15 [place] 2019 09:04    139    |  106    |  23     ----------------------------<  92     4.3     |  23     |  1.10   14 Jan 2019 08:17    139    |  106    |  28     ----------------------------<  80     4.5     |  23     |  1.10   13 Jan 2019 13:46    138    |  104    |  36     ----------------------------<  110    5.0     |  25     |  1.60     Ca    8.9        15 [place] 2019 09:04  Ca    8.3        14 Jan 2019 08:17  Ca    8.8        13 Jan 2019 13:46  Mg     1.6       15 [place] 2019 09:04    TPro  6.8    /  Alb  1.8    /  TBili  0.5    /  DBili  .30    /  AST  172    /  ALT  427    /  AlkPhos  879    15 [place] 2019 09:04  TPro  5.9    /  Alb  1.7    /  TBili  0.5    /  DBili  .20    /  AST  248    /  ALT  479    /  AlkPhos  919    14 Jan 2019 08:17  TPro  7.1    /  Alb  2.1    /  TBili  0.7    /  DBili  x      /  AST  427    /  ALT  664    /  AlkPhos  1085   13 Jan 2019 13:46      < from: CT Angio Chest w/ IV Cont (01.13.19 @ 15:01) >    EXAM:  CT ANGIO CHEST (W)AW IC                            PROCEDURE DATE:  01/13/2019          INTERPRETATION:  CLINICAL INFORMATION:  Cough and shortness of breath.   History of lung cancer.    PROCEDURE:  Using multislice helical technique,  CT angiography, 1.5 mm   sections were obtained  following the intravenous administration of 68   ccs of Omnipaque 350.  Multiplanar MIP reconstructed images were obtained.    FINDINGS:      Comparison: Chest radiograph 1/13/2019 and CT scan of the chest 6/5/2013.    There is no evidence of intraluminal filling defects to suggest central   or interlobar pulmonary emboli.   No segmental pulmonary artery filling defect is noted.    There is arteriosclerotic calcification of thoracic aorta with coronary   artery calcifications.  No pericardial effusion is noted.    No enlarged mediastinal or hilar lymphadenopathy is noted.    There is extensive centrilobular and paraseptal emphysematous changes,   most pronounced in the upper lung zones bilaterally.  There is probable wedge resection left upper lobe.  There is focal soft tissue density at the anterior medial aspect of the   left lung apex. There is adjacent airspace consolidation and focal   bronchiectasis.    The central airways remain patent; no central endobronchial lesion is   noted.    No significant pleural effusion is noted.  No pneumothorax is noted.    Limited images that include the upper abdomen demonstrate distended   gallbladder with gallstone.  There are indeterminate hypodense lesions at the upper poles of the   kidneys bilaterally.    There are multilevel degenerative changes of the thoracic spine.    Impression:    Probable wedge resection left upper lobe with soft tissue opacity   adjacent medial left lung apex. Focal airspace consolidation and   bronchiectasis medial left upper lobe.  Findings could represent the sequelae of prior treatment, however, cannot   exclude recurrent or residual tumor.  Recommend further clinical correlation and comparison with more recent  prior CT scan of the chest.    No CT evidence for acute pulmonary embolism.    Other findings as discussed above.    MARIELLE GRIMALDO M.D., ATTENDING RADIOLOGIST  This document has been electronically signed. Jan 13 2019  3:27PM      < end of copied text >    < from: 12 Lead ECG (01.13.19 @ 13:35) >    Ventricular Rate 80 BPM    Atrial Rate 80 BPM    P-R Interval 136 ms    QRS Duration 108 ms    Q-T Interval 408 ms    QTC Calculation(Bezet) 470 ms    P Axis 45 degrees    R Axis -17 degrees    T Axis 68 degrees    Diagnosis Line Normal sinus rhythm with sinus arrhythmia  Karlie-Parkinson-White  Abnormal ECG    Confirmed by Brayden Fish ([id number]) on 1/14/2019 10:14:12 AM    < end of copied text >

## 2019-01-16 NOTE — DISCHARGE NOTE ADULT - MEDICATION SUMMARY - MEDICATIONS TO STOP TAKING
I will STOP taking the medications listed below when I get home from the hospital:    Lipitor 20 mg oral tablet  -- 1 tab(s) by mouth once a day (at bedtime)    Zetia 10 mg oral tablet  -- 1 tab(s) by mouth once a day (at bedtime)    acetaminophen 325 mg oral tablet  -- 2 tab(s) by mouth every 6 hours as needed for Mild pain  begin tapering off as pain decreases  MDD Twelve tabs    aspirin 81 mg oral tablet  -- 1 tab(s) by mouth once a day MUST TAKE WITH FOOD    CoQ10 300 mg oral capsule  -- 1 cap(s) by mouth once a day    Zofran ODT 8 mg oral tablet, disintegrating  -- 1 tab(s) by mouth 3 times a day, As Needed

## 2019-01-16 NOTE — DISCHARGE NOTE ADULT - SECONDARY DIAGNOSIS.
Lung cancer Coronary artery disease Elevated liver enzymes Anemia HTN (hypertension) CKD (chronic kidney disease)

## 2019-01-16 NOTE — DISCHARGE NOTE ADULT - CARE PROVIDER_API CALL
Jin Reynolds (DO), Gastroenterology; Internal Medicine  [address] [place] [address]  Phone: [phone]  Fax: [phone]    Александр Foreman (DO), Internal Medicine  [address] [facility]  [address] [place] [address]  Phone: [phone]  Fax: [phone]    Hon MARGARITA Mahan (MD), Hematology; Internal Medicine; Medical Oncology  [address] [facility]  [address] [place] [address]  Phone: [phone]  Fax: [phone]

## 2019-01-16 NOTE — PROGRESS NOTE ADULT - PROVIDER SPECIALTY LIST ADULT
Cardiology
Gastroenterology
Gastroenterology
Heme/Onc
Hospitalist
Pulmonology
Hospitalist
Cardiology

## 2019-01-16 NOTE — PROGRESS NOTE ADULT - SUBJECTIVE AND OBJECTIVE BOX
INTERVAL HPI/OVERNIGHT EVENTS:  pt seen and examined  denies n/v/d/abd pain  per overnight rn no acute gi issues  afebrile overnight labs noted    MEDICATIONS  (STANDING):  ALBUTerol    0.083% 2.5 milliGRAM(s) Nebulizer every 8 hours  amLODIPine   Tablet 10 milliGRAM(s) Oral daily  azithromycin   Tablet 500 milliGRAM(s) Oral every 24 hours  cefTRIAXone   IVPB 1 Gram(s) IV Intermittent every 24 hours  cholecalciferol 1000 Unit(s) Oral daily  FLUoxetine 20 milliGRAM(s) Oral daily  folic acid 1 milliGRAM(s) Oral daily  heparin  Injectable 5000 Unit(s) SubCutaneous every 12 hours  hydrocortisone 1% Cream 1 Application(s) Topical three times a day  latanoprost 0.005% Ophthalmic Solution 1 Drop(s) Both EYES at bedtime  metoprolol succinate ER 50 milliGRAM(s) Oral daily  pantoprazole    Tablet 40 milliGRAM(s) Oral before breakfast  predniSONE   Tablet 80 milliGRAM(s) Oral every 24 hours  senna 2 Tablet(s) Oral at bedtime    MEDICATIONS  (PRN):  guaiFENesin   Syrup  (Sugar-Free) 200 milliGRAM(s) Oral every 6 hours PRN Cough  ibuprofen  Tablet. 400 milliGRAM(s) Oral every 6 hours PRN Mild Pain (1 - 3)  ondansetron   Disintegrating Tablet 8 milliGRAM(s) Oral three times a day PRN Nausea and/or Vomiting      Allergies    No Known Allergies    Intolerances        Review of Systems:    General:  No wt loss, fevers, chills, night sweats, fatigue   Eyes:  Good vision, no reported pain  ENT:  No sore throat, pain, runny nose, dysphagia  CV:  No pain, palpitations, hypo/hypertension  Resp:  No dyspnea, cough, tachypnea, wheezing  GI:  No pain, No nausea, No vomiting, No diarrhea, No constipation, No weight loss, No fever, No pruritis, No rectal bleeding, No melena, No dysphagia  :  No pain, bleeding, incontinence, nocturia  Muscle:  No pain, weakness  Neuro:  No weakness, tingling, memory problems  Psych:  No fatigue, insomnia, mood problems, depression  Endocrine:  No polyuria, polydypsia, cold/heat intolerance  Heme:  No petechiae, ecchymosis, easy bruisability  Skin:  No rash, tattoos, scars, edema      Vital Signs Last 24 Hrs  T(C): 36.6 (16 Jan 2019 05:07), Max: 36.9 (15 [place] 2019 13:01)  T(F): 97.8 (16 Jan 2019 05:07), Max: 98.4 (15 [place] 2019 13:01)  HR: 75 (16 Jan 2019 08:02) (73 - 88)  BP: 120/66 (16 Jan 2019 05:07) (118/73 - 132/76)  BP(mean): --  RR: 17 (16 Jan 2019 05:07) (17 - 17)  SpO2: 96% (16 Jan 2019 08:02) (96% - 98%)    PHYSICAL EXAM:    Constitutional: NAD  HEENT: ncat  Neck: No LAD  Respiratory: dec bs  Cardiovascular: S1 and S2, RRR  Gastrointestinal: soft nt nd  Extremities: No peripheral edema  Vascular: 2+ peripheral pulses  Neurological: awake alert responds appropriately  Skin: No rashes    LABS:                        9.2    6.25  )-----------( 95       ( 16 Jan 2019 08:46 )             28.9     01-16    138  |  104  |  25<H>  ----------------------------<  130<H>  5.3   |  26  |  1.30    Ca    8.9      16 Jan 2019 08:46  Mg     1.6     01-16    TPro  6.8  /  Alb  1.8<L>  /  TBili  0.4  /  DBili  .20  /  AST  107<H>  /  ALT  325<H>  /  AlkPhos  898<H>  01-16    PT/INR - ( 16 Jan 2019 08:46 )   PT: 13.2 sec;   INR: 1.15 ratio         PTT - ( 16 Jan 2019 08:46 )  PTT:29.9 sec      RADIOLOGY & ADDITIONAL TESTS:  < from: MR MRCP w/wo IV Cont (01.15.19 @ 17:11) >    EXAM:  MR MRCP WAW IC                            PROCEDURE DATE:  01/15/2019          INTERPRETATION:  [facility] RADIOLOGIST PRELIMINARY REPORT    EXAM:    MR Abdomen Without and With Contrast. Liver.     EXAM DATE/TIME:    1/15/2019 4:19 PM     CLINICAL HISTORY:    85 years old, male; Signs and symptoms; Abdominal tenderness; Patient   HX: Cbd   stone elevated lfts eval for liver mets; Additional info: Faxed CT and   sono   report.     TECHNIQUE:    MR Abdomen with and without intravenous contrast.Exam focused on the   liver.     CONTRAST:    7.5 ml of gadavist administered intravenously.     COMPARISON:    US ABDOMEN COMPLETE 1/14/2019 2:30 PM     FINDINGS:    Liver: Unremarkable liver. No masses. No intrahepatic biliary ductal   dilation.   Gallbladder and bile ducts:  The common bile duct measures up to 0.9 cm   and   tapers gradually to 0.4 cm near the pancreatic head. No   choledocholithiasis.   Few small gallstones.    Pancreas:  No pancreatic ductal dilation. No peripancreatic fluid.    Spleen:  The spleen is not enlarged and unremarkable appearing.    Adrenals:  Adrenal glands are unremarkable.    Kidneys:  Numerous cysts of the bilateral kidneys are compatible with   simple   cysts measuring up to 4 cm in the right kidney upper pole and 3.2 cm and   the   left kidney upper pole. No followup is required. No hydronephrosis. No   perinephric fluid. No suspicious renal mass.    Vasculature:  No abdominal aortic aneurysm.    Other findings:  Bowel: No bowel dilation or evidenceof mucosal   thickening.   Bones: No acute osseous findings are evident.     IMPRESSION:   Cholelithiasis. No choledocholithiasis or MRI evidence of biliary   obstruction.    Agree with above report                KAYLA WILKINS M.D., ATTENDING RADIOLOGIST  This document has been electronically signed. Zel[place] 15 2019  7:11PM                < end of copied text >

## 2019-01-16 NOTE — PROGRESS NOTE ADULT - PROBLEM SELECTOR PLAN 2
multifactorial, 2/2 malignancy, chemo  no overt s/s gib  serial cbc, transfuse prn  cont ppi ppx  check fobt if hgb downtrends  heme/onc eval appreciated  will follow

## 2019-01-16 NOTE — DISCHARGE NOTE ADULT - PATIENT PORTAL LINK FT
You can access the [facility] Patient Portal, offered by [facility], by registering with the following website: [email or web address]

## 2019-01-16 NOTE — PROGRESS NOTE ADULT - ASSESSMENT
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   s/p 2 cycles of Carboplatin-Alimta-Keytruda.     Admitted with generalized weakness, muscle aches, ? pneumonia.CTA chest with no PE.   Noted to have marked elevated LFTs.   AlkPhos 1085, ,   CKD /GERMAN with Cr 1.6  GI workup including  neg mrcp  for liver masses, cbd stones, biliary dilation/obstruction  neg acute hepatitis panel, apap, afp       This supports diagnosis of immune mediated hepatitis  pt was started on steroids,lfts overall downtrending    anemia workup : no evidence iron/ B12 def  Thrombocytopenia due to recent chemotx, plt count is better     RECOMMENDATIONS  continue prednisone 80mg q24hr (currently ordered for 10d)   if LFT stable / continue improving tomorrow taper to 60mg daily x 7-10d thereafter slow taper to 40mg.   f/u tylenol levels. 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   s/p 2 cycles of Carboplatin-Alimta-Keytruda.     Admitted with generalized weakness, muscle aches, ? pneumonia.CTA chest with no PE.   Noted to have marked elevated LFTs: immune-mediated adverse event Vs tylenol toxicity   AlkPhos 1085, ,   CKD /GERMAN with Cr 1.6  GI workup including  neg mrcp  for liver masses, cbd stones, biliary dilation/obstruction  neg acute hepatitis panel, apap, afp   tylenol level is low   for presumable  immune mediated hepatitis pt was started on steroids, lfts overall downtrending    anemia workup : no evidence iron/ B12 def  Thrombocytopenia due to recent chemotx, plt count is better     RECOMMENDATIONS  continue prednisone 80mg q24hr (currently ordered for 10d)   if LFT stable / continue improving tomorrow taper to 60mg daily x 7-10d thereafter slow taper to 40mg.

## 2019-01-16 NOTE — PROGRESS NOTE ADULT - SUBJECTIVE AND OBJECTIVE BOX
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  S/P skin neoplasm resection  Hip pain: Right hip replacement in 2008  FH: CABG (coronary artery bypass surgery): 1994        Medication list         MEDICATIONS  (STANDING):  ALBUTerol    0.083% 2.5 milliGRAM(s) Nebulizer every 8 hours  amLODIPine   Tablet 10 milliGRAM(s) Oral daily  azithromycin   Tablet 500 milliGRAM(s) Oral every 24 hours  cefTRIAXone   IVPB 1 Gram(s) IV Intermittent every 24 hours  cholecalciferol 1000 Unit(s) Oral daily  FLUoxetine 20 milliGRAM(s) Oral daily  folic acid 1 milliGRAM(s) Oral daily  heparin  Injectable 5000 Unit(s) SubCutaneous every 12 hours  hydrocortisone 1% Cream 1 Application(s) Topical three times a day  latanoprost 0.005% Ophthalmic Solution 1 Drop(s) Both EYES at bedtime  metoprolol succinate ER 50 milliGRAM(s) Oral daily  pantoprazole    Tablet 40 milliGRAM(s) Oral before breakfast  predniSONE   Tablet 80 milliGRAM(s) Oral every 24 hours  senna 2 Tablet(s) Oral at bedtime    MEDICATIONS  (PRN):  guaiFENesin   Syrup  (Sugar-Free) 200 milliGRAM(s) Oral every 6 hours PRN Cough  ibuprofen  Tablet. 400 milliGRAM(s) Oral every 6 hours PRN Mild Pain (1 - 3)  ondansetron   Disintegrating Tablet 8 milliGRAM(s) Oral three times a day PRN Nausea and/or Vomiting         Vitals log        ICU Vital Signs Last 24 Hrs  T(C): 36.6 (16 Jan 2019 05:07), Max: 36.9 (15 [place] 2019 13:01)  T(F): 97.8 (16 Jan 2019 05:07), Max: 98.4 (15 [place] 2019 13:01)  HR: 75 (16 Jan 2019 08:02) (73 - 88)  BP: 120/66 (16 Jan 2019 05:07) (118/73 - 132/76)  BP(mean): --  ABP: --  ABP(mean): --  RR: 17 (16 Jan 2019 05:07) (17 - 17)  SpO2: 96% (16 Jan 2019 08:02) (96% - 98%)           Input and Output:  I&O's Detail    15 [place] 2019 07:01  -  16 Jan 2019 07:00  --------------------------------------------------------  IN:    Solution: 50 mL  Total IN: 50 mL    OUT:    Voided: 400 mL  Total OUT: 400 mL    Total NET: -350 mL          Lab Data                        9.5    7.68  )-----------( 73       ( 15 [place] 2019 09:04 )             29.2     01-15    139  |  106  |  23  ----------------------------<  92  4.3   |  23  |  1.10    Ca    8.9      15 [place] 2019 09:04  Mg     1.6     01-15    TPro  6.8  /  Alb  1.8<L>  /  TBili  0.5  /  DBili  .30<H>  /  AST  172<H>  /  ALT  427<H>  /  AlkPhos  879<H>  01-15            Review of Systems	      Objective     Physical Examination    heart s1s2  lung dec BS      Pertinent Lab findings & Imaging      Jose Guadalupe:  NO   Adequate UO     I&O's Detail    15 [place] 2019 07:01  -  16 Jan 2019 07:00  --------------------------------------------------------  IN:    Solution: 50 mL  Total IN: 50 mL    OUT:    Voided: 400 mL  Total OUT: 400 mL    Total NET: -350 mL               Discussed with:     Cultures:	        Radiology

## 2019-01-16 NOTE — PROGRESS NOTE ADULT - ASSESSMENT
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      - Patient had delta waves seen on EKG, however, appears old as seen from previous EKG in 9/2018.  No acute changes on EKG compared to previous.  - Follows up with Dr. Ervin outpatient and on EKG on 9/2018 noted to have new Inferior MI pattern, stress test performed showed inferior and inferolateral MI and though echo showed 50% EF, the nuclear stress ef was ~30, which may be more reliable  - No symptoms of acute cardiac ischemia  - Weakness and muscle aches resolved    - Weakness likely secondary to cancer diagnosis and post chemo side-effects, low suspicion for cardiac related weakness and SOB  - unlikely that his sxs are related to volume overload.  in fact he seems relatively intravascularly dry, despite the elevated bnp level and presumed icm  - No evidence of volume overload.   Improvement of symptoms noted after IV hydration.      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  Patient had delta waves seen on EKG, however, appears old as seen from previous EKG in 9/2018.  No acute changes on EKG compared to previous.  Follows up with Dr. Ervin outpatient and on EKG on 9/2018 noted to have new Inferior MI pattern, stress test performed showed inferior and inferolateral MI and though echo showed 50% EF, the nuclear stress ef was ~30, which may be more reliable   No symptoms of acute cardiac ischemia; Weakness and muscle aches resolved   Weakness likely secondary to cancer diagnosis and post chemo side-effects, low suspicion for cardiac related weakness and SOB  unlikely that his sxs are related to volume overload.  in fact he seems relatively intravascularly dry, despite the elevated bnp level and presumed icm  No evidence of volume overload.   Improvement of symptoms noted after IV hydration.          - BP well controlled, monitor routine hemodynamics  - Abx per med  - DVT prophylaxis  - Continue amlodipine 10mg QD and metoprolol succinate 50mg QD  - Other cardiovascular workup will depend on clinical course  - All other workup per primary team  - ill continue to follow    Vielka Shirley NP  Cardiology

## 2019-01-16 NOTE — PROGRESS NOTE ADULT - PROBLEM SELECTOR PLAN 1
lung ca  emphysema  poss lower resp tract infection  transaminitis - GI and Heme eval noted  MRI abd noted  serial LFT  cont bronchodilators, cont cough rx regimen, cont emp ABX - dual regimen  complete 7 days of ABX   tolerating room air  on Prednisone as per Heme Onc recs -   assess sat on exertion  DC planning  Nutrition  s/p 2 cycles of Chemo  will follow up with Pulm in [place] - with his Pulm PMD

## 2019-01-16 NOTE — DISCHARGE NOTE ADULT - PLAN OF CARE
resolution You had pneumonia for which you were treated for with antibiotics. Continue antibiotics for 2 more days.   follow up with Primary doctor in 1 week stable outpatient follow up with heme/onc continue ASA follow up with GI outpatient continue home meds Your kidney function remained stable You had pneumonia for which you were treated for with antibiotics. Complete course of antibiotics.   follow up with Primary doctor in 1 week and your pulmonologist in 1-2 weeks. Continue cardiac maintenance medications stable.  Continue prednisone taper.  do not take tylenol or cholesterol lowering medications until resolution of elevated liver enzymes follow up with GI outpatient in 1-2 weeks

## 2019-01-16 NOTE — DISCHARGE NOTE ADULT - MEDICATION SUMMARY - MEDICATIONS TO TAKE
I will START or STAY ON the medications listed below when I get home from the hospital:    predniSONE 20 mg oral tablet  -- 3 tab(s) by mouth every 24 hours  x 7 days, then take 2 tabs by mouth every 24 hours x 7 days, then take 1 tab by mouth every 24 hours x 7 days.    -- Indication: For Elevated liver enzymes    CARBOplatin  -- resume with your oncologist if needed  -- Indication: For Lung cancer    Keytruda  -- Resume with your oncologist as needed  -- Indication: For Lung cancer    FLUoxetine 20 mg oral capsule  -- 1 cap(s) by mouth once a day  -- Indication: For Depression    prochlorperazine 10 mg oral capsule, extended release  -- 10 milligram(s) by mouth every 6 hours, As Needed  -- Indication: For Nausea or vomiting    Alimta  -- resume with your oncologist as needed.  -- Indication: For Lung cancer    Toprol-XL 50 mg oral tablet, extended release  -- 1 tab(s) by mouth once a day  -- Indication: For Coronary artery disease    Norvasc 10 mg oral tablet  -- 1 tab(s) by mouth once a day  -- Indication: For HTN (hypertension)    cefpodoxime 200 mg oral tablet  -- 1 tab(s) by mouth every 12 hours  -- Indication: For Pneumonia    senna oral tablet  -- 2 tab(s) by mouth once a day (at bedtime)  -- Indication: For Constipation    azithromycin 500 mg oral tablet  -- 1 tab(s) by mouth every 24 hours  -- Indication: For Pneumonia    Travatan 0.004% ophthalmic solution  -- 1 drop(s) to each affected eye once a day (in the evening)  -- Indication: For Eye drops    folic acid 1 mg oral tablet  -- 1 tab(s) by mouth once a day  -- Indication: For supplement    Vitamin D3  -- orally once a day  -- Indication: For supplement I will START or STAY ON the medications listed below when I get home from the hospital:    predniSONE 20 mg oral tablet  -- 3 tab(s) by mouth every 24 hours  x 7 days, then take 2 tabs by mouth every 24 hours x 7 days, then take 1 tab by mouth every 24 hours x 7 days.    -- Indication: For Elevated liver enzymes    CARBOplatin  -- resume with your oncologist if needed  -- Indication: For Lung cancer    Keytruda  -- Resume with your oncologist as needed  -- Indication: For Lung cancer    FLUoxetine 20 mg oral capsule  -- 1 cap(s) by mouth once a day  -- Indication: For Depression    prochlorperazine 10 mg oral capsule, extended release  -- 10 milligram(s) by mouth every 6 hours, As Needed  -- Indication: For Nausea or vomiting    Alimta  -- resume with your oncologist as needed.  -- Indication: For Lung cancer    Toprol-XL 50 mg oral tablet, extended release  -- 1 tab(s) by mouth once a day  -- Indication: For Coronary artery disease    Norvasc 10 mg oral tablet  -- 1 tab(s) by mouth once a day  -- Indication: For HTN (hypertension)    cefpodoxime 200 mg oral tablet  -- 1 tab(s) by mouth every 12 hours  -- Indication: For Pneumonia    senna oral tablet  -- 2 tab(s) by mouth once a day (at bedtime)  -- Indication: For Constipation    azithromycin 500 mg oral tablet  -- 1 tab(s) by mouth every 24 hours  -- Indication: For Pneumonia    Travatan 0.004% ophthalmic solution  -- 1 drop(s) to each affected eye once a day (in the evening)  -- Indication: For Eye drops    pantoprazole 40 mg oral delayed release tablet  -- 1 tab(s) by mouth once a day (before a meal)  -- Indication: For Need for prophylactic measure    folic acid 1 mg oral tablet  -- 1 tab(s) by mouth once a day  -- Indication: For supplement    Vitamin D3  -- orally once a day  -- Indication: For supplement

## 2019-01-16 NOTE — PROGRESS NOTE ADULT - PROBLEM SELECTOR PLAN 1
suspect drug induced  US limited study, showed gs and dilated cbd  mrcp neg for liver masses, cbd stones, biliary dilation/obstruction  lfts overall downtrending  acute hepatitis panel, apap, afp neg  cont steroids  trend labs daily  avoid hepatotoxins  will follow suspect drug induced from chemo ?tylenol use  US limited study, showed gs and dilated cbd  mrcp neg for liver masses, cbd stones, biliary dilation/obstruction  lfts overall downtrending  acute hepatitis panel, apap, afp neg  cont steroids  trend labs daily  avoid hepatotoxins  will follow

## 2019-01-16 NOTE — DISCHARGE NOTE ADULT - CARE PLAN
Principal Discharge DX:	Pneumonia  Goal:	resolution  Assessment and plan of treatment:	You had pneumonia for which you were treated for with antibiotics. Continue antibiotics for 2 more days.   follow up with Primary doctor in 1 week  Secondary Diagnosis:	Lung cancer  Goal:	stable  Assessment and plan of treatment:	outpatient follow up with heme/onc  Secondary Diagnosis:	Coronary artery disease  Goal:	stable  Assessment and plan of treatment:	continue ASA  Secondary Diagnosis:	Elevated liver enzymes  Goal:	stable  Assessment and plan of treatment:	follow up with GI outpatient  Secondary Diagnosis:	Anemia  Goal:	stable  Secondary Diagnosis:	HTN (hypertension)  Goal:	stable  Assessment and plan of treatment:	continue home meds  Secondary Diagnosis:	CKD (chronic kidney disease)  Goal:	stable  Assessment and plan of treatment:	Your kidney function remained stable Principal Discharge DX:	Pneumonia  Goal:	resolution  Assessment and plan of treatment:	You had pneumonia for which you were treated for with antibiotics. Complete course of antibiotics.   follow up with Primary doctor in 1 week and your pulmonologist in 1-2 weeks.  Secondary Diagnosis:	Lung cancer  Goal:	stable  Assessment and plan of treatment:	outpatient follow up with heme/onc  Secondary Diagnosis:	Coronary artery disease  Goal:	stable  Assessment and plan of treatment:	Continue cardiac maintenance medications  Secondary Diagnosis:	Elevated liver enzymes  Goal:	stable.  Continue prednisone taper.  do not take tylenol or cholesterol lowering medications until resolution of elevated liver enzymes  Assessment and plan of treatment:	follow up with GI outpatient in 1-2 weeks  Secondary Diagnosis:	Anemia  Goal:	stable  Secondary Diagnosis:	HTN (hypertension)  Goal:	stable  Assessment and plan of treatment:	continue home meds  Secondary Diagnosis:	CKD (chronic kidney disease)  Goal:	stable  Assessment and plan of treatment:	Your kidney function remained stable

## 2019-01-16 NOTE — DISCHARGE NOTE ADULT - ADDITIONAL INSTRUCTIONS
-Please follow up with your primary doctor within one week.  -Please follow up with Heme/Onc outpatient  -Patient and family to set up follow up appointments.  -Continue taking your medications as directed above.  -If symptoms persist/worsen, please call your PMD or return to the ED. -Please follow up with your primary doctor within one week.  -Please follow up with Heme/Onc outpatient  -Continue taking your medications as directed above.  -If symptoms persist/worsen, please call your PMD or return to the ED.

## 2019-01-18 ENCOUNTER — APPOINTMENT (OUTPATIENT)
Age: 84
End: 2019-01-18

## 2019-01-18 LAB
CULTURE RESULTS: SIGNIFICANT CHANGE UP
CULTURE RESULTS: SIGNIFICANT CHANGE UP
SPECIMEN SOURCE: SIGNIFICANT CHANGE UP
SPECIMEN SOURCE: SIGNIFICANT CHANGE UP

## 2019-01-23 ENCOUNTER — OUTPATIENT (OUTPATIENT)
Dept: OUTPATIENT SERVICES | Facility: HOSPITAL | Age: 84
LOS: 1 days | Discharge: HOME | End: 2019-01-23

## 2019-01-23 DIAGNOSIS — Z84.89 FAMILY HISTORY OF OTHER SPECIFIED CONDITIONS: Chronic | ICD-10-CM

## 2019-01-23 DIAGNOSIS — M25.559 PAIN IN UNSPECIFIED HIP: Chronic | ICD-10-CM

## 2019-01-23 DIAGNOSIS — C34.90 MALIGNANT NEOPLASM OF UNSPECIFIED PART OF UNSPECIFIED BRONCHUS OR LUNG: ICD-10-CM

## 2019-01-23 DIAGNOSIS — D16.00 BENIGN NEOPLASM OF SCAPULA AND LONG BONES OF UNSPECIFIED UPPER LIMB: Chronic | ICD-10-CM

## 2019-01-30 ENCOUNTER — APPOINTMENT (OUTPATIENT)
Dept: HEMATOLOGY ONCOLOGY | Facility: CLINIC | Age: 84
End: 2019-01-30

## 2019-02-06 ENCOUNTER — APPOINTMENT (OUTPATIENT)
Dept: HEMATOLOGY ONCOLOGY | Facility: CLINIC | Age: 84
End: 2019-02-06

## 2019-02-08 ENCOUNTER — APPOINTMENT (OUTPATIENT)
Age: 84
End: 2019-02-08

## 2019-02-14 ENCOUNTER — APPOINTMENT (OUTPATIENT)
Dept: HEMATOLOGY ONCOLOGY | Facility: CLINIC | Age: 84
End: 2019-02-14

## 2019-02-28 ENCOUNTER — APPOINTMENT (OUTPATIENT)
Dept: HEMATOLOGY ONCOLOGY | Facility: CLINIC | Age: 84
End: 2019-02-28

## 2019-03-01 ENCOUNTER — APPOINTMENT (OUTPATIENT)
Age: 84
End: 2019-03-01

## 2019-03-20 ENCOUNTER — APPOINTMENT (OUTPATIENT)
Dept: HEMATOLOGY ONCOLOGY | Facility: CLINIC | Age: 84
End: 2019-03-20

## 2019-03-22 ENCOUNTER — APPOINTMENT (OUTPATIENT)
Age: 84
End: 2019-03-22

## 2019-04-11 ENCOUNTER — RX RENEWAL (OUTPATIENT)
Age: 84
End: 2019-04-11

## 2019-04-29 ENCOUNTER — RX RENEWAL (OUTPATIENT)
Age: 84
End: 2019-04-29

## 2019-04-29 RX ORDER — FOLIC ACID 1 MG/1
1 TABLET ORAL DAILY
Qty: 90 | Refills: 1 | Status: ACTIVE | COMMUNITY
Start: 2018-11-20 | End: 1900-01-01

## 2019-08-19 ENCOUNTER — RX RENEWAL (OUTPATIENT)
Age: 84
End: 2019-08-19

## 2019-09-09 ENCOUNTER — RX RENEWAL (OUTPATIENT)
Age: 84
End: 2019-09-09

## 2019-12-09 ENCOUNTER — RX RENEWAL (OUTPATIENT)
Age: 84
End: 2019-12-09

## 2020-02-02 ENCOUNTER — INPATIENT (INPATIENT)
Facility: HOSPITAL | Age: 85
LOS: 9 days | DRG: 871 | End: 2020-02-12
Attending: FAMILY MEDICINE | Admitting: INTERNAL MEDICINE
Payer: MEDICARE

## 2020-02-02 VITALS
OXYGEN SATURATION: 92 % | TEMPERATURE: 97 F | WEIGHT: 139.99 LBS | HEIGHT: 72 IN | RESPIRATION RATE: 20 BRPM | SYSTOLIC BLOOD PRESSURE: 124 MMHG | DIASTOLIC BLOOD PRESSURE: 72 MMHG | HEART RATE: 92 BPM

## 2020-02-02 DIAGNOSIS — Z29.9 ENCOUNTER FOR PROPHYLACTIC MEASURES, UNSPECIFIED: ICD-10-CM

## 2020-02-02 DIAGNOSIS — J18.9 PNEUMONIA, UNSPECIFIED ORGANISM: ICD-10-CM

## 2020-02-02 DIAGNOSIS — I25.10 ATHEROSCLEROTIC HEART DISEASE OF NATIVE CORONARY ARTERY WITHOUT ANGINA PECTORIS: ICD-10-CM

## 2020-02-02 DIAGNOSIS — R06.03 ACUTE RESPIRATORY DISTRESS: ICD-10-CM

## 2020-02-02 DIAGNOSIS — M25.559 PAIN IN UNSPECIFIED HIP: Chronic | ICD-10-CM

## 2020-02-02 DIAGNOSIS — D16.00 BENIGN NEOPLASM OF SCAPULA AND LONG BONES OF UNSPECIFIED UPPER LIMB: Chronic | ICD-10-CM

## 2020-02-02 DIAGNOSIS — E87.5 HYPERKALEMIA: ICD-10-CM

## 2020-02-02 DIAGNOSIS — A41.9 SEPSIS, UNSPECIFIED ORGANISM: ICD-10-CM

## 2020-02-02 DIAGNOSIS — I10 ESSENTIAL (PRIMARY) HYPERTENSION: ICD-10-CM

## 2020-02-02 DIAGNOSIS — Z84.89 FAMILY HISTORY OF OTHER SPECIFIED CONDITIONS: Chronic | ICD-10-CM

## 2020-02-02 DIAGNOSIS — N17.9 ACUTE KIDNEY FAILURE, UNSPECIFIED: ICD-10-CM

## 2020-02-02 DIAGNOSIS — C34.90 MALIGNANT NEOPLASM OF UNSPECIFIED PART OF UNSPECIFIED BRONCHUS OR LUNG: ICD-10-CM

## 2020-02-02 LAB
ALBUMIN SERPL ELPH-MCNC: 2.7 G/DL — LOW (ref 3.3–5)
ALP SERPL-CCNC: 164 U/L — HIGH (ref 40–120)
ALT FLD-CCNC: 23 U/L — SIGNIFICANT CHANGE UP (ref 12–78)
ANION GAP SERPL CALC-SCNC: 14 MMOL/L — SIGNIFICANT CHANGE UP (ref 5–17)
APTT BLD: 29.8 SEC — SIGNIFICANT CHANGE UP (ref 28.5–37)
AST SERPL-CCNC: 26 U/L — SIGNIFICANT CHANGE UP (ref 15–37)
BASE EXCESS BLDA CALC-SCNC: -10.8 MMOL/L — LOW (ref -2–2)
BASOPHILS # BLD AUTO: 0.04 K/UL — SIGNIFICANT CHANGE UP (ref 0–0.2)
BASOPHILS NFR BLD AUTO: 0.2 % — SIGNIFICANT CHANGE UP (ref 0–2)
BILIRUB SERPL-MCNC: 0.8 MG/DL — SIGNIFICANT CHANGE UP (ref 0.2–1.2)
BLOOD GAS COMMENTS ARTERIAL: SIGNIFICANT CHANGE UP
BLOOD GAS COMMENTS ARTERIAL: SIGNIFICANT CHANGE UP
BUN SERPL-MCNC: 109 MG/DL — HIGH (ref 7–23)
CALCIUM SERPL-MCNC: 9 MG/DL — SIGNIFICANT CHANGE UP (ref 8.5–10.1)
CHLORIDE SERPL-SCNC: 109 MMOL/L — HIGH (ref 96–108)
CO2 SERPL-SCNC: 19 MMOL/L — LOW (ref 22–31)
CREAT SERPL-MCNC: 6.3 MG/DL — HIGH (ref 0.5–1.3)
EOSINOPHIL # BLD AUTO: 0 K/UL — SIGNIFICANT CHANGE UP (ref 0–0.5)
EOSINOPHIL NFR BLD AUTO: 0 % — SIGNIFICANT CHANGE UP (ref 0–6)
FLU A RESULT: SIGNIFICANT CHANGE UP
FLU A RESULT: SIGNIFICANT CHANGE UP
FLUAV AG NPH QL: SIGNIFICANT CHANGE UP
FLUBV AG NPH QL: SIGNIFICANT CHANGE UP
GLUCOSE SERPL-MCNC: 109 MG/DL — HIGH (ref 70–99)
HCO3 BLDA-SCNC: 16 MMOL/L — LOW (ref 23–27)
HCT VFR BLD CALC: 41.6 % — SIGNIFICANT CHANGE UP (ref 39–50)
HGB BLD-MCNC: 13.3 G/DL — SIGNIFICANT CHANGE UP (ref 13–17)
IMM GRANULOCYTES NFR BLD AUTO: 0.6 % — SIGNIFICANT CHANGE UP (ref 0–1.5)
INR BLD: 1.22 RATIO — HIGH (ref 0.88–1.16)
LACTATE SERPL-SCNC: 2.5 MMOL/L — HIGH (ref 0.7–2)
LACTATE SERPL-SCNC: 2.6 MMOL/L — HIGH (ref 0.7–2)
LACTATE SERPL-SCNC: 3 MMOL/L — HIGH (ref 0.7–2)
LYMPHOCYTES # BLD AUTO: 0.93 K/UL — LOW (ref 1–3.3)
LYMPHOCYTES # BLD AUTO: 5 % — LOW (ref 13–44)
MAGNESIUM SERPL-MCNC: 1.7 MG/DL — SIGNIFICANT CHANGE UP (ref 1.6–2.6)
MCHC RBC-ENTMCNC: 29.3 PG — SIGNIFICANT CHANGE UP (ref 27–34)
MCHC RBC-ENTMCNC: 32 GM/DL — SIGNIFICANT CHANGE UP (ref 32–36)
MCV RBC AUTO: 91.6 FL — SIGNIFICANT CHANGE UP (ref 80–100)
MONOCYTES # BLD AUTO: 0.57 K/UL — SIGNIFICANT CHANGE UP (ref 0–0.9)
MONOCYTES NFR BLD AUTO: 3.1 % — SIGNIFICANT CHANGE UP (ref 2–14)
NEUTROPHILS # BLD AUTO: 16.8 K/UL — HIGH (ref 1.8–7.4)
NEUTROPHILS NFR BLD AUTO: 91.1 % — HIGH (ref 43–77)
NRBC # BLD: 0 /100 WBCS — SIGNIFICANT CHANGE UP (ref 0–0)
PCO2 BLDA: 36 MMHG — SIGNIFICANT CHANGE UP (ref 32–46)
PH BLDA: 7.25 — LOW (ref 7.35–7.45)
PHOSPHATE SERPL-MCNC: 6 MG/DL — HIGH (ref 2.5–4.5)
PLATELET # BLD AUTO: 170 K/UL — SIGNIFICANT CHANGE UP (ref 150–400)
PO2 BLDA: 64 MMHG — LOW (ref 74–108)
POTASSIUM SERPL-MCNC: 4.7 MMOL/L — SIGNIFICANT CHANGE UP (ref 3.5–5.3)
POTASSIUM SERPL-MCNC: 5.4 MMOL/L — HIGH (ref 3.5–5.3)
POTASSIUM SERPL-SCNC: 4.7 MMOL/L — SIGNIFICANT CHANGE UP (ref 3.5–5.3)
POTASSIUM SERPL-SCNC: 5.4 MMOL/L — HIGH (ref 3.5–5.3)
PROCALCITONIN SERPL-MCNC: 4.5 NG/ML — HIGH (ref 0–0.04)
PROT SERPL-MCNC: 6.4 G/DL — SIGNIFICANT CHANGE UP (ref 6–8.3)
PROTHROM AB SERPL-ACNC: 13.7 SEC — HIGH (ref 10–12.9)
RBC # BLD: 4.54 M/UL — SIGNIFICANT CHANGE UP (ref 4.2–5.8)
RBC # FLD: 14.8 % — HIGH (ref 10.3–14.5)
RSV RESULT: SIGNIFICANT CHANGE UP
RSV RNA RESP QL NAA+PROBE: SIGNIFICANT CHANGE UP
SAO2 % BLDA: 88 % — LOW (ref 92–96)
SODIUM SERPL-SCNC: 142 MMOL/L — SIGNIFICANT CHANGE UP (ref 135–145)
WBC # BLD: 18.45 K/UL — HIGH (ref 3.8–10.5)
WBC # FLD AUTO: 18.45 K/UL — HIGH (ref 3.8–10.5)

## 2020-02-02 PROCEDURE — 76775 US EXAM ABDO BACK WALL LIM: CPT | Mod: 26

## 2020-02-02 PROCEDURE — 71250 CT THORAX DX C-: CPT | Mod: 26

## 2020-02-02 PROCEDURE — 99223 1ST HOSP IP/OBS HIGH 75: CPT | Mod: GC

## 2020-02-02 PROCEDURE — 71045 X-RAY EXAM CHEST 1 VIEW: CPT | Mod: 26

## 2020-02-02 PROCEDURE — 93010 ELECTROCARDIOGRAM REPORT: CPT

## 2020-02-02 PROCEDURE — 99285 EMERGENCY DEPT VISIT HI MDM: CPT

## 2020-02-02 PROCEDURE — 76770 US EXAM ABDO BACK WALL COMP: CPT | Mod: 26

## 2020-02-02 RX ORDER — PIPERACILLIN AND TAZOBACTAM 4; .5 G/20ML; G/20ML
3.38 INJECTION, POWDER, LYOPHILIZED, FOR SOLUTION INTRAVENOUS ONCE
Refills: 0 | Status: COMPLETED | OUTPATIENT
Start: 2020-02-02 | End: 2020-02-02

## 2020-02-02 RX ORDER — ALBUTEROL 90 UG/1
2.5 AEROSOL, METERED ORAL
Refills: 0 | Status: COMPLETED | OUTPATIENT
Start: 2020-02-02 | End: 2020-02-02

## 2020-02-02 RX ORDER — IPRATROPIUM BROMIDE 0.2 MG/ML
500 SOLUTION, NON-ORAL INHALATION ONCE
Refills: 0 | Status: COMPLETED | OUTPATIENT
Start: 2020-02-02 | End: 2020-02-02

## 2020-02-02 RX ORDER — SODIUM POLYSTYRENE SULFONATE 4.1 MEQ/G
30 POWDER, FOR SUSPENSION ORAL ONCE
Refills: 0 | Status: COMPLETED | OUTPATIENT
Start: 2020-02-02 | End: 2020-02-02

## 2020-02-02 RX ORDER — TRAVOPROST 0.04 MG/ML
1 SOLUTION/ DROPS OPHTHALMIC
Qty: 0 | Refills: 0 | DISCHARGE

## 2020-02-02 RX ORDER — CARBOPLATIN 50 MG
0 VIAL (EA) INTRAVENOUS
Qty: 0 | Refills: 0 | DISCHARGE

## 2020-02-02 RX ORDER — PEMETREXED 500 MG/20ML
0 INJECTION, SOLUTION, CONCENTRATE INTRAVENOUS
Qty: 0 | Refills: 0 | DISCHARGE

## 2020-02-02 RX ORDER — EZETIMIBE 10 MG/1
1 TABLET ORAL
Qty: 0 | Refills: 0 | DISCHARGE

## 2020-02-02 RX ORDER — SODIUM CHLORIDE 9 MG/ML
1000 INJECTION, SOLUTION INTRAVENOUS
Refills: 0 | Status: DISCONTINUED | OUTPATIENT
Start: 2020-02-02 | End: 2020-02-04

## 2020-02-02 RX ORDER — AMLODIPINE BESYLATE 2.5 MG/1
1 TABLET ORAL
Qty: 0 | Refills: 0 | DISCHARGE

## 2020-02-02 RX ORDER — SODIUM CHLORIDE 9 MG/ML
1000 INJECTION INTRAMUSCULAR; INTRAVENOUS; SUBCUTANEOUS ONCE
Refills: 0 | Status: COMPLETED | OUTPATIENT
Start: 2020-02-02 | End: 2020-02-02

## 2020-02-02 RX ORDER — CHOLECALCIFEROL (VITAMIN D3) 125 MCG
1 CAPSULE ORAL
Qty: 0 | Refills: 0 | DISCHARGE

## 2020-02-02 RX ORDER — VANCOMYCIN HCL 1 G
1000 VIAL (EA) INTRAVENOUS ONCE
Refills: 0 | Status: COMPLETED | OUTPATIENT
Start: 2020-02-02 | End: 2020-02-02

## 2020-02-02 RX ORDER — CEFEPIME 1 G/1
500 INJECTION, POWDER, FOR SOLUTION INTRAMUSCULAR; INTRAVENOUS EVERY 24 HOURS
Refills: 0 | Status: COMPLETED | OUTPATIENT
Start: 2020-02-02 | End: 2020-02-06

## 2020-02-02 RX ORDER — SODIUM CHLORIDE 9 MG/ML
1000 INJECTION INTRAMUSCULAR; INTRAVENOUS; SUBCUTANEOUS
Refills: 0 | Status: DISCONTINUED | OUTPATIENT
Start: 2020-02-02 | End: 2020-02-02

## 2020-02-02 RX ORDER — HYDROMORPHONE HYDROCHLORIDE 2 MG/ML
0.25 INJECTION INTRAMUSCULAR; INTRAVENOUS; SUBCUTANEOUS EVERY 4 HOURS
Refills: 0 | Status: DISCONTINUED | OUTPATIENT
Start: 2020-02-02 | End: 2020-02-09

## 2020-02-02 RX ORDER — FLUOXETINE HCL 10 MG
1 CAPSULE ORAL
Qty: 0 | Refills: 0 | DISCHARGE

## 2020-02-02 RX ORDER — ATORVASTATIN CALCIUM 80 MG/1
1 TABLET, FILM COATED ORAL
Qty: 0 | Refills: 0 | DISCHARGE

## 2020-02-02 RX ORDER — METOPROLOL TARTRATE 50 MG
1 TABLET ORAL
Qty: 0 | Refills: 0 | DISCHARGE

## 2020-02-02 RX ORDER — PANTOPRAZOLE SODIUM 20 MG/1
40 TABLET, DELAYED RELEASE ORAL
Refills: 0 | Status: DISCONTINUED | OUTPATIENT
Start: 2020-02-02 | End: 2020-02-08

## 2020-02-02 RX ORDER — DRONABINOL 2.5 MG
1 CAPSULE ORAL
Qty: 0 | Refills: 0 | DISCHARGE

## 2020-02-02 RX ORDER — FOLIC ACID 0.8 MG
1 TABLET ORAL
Qty: 0 | Refills: 0 | DISCHARGE

## 2020-02-02 RX ORDER — CHOLECALCIFEROL (VITAMIN D3) 125 MCG
0 CAPSULE ORAL
Qty: 0 | Refills: 0 | DISCHARGE

## 2020-02-02 RX ORDER — DEXTROSE 50 % IN WATER 50 %
25 SYRINGE (ML) INTRAVENOUS ONCE
Refills: 0 | Status: COMPLETED | OUTPATIENT
Start: 2020-02-02 | End: 2020-02-02

## 2020-02-02 RX ORDER — MORPHINE SULFATE 50 MG/1
1 CAPSULE, EXTENDED RELEASE ORAL
Qty: 0 | Refills: 0 | DISCHARGE

## 2020-02-02 RX ORDER — IPRATROPIUM/ALBUTEROL SULFATE 18-103MCG
3 AEROSOL WITH ADAPTER (GRAM) INHALATION EVERY 6 HOURS
Refills: 0 | Status: DISCONTINUED | OUTPATIENT
Start: 2020-02-02 | End: 2020-02-10

## 2020-02-02 RX ORDER — INSULIN HUMAN 100 [IU]/ML
5 INJECTION, SOLUTION SUBCUTANEOUS ONCE
Refills: 0 | Status: COMPLETED | OUTPATIENT
Start: 2020-02-02 | End: 2020-02-02

## 2020-02-02 RX ORDER — PANTOPRAZOLE SODIUM 20 MG/1
1 TABLET, DELAYED RELEASE ORAL
Qty: 0 | Refills: 0 | DISCHARGE

## 2020-02-02 RX ORDER — PEMBROLIZUMAB 25 MG/ML
0 INJECTION, SOLUTION INTRAVENOUS
Qty: 0 | Refills: 0 | DISCHARGE

## 2020-02-02 RX ORDER — PROCHLORPERAZINE MALEATE 5 MG
10 TABLET ORAL
Qty: 0 | Refills: 0 | DISCHARGE

## 2020-02-02 RX ORDER — TIOTROPIUM BROMIDE AND OLODATEROL 3.124; 2.736 UG/1; UG/1
2 SPRAY, METERED RESPIRATORY (INHALATION)
Qty: 0 | Refills: 0 | DISCHARGE

## 2020-02-02 RX ORDER — HEPARIN SODIUM 5000 [USP'U]/ML
5000 INJECTION INTRAVENOUS; SUBCUTANEOUS EVERY 12 HOURS
Refills: 0 | Status: DISCONTINUED | OUTPATIENT
Start: 2020-02-02 | End: 2020-02-06

## 2020-02-02 RX ADMIN — ALBUTEROL 2.5 MILLIGRAM(S): 90 AEROSOL, METERED ORAL at 12:54

## 2020-02-02 RX ADMIN — Medication 500 MICROGRAM(S): at 12:54

## 2020-02-02 RX ADMIN — ALBUTEROL 2.5 MILLIGRAM(S): 90 AEROSOL, METERED ORAL at 13:19

## 2020-02-02 RX ADMIN — SODIUM CHLORIDE 1000 MILLILITER(S): 9 INJECTION INTRAMUSCULAR; INTRAVENOUS; SUBCUTANEOUS at 14:12

## 2020-02-02 RX ADMIN — SODIUM CHLORIDE 75 MILLILITER(S): 9 INJECTION, SOLUTION INTRAVENOUS at 17:08

## 2020-02-02 RX ADMIN — SODIUM POLYSTYRENE SULFONATE 30 GRAM(S): 4.1 POWDER, FOR SUSPENSION ORAL at 15:23

## 2020-02-02 RX ADMIN — SODIUM CHLORIDE 75 MILLILITER(S): 9 INJECTION INTRAMUSCULAR; INTRAVENOUS; SUBCUTANEOUS at 15:40

## 2020-02-02 RX ADMIN — ALBUTEROL 2.5 MILLIGRAM(S): 90 AEROSOL, METERED ORAL at 13:26

## 2020-02-02 RX ADMIN — SODIUM CHLORIDE 1000 MILLILITER(S): 9 INJECTION INTRAMUSCULAR; INTRAVENOUS; SUBCUTANEOUS at 14:15

## 2020-02-02 RX ADMIN — PIPERACILLIN AND TAZOBACTAM 200 GRAM(S): 4; .5 INJECTION, POWDER, LYOPHILIZED, FOR SOLUTION INTRAVENOUS at 14:26

## 2020-02-02 RX ADMIN — SODIUM CHLORIDE 1000 MILLILITER(S): 9 INJECTION INTRAMUSCULAR; INTRAVENOUS; SUBCUTANEOUS at 13:15

## 2020-02-02 RX ADMIN — CEFEPIME 100 MILLIGRAM(S): 1 INJECTION, POWDER, FOR SOLUTION INTRAMUSCULAR; INTRAVENOUS at 17:08

## 2020-02-02 RX ADMIN — Medication 125 MILLIGRAM(S): at 12:54

## 2020-02-02 RX ADMIN — HEPARIN SODIUM 5000 UNIT(S): 5000 INJECTION INTRAVENOUS; SUBCUTANEOUS at 17:10

## 2020-02-02 RX ADMIN — Medication 250 MILLIGRAM(S): at 15:24

## 2020-02-02 RX ADMIN — Medication 25 MILLILITER(S): at 15:24

## 2020-02-02 RX ADMIN — Medication 3 MILLILITER(S): at 20:10

## 2020-02-02 RX ADMIN — SODIUM CHLORIDE 1000 MILLILITER(S): 9 INJECTION INTRAMUSCULAR; INTRAVENOUS; SUBCUTANEOUS at 16:59

## 2020-02-02 RX ADMIN — INSULIN HUMAN 5 UNIT(S): 100 INJECTION, SOLUTION SUBCUTANEOUS at 15:31

## 2020-02-02 RX ADMIN — Medication 20 MILLIGRAM(S): at 21:41

## 2020-02-02 NOTE — H&P ADULT - ATTENDING COMMENTS
Pt is an 85 yo M presenting with cough and SOB found to have PNA. PMH CAD s/p CABG, HTN, HLD, CKD III, lung cancer.   Pt seen and examined at bedside.   HPI as stated above. Patient presenting with past few days of cough and SOB found to have PNA. Denies headaches, nausea, vomiting, chest pain, palpitations, abdominal pain, constipation, diarrhea, melena, hematochezia, dysuria. Does admit to decreased urinary output. Denies use of NSAID's. He does take Keytruda due to his lung CA.     T(C): 36.9 (02-02-20 @ 16:36), Max: 36.9 (02-02-20 @ 16:36)  HR: 99 (02-02-20 @ 16:36) (92 - 99)  BP: 112/55 (02-02-20 @ 16:36) (108/58 - 124/72)  RR: 18 (02-02-20 @ 16:36) (18 - 20)  SpO2: 91% (02-02-20 @ 16:36) (84% - 96%)  Wt(kg): --    Physical Exam:   GENERAL: well-groomed, NAD  HEENT: head NC/AT; EOM intact, PERRLA, conjunctiva & sclera clear; hearing grossly intact, moist mucous membranes  NECK: supple, no JVD  RESPIRATORY: rhonchi b/l, no rales  CARDIOVASCULAR: S1&S2, RRR, no murmurs or gallops  ABDOMEN: soft, non-tender, non-distended, + Bowel sounds x4 quadrants, no guarding, rebound or rigidity  MUSCULOSKELETAL:  b/l LE pitting edema 1+  LYMPH: no cervical lymphadenopathy  VASCULAR: Radial pulses 2+ bilaterally, no varicose veins   SKIN: warm and dry, color normal  NEUROLOGIC: AA&O X3, CN2-12 intact w/ no focal deficits, no sensory loss, motor Strength 5/5 in UE & LE B/L  Psych: Normal mood and affect, normal behavior      Plan:   Sepsis 2/2 PNA: sepsis protocol.   -f/u repeat lactic acid still elevated, will repeat again.    -continue IV abx  -monitor for fever and trend WBC count.   -ID consult-Dr Junior  -obtain CT chest w/o contrast and f/u    GERMAN: obtain renal U/S.   -ABG consistent with metabolic acidosis, will start 1/2NS w/ 75meQ bicarb as recommended by Nephro.   -gentle IVF  -avoid nephrotoxic agents   -plan discussed with Dr. Brooks. repeat ABG in AM.       Hyperkalemia: will give IV insulin with dextrose as well as one dose of Kayexelate.   -repeat K at 5PM.     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   -followed by Heme/onc    HTN: will hold anti-htn regimen at this time    HLD: family will determine if patient is still on a statin.     CKD III: see above    Anemia: hx of Anemia, currently Hgb is normal, possibly due to hemoconcentration.   -will continue to monitor.     DVT ppx: heparin    Patient is DNR/DNI. Code status discussed with patient.   Family to bring Living Will.   Home meds to be re-confirmed with family. Attempted to reconcile meds, but family and patient unsure. Pharm tech also called 2 different pharmacies.

## 2020-02-02 NOTE — H&P ADULT - ASSESSMENT
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 Admit for acute hypooxic respiratory failure likely 2/2 fluid overload in setting of acute on chronic CKD vs PNA in setting of cancer. 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 Admit for acute hypooxic respiratory failure likely 2/2  PNA in setting of cancer also noted to have GERMAN.

## 2020-02-02 NOTE — ED ADULT NURSE NOTE - OBJECTIVE STATEMENT
85 y/o male brought in by EMS with complaints of difficulty breathing. States he fell in the middle of the week and started to get a cough and not feel great approx 2 days ago. Torey chest pain, nausea or vomiting. Patient placed on oxygen via nasal cannula. EKG obtained and cardiac monitor in place. PIV started. Plan of care discussed with patient. Comfort and safety maintained. Will continue to monitor.

## 2020-02-02 NOTE — CHART NOTE - NSCHARTNOTEFT_GEN_A_CORE
Called by RN for pt having 8 beats of wide qrs. Patient is asymptomatic at the moment and has no new complaints. STAT phosphorus level at 6.0. Patient denies any chest pain, palpitations, headaches, sob, fever, or chills. If patient becomes symptomatic, will order EKG to assess.   - Will order Mg/Phos for AM  - RN to call if any changes

## 2020-02-02 NOTE — H&P ADULT - NSHPREVIEWOFSYSTEMS_GEN_ALL_CORE
CONSTITUTIONAL: denies fever, chills, fatigue, weakness  HEENT: denies blurred vision, sore throat  SKIN: denies new lesions, rash  CARDIOVASCULAR: denies chest pain, chest pressure, palpitations  RESPIRATORY: +SPB, +HADLEY, +yellow sputum cough   GASTROINTESTINAL: denies nausea, vomiting, diarrhea, abdominal pain  GENITOURINARY: +decreased urinary output., denies dysuria, discharge  NEUROLOGICAL: denies numbness, headache, focal weakness  MUSCULOSKELETAL: denies new joint pain, muscle aches CONSTITUTIONAL: denies fever, chills, fatigue, weakness  HEENT: denies blurred vision, sore throat  SKIN: denies new lesions, rash  CARDIOVASCULAR: denies chest pain, chest pressure, palpitations  RESPIRATORY: +SOB, +HADLEY, +yellow sputum cough   GASTROINTESTINAL: denies nausea, vomiting, diarrhea, abdominal pain  GENITOURINARY: +decreased urinary output., denies dysuria, discharge  NEUROLOGICAL: denies numbness, headache, focal weakness  MUSCULOSKELETAL: denies new joint pain, muscle aches

## 2020-02-02 NOTE — ED PROVIDER NOTE - CONSTITUTIONAL, MLM
normal... Weak appearing white male, awake, alert, oriented to person, place, time/situation and in mild to moderate apparent distress.

## 2020-02-02 NOTE — H&P ADULT - PROBLEM SELECTOR PLAN 1
-SOB x 1 week, likely fluid overload +1 pitting edema RLE +2 pitting edema LLE, elevated procal 4.5, leukocytosis   -s/p albuterol neb x 3, ipratropium x 1 for neb, solumedrol injectable  mg x1, kayexalate 30 mg x1, 2L NS   -WBC: 18.45 w/ left shift, Lactate: 2.5, Potassium 5.4, ProCal: 4.5   -CXR: diffuse vascular congestion vs , likely mass R middle lobe   -CT chest   -s/p levaquin, zosyn, conitnue vancomycin    -bicarb f/u   -lactate 2.5, procal 4 -SOB x 1 week, likely due to PNA  -s/p albuterol neb x 3, ipratropium x 1 for neb, solumedrol injectable  mg x1, kayexalate 30 mg x1, 2L NS   -WBC: 18.45 w/ left shift, Lactate: 2.5, Potassium 5.4, ProCal: 4.5   -CXR: Shows R sided PNA  -f/u CT chest   -s/p levaquin, zosyn, conitnue vancomycin . Will continue with Cefepime and already received 1 dose of Vancomycin. Will await ID rec's before further dosing given his Cr of 6.30  -lactate 2.5, procal 4  -consult Pulm

## 2020-02-02 NOTE — H&P ADULT - HISTORY OF PRESENT ILLNESS
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 Pt states he has baseline HADLEY, but dyspea has increased in the last week. Of note he is in week 6 of chemo treatment keytruda, gets chemo every 3 weeks, tolerating chemo appropriately. Pt also has been urinating less for the last 4 days. Pt denies every having dialysis. Pt denies fever, chills, CP, orthopnea, dysuria, hematuria.      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 Pt states he has baseline HADLEY, but dyspea has increased in the last week. Of note he is in week 6 of chemo treatment keytruda, gets chemo every 3 weeks, tolerating chemo appropriately. Pt also has been urinating less for the last 4 days. Pt denies ever having dialysis. Pt denies fever, chills, chest pain, orthopnea, dysuria, hematuria, diarrhea, melena, hematochezia.      ED vitals: T: 96.8, HR: 99, BP: 108/58, RR: 18, O2 Saturation: 96% on 2L  Pt recieved albuterol neb x 3, ipratropium x 1 for neb, solumedrol injectable  mg x1, kayexalate 30 mg x1, 2L NS   Labs significant for: WBC: 18.45 w/ left shift, Lactate: 2.5, Potassium 5.4, Chloride: 109, CO2: 19, BUN/Cr: 109/6.3, Albumin: 2.7, Alk Phos: 164, ProCal: 4.5   EKG: sinus tachycardia 106 bpm  CXR: R sided PNA

## 2020-02-02 NOTE — H&P ADULT - PROBLEM SELECTOR PLAN 8
dvt ppx: heparin 5000 q 12     BB IMPROVE VTE Individual Risk Assessment        RISK                                                          Points  [  ] Previous VTE                                                3  [  ] Thrombophilia                                             2  [  ] Lower limb paralysis                                   2        (unable to hold up >15 seconds)    [ x ] Current Cancer                                            2         (within 6 months)  [  ] Immobilization > 24 hrs                              1  [  ] ICU/CCU stay > 24 hours                            1  [  x] Age > 60                                                    1    IMPROVE VTE Score ___3______

## 2020-02-02 NOTE — CONSULT NOTE ADULT - ASSESSMENT
Acute on CKD Stage 3  Metabolic Acidosis  Hyperkalemia  Sepsis/PNA  Dehydration      -GERMAN is likely due to dehydration along with septic ATN  -Check urine indices  -Renal sonogram reviewed showing no evidence of Hydro, but echogenic kidneys noted  -Start 1/2NS+75meq sodium bicarb  -S/P Hyperkalemic cocktail with improvement in potassium levels  -Abx, renal dosing  -Monitor urine output  -No acute indication for hemodialysis    Thank you    D/W Family and Dr. Ziegler

## 2020-02-02 NOTE — H&P ADULT - PROBLEM SELECTOR PLAN 2
-Cr on admission elevated at 6.3 (baseline 1.3-2.5 in 2019)   -   -   -Dr. Delphine Brooks (nephro) consulted, f/u recs -Cr on admission elevated at 6.3 (baseline 1.3-2.5 in 2019)   -consult nephro: dialysis   - renal US   -Dr. Delphine Brooks (nephro) consulted, f/u recs  -DASH diet -leukocytosis, tachycardia  -s/p 2 L IVF  -f/u blood cx 2x, urine cx, UA, CXR -leukocytosis, tachycardia  -s/p 2 L IVF  -f/u blood cx 2x, urine cx, UA,   -f/u repeat lactic acid

## 2020-02-02 NOTE — H&P ADULT - PROBLEM SELECTOR PLAN 3
-current  -on chemo session 6, q 3 weeks -Cr on admission elevated at 6.3 (baseline 1.3-2.5 in 2019)   -consult nephro: dialysis   - renal US   -Dr. Delphine Brooks (nephro) consulted, f/u recs  -renal diet -Cr on admission elevated at 6.3 (baseline 1.3-2.5 in 2019)   -consult nephro:   - renal US reviewed  -Dr. Delphine Brooks (nephro) consulted, f/u recs  -renal diet

## 2020-02-02 NOTE — CHART NOTE - NSCHARTNOTEFT_GEN_A_CORE
Called by RN for pt repeat lactate 3, up from 2.6 on admission. S/P 2 L IVF. Will not give more fluid at this time given fluid status. Pt has +1 pitting edema b/l, likely fluid overloaded.     -RN to call if any changes    Dr Ma  [id number]  [phone]

## 2020-02-02 NOTE — ED ADULT NURSE NOTE - NSIMPLEMENTINTERV_GEN_ALL_ED
Implemented All Fall with Harm Risk Interventions:  [place] to call system. Call bell, personal items and telephone within reach. Instruct patient to call for assistance. Room bathroom lighting operational. Non-slip footwear when patient is off stretcher. Physically safe environment: no spills, clutter or unnecessary equipment. Stretcher in lowest position, wheels locked, appropriate side rails in place. Provide visual cue, wrist band, yellow gown, etc. Monitor gait and stability. Monitor for mental status changes and reorient to person, place, and time. Review medications for side effects contributing to fall risk. Reinforce activity limits and safety measures with patient and family. Provide visual clues: red socks.

## 2020-02-02 NOTE — ED PROVIDER NOTE - OBJECTIVE STATEMENT
85 yo white male with Lung Ca and COPD, chronic cough who over the past few days has noted increase in cough associated with increasing SOB and wheezing. Denies any fever, chills, chest pain, abdominal pains, back pain or hemoptysis. No recent travel. Not on any home O2 therapy.

## 2020-02-02 NOTE — H&P ADULT - NSHPSOCIALHISTORY_GEN_ALL_CORE
quit smoking 1979, smoked 3 ppd 25 years (75 pack years)   lives with wife, performs all ADLs, uses walker and cane

## 2020-02-02 NOTE — ED PROVIDER NOTE - CARE PLAN
Principal Discharge DX:	Pneumonia of right lung due to infectious organism, unspecified part of lung  Secondary Diagnosis:	GERMAN (acute kidney injury)  Secondary Diagnosis:	Dehydration

## 2020-02-02 NOTE — CONSULT NOTE ADULT - SUBJECTIVE AND OBJECTIVE BOX
Date/Time Patient Seen:  		  Referring MD:   Data Reviewed	       Patient is a 86y old  Male who presents with a chief complaint of shortness of breath (02 Feb 2020 14:08)      Subjective/HPI    in bed  seen and examined  vs and meds reviewed  labs reviewed  h and p reviewed  pt from home  lives with wife  retired  heavy ex smoker  follows with Dr. Barnett for Pulm and Dr. Mahan for Onc  on Keytruda - however - hasnt used it in one month  frail and weak - not eating or drinking well  has 3 children  family hx - cad  retired    denies asbestos exp    ct shows multifocal PNA    History and Physical:   Source of Information	Chart(s), Patient  Comments/Contacts	 vani  Outpatient Providers	PCP: Ahsan Fountain  nephro Dr. 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 Pt states he has baseline GALARZA, but dyspea has increased in the last week. Of note he is in week 6 of chemo treatment keytruda, gets chemo every 3 weeks, tolerating chemo appropriately. Pt also has been urinating less for the last 4 days. Pt denies ever having dialysis. Pt denies fever, chills, chest pain, orthopnea, dysuria, hematuria, diarrhea, melena, hematochezia.      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     No pertinent family history in first degree relatives.     No Pertinent Family History in first degree relatives of: denies FHX of DM type 2, HTN, CKD.     Social History:  Social History (marital status, living situation, occupation, tobacco use, alcohol and drug use, and sexual history): quit smoking 1979, smoked 3 ppd 25 years (75 pack years)   lives with wife, performs all ADLs, uses walker and cane     Tobacco Screening:  · Core Measure Site	Yes  · Has the patient used tobacco in the past 30 days?	No    Risk Assessment:    Present on Admission:  Deep Venous Thrombosis	no  Pulmonary Embolus	no     Heart Failure:  Does this patient have a history of or has been diagnosed with heart failure? no.       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  S/P skin neoplasm resection  Hip pain: Right hip replacement in 2008  FH: CABG (coronary artery bypass surgery): 1994 (double)        Medication list         MEDICATIONS  (STANDING):  cefepime   IVPB 500 milliGRAM(s) IV Intermittent every 24 hours  heparin  Injectable 5000 Unit(s) SubCutaneous every 12 hours  pantoprazole    Tablet 40 milliGRAM(s) Oral before breakfast  sodium chloride 0.45% 1000 milliLiter(s) (75 mL/Hr) IV Continuous <Continuous>    MEDICATIONS  (PRN):         Vitals log        ICU Vital Signs Last 24 Hrs  T(C): 36.9 (02 Feb 2020 16:36), Max: 36.9 (02 Feb 2020 16:36)  T(F): 98.4 (02 Feb 2020 16:36), Max: 98.4 (02 Feb 2020 16:36)  HR: 99 (02 Feb 2020 16:36) (92 - 99)  BP: 112/55 (02 Feb 2020 16:36) (108/58 - 124/72)  BP(mean): --  ABP: --  ABP(mean): --  RR: 18 (02 Feb 2020 16:36) (18 - 20)  SpO2: 91% (02 Feb 2020 16:36) (84% - 96%)           Input and Output:  I&O's Detail      Lab Data                        13.3   18.45 )-----------( 170      ( 02 Feb 2020 12:48 )             41.6     02-02    142  |  109<H>  |  109<H>  ----------------------------<  109<H>  5.4<H>   |  19<L>  |  6.30<H>    Ca    9.0      02 Feb 2020 12:48    TPro  6.4  /  Alb  2.7<L>  /  TBili  0.8  /  DBili  x   /  AST  26  /  ALT  23  /  AlkPhos  164<H>  02-02    ABG - ( 02 Feb 2020 15:57 )  pH, Arterial: 7.25  pH, Blood: x     /  pCO2: 36    /  pO2: 64    / HCO3: 16    / Base Excess: -10.8 /  SaO2: 88                      Review of Systems	  sob  galarza  frail  weak      Objective     Physical Examination      cachexia  head nc  head at  verbal  alert  lung dec BS  abd soft  on o2 support  poor dentition    Pertinent Lab findings & Imaging      Shi:  NO   Adequate UO     I&O's Detail           Discussed with:     Cultures:	        Radiology    cxr  poss pna right side       EXAM:  CT CHEST                            PROCEDURE DATE:  02/02/2020          INTERPRETATION:  Clinical information: Suspected pneumonia. History of lung cancer.    Comparison: 1/13/2019 CT scan of the chest.    PROCEDURE:   CT of the Chest was performed without intravenous contrast.     FINDINGS:    Lungs and airways: Emphysema. Interval development of multifocal opacities in the right upper lobe and right lower lobe compatible with multifocal pneumonia. Again noted is a left upper lobe and para-mediastinal mass adjacent to surgical clips inseparable from the proximal aortic arch. The area on series 2 image 45 measures 4.1 x 3.7 cm previously 3.8 x 2.3 cm. Slightly more superiorly there is soft tissue that extends anteriorly through the intercostal space on series 2 image 36 measuring 3.1 x 3.0 cm.  Findings are suspicious for progression of disease.    Pleura: Within normal limits. No pleural effusion.    Mediastinum and Ginny: No abnormally enlarged lymph nodes. The esophagus is distended with fluid.    Heart: Normal size. No pericardial effusion.     Vessels: There has been median sternotomy and CABG. Main pulmonary artery is enlarged to 4.2 cm.    Chest wall and lower neck: Within normal limits.    Upper abdomen: Bilateral renal hypodensities again noted including some which are not simple cysts. For example there is a 3.0 cm lesion in the right mid kidney which is not a simple cyst and a 1.4 cm lesion in the left mid kidney which is not a simple cyst are stable. The gallbladder is distended containing a stone.    Bones: Within normal limits.    IMPRESSION:     Interval development of multifocal pneumonia involving the right upper and lower lobes.    Increase in size of soft tissue mass associated with surgical clips in the left upper lobe paramediastinal location inseparable from the aortic arch which now extends anteriorly in an intercostal location highly suspicious for progression of disease.

## 2020-02-02 NOTE — H&P ADULT - NSHPPHYSICALEXAM_GEN_ALL_CORE
Vital Signs Last 24 Hrs  T(C): 36 (02 Feb 2020 12:13), Max: 36 (02 Feb 2020 12:13)  T(F): 96.8 (02 Feb 2020 12:13), Max: 96.8 (02 Feb 2020 12:13)  HR: 99 (02 Feb 2020 13:27) (92 - 99)  BP: 108/58 (02 Feb 2020 13:27) (108/58 - 124/72)  BP(mean): --  RR: 18 (02 Feb 2020 13:27) (18 - 20)  SpO2: 96% (02 Feb 2020 13:27) (84% - 96%)    General: elderly, frail, appears SOB   HEENT: NCAT, PERRLA, EOMI bl, moist mucous membranes   Neck: Supple, nontender, no mass  Neurology: A&Ox3, nonfocal, CN II-XII grossly intact, sensation intact, no gait abnormalities   Respiratory: wheezes b/l, rhonchi and rales b/l    CV: RRR, +S1/S2, no murmurs, rubs or gallops  Abdominal: Soft, NT, ND +BSx4  Extremities: +2 edema Left +1 edema RLE, ecchymosis on b/l upper and lower ext., No C/C, + peripheral pulses  MSK: Normal ROM, no joint erythema or warmth, no joint swelling   Skin: warm, dry, normal color, no rash or abnormal lesions Vital Signs Last 24 Hrs  T(C): 36 (02 Feb 2020 12:13), Max: 36 (02 Feb 2020 12:13)  T(F): 96.8 (02 Feb 2020 12:13), Max: 96.8 (02 Feb 2020 12:13)  HR: 99 (02 Feb 2020 13:27) (92 - 99)  BP: 108/58 (02 Feb 2020 13:27) (108/58 - 124/72)  BP(mean): --  RR: 18 (02 Feb 2020 13:27) (18 - 20)  SpO2: 96% (02 Feb 2020 13:27) (84% - 96%)    General: elderly, frail, appears SOB   HEENT: NCAT, PERRLA, EOMI bl, moist mucous membranes   Neck: Supple, nontender, no mass  Neurology: A&Ox3, nonfocal, CN II-XII grossly intact, sensation intact, no gait abnormalities   Respiratory: wheezes b/l, rhonchi b/l  CV: RRR, +S1/S2, no murmurs, rubs or gallops  Abdominal: Soft, NT, ND +BSx4  Extremities: +2 edema Left +1 edema RLE, ecchymosis on b/l upper and lower ext., No C/C, + peripheral pulses  MSK: Normal ROM, no joint erythema or warmth, no joint swelling   Skin: warm, dry, normal color, no rash or abnormal lesions

## 2020-02-02 NOTE — H&P ADULT - NSICDXPASTMEDICALHX_GEN_ALL_CORE_FT
PAST MEDICAL HISTORY:  Arteriosclerotic heart disease (ASHD)     CKD (chronic kidney disease) stage 3    Coronary artery disease     Disorder of bone, unspecified     Dyslipidemia     Hypertension     Lung cancer on chemo

## 2020-02-02 NOTE — H&P ADULT - PROBLEM SELECTOR PLAN 7
-stable chronic  -continue home meds with hold parameters -stable chronic  -will hold HTN meds given his sepsis and his current /58

## 2020-02-02 NOTE — CONSULT NOTE ADULT - SUBJECTIVE AND OBJECTIVE BOX
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 Pt states he has baseline HADLEY, but dyspnea has increased in the last week. Of note he is in week 6 of chemo treatment keytruda, gets chemo every 3 weeks, tolerating chemo appropriately. Pt also has been urinating less for the last 4 days. Pt denies ever having dialysis. Pt denies fever, chills, chest pain, orthopnea, dysuria, hematuria, diarrhea, melena, hematochezia.      ED vitals: T: 96.8, HR: 99, BP: 108/58, RR: 18, O2 Saturation: 96% on 2L  Pt recieved albuterol neb x 3, ipratropium x 1 for neb, solumedrol injectable  mg x1, kayexalate 30 mg x1, 2L NS   Labs significant for: WBC: 18.45 w/ left shift, Lactate: 2.5, Potassium 5.4, Chloride: 109, CO2: 19, BUN/Cr: 109/6.3, Albumin: 2.7, Alk Phos: 164, ProCal: 4.5   EKG: sinus tachycardia 106 bpm  CXR: R sided PNA (2020 14:08)    Renal consulted for GERMAN. Per family, patient had GERMAN a year ago when he previously had PNA. However, it improved after the PNA was treated. Patient admits to decreased urination. Also with poor intake.        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  Hip pain: Right hip replacement in   FH: CABG (coronary artery bypass surgery):  (double)       FAMILY HISTORY:  No pertinent family history in first degree relatives  [place]    Social History:Non smoker    MEDICATIONS  (STANDING):  albuterol/ipratropium for Nebulization 3 milliLiter(s) Nebulizer every 6 hours  cefepime   IVPB 500 milliGRAM(s) IV Intermittent every 24 hours  heparin  Injectable 5000 Unit(s) SubCutaneous every 12 hours  methylPREDNISolone sodium succinate Injectable 20 milliGRAM(s) IV Push every 8 hours  pantoprazole    Tablet 40 milliGRAM(s) Oral before breakfast  sodium chloride 0.45% 1000 milliLiter(s) (75 mL/Hr) IV Continuous <Continuous>    MEDICATIONS  (PRN):  guaiFENesin   Syrup  (Sugar-Free) 200 milliGRAM(s) Oral every 6 hours PRN Cough  HYDROmorphone  Injectable 0.25 milliGRAM(s) IV Push every 4 hours PRN pain, distress, suffering, dyspnea, palliative measures   Meds reviewed    Allergies    No Known Allergies    Intolerances         REVIEW OF SYSTEMS:    CONSTITUTIONAL:  No weight loss , +Poor intake  EYES: No eye pain, visual disturbances, or discharge  ENMT:  No difficulty hearing, tinnitus, vertigo; No sinus or throat pain  NECK: No pain or stiffness  BREASTS: No pain, masses, or nipple discharge  RESPIRATORY: +SOB. +wheeze. +Cough  CARDIOVASCULAR: No chest pain, palpitations, dizziness,   GASTROINTESTINAL: No abdominal or epigastric pain. No nausea, vomiting, or hematemesis; No diarrhea or constipation. No melena   GENITOURINARY: No dysuria, frequency, hematuria, or incontinence  NEUROLOGICAL: No headaches, memory loss, loss of strength, numbness, or tremors  SKIN: No Diffuse erythema, no blisters  LYMPH NODES: No enlarged glands  ENDOCRINE: No heat or cold intolerance; No hair loss  MUSCULOSKELETAL: No joint pain or swelling   PSYCHIATRIC: No depression, anxiety, mood swings, or difficulty sleeping  HEME/LYMPH: No easy bruising, or bleeding gums  ALLERGY AND IMMUNOLOGIC: No hives or eczema      Vital Signs Last 24 Hrs  T(C): 36.3 (2020 17:48), Max: 36.9 (2020 16:36)  T(F): 97.3 (2020 17:48), Max: 98.4 (2020 16:36)  HR: 99 (2020 17:48) (92 - 99)  BP: 112/66 (2020 17:48) (108/58 - 124/72)  BP(mean): --  RR: 17 (2020 17:48) (17 - 20)  SpO2: 90% (2020 17:48) (84% - 96%)  Daily Height in cm: 182.88 (2020 12:13)    Daily Weight in k.3 (2020 17:48)    PHYSICAL EXAM:    GENERAL: No Distress  HEAD:  Atraumatic, Normocephalic  EYES: EOMI, PERRLA, conjunctiva and sclera clear  ENMT: No tonsillar erythema, exudates, or enlargement; dry mucous membranes, Good dentition, No lesions  NECK: Supple, neck veins full  NERVOUS SYSTEM:  Alert & Oriented X3, Good concentration; Motor Strength wnl upper and lower extremities  CHEST/LUNG: Rhonchi B/L  HEART: Regular rate and rhythm; No murmurs, rubs, or gallops  ABDOMEN: Soft, Nontender, Nondistended; Bowel sounds present, No hepatomegaly  EXTREMITIES: No Edema  LYMPH: No lymphadenopathy noted  SKIN: No rashes or lesions, dry; poor turgor      LABS:                        13.3   18.45 )-----------( 170      ( 2020 12:48 )             41.6     02    x   |  x   |  109  ----------------------------<  x   4.7   |  19   |  6.3     Ca    9.0      2020 12:48    TPro  6.4  /  Alb  2.7<L>  /  TBili  0.8  /  DBili  x   /  AST  26  /  ALT  23  /  AlkPhos  164<H>      PT/INR - ( 2020 12:48 )   PT: 13.7 sec;   INR: 1.22 ratio         PTT - ( 2020 12:48 )  PTT:29.8 sec      ABG - ( 2020 15:57 )  pH, Arterial: 7.25  pH, Blood: x     /  pCO2: 36    /  pO2: 64    / HCO3: 16    / Base Excess: -10.8 /  SaO2: 88                    RADIOLOGY & ADDITIONAL TESTS:

## 2020-02-02 NOTE — H&P ADULT - PROBLEM SELECTOR PLAN 6
dvt ppx: heparin 5000 q 12     BB IMPROVE VTE Individual Risk Assessment        RISK                                                          Points  [  ] Previous VTE                                                3  [  ] Thrombophilia                                             2  [  ] Lower limb paralysis                                   2        (unable to hold up >15 seconds)    [ x ] Current Cancer                                            2         (within 6 months)  [  ] Immobilization > 24 hrs                              1  [  ] ICU/CCU stay > 24 hours                            1  [  x] Age > 60                                                    1    IMPROVE VTE Score ___3______ -stable chronic  -continue home -stable chronic  -his home ASA was stopped, family is unsure if he is on B-blocker. -stable chronic  -his home ASA was stopped, family is unsure if he is on B-blocker or statin. They will check his medications at home and let us know. Pharmacy was also called and there is confusion to his current medications as patient uses 2 pharmacies

## 2020-02-02 NOTE — H&P ADULT - NSICDXPASTSURGICALHX_GEN_ALL_CORE_FT
PAST SURGICAL HISTORY:  Benign neoplasm of scapula R distal scapula resection    FH: CABG (coronary artery bypass surgery) 1994 (double)    Hip pain Right hip replacement in 2008

## 2020-02-02 NOTE — H&P ADULT - PROBLEM SELECTOR PLAN 4
-stable chronic  -continue home -K 5.9 on admission likely 2/2 GERMAN on CKD stage 3   -insulin, dextrose, kayexalate  -nephrology consulted: may need dialysis -K 5.4 on admission likely 2/2 GERMAN on CKD stage 3   -insulin, dextrose, kayexalate  -nephrology consulted.  -repeat K at 5PM  -no EKG changes concerning for hyperkalemia

## 2020-02-02 NOTE — H&P ADULT - NSHPOUTPATIENTPROVIDERS_GEN_ALL_CORE
PCP: Ahsan Fountain PCP: Ahsan Fountain  nephro Dr. Monzon   oncologist dr louann champion   cardiologist dr alejandra   [facility]

## 2020-02-02 NOTE — CHART NOTE - NSCHARTNOTEFT_GEN_A_CORE
Called by RN for patient with 3 beats of wide complex Vtach. Patient asymptomatic. Will add magnesium and phosphorous to AM labs.

## 2020-02-02 NOTE — H&P ADULT - NSHPLABSRESULTS_GEN_ALL_CORE
02-02    142  |  109<H>  |  109<H>  ----------------------------<  109<H>  5.4<H>   |  19<L>  |  6.30<H>    Ca    9.0      02 Feb 2020 12:48    TPro  6.4  /  Alb  2.7<L>  /  TBili  0.8  /  DBili  x   /  AST  26  /  ALT  23  /  AlkPhos  164<H>  02-02

## 2020-02-03 DIAGNOSIS — D72.829 ELEVATED WHITE BLOOD CELL COUNT, UNSPECIFIED: ICD-10-CM

## 2020-02-03 DIAGNOSIS — I47.2 VENTRICULAR TACHYCARDIA: ICD-10-CM

## 2020-02-03 LAB
ALBUMIN SERPL ELPH-MCNC: 2.1 G/DL — LOW (ref 3.3–5)
ALP SERPL-CCNC: 103 U/L — SIGNIFICANT CHANGE UP (ref 40–120)
ALT FLD-CCNC: 19 U/L — SIGNIFICANT CHANGE UP (ref 12–78)
ANION GAP SERPL CALC-SCNC: 12 MMOL/L — SIGNIFICANT CHANGE UP (ref 5–17)
APPEARANCE UR: ABNORMAL
AST SERPL-CCNC: 21 U/L — SIGNIFICANT CHANGE UP (ref 15–37)
BACTERIA # UR AUTO: ABNORMAL
BASE EXCESS BLDA CALC-SCNC: -5.5 MMOL/L — LOW (ref -2–2)
BASOPHILS # BLD AUTO: 0.02 K/UL — SIGNIFICANT CHANGE UP (ref 0–0.2)
BASOPHILS NFR BLD AUTO: 0.2 % — SIGNIFICANT CHANGE UP (ref 0–2)
BILIRUB SERPL-MCNC: 0.5 MG/DL — SIGNIFICANT CHANGE UP (ref 0.2–1.2)
BILIRUB UR-MCNC: NEGATIVE — SIGNIFICANT CHANGE UP
BLOOD GAS COMMENTS ARTERIAL: SIGNIFICANT CHANGE UP
BLOOD GAS COMMENTS ARTERIAL: SIGNIFICANT CHANGE UP
BUN SERPL-MCNC: 109 MG/DL — HIGH (ref 7–23)
CALCIUM SERPL-MCNC: 8.5 MG/DL — SIGNIFICANT CHANGE UP (ref 8.5–10.1)
CHLORIDE SERPL-SCNC: 112 MMOL/L — HIGH (ref 96–108)
CK MB BLD-MCNC: 4.7 % — HIGH (ref 0–3.5)
CK MB BLD-MCNC: 5.1 % — HIGH (ref 0–3.5)
CK MB BLD-MCNC: 5.3 % — HIGH (ref 0–3.5)
CK MB CFR SERPL CALC: 2.5 NG/ML — SIGNIFICANT CHANGE UP (ref 0–3.6)
CK MB CFR SERPL CALC: 2.7 NG/ML — SIGNIFICANT CHANGE UP (ref 0–3.6)
CK MB CFR SERPL CALC: 3.1 NG/ML — SIGNIFICANT CHANGE UP (ref 0–3.6)
CK SERPL-CCNC: 53 U/L — SIGNIFICANT CHANGE UP (ref 26–308)
CK SERPL-CCNC: 53 U/L — SIGNIFICANT CHANGE UP (ref 26–308)
CK SERPL-CCNC: 57 U/L — SIGNIFICANT CHANGE UP (ref 26–308)
CK SERPL-CCNC: 59 U/L — SIGNIFICANT CHANGE UP (ref 26–308)
CO2 SERPL-SCNC: 19 MMOL/L — LOW (ref 22–31)
COLOR SPEC: YELLOW — SIGNIFICANT CHANGE UP
COMMENT - URINE: SIGNIFICANT CHANGE UP
CREAT ?TM UR-MCNC: 105 MG/DL — SIGNIFICANT CHANGE UP
CREAT SERPL-MCNC: 6.1 MG/DL — HIGH (ref 0.5–1.3)
DIFF PNL FLD: ABNORMAL
EOSINOPHIL # BLD AUTO: 0 K/UL — SIGNIFICANT CHANGE UP (ref 0–0.5)
EOSINOPHIL NFR BLD AUTO: 0 % — SIGNIFICANT CHANGE UP (ref 0–6)
EOSINOPHIL NFR URNS MANUAL: NEGATIVE — SIGNIFICANT CHANGE UP
EPI CELLS # UR: SIGNIFICANT CHANGE UP
GLUCOSE SERPL-MCNC: 140 MG/DL — HIGH (ref 70–99)
GLUCOSE UR QL: NEGATIVE — SIGNIFICANT CHANGE UP
HCO3 BLDA-SCNC: 20 MMOL/L — LOW (ref 23–27)
HCT VFR BLD CALC: 32.1 % — LOW (ref 39–50)
HGB BLD-MCNC: 10.4 G/DL — LOW (ref 13–17)
IMM GRANULOCYTES NFR BLD AUTO: 0.4 % — SIGNIFICANT CHANGE UP (ref 0–1.5)
KETONES UR-MCNC: NEGATIVE — SIGNIFICANT CHANGE UP
LEUKOCYTE ESTERASE UR-ACNC: ABNORMAL
LYMPHOCYTES # BLD AUTO: 0.34 K/UL — LOW (ref 1–3.3)
LYMPHOCYTES # BLD AUTO: 2.7 % — LOW (ref 13–44)
MAGNESIUM SERPL-MCNC: 1.7 MG/DL — SIGNIFICANT CHANGE UP (ref 1.6–2.6)
MCHC RBC-ENTMCNC: 29.3 PG — SIGNIFICANT CHANGE UP (ref 27–34)
MCHC RBC-ENTMCNC: 32.4 GM/DL — SIGNIFICANT CHANGE UP (ref 32–36)
MCV RBC AUTO: 90.4 FL — SIGNIFICANT CHANGE UP (ref 80–100)
MONOCYTES # BLD AUTO: 0.23 K/UL — SIGNIFICANT CHANGE UP (ref 0–0.9)
MONOCYTES NFR BLD AUTO: 1.8 % — LOW (ref 2–14)
NEUTROPHILS # BLD AUTO: 11.94 K/UL — HIGH (ref 1.8–7.4)
NEUTROPHILS NFR BLD AUTO: 94.9 % — HIGH (ref 43–77)
NITRITE UR-MCNC: NEGATIVE — SIGNIFICANT CHANGE UP
NRBC # BLD: 0 /100 WBCS — SIGNIFICANT CHANGE UP (ref 0–0)
PCO2 BLDA: 36 MMHG — SIGNIFICANT CHANGE UP (ref 32–46)
PH BLDA: 7.35 — SIGNIFICANT CHANGE UP (ref 7.35–7.45)
PH UR: 5 — SIGNIFICANT CHANGE UP (ref 5–8)
PHOSPHATE SERPL-MCNC: 6.3 MG/DL — HIGH (ref 2.5–4.5)
PLATELET # BLD AUTO: 117 K/UL — LOW (ref 150–400)
PO2 BLDA: 70 MMHG — LOW (ref 74–108)
POTASSIUM SERPL-MCNC: 4.9 MMOL/L — SIGNIFICANT CHANGE UP (ref 3.5–5.3)
POTASSIUM SERPL-SCNC: 4.9 MMOL/L — SIGNIFICANT CHANGE UP (ref 3.5–5.3)
PROT ?TM UR-MCNC: 75 MG/DL — HIGH (ref 0–12)
PROT SERPL-MCNC: 5.1 G/DL — LOW (ref 6–8.3)
PROT UR-MCNC: 100
PROT/CREAT UR-RTO: 0.7 RATIO — HIGH (ref 0–0.2)
RBC # BLD: 3.55 M/UL — LOW (ref 4.2–5.8)
RBC # FLD: 14.9 % — HIGH (ref 10.3–14.5)
RBC CASTS # UR COMP ASSIST: ABNORMAL /HPF (ref 0–4)
SAO2 % BLDA: 93 % — SIGNIFICANT CHANGE UP (ref 92–96)
SODIUM SERPL-SCNC: 143 MMOL/L — SIGNIFICANT CHANGE UP (ref 135–145)
SODIUM UR-SCNC: 21 MMOL/L — SIGNIFICANT CHANGE UP
SP GR SPEC: 1.02 — SIGNIFICANT CHANGE UP (ref 1.01–1.02)
TROPONIN I SERPL-MCNC: 0.06 NG/ML — HIGH (ref 0.01–0.04)
TROPONIN I SERPL-MCNC: 0.07 NG/ML — HIGH (ref 0.01–0.04)
TROPONIN I SERPL-MCNC: 0.07 NG/ML — HIGH (ref 0.01–0.04)
URATE SERPL-MCNC: 12.6 MG/DL — HIGH (ref 3.4–8.8)
UROBILINOGEN FLD QL: NEGATIVE — SIGNIFICANT CHANGE UP
UUN UR-MCNC: 576 MG/DL — SIGNIFICANT CHANGE UP
WBC # BLD: 12.58 K/UL — HIGH (ref 3.8–10.5)
WBC # FLD AUTO: 12.58 K/UL — HIGH (ref 3.8–10.5)
WBC UR QL: ABNORMAL

## 2020-02-03 PROCEDURE — 93010 ELECTROCARDIOGRAM REPORT: CPT

## 2020-02-03 PROCEDURE — 93306 TTE W/DOPPLER COMPLETE: CPT | Mod: 26

## 2020-02-03 PROCEDURE — 99233 SBSQ HOSP IP/OBS HIGH 50: CPT | Mod: GC

## 2020-02-03 PROCEDURE — 99223 1ST HOSP IP/OBS HIGH 75: CPT

## 2020-02-03 RX ORDER — FOLIC ACID 0.8 MG
1 TABLET ORAL DAILY
Refills: 0 | Status: DISCONTINUED | OUTPATIENT
Start: 2020-02-03 | End: 2020-02-08

## 2020-02-03 RX ORDER — MAGNESIUM SULFATE 500 MG/ML
1 VIAL (ML) INJECTION ONCE
Refills: 0 | Status: COMPLETED | OUTPATIENT
Start: 2020-02-03 | End: 2020-02-03

## 2020-02-03 RX ORDER — METOPROLOL TARTRATE 50 MG
12.5 TABLET ORAL EVERY 6 HOURS
Refills: 0 | Status: DISCONTINUED | OUTPATIENT
Start: 2020-02-03 | End: 2020-02-05

## 2020-02-03 RX ORDER — EZETIMIBE 10 MG/1
10 TABLET ORAL DAILY
Refills: 0 | Status: DISCONTINUED | OUTPATIENT
Start: 2020-02-03 | End: 2020-02-08

## 2020-02-03 RX ORDER — DRONABINOL 2.5 MG
5 CAPSULE ORAL EVERY 12 HOURS
Refills: 0 | Status: DISCONTINUED | OUTPATIENT
Start: 2020-02-03 | End: 2020-02-08

## 2020-02-03 RX ORDER — ALPRAZOLAM 0.25 MG
0.25 TABLET ORAL ONCE
Refills: 0 | Status: DISCONTINUED | OUTPATIENT
Start: 2020-02-03 | End: 2020-02-03

## 2020-02-03 RX ORDER — LANOLIN ALCOHOL/MO/W.PET/CERES
5 CREAM (GRAM) TOPICAL ONCE
Refills: 0 | Status: COMPLETED | OUTPATIENT
Start: 2020-02-03 | End: 2020-02-03

## 2020-02-03 RX ORDER — CALCIUM CARBONATE 500(1250)
2 TABLET ORAL ONCE
Refills: 0 | Status: COMPLETED | OUTPATIENT
Start: 2020-02-03 | End: 2020-02-03

## 2020-02-03 RX ORDER — SODIUM CHLORIDE 9 MG/ML
4 INJECTION INTRAMUSCULAR; INTRAVENOUS; SUBCUTANEOUS EVERY 6 HOURS
Refills: 0 | Status: DISCONTINUED | OUTPATIENT
Start: 2020-02-03 | End: 2020-02-10

## 2020-02-03 RX ORDER — ATORVASTATIN CALCIUM 80 MG/1
20 TABLET, FILM COATED ORAL AT BEDTIME
Refills: 0 | Status: DISCONTINUED | OUTPATIENT
Start: 2020-02-03 | End: 2020-02-08

## 2020-02-03 RX ORDER — LATANOPROST 0.05 MG/ML
1 SOLUTION/ DROPS OPHTHALMIC; TOPICAL AT BEDTIME
Refills: 0 | Status: DISCONTINUED | OUTPATIENT
Start: 2020-02-03 | End: 2020-02-10

## 2020-02-03 RX ORDER — MAGNESIUM SULFATE 500 MG/ML
2 VIAL (ML) INJECTION ONCE
Refills: 0 | Status: COMPLETED | OUTPATIENT
Start: 2020-02-03 | End: 2020-02-03

## 2020-02-03 RX ADMIN — HYDROMORPHONE HYDROCHLORIDE 0.25 MILLIGRAM(S): 2 INJECTION INTRAMUSCULAR; INTRAVENOUS; SUBCUTANEOUS at 04:30

## 2020-02-03 RX ADMIN — LATANOPROST 1 DROP(S): 0.05 SOLUTION/ DROPS OPHTHALMIC; TOPICAL at 21:16

## 2020-02-03 RX ADMIN — Medication 3 MILLILITER(S): at 01:26

## 2020-02-03 RX ADMIN — Medication 3 MILLILITER(S): at 07:30

## 2020-02-03 RX ADMIN — Medication 20 MILLIGRAM(S): at 13:00

## 2020-02-03 RX ADMIN — Medication 1 MILLIGRAM(S): at 17:22

## 2020-02-03 RX ADMIN — Medication 2 TABLET(S): at 05:39

## 2020-02-03 RX ADMIN — Medication 20 MILLIGRAM(S): at 05:39

## 2020-02-03 RX ADMIN — Medication 3 MILLILITER(S): at 19:58

## 2020-02-03 RX ADMIN — HEPARIN SODIUM 5000 UNIT(S): 5000 INJECTION INTRAVENOUS; SUBCUTANEOUS at 17:22

## 2020-02-03 RX ADMIN — CEFEPIME 100 MILLIGRAM(S): 1 INJECTION, POWDER, FOR SOLUTION INTRAMUSCULAR; INTRAVENOUS at 16:38

## 2020-02-03 RX ADMIN — SODIUM CHLORIDE 4 MILLILITER(S): 9 INJECTION INTRAMUSCULAR; INTRAVENOUS; SUBCUTANEOUS at 13:32

## 2020-02-03 RX ADMIN — HEPARIN SODIUM 5000 UNIT(S): 5000 INJECTION INTRAVENOUS; SUBCUTANEOUS at 05:39

## 2020-02-03 RX ADMIN — Medication 3 MILLILITER(S): at 13:32

## 2020-02-03 RX ADMIN — Medication 12.5 MILLIGRAM(S): at 17:22

## 2020-02-03 RX ADMIN — Medication 20 MILLIGRAM(S): at 21:13

## 2020-02-03 RX ADMIN — Medication 100 GRAM(S): at 18:23

## 2020-02-03 RX ADMIN — HYDROMORPHONE HYDROCHLORIDE 0.25 MILLIGRAM(S): 2 INJECTION INTRAMUSCULAR; INTRAVENOUS; SUBCUTANEOUS at 04:16

## 2020-02-03 RX ADMIN — EZETIMIBE 10 MILLIGRAM(S): 10 TABLET ORAL at 17:23

## 2020-02-03 RX ADMIN — Medication 5 MILLIGRAM(S): at 21:13

## 2020-02-03 RX ADMIN — ATORVASTATIN CALCIUM 20 MILLIGRAM(S): 80 TABLET, FILM COATED ORAL at 21:13

## 2020-02-03 RX ADMIN — Medication 0.25 MILLIGRAM(S): at 05:38

## 2020-02-03 RX ADMIN — Medication 50 GRAM(S): at 05:38

## 2020-02-03 RX ADMIN — Medication 12.5 MILLIGRAM(S): at 12:56

## 2020-02-03 RX ADMIN — SODIUM CHLORIDE 4 MILLILITER(S): 9 INJECTION INTRAMUSCULAR; INTRAVENOUS; SUBCUTANEOUS at 07:34

## 2020-02-03 RX ADMIN — PANTOPRAZOLE SODIUM 40 MILLIGRAM(S): 20 TABLET, DELAYED RELEASE ORAL at 05:39

## 2020-02-03 RX ADMIN — SODIUM CHLORIDE 4 MILLILITER(S): 9 INJECTION INTRAMUSCULAR; INTRAVENOUS; SUBCUTANEOUS at 19:57

## 2020-02-03 NOTE — CHART NOTE - NSCHARTNOTEFT_GEN_A_CORE
Called by RN for 17 beats of vtach noted on remote tele. Pt seen and examined at bedside. Pt found sleeping comfortably. He denies chest pain, palpitations, SOB, N/V.         T(C): 37 (20 @ 12:49), Max: 37.1 (20 @ 04:46)  HR: 103 (20 @ 13:51) (87 - 105)  BP: 112/63 (20 @ 12:49) (112/55 - 125/66)  RR: 20 (20 @ 12:49) (17 - 22)  SpO2: 95% (20 @ 13:51) (90% - 95%)      Physical :  Gen- NAD, ncat  Cardio - s+1,s+2, rrr, no murmur  Lung - cta b/l, no wheeze, no rhonchi, no rales   Abdomen- +BS, NT/ND, no guarding, no rebound, no masses  Ext- no edema, 2+ pulses b/l  Neuro- CN grossly intact, strength 5/5 b/l extrem        LABS:                        10.4   12.58 )-----------( 117      ( 2020 05:51 )             32.1     02-    143  |  112<H>  |  109<H>  ----------------------------<  140<H>  4.9   |  19<L>  |  6.10<H>    Ca    8.5      2020 05:51  Phos  6.3     02-  Mg     1.7     -    TPro  5.1<L>  /  Alb  2.1<L>  /  TBili  0.5  /  DBili  x   /  AST  21  /  ALT  19  /  AlkPhos  103  02-03    PT/INR - ( 2020 12:48 )   PT: 13.7 sec;   INR: 1.22 ratio         PTT - ( 2020 12:48 )  PTT:29.8 sec  Urinalysis Basic - ( 2020 00:16 )    Color: Yellow / Appearance: Slightly Turbid / S.020 / pH: x  Gluc: x / Ketone: Negative  / Bili: Negative / Urobili: Negative   Blood: x / Protein: 100 / Nitrite: Negative   Leuk Esterase: Trace / RBC: 6-10 /HPF / WBC 6-10   Sq Epi: x / Non Sq Epi: Occasional / Bacteria: Few      ABG - ( 2020 05:57 )  pH, Arterial: 7.35  pH, Blood: x     /  pCO2: 36    /  pO2: 70    / HCO3: 20    / Base Excess: -5.5  /  SaO2: 93          CARDIAC MARKERS ( 2020 11:31 )  .074 ng/mL / x     / 53 U/L / x     / 2.7 ng/mL  CARDIAC MARKERS ( 2020 05:51 )  .061 ng/mL / x     / 57 U/L / x     / 3.1 ng/mL        Assessment/Plan  87yo M, with PMH/o CAD s/p CABG (), HTN, HLD, stage III CKD, lung cancer (currently receiving CT every 3 weeks), COPD, and back neoplasm s/p right scapula exploration and partial resection, presenting to the ED with cough productive of yellow sputum, wheezing, and increased SOB x 1 week, admitted for acute hypoxic respiratory failure likely 2/2 PNA in the setting of lung CA, also noted to have GERMAN.    - Multiple episodes of vtach overnight, now with 17 beats  - Cardio Dr. Qureshi following, elevated trops likely 2/2 demand ischemia  - Pt currently hyperphosphatemic  - Mg 1.7, will replete with 1g mag sulfate and f/u AM level  - RN to call with any changes    Plan discussed with attending physician, Dr. Gilbert. Called by RN for 17 beats of vtach noted on remote tele. Pt seen and examined at bedside. Pt found sleeping comfortably. He denies chest pain, palpitations, SOB, N/V.         T(C): 37 (20 @ 12:49), Max: 37.1 (20 @ 04:46)  HR: 103 (20 @ 13:51) (87 - 105)  BP: 112/63 (20 @ 12:49) (112/55 - 125/66)  RR: 20 (20 @ 12:49) (17 - 22)  SpO2: 95% (20 @ 13:51) (90% - 95%)      Physical :  Gen- NAD, ncat  Cardio - s+1,s+2, rrr, no murmur  Lung - cta b/l, no wheeze, no rhonchi, no rales   Abdomen- +BS, NT/ND, no guarding, no rebound, no masses  Ext- no edema, 2+ pulses b/l  Neuro- CN grossly intact, strength 5/5 b/l extrem        LABS:                        10.4   12.58 )-----------( 117      ( 2020 05:51 )             32.1     02-    143  |  112<H>  |  109<H>  ----------------------------<  140<H>  4.9   |  19<L>  |  6.10<H>    Ca    8.5      2020 05:51  Phos  6.3     02-  Mg     1.7     -    TPro  5.1<L>  /  Alb  2.1<L>  /  TBili  0.5  /  DBili  x   /  AST  21  /  ALT  19  /  AlkPhos  103  02-03    PT/INR - ( 2020 12:48 )   PT: 13.7 sec;   INR: 1.22 ratio         PTT - ( 2020 12:48 )  PTT:29.8 sec  Urinalysis Basic - ( 2020 00:16 )    Color: Yellow / Appearance: Slightly Turbid / S.020 / pH: x  Gluc: x / Ketone: Negative  / Bili: Negative / Urobili: Negative   Blood: x / Protein: 100 / Nitrite: Negative   Leuk Esterase: Trace / RBC: 6-10 /HPF / WBC 6-10   Sq Epi: x / Non Sq Epi: Occasional / Bacteria: Few      ABG - ( 2020 05:57 )  pH, Arterial: 7.35  pH, Blood: x     /  pCO2: 36    /  pO2: 70    / HCO3: 20    / Base Excess: -5.5  /  SaO2: 93          CARDIAC MARKERS ( 2020 11:31 )  .074 ng/mL / x     / 53 U/L / x     / 2.7 ng/mL  CARDIAC MARKERS ( 2020 05:51 )  .061 ng/mL / x     / 57 U/L / x     / 3.1 ng/mL        Assessment/Plan  85yo M, with PMH/o CAD s/p CABG (), HTN, HLD, stage III CKD, lung cancer (currently receiving CT every 3 weeks), COPD, and back neoplasm s/p right scapula exploration and partial resection, presenting to the ED with cough productive of yellow sputum, wheezing, and increased SOB x 1 week, admitted for acute hypoxic respiratory failure likely 2/2 PNA in the setting of lung CA, also noted to have GERMAN.    - Multiple episodes of vtach overnight, now with 17 beats  - Cardio Dr. Qureshi following, elevated trops likely 2/2 demand ischemia  - Pt currently hyperphosphatemic  - Mg 1.7, will replete with 1g mag sulfate and f/u AM level, goal >2  - RN to call with any changes    Plan discussed with attending physician, Dr. Gilbert.

## 2020-02-03 NOTE — PROGRESS NOTE ADULT - ASSESSMENT
Acute on CKD Stage 3  Metabolic Acidosis  Hyperkalemia  Sepsis/PNA  Dehydration      -GERMAN is likely due to dehydration along with septic ATN  -FeUrea 31%  -Renal sonogram reviewed showing no evidence of Hydro, but echogenic kidneys noted  -1/2NS+75meq sodium bicarb, changed to 1/2 NS  -S/P Hyperkalemic cocktail with improvement in potassium levels  -Abx, renal dosing  -Monitor urine output, strict I&Os  -No acute indication for hemodialysis  -Creatinine improved with IVF,  Will need strict I&OS   -Cont IVF 1 additional day, will consider albumin and lasix based on clinical picture in AM and UOP    d/w resident    Thank you

## 2020-02-03 NOTE — DIETITIAN INITIAL EVALUATION ADULT. - PROBLEM SELECTOR PLAN 3
-Cr on admission elevated at 6.3 (baseline 1.3-2.5 in 2019)   -consult nephro:   - renal US reviewed  -Dr. Delphine Brooks (nephro) consulted, f/u recs  -renal diet

## 2020-02-03 NOTE — PROGRESS NOTE ADULT - PROBLEM SELECTOR PLAN 8
- DVT ppx: heparin 5000 q 12     BB IMPROVE VTE Individual Risk Assessment        RISK                                                          Points  [  ] Previous VTE                                                3  [  ] Thrombophilia                                             2  [  ] Lower limb paralysis                                   2        (unable to hold up >15 seconds)    [ x ] Current Cancer                                            2         (within 6 months)  [  ] Immobilization > 24 hrs                              1  [  ] ICU/CCU stay > 24 hours                            1  [  x] Age > 60                                                    1    IMPROVE VTE Score: 3

## 2020-02-03 NOTE — CONSULT NOTE ADULT - PROBLEM SELECTOR RECOMMENDATION 9
resp distress - lung ca with mets - progressive disease - multifocal pna -   CKD and GERMAN - Dehydration - Dec PO Intake - IVF - Renal to see the patient - replete lytes and serial renal indices - avoid nephrotoxins  cachexia and mets ca progression - hospice appropriate - will refer to Hospice eval  broad spectrum ABX - multifocal PNA - poss Aspiration event  COPD - Emphysema - NEBS and Steroids  pt was on Keytruda in the past - currently off - as per Family - Follows with Dr. ADAMSON Onc in [place] Office -   prognosis is very poor - pt is DNR DNI  discussed plan of care with family  CT chest reviewed  Dilaudid 0.25 mg IVP prn for resp distress - suffering - dyspnea - pain - palliative measures
story consistent and pt improving on current therapy. Concur with current abx but will assess for need for anti-pseudomonal beta-lactam and further recs to follow regarding duration and potential timing of transition to PO abx.

## 2020-02-03 NOTE — DIETITIAN INITIAL EVALUATION ADULT. - PROBLEM SELECTOR PLAN 4
-K 5.4 on admission likely 2/2 GERMAN on CKD stage 3   -insulin, dextrose, kayexalate  -nephrology consulted.  -repeat K at 5PM  -no EKG changes concerning for hyperkalemia

## 2020-02-03 NOTE — PROGRESS NOTE ADULT - PROBLEM SELECTOR PLAN 1
hospice eval pending - appropriate for [facility]osp[facility] at home and [facility]  Overnight events noted - Cardio eval - pt is DNR - hospice appropriate -    resp distress - lung ca with mets - progressive disease - multifocal pna -   CKD and GERMAN - Dehydration - Dec PO Intake - IVF -   Renal EVAL noted - replete lytes and serial renal indices - avoid nephrotoxins  cachexia and mets ca progression - hospice appropriate - will refer to Hospice eval  broad spectrum ABX - multifocal PNA - poss Aspiration event  COPD - Emphysema - NEBS and Steroids - Hypertonic Saline NEBS  pt was on Keytruda in the past - currently off - as per Family - Follows with Dr. ADAMSON Onc in [place] Office -   prognosis is very poor - pt is DNR DNI  discussed plan of care with family  CT chest reviewed  Dilaudid 0.25 mg IVP prn for resp distress - suffering - dyspnea - pain - palliative measures.

## 2020-02-03 NOTE — DIETITIAN INITIAL EVALUATION ADULT. - OTHER INFO
Pt admit with Pn/ GERMAN, hx lung ca with mets, on chemo. Per EMR, disease progressing, appropriate  for hospice. Hospice eval pending. TakesEnsure at home, but decreased intake of food and supplement. Was admitted last year and identified with malnutrition; pt with another 14# wt loss over last year, severe fat loss triceps/ribs, severe muscle loss quads, deltoids/scapula.Prefers to try chopped foods with loose teeth. agreeable to po supplements. Snacks added, food preferences obtained. Pt states on marinol at home with good results;last BM 5 days ago-takes dulcolax at home. (md aware of pt request for marinol/dulcolax). Recommend liberalize diet to soft low K+ to improve po intake,only took 25% of breakfast this am.

## 2020-02-03 NOTE — CONSULT NOTE ADULT - SUBJECTIVE AND OBJECTIVE BOX
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 Pt states he has baseline HADLEY, but dyspea has increased in the last week. Of note he is in week 6 of chemo treatment keytruda, gets chemo every 3 weeks, tolerating chemo appropriately. Pt also has been urinating less for the last 4 days. Pt denies ever having dialysis. Pt denies fever, chills, chest pain, orthopnea, dysuria, hematuria, diarrhea, melena, hematochezia.      ED vitals: T: 96.8, HR: 99, BP: 108/58, RR: 18, O2 Saturation: 96% on 2L  Pt recieved albuterol neb x 3, ipratropium x 1 for neb, solumedrol injectable  mg x1, kayexalate 30 mg x1, 2L NS   Labs significant for: WBC: 18.45 w/ left shift, Lactate: 2.5, Potassium 5.4, Chloride: 109, CO2: 19, BUN/Cr: 109/6.3, Albumin: 2.7, Alk Phos: 164, ProCal: 4.5   EKG: sinus tachycardia 106 bpm  CXR: R sided PNA    Interval History: Patient seen and examined at bedside. Family not at bedside. Patient is a poor historian. Patient states he follows Dr. Ervin, unsure when he was last seen. Patient denies any pain or SOB at this time. Echocardiography performed on 9/15/18 revealed moderate aortic stenosis with satisfactory left ventricular systolic function (estimated ejection fraction 55%). Pharmacological nuclear stress testing performed on 18 revealed evidence for an inferior infarction, however, no significant myocardial ischemia was demonstrated. Overnight, patient had multiple episodes of vtach, with the longest at 20beats of vtach at 4:30AM. EKG performed at the time was unchanged from that on admission. Cardiac enzymes were drawn and trop was found to be elevated at .061. Patient asymptomatic at the time.       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       MEDICATIONS  (STANDING):  albuterol/ipratropium for Nebulization 3 milliLiter(s) Nebulizer every 6 hours  cefepime   IVPB 500 milliGRAM(s) IV Intermittent every 24 hours  heparin  Injectable 5000 Unit(s) SubCutaneous every 12 hours  methylPREDNISolone sodium succinate Injectable 20 milliGRAM(s) IV Push every 8 hours  pantoprazole    Tablet 40 milliGRAM(s) Oral before breakfast  sodium chloride 0.45% 1000 milliLiter(s) (75 mL/Hr) IV Continuous <Continuous>  sodium chloride 3%  Inhalation 4 milliLiter(s) Inhalation every 6 hours    MEDICATIONS  (PRN):  guaiFENesin   Syrup  (Sugar-Free) 200 milliGRAM(s) Oral every 6 hours PRN Cough  HYDROmorphone  Injectable 0.25 milliGRAM(s) IV Push every 4 hours PRN pain, distress, suffering, dyspnea, palliative measures      FAMILY HISTORY:  No pertinent family history in first degree relatives  Denies Family history of CAD or early MI      Constitutional: denies fever, chills  Respiratory: denies SOB, HADLEY, cough  Cardiovascular: denies CP, palpitations, orthopnea, PND, LE edema  Gastrointestinal: denies nausea, vomiting, abdominal pain  Genitourinary: denies urinary changes  Neurologic: denies headache, weakness, dizziness  Hematology/Oncology: admits easy bruising 2/2 steroid use      SOCIAL HISTORY:  Quit smoking , smoked 3 ppd 25 years (75 pack years)   Lives with wife, performs all ADLs, uses walker and cane    Vital Signs Last 24 Hrs  T(C): 37.1 (2020 04:46), Max: 37.1 (2020 04:46)  T(F): 98.7 (2020 04:46), Max: 98.7 (2020 04:46)  HR: 92 (2020 04:46) (87 - 99)  BP: 116/68 (2020 04:46) (108/58 - 125/66)  BP(mean): --  RR: 19 (2020 04:46) (17 - 22)  SpO2: 94% (2020 04:46) (84% - 96%)    Physical Exam:  General: Well developed, well nourished, NAD  HEENT: NCAT, PERRLA, EOMI bl,  Neck: Supple, nontender, no mass  Neurology: A&Ox3, nonfocal, sensation intact   Respiratory: CTA B/L, No W/R/R  CV: RRR, +S1/S2, no murmurs, rubs or gallops  Abdominal: Soft, NT, ND +BSx4, no palpable masses  Extremities: No C/C/E, + peripheral pulses  Heme: diffuse ecchymosis      I&O's Detail      LABS:                        10.4   12.58 )-----------( 117      ( 2020 05:51 )             32.1     02-03    143  |  112<H>  |  109<H>  ----------------------------<  140<H>  4.9   |  19<L>  |  6.10<H>    Ca    8.5      2020 05:51  Phos  6.3     02-03  Mg     1.7     02-03    TPro  5.1<L>  /  Alb  2.1<L>  /  TBili  0.5  /  DBili  x   /  AST  21  /  ALT  19  /  AlkPhos  103  02-03    CARDIAC MARKERS ( 2020 05:51 )  .061 ng/mL / x     / 57 U/L / x     / 3.1 ng/mL      PT/INR - ( 2020 12:48 )   PT: 13.7 sec;   INR: 1.22 ratio         PTT - ( 2020 12:48 )  PTT:29.8 sec  Urinalysis Basic - ( 2020 00:16 )    Color: Yellow / Appearance: Slightly Turbid / S.020 / pH: x  Gluc: x / Ketone: Negative  / Bili: Negative / Urobili: Negative   Blood: x / Protein: 100 / Nitrite: Negative   Leuk Esterase: Trace / RBC: 6-10 /HPF / WBC 6-10   Sq Epi: x / Non Sq Epi: Occasional / Bacteria: Few      I&O's Summary    BNP  RADIOLOGY & ADDITIONAL STUDIES: INCOMPLETE      Patient is a 86y old  Male who presents with a chief complaint of shortness of breath (2020 07:15)    HPI:  86 year old male PMH coronary artery disease s/p CABG , HLD, HTN, CKD stage III, 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 Pt states he has baseline HADLEY, but dyspea has increased in the last week. Of note he is in week 6 of chemo treatment keytruda, gets chemo every 3 weeks, tolerating chemo appropriately. Pt also has been urinating less for the last 4 days. Pt denies ever having dialysis. Pt denies fever, chills, chest pain, orthopnea, dysuria, hematuria, diarrhea, melena, hematochezia.      ED vitals: T: 96.8, HR: 99, BP: 108/58, RR: 18, O2 Saturation: 96% on 2L  Pt recieved albuterol neb x 3, ipratropium x 1 for neb, solumedrol injectable  mg x1, kayexalate 30 mg x1, 2L NS   Labs significant for: WBC: 18.45 w/ left shift, Lactate: 2.5, Potassium 5.4, Chloride: 109, CO2: 19, BUN/Cr: 109/6.3, Albumin: 2.7, Alk Phos: 164, ProCal: 4.5   EKG: sinus tachycardia 106 bpm  CXR: R sided PNA    Interval History: Patient seen and examined at bedside. Family not at bedside. Patient is a poor historian. Patient states he follows Dr. Ervin, unsure when he was last seen. Patient denies any pain or SOB at this time. Echocardiography performed on 9/15/18 revealed moderate aortic stenosis with satisfactory left ventricular systolic function (estimated ejection fraction 55%). Pharmacological nuclear stress testing performed on 18 revealed evidence for an inferior infarction, however, no significant myocardial ischemia was demonstrated. Overnight, patient had multiple episodes of vtach, with the longest at 20beats of vtach at 4:00AM. EKG performed at the time was unchanged from that on admission. Cardiac enzymes were drawn and trop was found to be elevated at .061. Patient asymptomatic at the time.       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       MEDICATIONS  (STANDING):  albuterol/ipratropium for Nebulization 3 milliLiter(s) Nebulizer every 6 hours  cefepime   IVPB 500 milliGRAM(s) IV Intermittent every 24 hours  heparin  Injectable 5000 Unit(s) SubCutaneous every 12 hours  methylPREDNISolone sodium succinate Injectable 20 milliGRAM(s) IV Push every 8 hours  pantoprazole    Tablet 40 milliGRAM(s) Oral before breakfast  sodium chloride 0.45% 1000 milliLiter(s) (75 mL/Hr) IV Continuous <Continuous>  sodium chloride 3%  Inhalation 4 milliLiter(s) Inhalation every 6 hours    MEDICATIONS  (PRN):  guaiFENesin   Syrup  (Sugar-Free) 200 milliGRAM(s) Oral every 6 hours PRN Cough  HYDROmorphone  Injectable 0.25 milliGRAM(s) IV Push every 4 hours PRN pain, distress, suffering, dyspnea, palliative measures      FAMILY HISTORY:  No pertinent family history in first degree relatives  Denies Family history of CAD or early MI      Constitutional: denies fever, chills  Respiratory: denies SOB, HADLEY, cough  Cardiovascular: denies CP, palpitations, orthopnea, PND, LE edema  Gastrointestinal: denies nausea, vomiting, abdominal pain  Genitourinary: denies urinary changes  Neurologic: denies headache, weakness, dizziness  Hematology/Oncology: admits easy bruising 2/2 steroid use      SOCIAL HISTORY:  Quit smoking , smoked 3 ppd 25 years (75 pack years)   Lives with wife, performs all ADLs, uses walker and cane    Vital Signs Last 24 Hrs  T(C): 37.1 (2020 04:46), Max: 37.1 (2020 04:46)  T(F): 98.7 (2020 04:46), Max: 98.7 (2020 04:46)  HR: 92 (2020 04:46) (87 - 99)  BP: 116/68 (2020 04:46) (108/58 - 125/66)  BP(mean): --  RR: 19 (2020 04:46) (17 - 22)  SpO2: 94% (2020 04:46) (84% - 96%)    Physical Exam:  General: Well developed, well nourished, NAD  HEENT: NCAT, PERRLA, EOMI bl,  Neck: Supple, nontender, no mass  Neurology: A&Ox3, nonfocal, sensation intact   Respiratory: CTA B/L, No W/R/R  CV: RRR, +S1/S2, no murmurs, rubs or gallops  Abdominal: Soft, NT, ND +BSx4, no palpable masses  Extremities: No C/C/E, + peripheral pulses  Heme: diffuse ecchymosis      I&O's Detail      LABS:                        10.4   12.58 )-----------( 117      ( 2020 05:51 )             32.1     02-03    143  |  112<H>  |  109<H>  ----------------------------<  140<H>  4.9   |  19<L>  |  6.10<H>    Ca    8.5      2020 05:51  Phos  6.3     02-03  Mg     1.7     02-03    TPro  5.1<L>  /  Alb  2.1<L>  /  TBili  0.5  /  DBili  x   /  AST  21  /  ALT  19  /  AlkPhos  103  02-03    CARDIAC MARKERS ( 2020 05:51 )  .061 ng/mL / x     / 57 U/L / x     / 3.1 ng/mL      PT/INR - ( 2020 12:48 )   PT: 13.7 sec;   INR: 1.22 ratio         PTT - ( 2020 12:48 )  PTT:29.8 sec  Urinalysis Basic - ( 2020 00:16 )    Color: Yellow / Appearance: Slightly Turbid / S.020 / pH: x  Gluc: x / Ketone: Negative  / Bili: Negative / Urobili: Negative   Blood: x / Protein: 100 / Nitrite: Negative   Leuk Esterase: Trace / RBC: 6-10 /HPF / WBC 6-10   Sq Epi: x / Non Sq Epi: Occasional / Bacteria: Few      I&O's Summary    BNP  RADIOLOGY & ADDITIONAL STUDIES: Patient is a 86y old  Male who presents with a chief complaint of shortness of breath (2020 07:15)    HPI:  86 year old male PMH coronary artery disease s/p CABG , HLD, HTN, CKD stage III, 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 Pt states he has baseline HADLEY, but dyspea has increased in the last week. Of note he is in week 6 of chemo treatment keytruda, gets chemo every 3 weeks, tolerating chemo appropriately. Pt also has been urinating less for the last 4 days. Pt denies ever having dialysis. Pt denies fever, chills, chest pain, orthopnea, dysuria, hematuria, diarrhea, melena, hematochezia.      ED vitals: T: 96.8, HR: 99, BP: 108/58, RR: 18, O2 Saturation: 96% on 2L  Pt recieved albuterol neb x 3, ipratropium x 1 for neb, solumedrol injectable  mg x1, kayexalate 30 mg x1, 2L NS   Labs significant for: WBC: 18.45 w/ left shift, Lactate: 2.5, Potassium 5.4, Chloride: 109, CO2: 19, BUN/Cr: 109/6.3, Albumin: 2.7, Alk Phos: 164, ProCal: 4.5   EKG: sinus tachycardia 106 bpm  CXR: R sided PNA    Interval History: Patient seen and examined at bedside. Family not at bedside. Patient is a poor historian. Patient states he follows Dr. Ervin, unsure when he was last seen. Patient denies any pain or SOB at this time. Echocardiography performed on 9/15/18 revealed moderate aortic stenosis with satisfactory left ventricular systolic function (estimated ejection fraction 55%). Pharmacological nuclear stress testing performed on 18 revealed evidence for an inferior infarction, however, no significant myocardial ischemia was demonstrated. Overnight, patient had multiple episodes of vtach, with the longest at 20beats of vtach at 4:00AM. EKG performed at the time was unchanged from that on admission. Cardiac enzymes were drawn and trop was found to be elevated at .061. Patient asymptomatic at the time.       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       MEDICATIONS  (STANDING):  albuterol/ipratropium for Nebulization 3 milliLiter(s) Nebulizer every 6 hours  cefepime   IVPB 500 milliGRAM(s) IV Intermittent every 24 hours  heparin  Injectable 5000 Unit(s) SubCutaneous every 12 hours  methylPREDNISolone sodium succinate Injectable 20 milliGRAM(s) IV Push every 8 hours  pantoprazole    Tablet 40 milliGRAM(s) Oral before breakfast  sodium chloride 0.45% 1000 milliLiter(s) (75 mL/Hr) IV Continuous <Continuous>  sodium chloride 3%  Inhalation 4 milliLiter(s) Inhalation every 6 hours    MEDICATIONS  (PRN):  guaiFENesin   Syrup  (Sugar-Free) 200 milliGRAM(s) Oral every 6 hours PRN Cough  HYDROmorphone  Injectable 0.25 milliGRAM(s) IV Push every 4 hours PRN pain, distress, suffering, dyspnea, palliative measures      FAMILY HISTORY:  No pertinent family history in first degree relatives  Denies Family history of CAD or early MI      Constitutional: denies fever, chills  Respiratory: denies SOB, HADLEY, cough  Cardiovascular: denies CP, palpitations, orthopnea, PND, LE edema  Gastrointestinal: denies nausea, vomiting, abdominal pain  Genitourinary: denies urinary changes  Neurologic: denies headache, weakness, dizziness  Hematology/Oncology: admits easy bruising 2/2 steroid use      SOCIAL HISTORY:  Quit smoking , smoked 3 ppd 25 years (75 pack years)   Lives with wife, performs all ADLs, uses walker and cane    Vital Signs Last 24 Hrs  T(C): 37.1 (2020 04:46), Max: 37.1 (2020 04:46)  T(F): 98.7 (2020 04:46), Max: 98.7 (2020 04:46)  HR: 92 (2020 04:46) (87 - 99)  BP: 116/68 (2020 04:46) (108/58 - 125/66)  BP(mean): --  RR: 19 (2020 04:46) (17 - 22)  SpO2: 94% (2020 04:46) (84% - 96%)    Physical Exam:  General: Well developed, well nourished, NAD  HEENT: NCAT, PERRLA, EOMI bl,  Neck: Supple, nontender, no mass  Neurology: A&Ox3, nonfocal, sensation intact   Respiratory: CTA B/L, No W/R/R  CV: RRR, +S1/S2, no murmurs, rubs or gallops  Abdominal: Soft, NT, ND +BSx4, no palpable masses  Extremities: No C/C/E, + peripheral pulses  Heme: diffuse ecchymosis      I&O's Detail      LABS:                        10.4   12.58 )-----------( 117      ( 2020 05:51 )             32.1     02-03    143  |  112<H>  |  109<H>  ----------------------------<  140<H>  4.9   |  19<L>  |  6.10<H>    Ca    8.5      2020 05:51  Phos  6.3     02-03  Mg     1.7     02-03    TPro  5.1<L>  /  Alb  2.1<L>  /  TBili  0.5  /  DBili  x   /  AST  21  /  ALT  19  /  AlkPhos  103  02-03    CARDIAC MARKERS ( 2020 05:51 )  .061 ng/mL / x     / 57 U/L / x     / 3.1 ng/mL      PT/INR - ( 2020 12:48 )   PT: 13.7 sec;   INR: 1.22 ratio         PTT - ( 2020 12:48 )  PTT:29.8 sec  Urinalysis Basic - ( 2020 00:16 )    Color: Yellow / Appearance: Slightly Turbid / S.020 / pH: x  Gluc: x / Ketone: Negative  / Bili: Negative / Urobili: Negative   Blood: x / Protein: 100 / Nitrite: Negative   Leuk Esterase: Trace / RBC: 6-10 /HPF / WBC 6-10   Sq Epi: x / Non Sq Epi: Occasional / Bacteria: Few      I&O's Summary    BNP  RADIOLOGY & ADDITIONAL STUDIES: Patient is a 86y old  Male who presents with a chief complaint of shortness of breath (2020 07:15)    HPI:  86 year old male PMH coronary artery disease s/p CABG , HLD, HTN, CKD stage III, 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 Pt states he has baseline HADLEY, but dyspea has increased in the last week. Of note he is in week 6 of chemo treatment keytruda, gets chemo every 3 weeks, tolerating chemo appropriately. Pt also has been urinating less for the last 4 days. Pt denies ever having dialysis. Pt denies fever, chills, chest pain, orthopnea, dysuria, hematuria, diarrhea, melena, hematochezia.      ED vitals: T: 96.8, HR: 99, BP: 108/58, RR: 18, O2 Saturation: 96% on 2L  Pt recieved albuterol neb x 3, ipratropium x 1 for neb, solumedrol injectable  mg x1, kayexalate 30 mg x1, 2L NS   Labs significant for: WBC: 18.45 w/ left shift, Lactate: 2.5, Potassium 5.4, Chloride: 109, CO2: 19, BUN/Cr: 109/6.3, Albumin: 2.7, Alk Phos: 164, ProCal: 4.5   EKG: sinus tachycardia 106 bpm  CXR: R sided PNA    Interval History: Patient seen and examined at bedside. Family not at bedside. Patient is a poor historian. Patient states he follows Dr. Ervin, unsure when he was last seen. Patient denies any pain or SOB at this time. Echocardiography performed on 9/15/18 revealed moderate aortic stenosis with satisfactory left ventricular systolic function (estimated ejection fraction 55%). Pharmacological nuclear stress testing performed on 18 revealed evidence for an inferior infarction, however, no significant myocardial ischemia was demonstrated. Overnight, patient had multiple episodes of vtach, with the longest at 20beats of vtach at 4:00AM. EKG performed at the time was unchanged from that on admission. Cardiac enzymes were drawn and trop was found to be elevated at .061. Patient asymptomatic at the time.       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       MEDICATIONS  (STANDING):  albuterol/ipratropium for Nebulization 3 milliLiter(s) Nebulizer every 6 hours  cefepime   IVPB 500 milliGRAM(s) IV Intermittent every 24 hours  heparin  Injectable 5000 Unit(s) SubCutaneous every 12 hours  methylPREDNISolone sodium succinate Injectable 20 milliGRAM(s) IV Push every 8 hours  pantoprazole    Tablet 40 milliGRAM(s) Oral before breakfast  sodium chloride 0.45% 1000 milliLiter(s) (75 mL/Hr) IV Continuous <Continuous>  sodium chloride 3%  Inhalation 4 milliLiter(s) Inhalation every 6 hours    MEDICATIONS  (PRN):  guaiFENesin   Syrup  (Sugar-Free) 200 milliGRAM(s) Oral every 6 hours PRN Cough  HYDROmorphone  Injectable 0.25 milliGRAM(s) IV Push every 4 hours PRN pain, distress, suffering, dyspnea, palliative measures      FAMILY HISTORY:  No pertinent family history in first degree relatives  Denies Family history of CAD or early MI      Constitutional: denies fever, chills  Respiratory: denies SOB, HADLEY, cough  Cardiovascular: denies CP, palpitations, orthopnea, PND, LE edema  Gastrointestinal: denies nausea, vomiting, abdominal pain  Genitourinary: denies urinary changes  Neurologic: denies headache, weakness, dizziness  Hematology/Oncology: admits easy bruising 2/2 steroid use      SOCIAL HISTORY:  Quit smoking , smoked 3 ppd 25 years (75 pack years)   Lives with wife, performs all ADLs, uses walker and cane    Vital Signs Last 24 Hrs  T(C): 37.1 (2020 04:46), Max: 37.1 (2020 04:46)  T(F): 98.7 (2020 04:46), Max: 98.7 (2020 04:46)  HR: 92 (2020 04:46) (87 - 99)  BP: 116/68 (2020 04:46) (108/58 - 125/66)  BP(mean): --  RR: 19 (2020 04:46) (17 - 22)  SpO2: 94% (2020 04:46) (84% - 96%)    Physical Exam:  General: Well developed, well nourished, NAD  HEENT: NCAT, PERRLA, EOMI bl,  Neck: Supple, nontender, no mass  Neurology: A&Ox3, nonfocal, sensation intact   Respiratory: CTA B/L, No W/R/R  CV: RRR, +S1/S2, +systolic murmur heard at RSB  Abdominal: Soft, NT, ND +BSx4, no palpable masses  Extremities: No C/C/E, + peripheral pulses  Heme: diffuse ecchymosis      I&O's Detail      LABS:                        10.4   12.58 )-----------( 117      ( 2020 05:51 )             32.1     02-03    143  |  112<H>  |  109<H>  ----------------------------<  140<H>  4.9   |  19<L>  |  6.10<H>    Ca    8.5      2020 05:51  Phos  6.3     02-03  Mg     1.7     02-03    TPro  5.1<L>  /  Alb  2.1<L>  /  TBili  0.5  /  DBili  x   /  AST  21  /  ALT  19  /  AlkPhos  103  02-03    CARDIAC MARKERS ( 2020 05:51 )  .061 ng/mL / x     / 57 U/L / x     / 3.1 ng/mL      PT/INR - ( 2020 12:48 )   PT: 13.7 sec;   INR: 1.22 ratio         PTT - ( 2020 12:48 )  PTT:29.8 sec  Urinalysis Basic - ( 2020 00:16 )    Color: Yellow / Appearance: Slightly Turbid / S.020 / pH: x  Gluc: x / Ketone: Negative  / Bili: Negative / Urobili: Negative   Blood: x / Protein: 100 / Nitrite: Negative   Leuk Esterase: Trace / RBC: 6-10 /HPF / WBC 6-10   Sq Epi: x / Non Sq Epi: Occasional / Bacteria: Few      I&O's Summary    BNP  RADIOLOGY & ADDITIONAL STUDIES: Patient is a 86y old  Male who presents with a chief complaint of shortness of breath (2020 07:15)    HPI:  86 year old male PMH coronary artery disease s/p CABG , HLD, HTN, CKD stage III, 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 Pt states he has baseline HADLEY, but dyspea has increased in the last week. Of note he is in week 6 of chemo treatment keytruda, gets chemo every 3 weeks, tolerating chemo appropriately. Pt also has been urinating less for the last 4 days. Pt denies ever having dialysis. Pt denies fever, chills, chest pain, orthopnea, dysuria, hematuria, diarrhea, melena, hematochezia.      ED vitals: T: 96.8, HR: 99, BP: 108/58, RR: 18, O2 Saturation: 96% on 2L  Pt recieved albuterol neb x 3, ipratropium x 1 for neb, solumedrol injectable  mg x1, kayexalate 30 mg x1, 2L NS   Labs significant for: WBC: 18.45 w/ left shift, Lactate: 2.5, Potassium 5.4, Chloride: 109, CO2: 19, BUN/Cr: 109/6.3, Albumin: 2.7, Alk Phos: 164, ProCal: 4.5   EKG: sinus tachycardia 106 bpm  CXR: R sided PNA    Interval History: Patient seen and examined at bedside. Family not at bedside. Patient is a poor historian. Patient states he follows Dr. Ervin, unsure when he was last seen. Patient denies any pain or SOB at this time. Echocardiography performed on 9/15/18 revealed moderate aortic stenosis with satisfactory left ventricular systolic function (estimated ejection fraction 55%). Pharmacological nuclear stress testing performed on 18 revealed evidence for an inferior infarction, however, no significant myocardial ischemia was demonstrated. Overnight, patient had multiple episodes of vtach, with the longest at 20beats of vtach at 4:00AM. EKG performed at the time was unchanged from that on admission. Cardiac enzymes were drawn and trop was found to be elevated at .061. Patient asymptomatic at the time.       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       MEDICATIONS  (STANDING):  albuterol/ipratropium for Nebulization 3 milliLiter(s) Nebulizer every 6 hours  cefepime   IVPB 500 milliGRAM(s) IV Intermittent every 24 hours  heparin  Injectable 5000 Unit(s) SubCutaneous every 12 hours  methylPREDNISolone sodium succinate Injectable 20 milliGRAM(s) IV Push every 8 hours  pantoprazole    Tablet 40 milliGRAM(s) Oral before breakfast  sodium chloride 0.45% 1000 milliLiter(s) (75 mL/Hr) IV Continuous <Continuous>  sodium chloride 3%  Inhalation 4 milliLiter(s) Inhalation every 6 hours    MEDICATIONS  (PRN):  guaiFENesin   Syrup  (Sugar-Free) 200 milliGRAM(s) Oral every 6 hours PRN Cough  HYDROmorphone  Injectable 0.25 milliGRAM(s) IV Push every 4 hours PRN pain, distress, suffering, dyspnea, palliative measures      FAMILY HISTORY:  No pertinent family history in first degree relatives  Denies Family history of CAD or early MI      Constitutional: denies fever, chills  Respiratory: denies SOB, HADLEY, cough  Cardiovascular: denies CP, palpitations, orthopnea, PND, LE edema  Gastrointestinal: denies nausea, vomiting, abdominal pain  Genitourinary: denies urinary changes  Neurologic: denies headache, weakness, dizziness  Hematology/Oncology: admits easy bruising 2/2 steroid use      SOCIAL HISTORY:  Quit smoking , smoked 3 ppd 25 years (75 pack years)   Lives with wife, performs all ADLs, uses walker and cane    Vital Signs Last 24 Hrs  T(C): 37.1 (2020 04:46), Max: 37.1 (2020 04:46)  T(F): 98.7 (2020 04:46), Max: 98.7 (2020 04:46)  HR: 92 (2020 04:46) (87 - 99)  BP: 116/68 (2020 04:46) (108/58 - 125/66)  BP(mean): --  RR: 19 (2020 04:46) (17 - 22)  SpO2: 94% (2020 04:46) (84% - 96%)    Physical Exam:  General: Well developed, well nourished, NAD  HEENT: NCAT, PERRLA, EOMI bl,  Neck: Supple, nontender, no mass  Neurology: A&Ox3, nonfocal, sensation intact   Respiratory: +decreased BS B/L, diffuse rhonchi R>L  CV: RRR, +S1/S2, +systolic murmur heard at RSB  Abdominal: Soft, NT, ND +BSx4, no palpable masses  Extremities: No C/C/E, + peripheral pulses  Heme: diffuse ecchymosis      I&O's Detail      LABS:                        10.4   12.58 )-----------( 117      ( 2020 05:51 )             32.1     02-03    143  |  112<H>  |  109<H>  ----------------------------<  140<H>  4.9   |  19<L>  |  6.10<H>    Ca    8.5      2020 05:51  Phos  6.3     02-03  Mg     1.7     02-03    TPro  5.1<L>  /  Alb  2.1<L>  /  TBili  0.5  /  DBili  x   /  AST  21  /  ALT  19  /  AlkPhos  103  02-03    CARDIAC MARKERS ( 2020 05:51 )  .061 ng/mL / x     / 57 U/L / x     / 3.1 ng/mL      PT/INR - ( 2020 12:48 )   PT: 13.7 sec;   INR: 1.22 ratio         PTT - ( 2020 12:48 )  PTT:29.8 sec  Urinalysis Basic - ( 2020 00:16 )    Color: Yellow / Appearance: Slightly Turbid / S.020 / pH: x  Gluc: x / Ketone: Negative  / Bili: Negative / Urobili: Negative   Blood: x / Protein: 100 / Nitrite: Negative   Leuk Esterase: Trace / RBC: 6-10 /HPF / WBC 6-10   Sq Epi: x / Non Sq Epi: Occasional / Bacteria: Few      I&O's Summary    BNP  RADIOLOGY & ADDITIONAL STUDIES: Patient is a 86y old  Male who presents with a chief complaint of shortness of breath (2020 07:15)    HPI:  86 year old male PMH coronary artery disease s/p CABG , HLD, HTN, CKD stage III, 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 Pt states he has baseline HADLEY, but dyspea has increased in the last week. Of note he is in week 6 of chemo treatment keytruda, gets chemo every 3 weeks, tolerating chemo appropriately. Pt also has been urinating less for the last 4 days. Pt denies ever having dialysis. Pt denies fever, chills, chest pain, orthopnea, dysuria, hematuria, diarrhea, melena, hematochezia.      ED vitals: T: 96.8, HR: 99, BP: 108/58, RR: 18, O2 Saturation: 96% on 2L  Pt recieved albuterol neb x 3, ipratropium x 1 for neb, solumedrol injectable  mg x1, kayexalate 30 mg x1, 2L NS   Labs significant for: WBC: 18.45 w/ left shift, Lactate: 2.5, Potassium 5.4, Chloride: 109, CO2: 19, BUN/Cr: 109/6.3, Albumin: 2.7, Alk Phos: 164, ProCal: 4.5   EKG: sinus tachycardia 106 bpm  CXR: R sided PNA    Interval History: Patient seen and examined at bedside. Family not at bedside. Patient is a poor historian. Patient states he follows Dr. Ervin, unsure when he was last seen. Patient denies any pain or SOB at this time. Echocardiography performed on 9/15/18 revealed moderate aortic stenosis with satisfactory left ventricular systolic function (estimated ejection fraction 55%). Pharmacological nuclear stress testing performed on 18 revealed evidence for an inferior infarction, however, no significant myocardial ischemia was demonstrated. Overnight, patient had multiple episodes of vtach, with the longest at 20beats of vtach at 4:00AM. EKG performed at the time was unchanged from that on admission. Cardiac enzymes were drawn and trop was found to be elevated at .061. Patient asymptomatic at the time.       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       MEDICATIONS  (STANDING):  albuterol/ipratropium for Nebulization 3 milliLiter(s) Nebulizer every 6 hours  cefepime   IVPB 500 milliGRAM(s) IV Intermittent every 24 hours  heparin  Injectable 5000 Unit(s) SubCutaneous every 12 hours  methylPREDNISolone sodium succinate Injectable 20 milliGRAM(s) IV Push every 8 hours  pantoprazole    Tablet 40 milliGRAM(s) Oral before breakfast  sodium chloride 0.45% 1000 milliLiter(s) (75 mL/Hr) IV Continuous <Continuous>  sodium chloride 3%  Inhalation 4 milliLiter(s) Inhalation every 6 hours    MEDICATIONS  (PRN):  guaiFENesin   Syrup  (Sugar-Free) 200 milliGRAM(s) Oral every 6 hours PRN Cough  HYDROmorphone  Injectable 0.25 milliGRAM(s) IV Push every 4 hours PRN pain, distress, suffering, dyspnea, palliative measures      FAMILY HISTORY:  No pertinent family history in first degree relatives  Denies Family history of CAD or early MI      Constitutional: denies fever, chills  Respiratory: denies SOB, HADLEY, cough  Cardiovascular: denies CP, palpitations, orthopnea, PND, LE edema  Gastrointestinal: denies nausea, vomiting, abdominal pain  Genitourinary: denies urinary changes  Neurologic: denies headache, weakness, dizziness  Hematology/Oncology: admits easy bruising 2/2 steroid use      SOCIAL HISTORY:  Quit smoking , smoked 3 ppd 25 years (75 pack years)   Lives with wife, performs all ADLs, uses walker and cane    Vital Signs Last 24 Hrs  T(C): 37.1 (2020 04:46), Max: 37.1 (2020 04:46)  T(F): 98.7 (2020 04:46), Max: 98.7 (2020 04:46)  HR: 92 (2020 04:46) (87 - 99)  BP: 116/68 (2020 04:46) (108/58 - 125/66)  BP(mean): --  RR: 19 (2020 04:46) (17 - 22)  SpO2: 94% (2020 04:46) (84% - 96%)    Physical Exam:  General: Well developed, well nourished, NAD  HEENT: NCAT, PERRLA, EOMI bl,  Neck: Supple, nontender, no mass  Neurology: A&Ox3, nonfocal, sensation intact   Respiratory: +decreased BS B/L, diffuse rhonchi R>L  CV: RRR, +S1/S2, +systolic murmur heard at RSB  Abdominal: Soft, NT, ND +BSx4, no palpable masses  Extremities: No C/C/E, + peripheral pulses  Heme: diffuse ecchymosis      I&O's Detail      LABS:                        10.4   12.58 )-----------( 117      ( 2020 05:51 )             32.1     02-03    143  |  112<H>  |  109<H>  ----------------------------<  140<H>  4.9   |  19<L>  |  6.10<H>    Ca    8.5      2020 05:51  Phos  6.3     02-03  Mg     1.7     02-03    TPro  5.1<L>  /  Alb  2.1<L>  /  TBili  0.5  /  DBili  x   /  AST  21  /  ALT  19  /  AlkPhos  103  02-03    CARDIAC MARKERS ( 2020 05:51 )  .061 ng/mL / x     / 57 U/L / x     / 3.1 ng/mL      PT/INR - ( 2020 12:48 )   PT: 13.7 sec;   INR: 1.22 ratio         PTT - ( 2020 12:48 )  PTT:29.8 sec  Urinalysis Basic - ( 2020 00:16 )    Color: Yellow / Appearance: Slightly Turbid / S.020 / pH: x  Gluc: x / Ketone: Negative  / Bili: Negative / Urobili: Negative   Blood: x / Protein: 100 / Nitrite: Negative   Leuk Esterase: Trace / RBC: 6-10 /HPF / WBC 6-10   Sq Epi: x / Non Sq Epi: Occasional / Bacteria: Few      I&O's Summary    BNP  RADIOLOGY & ADDITIONAL STUDIES:  < from: CT Chest No Cont (20 @ 15:13) >    EXAM:  CT CHEST                            PROCEDURE DATE:  2020          INTERPRETATION:  Clinical information: Suspected pneumonia. History of lung cancer.    Comparison: 2019 CT scan of the chest.    PROCEDURE:   CT of the Chest was performed without intravenous contrast.     FINDINGS:    Lungs and airways: Emphysema. Interval development of multifocal opacities in the right upper lobe and right lower lobe compatible with multifocal pneumonia. Again noted is a left upper lobe and para-mediastinal mass adjacent to surgical clips inseparable from the proximal aortic arch. The area on series 2 image 45 measures 4.1 x 3.7 cm previously 3.8 x 2.3 cm. Slightly more superiorly there is soft tissue that extends anteriorly through the intercostal space on series 2 image 36 measuring 3.1 x 3.0 cm.  Findings are suspicious for progression of disease.    Pleura: Within normal limits. No pleural effusion.    Mediastinum and Ginny: No abnormally enlarged lymph nodes. The esophagus is distended with fluid.    Heart: Normal size. No pericardial effusion.     Vessels: There has been median sternotomy and CABG. Main pulmonary artery is enlarged to 4.2 cm.    Chest wall and lower neck: Within normal limits.    Upper abdomen: Bilateral renal hypodensities again noted including some which are not simple cysts. For example there is a 3.0 cm lesion in the right mid kidney which is not a simple cyst and a 1.4 cm lesion in the left mid kidney which is not a simple cyst are stable. The gallbladder is distended containing a stone.    Bones: Within normal limits.    IMPRESSION:     Interval development of multifocal pneumonia involving the right upper and lower lobes.    Increase in size of soft tissue mass associated with surgical clips in the left upper lobe paramediastinal location inseparable from the aortic arch which now extends anteriorly in an intercostal location highly suspicious for progression of disease.                      BLANCA GOODRICH M.D., ATTENDING RADIOLOGIST  This document has been electronically signed. 2020  4:00PM                < end of copied text >

## 2020-02-03 NOTE — PROGRESS NOTE ADULT - PROBLEM SELECTOR PLAN 7
dvt ppx: heparin 5000 q 12     BB IMPROVE VTE Individual Risk Assessment        RISK                                                          Points  [  ] Previous VTE                                                3  [  ] Thrombophilia                                             2  [  ] Lower limb paralysis                                   2        (unable to hold up >15 seconds)    [ x ] Current Cancer                                            2         (within 6 months)  [  ] Immobilization > 24 hrs                              1  [  ] ICU/CCU stay > 24 hours                            1  [  x] Age > 60                                                    1    IMPROVE VTE Score: 3 - Chronic, stable  - Hold HTN meds as current BP stable  - Monitor routine hemodynamics

## 2020-02-03 NOTE — PROGRESS NOTE ADULT - SUBJECTIVE AND OBJECTIVE BOX
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 Pt states he has baseline HADLEY, but dyspea has increased in the last week. Of note he is in week 6 of chemo treatment keytruda, gets chemo every 3 weeks, tolerating chemo appropriately. Pt also has been urinating less for the last 4 days. Pt denies ever having dialysis. Pt denies fever, chills, chest pain, orthopnea, dysuria, hematuria, diarrhea, melena, hematochezia.      ED vitals: T: 96.8, HR: 99, BP: 108/58, RR: 18, O2 Saturation: 96% on 2L  Pt recieved albuterol neb x 3, ipratropium x 1 for neb, solumedrol injectable  mg x1, kayexalate 30 mg x1, 2L NS   Labs significant for: WBC: 18.45 w/ left shift, Lactate: 2.5, Potassium 5.4, Chloride: 109, CO2: 19, BUN/Cr: 109/6.3, Albumin: 2.7, Alk Phos: 164, ProCal: 4.5   EKG: sinus tachycardia 106 bpm  CXR: R sided PNA (02 Feb 2020 14:08)      INTERVAL EVENTS: Patient seen and examined at bedside. Overnight, pt was noted to have three episodes of v. tach, longest lasting 20 beats. During these episodes, patient remained asymptomatic. Repeat EKG was grossly unchanged, with no evidence of acute ischemia, yet troponin mildly elevated. STAT labs showed hyperphosphatemia and pt was given 1g calcium carbonate. This AM pt has no acute complaints aside from persistent cough that has not been productive. Denies fever, chills, headache, vision changes, chest pain, palpitations, increased SOB, abdominal pain, N/V/D/C, or urinary symptoms.        REVIEW OF SYSTEMS:  CONSTITUTIONAL: No weakness, fevers or chills  EYES/ENT: No visual changes, no throat pain   RESPIRATORY: No cough, wheezing, hemoptysis; No shortness of breath  CARDIOVASCULAR: No chest pain or palpitations  GASTROINTESTINAL: No abdominal, nausea, vomiting, or hematemesis; No diarrhea or constipation. No melena or hematochezia.  GENITOURINARY: No dysuria, frequency or hematuria  NEUROLOGICAL: No dizziness, numbness, or weakness  SKIN: No itching, burning, rashes, or lesions   All other review of systems is negative unless indicated above.      VITAL SIGNS:  Vital Signs Last 24 Hrs  T(C): 37.1 (03 Feb 2020 04:46), Max: 37.1 (03 Feb 2020 04:46)  T(F): 98.7 (03 Feb 2020 04:46), Max: 98.7 (03 Feb 2020 04:46)  HR: 92 (03 Feb 2020 07:58) (87 - 99)  BP: 116/68 (03 Feb 2020 04:46) (108/58 - 125/66)  RR: 19 (03 Feb 2020 04:46) (17 - 22)  SpO2: 92% (03 Feb 2020 07:58) (90% - 96%)      PHYSICAL EXAM:   GENERAL: resting in bed, in no acute distress  HEENT: NC/AT, EOMI, neck supple, dry mucus membranes  RESPIRATORY: coarse expiratory breath sounds noted through R lung fields, diminished bibasilar breath sounds  CARDIOVASCULAR: RRR, no murmurs, gallops, rubs  ABDOMINAL: soft, non-tender, non-distended, positive bowel sounds   EXTREMITIES: 2-3+ pitting edema of b/l LE, no clubbing or cyanosis  NEUROLOGICAL: alert and oriented x 3, non-focal, 4/5 muscle strength of b/l LE, no sensory deficits  SKIN: diffuse ecchymoses of b/l UE and LE  MUSCULOSKELETAL: no gross joint deformity                              10.4   12.58 )-----------( 117      ( 03 Feb 2020 05:51 )             32.1     02-03    143  |  112<H>  |  109<H>  ----------------------------<  140<H>  4.9   |  19<L>  |  6.10<H>    Ca    8.5      03 Feb 2020 05:51  Phos  6.3     02-03  Mg     1.7     02-03    TPro  5.1<L>  /  Alb  2.1<L>  /  TBili  0.5  /  DBili  x   /  AST  21  /  ALT  19  /  AlkPhos  103  02-03      CAPILLARY BLOOD GLUCOSE  POCT Blood Glucose.: 135 mg/dL (02 Feb 2020 15:28)      MEDICATIONS  (STANDING):  albuterol/ipratropium for Nebulization 3 milliLiter(s) Nebulizer every 6 hours  cefepime   IVPB 500 milliGRAM(s) IV Intermittent every 24 hours  heparin  Injectable 5000 Unit(s) SubCutaneous every 12 hours  methylPREDNISolone sodium succinate Injectable 20 milliGRAM(s) IV Push every 8 hours  metoprolol tartrate 12.5 milliGRAM(s) Oral every 6 hours  pantoprazole    Tablet 40 milliGRAM(s) Oral before breakfast  sodium chloride 0.45% 1000 milliLiter(s) (75 mL/Hr) IV Continuous <Continuous>  sodium chloride 3%  Inhalation 4 milliLiter(s) Inhalation every 6 hours HPI:  86 year old male PMH coronary artery disease s/p CABG 1994, HLD, HTN, CKD stage III, 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 Pt states he has baseline HADLEY, but dyspea has increased in the last week. Of note he is in week 6 of chemo treatment keytruda, gets chemo every 3 weeks, tolerating chemo appropriately. Pt also has been urinating less for the last 4 days. Pt denies ever having dialysis. Pt denies fever, chills, chest pain, orthopnea, dysuria, hematuria, diarrhea, melena, hematochezia.      ED vitals: T: 96.8, HR: 99, BP: 108/58, RR: 18, O2 Saturation: 96% on 2L  Pt recieved albuterol neb x 3, ipratropium x 1 for neb, solumedrol injectable  mg x1, kayexalate 30 mg x1, 2L NS   Labs significant for: WBC: 18.45 w/ left shift, Lactate: 2.5, Potassium 5.4, Chloride: 109, CO2: 19, BUN/Cr: 109/6.3, Albumin: 2.7, Alk Phos: 164, ProCal: 4.5   EKG: sinus tachycardia 106 bpm  CXR: R sided PNA (02 Feb 2020 14:08)      INTERVAL EVENTS: Patient seen and examined at bedside. Overnight, pt was noted to have three episodes of v. tach, longest lasting 20 beats. During these episodes, patient remained asymptomatic. Repeat EKG was grossly unchanged, with no evidence of acute ischemia, yet troponin mildly elevated. STAT labs showed hyperphosphatemia and pt was given 1g calcium carbonate. This AM pt has no acute complaints aside from persistent cough that has not been productive. Denies fever, chills, headache, vision changes, chest pain, palpitations, increased SOB, abdominal pain, N/V/D/C, or urinary symptoms.        REVIEW OF SYSTEMS:  CONSTITUTIONAL: No weakness, fevers or chills  EYES/ENT: No visual changes, no throat pain   RESPIRATORY: No cough, wheezing, hemoptysis; No shortness of breath  CARDIOVASCULAR: No chest pain or palpitations  GASTROINTESTINAL: No abdominal pain, nausea, vomiting, or hematemesis; No diarrhea or constipation. No melena or hematochezia.  GENITOURINARY: No dysuria, frequency or hematuria  NEUROLOGICAL: No dizziness, numbness, or weakness  SKIN: No itching, burning, rashes, or lesions   All other review of systems is negative unless indicated above.      VITAL SIGNS:  Vital Signs Last 24 Hrs  T(C): 37.1 (03 Feb 2020 04:46), Max: 37.1 (03 Feb 2020 04:46)  T(F): 98.7 (03 Feb 2020 04:46), Max: 98.7 (03 Feb 2020 04:46)  HR: 92 (03 Feb 2020 07:58) (87 - 99)  BP: 116/68 (03 Feb 2020 04:46) (108/58 - 125/66)  RR: 19 (03 Feb 2020 04:46) (17 - 22)  SpO2: 92% (03 Feb 2020 07:58) (90% - 96%)      PHYSICAL EXAM:   GENERAL: resting in bed, in no acute distress  HEENT: NC/AT, EOMI, neck supple, dry mucus membranes  RESPIRATORY: coarse expiratory breath sounds noted through R lung fields, diminished bibasilar breath sounds  CARDIOVASCULAR: RRR, no murmurs, gallops, rubs  ABDOMINAL: soft, non-tender, non-distended, positive bowel sounds   EXTREMITIES: 2-3+ pitting edema of b/l LE, no clubbing or cyanosis  NEUROLOGICAL: alert and oriented x 3, non-focal, 4/5 muscle strength of b/l LE, no sensory deficits  SKIN: diffuse ecchymoses of b/l UE and LE  MUSCULOSKELETAL: no gross joint deformity                              10.4   12.58 )-----------( 117      ( 03 Feb 2020 05:51 )             32.1     02-03    143  |  112<H>  |  109<H>  ----------------------------<  140<H>  4.9   |  19<L>  |  6.10<H>    Ca    8.5      03 Feb 2020 05:51  Phos  6.3     02-03  Mg     1.7     02-03    TPro  5.1<L>  /  Alb  2.1<L>  /  TBili  0.5  /  DBili  x   /  AST  21  /  ALT  19  /  AlkPhos  103  02-03      CAPILLARY BLOOD GLUCOSE  POCT Blood Glucose.: 135 mg/dL (02 Feb 2020 15:28)      MEDICATIONS  (STANDING):  albuterol/ipratropium for Nebulization 3 milliLiter(s) Nebulizer every 6 hours  cefepime   IVPB 500 milliGRAM(s) IV Intermittent every 24 hours  heparin  Injectable 5000 Unit(s) SubCutaneous every 12 hours  methylPREDNISolone sodium succinate Injectable 20 milliGRAM(s) IV Push every 8 hours  metoprolol tartrate 12.5 milliGRAM(s) Oral every 6 hours  pantoprazole    Tablet 40 milliGRAM(s) Oral before breakfast  sodium chloride 0.45% 1000 milliLiter(s) (75 mL/Hr) IV Continuous <Continuous>  sodium chloride 3%  Inhalation 4 milliLiter(s) Inhalation every 6 hours

## 2020-02-03 NOTE — CONSULT NOTE ADULT - ATTENDING COMMENTS
I saw and examined the patient personally. Spoke with above provider regarding this case. I reviewed the above findings completely.  I agree with the above history, physical, and plan which I have edited where appropriate.     86 year old M PMH coronary artery disease s/p CABG, HLD, HTN, CKD stage III,  Lung Cancer (session 6 of keytruda), COPD not on home O2, p/w PNA now with NSVT.     - NSVT is likely precipitated by his PNA and GERMAN in the setting o CAD  - Must monitor tele closely for worsening of arrhythmia or degeneration to sustained VT.   - Monitor and replete electrolytes. Keep K>4.0 and Mg>2.0.  - Start Metoprolol 12.5mg q6  - abx.   - supplemental o2 for hypoxia  - GERMAN need to monitor closely. renal eval  - check echo  - Needs a GOC discussion.   - Further cardiac workup will depend on clinical course.

## 2020-02-03 NOTE — CHART NOTE - NSCHARTNOTEFT_GEN_A_CORE
Upon Nutritional Assessment by the Registered Dietitian your patient was determined to meet criteria / has evidence of the following diagnosis/diagnoses:          [ ]  Mild Protein Calorie Malnutrition        [ ]  Moderate Protein Calorie Malnutrition        [x ] Severe Protein Calorie Malnutrition chronic        [ ] Unspecified Protein Calorie Malnutrition        [ ] Underweight / BMI <19        [ ] Morbid Obesity / BMI > 40      Findings as based on:  •  Comprehensive nutrition assessment and consultation  •  Calorie counts (nutrient intake analysis)  •  Food acceptance and intake status from observations by staff  •  Follow up  •  Patient education  •  Intervention secondary to interdisciplinary rounds  •   concerns  wt loss, severe muscle/fat loss, decreased oral intake    Treatment:    The following diet has been recommended:    Ensure TID, diet liberalization, snacks added to trays  PROVIDER Section:     By signing this assessment you are acknowledging and agree with the diagnosis/diagnoses assigned by the Registered Dietitian    Comments:

## 2020-02-03 NOTE — PROGRESS NOTE ADULT - PROBLEM SELECTOR PLAN 1
- Met severe sepsis criteria with leukocytosis, tachycardia, and elevated lactate with pulmonary source  - Lactate 2.5 on admission, has since uptrended despite fluid resuscitation  - CXR and CT chest demonstrating multifocal PNA with ELIZABETH mass extension through intercostal space suspicious for disease progression  - Pulm Dr. Yu following, recommending hospice eval  - C/w Cefepime 500mg BID, ID Dr. Junior following  - UA not impressive, f/u UCx and BCx  - Leukocytosis downtrending and pt has remained afebrile  - Monitor daily CBC and temperatures

## 2020-02-03 NOTE — PROGRESS NOTE ADULT - PROBLEM SELECTOR PLAN 6
- Chronic, stable  - Hold HTN meds as current BP stable  - Monitor routine hemodynamics - Chronic, stable  - Home ASA was stopped, family is unsure if he is on B-blocker or statin. They will check his medications at home and let us know. Pharmacy was also called and there is confusion to his current medications as patient uses 2 pharmacies. - Chronic, stable  - Home ASA was stopped, family is unsure if he is on B-blocker or statin. They will check his medications at home and let us know. Pharmacy was also called and there is confusion to his current medications as patient uses 2 pharmacies.  - Metoprolol 12.5mg q6h restarted per cardio  - F/u TTE as last is from 2018

## 2020-02-03 NOTE — DIETITIAN INITIAL EVALUATION ADULT. - PROBLEM SELECTOR PLAN 1
-SOB x 1 week, likely due to PNA  -s/p albuterol neb x 3, ipratropium x 1 for neb, solumedrol injectable  mg x1, kayexalate 30 mg x1, 2L NS   -WBC: 18.45 w/ left shift, Lactate: 2.5, Potassium 5.4, ProCal: 4.5   -CXR: Shows R sided PNA  -f/u CT chest   -s/p levaquin, zosyn, conitnue vancomycin . Will continue with Cefepime and already received 1 dose of Vancomycin. Will await ID rec's before further dosing given his Cr of 6.30  -lactate 2.5, procal 4  -consult Pulm

## 2020-02-03 NOTE — CONSULT NOTE ADULT - PROBLEM SELECTOR RECOMMENDATION 4
improving.    Thank you for consulting us and involving us in the management of this most interesting and challenging case.     We will follow along in the care of this patient.

## 2020-02-03 NOTE — PROGRESS NOTE ADULT - ASSESSMENT
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

## 2020-02-03 NOTE — CONSULT NOTE ADULT - SUBJECTIVE AND OBJECTIVE BOX
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 Pt stated he has baseline HADLEY, but dyspnea had increased in the last week. Of note he is in week 6 of chemo treatment keytruda, gets chemo every 3 weeks, tolerating chemo appropriately. Pt also has been urinating less for the last 4 days. Pt denies ever having dialysis. Pt denies fever, chills, chest pain, orthopnea, dysuria, hematuria, diarrhea, melena, hematochezia.      ED vitals: T: 96.8, HR: 99, BP: 108/58, RR: 18, O2 Saturation: 96% on 2L  Pt recieved albuterol neb x 3, ipratropium x 1 for neb, solumedrol injectable  mg x1, kayexalate 30 mg x1, 2L NS   Labs significant for: WBC: 18.45 w/ left shift, Lactate: 2.5, Potassium 5.4, Chloride: 109, CO2: 19, BUN/Cr: 109/6.3, Albumin: 2.7, Alk Phos: 164, ProCal: 4.5   EKG: sinus tachycardia 106 bpm  CXR: R sided PNA (2020 14:08)    Pt reports when I see him that he is feeling improved.      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  Hip pain: Right hip replacement in   FH: CABG (coronary artery bypass surgery):  (double)      Antimicrobials  cefepime   IVPB 500 milliGRAM(s) IV Intermittent every 24 hours      Immunological      Other  albuterol/ipratropium for Nebulization 3 milliLiter(s) Nebulizer every 6 hours  guaiFENesin   Syrup  (Sugar-Free) 200 milliGRAM(s) Oral every 6 hours PRN  heparin  Injectable 5000 Unit(s) SubCutaneous every 12 hours  HYDROmorphone  Injectable 0.25 milliGRAM(s) IV Push every 4 hours PRN  methylPREDNISolone sodium succinate Injectable 20 milliGRAM(s) IV Push every 8 hours  metoprolol tartrate 12.5 milliGRAM(s) Oral every 6 hours  pantoprazole    Tablet 40 milliGRAM(s) Oral before breakfast  sodium chloride 0.45% 1000 milliLiter(s) IV Continuous <Continuous>  sodium chloride 3%  Inhalation 4 milliLiter(s) Inhalation every 6 hours      Allergies    No Known Allergies    Intolerances      SOCIAL HISTORY:  quit smoking , smoked 3 ppd 25 years (75 pack years)   lives with wife, performs all ADLs, uses walker and cane (2020 14:08)      FAMILY HISTORY:  No pertinent family history in first degree relatives      ROS:    EYES:  Negative  blurry vision or double vision  GASTROINTESTINAL:  Negative for nausea, vomiting, diarrhea  -otherwise negative except for subjective    Vital Signs Last 24 Hrs  T(C): 37.1 (2020 04:46), Max: 37.1 (2020 04:46)  T(F): 98.7 (2020 04:46), Max: 98.7 (2020 04:46)  HR: 92 (2020 07:58) (87 - 99)  BP: 116/68 (2020 04:46) (108/58 - 125/66)  BP(mean): --  RR: 19 (2020 04:46) (17 - 22)  SpO2: 92% (2020 07:58) (84% - 96%)    PE:  WDWN in no distress  HEENT:  NC, PERRL, sclerae anicteric, conjunctivae clear, EOMI.  Sinuses nontender, no nasal exudate.  No buccal or pharyngeal lesions, erythema or exudate  Neck:  Supple, no adenopathy  Lungs:  No accessory muscle use, bilaterally areas of decreased BS  Cor:  RRR, S1, S2, no murmur appreciated  Abd:  Symmetric, normoactive BS.  Soft, nontender, no masses, guarding or rebound.  Liver and spleen not enlarged  Extrem:  No cyanosis or edema  Skin:  No rashes.  Neuro: grossly intact  Musc: moving all limbs freely, no focal deficits    LABS:                        10.4   12.58 )-----------( 117      ( 2020 05:51 )             32.1       WBC Count: 12.58 K/uL (02-03-20 @ 05:51)  WBC Count: 18.45 K/uL (20 @ 12:48)          143  |  112<H>  |  109<H>  ----------------------------<  140<H>  4.9   |  19<L>  |  6.10<H>    Ca    8.5      2020 05:51  Phos  6.3       Mg     1.7         TPro  5.1<L>  /  Alb  2.1<L>  /  TBili  0.5  /  DBili  x   /  AST  21  /  ALT  19  /  AlkPhos  103        Creatinine, Serum: 6.10 mg/dL (20 @ 05:51)  Creatinine, Serum: 6.30 mg/dL (20 @ 12:48)      Urinalysis Basic - ( 2020 00:16 )    Color: Yellow / Appearance: Slightly Turbid / S.020 / pH: x  Gluc: x / Ketone: Negative  / Bili: Negative / Urobili: Negative   Blood: x / Protein: 100 / Nitrite: Negative   Leuk Esterase: Trace / RBC: 6-10 /HPF / WBC 6-10   Sq Epi: x / Non Sq Epi: Occasional / Bacteria: Few      MICROBIOLOGY:      RADIOLOGY & ADDITIONAL STUDIES:    --< from: CT Chest No Cont (20 @ 15:13) >    EXAM:  CT CHEST                            PROCEDURE DATE:  2020          INTERPRETATION:  Clinical information: Suspected pneumonia. History of lung cancer.    Comparison: 2019 CT scan of the chest.    PROCEDURE:   CT of the Chest was performed without intravenous contrast.     FINDINGS:    Lungs and airways: Emphysema. Interval development of multifocal opacities in the right upper lobe and right lower lobe compatible with multifocal pneumonia. Again noted is a left upper lobe and para-mediastinal mass adjacent to surgical clips inseparable from the proximal aortic arch. The area on series 2 image 45 measures 4.1 x 3.7 cm previously 3.8 x 2.3 cm. Slightly more superiorly there is soft tissue that extends anteriorly through the intercostal space on series 2 image 36 measuring 3.1 x 3.0 cm.  Findings are suspicious for progression of disease.    Pleura: Within normal limits. No pleural effusion.    Mediastinum and Ginny: No abnormally enlarged lymph nodes. The esophagus is distended with fluid.    Heart: Normal size. No pericardial effusion.     Vessels: There has been median sternotomy and CABG. Main pulmonary artery is enlarged to 4.2 cm.    Chest wall and lower neck: Within normal limits.    Upper abdomen: Bilateral renal hypodensities again noted including some which are not simple cysts. For example there is a 3.0 cm lesion in the right mid kidney which is not a simple cyst and a 1.4 cm lesion in the left mid kidney which is not a simple cyst are stable. The gallbladder is distended containing a stone.    Bones: Within normal limits.    IMPRESSION:     Interval development of multifocal pneumonia involving the right upper and lower lobes.    Increase in size of soft tissue mass associated with surgical clips in the left upper lobe paramediastinal location inseparable from the aortic arch which now extends anteriorly in an intercostal location highly suspicious for progression of disease.

## 2020-02-03 NOTE — CONSULT NOTE ADULT - ASSESSMENT
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 Admit for acute hypooxic respiratory failure likely 2/2  PNA in setting of cancer also noted to have GERMAN.    Episodes of vtach  -Longest run at 20beats of vtach at 4:30AM  -Patient asymptomatic at that time  -Repeat ekg was unchanged from prior  -S/p calcium carbonate for elevated phosphate  -Patient is DNR/DNI, poor prognosis with pending palliative and hospice eval    Elevated troponin  -Patient is not complaining of any cardiac symptoms at this time.  -EKG unchanged from prior  -Will trend 2 more sets of CE    CAD s/p CABG  -Patient unsure of his medications  -Family to verify whether patient is on beta blocker and statin.    HTN  -Will hold HTN meds as patient's BP well controlled at this time time  -Monitor routine hemodynamics    -Other cardiovascular workup will depend on clinical course.  -All other workup per primary team.  -Will continue to follow. 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 Admit for acute hypooxic respiratory failure likely 2/2  PNA in setting of cancer also noted to have GERMAN.    Episodes of vtach  -Longest run at 20beats of vtach at 4:30AM  -Patient asymptomatic at that time  -Repeat ekg was unchanged from prior  -S/p calcium carbonate for elevated phosphate  -Patient is DNR/DNI, poor prognosis with pending palliative and hospice eval    Elevated troponin  -Patient is not complaining of any cardiac symptoms at this time.  -EKG unchanged from prior  -Will trend 2 more sets of CE    CAD s/p CABG  -Patient unsure of his medications  -Previously on aspirin which   -Family to verify whether patient is on beta blocker and statin as patient uses 2 pharmacies.     HTN  -Will hold HTN meds as patient's BP well controlled at this time time  -Monitor routine hemodynamics    HLD  -Unsure whether patient on statin, family to verify  -Continue home zetia    -Other cardiovascular workup will depend on clinical course.  -All other workup per primary team.  -Will continue to follow. 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 Admit for acute hypooxic respiratory failure likely 2/2  PNA in setting of cancer also noted to have GERMAN.    Episodes of vtach  -Longest run at 20beats of vtach at 4:00AM  -Patient asymptomatic at that time  -Repeat ekg was unchanged from prior  -Patient was previously on metoprolol per [facility] and [facility] records, unsure if he is still taking. However, not indicated at this time as patient asymptomatic.  -S/p calcium carbonate for elevated phosphate and magnesium sulfate for low Mg  -Monitor electrolytes and replete or bind as needed  -Patient is DNR/DNI, poor prognosis with pending palliative and hospice eval.     Elevated troponin  -Likely demand ischemia 2/2 hypoxia 2/2 penumonia  -Patient is not complaining of any cardiac symptoms at this time.  -EKG unchanged from prior  -Will trend 2 more sets of CE    CAD s/p CABG  -Patient unsure of his medications  -Previously on aspirin which has been discontinued  -Family to verify whether patient is on beta blocker and statin as patient uses 2 pharmacies.     HTN  -Will hold HTN meds as patient's BP well controlled at this time time  -Monitor routine hemodynamics    HLD  -Unsure whether patient on statin, family to verify  -Continue home zetia    -Other cardiovascular workup will depend on clinical course.  -All other workup per primary team.  -Will continue to follow. 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 Admit for acute hypooxic respiratory failure likely 2/2 PNA in setting of cancer also noted to have GERMAN.    Episodes of monomorphic vtach  -Longest run at 20beats of vtach at 4:00AM  -In setting of CAD hx s/p CABG likely 2/2 electrolyte imbalances and multiple stressors leading to ectopic beats  -Patient asymptomatic at that time of episodes  -Repeat ekg was unchanged from prior  -Patient was previously on metoprolol per [facility] and [facility] records, unsure if he is still taking.  -Start metoprolol 12.5mg q6  -Echo ordered, as last echo is from 2018  -S/p calcium carbonate for elevated phosphate and magnesium sulfate for low Mg  -Monitor and replete lytes, keep K>4, Mg>2.  -Patient is DNR/DNI, poor prognosis with pending palliative and hospice eval.     Elevated troponin  -Likely demand ischemia 2/2 hypoxia 2/2 penumonia  -Patient is not complaining of any cardiac symptoms at this time.  -EKG unchanged from prior  -Will trend 2 more sets of CE    CAD s/p CABG  -Previously on aspirin which has been discontinued  -Start metoprolol 12.5mg q6  -Family to verify whether patient is on beta blocker and statin as patient uses 2 pharmacies.     HTN  -Start metoprolol 12.5mg q6 with hold parameters  -Monitor routine hemodynamics    HLD  -Unsure whether patient on statin, family to verify  -Continue home zetia    -Other cardiovascular workup will depend on clinical course.  -All other workup per primary team.  -Will continue to follow. 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 Admit for acute hypooxic respiratory failure likely 2/2 PNA in setting of cancer also noted to have GERMAN.    Episodes of monomorphic vtach  -Longest run at 20beats of vtach at 4:00AM  -In setting of CAD hx s/p CABG likely 2/2 electrolyte imbalances and multiple stressors (pneumonia, GERMAN) leading to ectopic beats  -Patient asymptomatic at that time of episodes  -Repeat ekg was unchanged from prior  -Patient was previously on metoprolol per [facility] records, unsure if he is still taking.  -Start metoprolol 12.5mg q6  -Echo ordered, as last echo is from 2018  -S/p calcium carbonate for elevated phosphate and magnesium sulfate for low Mg  -Monitor and replete lytes, keep K>4, Mg>2.  -Patient is DNR/DNI, poor prognosis with pending palliative and hospice eval.     Elevated troponin  -Likely demand ischemia 2/2 hypoxia 2/2 penumonia  -Patient is not complaining of any cardiac symptoms at this time.  -EKG unchanged from prior  -Will trend 2 more sets of CE    CAD s/p CABG  -Previously on aspirin which has been discontinued  -Start metoprolol 12.5mg q6  -Family to verify whether patient is on beta blocker and statin as patient uses 2 pharmacies.     HTN  -Start metoprolol 12.5mg q6 with hold parameters  -Monitor routine hemodynamics    HLD  -Unsure whether patient on statin, family to verify  -Continue home zetia    -Other cardiovascular workup will depend on clinical course.  -All other workup per primary team.  -Will continue to follow. 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 Admit for acute hypooxic respiratory failure likely 2/2 PNA in setting of cancer also noted to have GERMAN.       -Longest run at 20beats of vtach at 4:00AM on 2/3  -In setting of CAD hx s/p CABG likely 2/2 electrolyte imbalances and multiple stressors (pneumonia, GERMAN) leading to ectopic beats  -Patient asymptomatic at that time of episodes  -Repeat ekg was unchanged from prior  - Trop mildly elevated  Likely secondary to demand ischemia 2/2 hypoxia 2/2 pneumonia  -Will trend 2 more sets of CE    -Patient was previously on metoprolol per [facility] records, unsure if he is still taking.  - start metoprolol 12.5mg q6  -Echo ordered, as last echo is from 2018  -S/p calcium carbonate for elevated phosphate and magnesium sulfate for low Mg    - cont statin therapy    -Monitor and replete lytes, keep K>4, Mg>2.  -Patient is DNR/DNI, poor prognosis with pending palliative and hospice eval.   - All other workup per primary team. Will followup.

## 2020-02-03 NOTE — CHART NOTE - NSCHARTNOTEFT_GEN_A_CORE
Called by RN for 20 beats of vtach. Patient seen and examined at bedside. Patient seems anxious. Vitals were repeated, stable with no significant change. Patient denies headaches, dizziness, sob, chest pain, nausea and vomiting.     Vital Signs Last 24 Hrs  T(C): 36.4 (02 Feb 2020 20:25), Max: 36.9 (02 Feb 2020 16:36)  T(F): 97.5 (02 Feb 2020 20:25), Max: 98.4 (02 Feb 2020 16:36)  HR: 88 (03 Feb 2020 01:27) (87 - 99)  BP: 119/64 (02 Feb 2020 20:25) (108/58 - 124/72)  BP(mean): --  RR: 17 (02 Feb 2020 20:25) (17 - 20)  SpO2: 94% (03 Feb 2020 01:27) (84% - 96%)  [ ] room air   [ ] 02    PHYSICAL EXAM:  General: elderly and frail, appears anxious   HEENT: NCAT, PERRLA, EOMI bl, moist mucous membranes   Neck: Supple, nontender  Neurology: A&Ox3, nonfocal, CN II-XII grossly intact, sensation intact  Respiratory: decreased breath sounds b/l  CV: RRR, +S1/S2, no murmurs  Abdominal: Soft, NT, ND +BSx4  Skin: warm, dry, normal color, no rash or abnormal lesions    86 year old male PMH coronary artery disease s/p CABG 1994, HLD, HTN, CKD, 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 Admit for acute hypooxic respiratory failure likely 2/2  PNA in setting of cancer also noted to have GERMAN. Now having 20 beats of vtach.      Plan  - Stat EKG performed, grossly similar to previous EKGs   - Patient denies chest pain, pressure or any palpitations, does not look diaphoretic, will hold off on cardiac enzymes   - pt given dilaudid for distress, pain, suffering, dyspnea, palliative measures  - give melatonin for sleep aid  - cardiology consulted  - RN to call for any changes Called by RN for 20 beats of vtach. Patient seen and examined at bedside. Patient seems anxious. Vitals were repeated, stable with no significant change. Patient denies headaches, dizziness, sob, chest pain, nausea and vomiting.     Vital Signs Last 24 Hrs  T(C): 36.4 (02 Feb 2020 20:25), Max: 36.9 (02 Feb 2020 16:36)  T(F): 97.5 (02 Feb 2020 20:25), Max: 98.4 (02 Feb 2020 16:36)  HR: 88 (03 Feb 2020 01:27) (87 - 99)  BP: 119/64 (02 Feb 2020 20:25) (108/58 - 124/72)  BP(mean): --  RR: 17 (02 Feb 2020 20:25) (17 - 20)  SpO2: 94% (03 Feb 2020 01:27) (84% - 96%)  [ ] room air   [ ] 02    PHYSICAL EXAM:  General: elderly and frail, appears anxious   HEENT: NCAT, PERRLA, EOMI bl, moist mucous membranes   Neck: Supple, nontender  Neurology: A&Ox3, nonfocal, CN II-XII grossly intact, sensation intact  Respiratory: decreased breath sounds b/l  CV: RRR, +S1/S2, no murmurs  Abdominal: Soft, NT, ND +BSx4  Skin: warm, dry, normal color, no rash or abnormal lesions    86 year old male PMH coronary artery disease s/p CABG 1994, HLD, HTN, CKD, 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 Admit for acute hypooxic respiratory failure likely 2/2  PNA in setting of cancer also noted to have GERMAN. Called for having 20 beats of vtach. Now 16 beats of vtach      Plan  - Stat EKG performed, grossly similar to previous EKGs   - mag at 1.7, will replete to keep above 2  - phosphate elevated at 6, will give 1g calcium carbonate  - although patient denies chest pain, pressure or any palpitations and does not look diaphoretic, due to vtach 16 beats obtain stat cardiac enzymes   - pt given dilaudid for distress, pain, suffering, dyspnea, palliative measures  - give melatonin for sleep aid  - cardiology consulted, [facility], f/u recs  - patient is pending hospice eval in the morning, given goals of care consider transfer to tele, will remain on remote tele for now  - RN to call for any changes

## 2020-02-03 NOTE — CONSULT NOTE ADULT - ASSESSMENT
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

## 2020-02-03 NOTE — PROGRESS NOTE ADULT - PROBLEM SELECTOR PLAN 5
- Chronic, stable  - Home ASA was stopped, family is unsure if he is on B-blocker or statin. They will check his medications at home and let us know. Pharmacy was also called and there is confusion to his current medications as patient uses 2 pharmacies. - Currently receiving CT [id number]  - Reports he is due for CT tomorrow, will hold so as to not induce further immunosuppression

## 2020-02-03 NOTE — DIETITIAN INITIAL EVALUATION ADULT. - PROBLEM SELECTOR PLAN 6
-stable chronic  -his home ASA was stopped, family is unsure if he is on B-blocker or statin. They will check his medications at home and let us know. Pharmacy was also called and there is confusion to his current medications as patient uses 2 pharmacies

## 2020-02-03 NOTE — DIETITIAN INITIAL EVALUATION ADULT. - ETIOLOGY
Patient ID: Jamal is a 63 year old male.    Chief Complaint   Patient presents with   • Diabetes     148   • Hypertension   • Refill Request       HPI    64 y/o  male, with hx of DM2, HL, HTN, Fatty liver  Quit smoking x 5 months now.  Up to date with Podiatry - normal feet.  Ophthalmology - normal eye exam recently.  Pt denies any complaints today.    Colonoscopy 3/29/18    Patient's medications, allergies, past medical, surgical, social and family histories were reviewed and updated as appropriate.    Review of Systems   All other systems reviewed and are negative.      Patient Active Problem List   Diagnosis   • Fatty liver   • ED (erectile dysfunction)   • Obesity (BMI 30-39.9)   • Primary osteoarthritis of right knee       Current Outpatient Medications   Medication Sig Dispense Refill   • lisinopril (ZESTRIL) 2.5 MG tablet Take 1 tablet by mouth daily. 90 tablet 1   • atorvastatin (LIPITOR) 10 MG tablet Take 1 tablet by mouth daily. TAKE 1 TABLET BY MOUTH EVERY NIGHT AT BEDTIME 90 tablet 1   • glipizide-metformin (METAGLIP) 5-500 MG per tablet Take 2 tablets by mouth  In the morning and 1 in the evening. 180 tablet 0   • diclofenac (VOLTAREN) 1 % gel Apply topically 4 times daily. Apply to the knees. 300 g 1   • sildenafil (VIAGRA) 100 MG tablet Take 1 tablet by mouth as needed for Erectile Dysfunction. TAKE AS DIRECTED 10 tablet 1     No current facility-administered medications for this visit.        Past Medical History:   Diagnosis Date   • Diabetes mellitus (CMS/HCC)    • Essential (primary) hypertension        Social History     Tobacco Use   Smoking Status Former Smoker   Smokeless Tobacco Never Used   Tobacco Comment    stopped 2 months ago       Family History   Problem Relation Age of Onset   • Diabetes Other    • Hypertension Other    • Glaucoma Other    • Stroke Other        Past Surgical History:   Procedure Laterality Date   • Colon surgery     • Hernia repair     • Joint replacement          ALLERGIES:  No Known Allergies    Visit Vitals  /69   Pulse 78   Temp 97.5 °F (36.4 °C)   Resp 18   Ht 5' 5\" (1.651 m)   Wt 90.5 kg (199 lb 8.3 oz)   SpO2 96%   BMI 33.20 kg/m²       Physical Exam   Constitutional: He appears well-developed and well-nourished.   Abdominal: Soft. Bowel sounds are normal. He exhibits no mass.   Liver measures 8cm       Health Maintenance Summary     Topic Due On Due Status Completed On    Colorectal Cancer Screening - Colonoscopy Mar 29, 2028 Not Due Mar 29, 2018    Diabetes Eye Exam Feb 5, 1974 Overdue     Glycohemoglobin A1C  (Diabetes Sugar)  Aug 12, 2019 Not Due Feb 12, 2019    GFR  (Kidney Function Test)  Feb 12, 2020 Not Due Feb 12, 2019    Diabetes Foot Exam  Feb 12, 2020 Not Due Feb 12, 2019    Pneumococcal 19-64 Medium Risk  Completed Aug 22, 2016    IMMUNIZATION - DTaP/Tdap/Td Aug 8, 2023 Not Due Aug 8, 2013    Immunization-Influenza  Completed Oct 8, 2018    Depression Screening Feb 12, 2020 Not Due Feb 12, 2019    Hepatitis C Screening  Completed Feb 8, 2016    Immunization - Shingles Feb 5, 2006 Overdue     Immunization - MMR  Hidden     IMMUNIZATION - MENINGITIS SEROGROUP B  Hidden           Immunization History   Administered Date(s) Administered   • Influenza, injectable, quadrivalent, preservative-free 11/20/2017, 10/08/2018   • Influenza, seasonal, injectable, preservative free 12/22/2012, 12/09/2013, 11/24/2014, 11/13/2015   • Influenza, seasonal, injectable, trivalent 10/24/2009, 09/12/2011, 11/24/2014, 11/13/2015, 12/16/2016   • Pneumococcal Conjugate 13 valent 08/22/2016   • Pneumococcal polysaccharide, adult, 23 valent 08/08/2013   • Tdap 08/08/2013       BMP  Sodium (mmol/L)   Date Value   02/12/2019 136     Potassium (mmol/L)   Date Value   02/12/2019 4.9     Chloride (mmol/L)   Date Value   02/12/2019 101     Glucose (mg/dL)   Date Value   02/12/2019 127 (H)     CALCIUM (mg/dL)   Date Value   02/12/2019 9.0     Carbon Dioxide (mmol/L)   Date  Value   02/12/2019 26     BUN (mg/dL)   Date Value   02/12/2019 24 (H)     Creatinine (mg/dL)   Date Value   02/12/2019 1.00     GFR Estimate,  (no units)   Date Value   02/12/2019 >90     GFR Estimate, Non  (no units)   Date Value   02/12/2019 80       LIPIDS  CHOLESTEROL (mg/dL)   Date Value   02/12/2019 144     HDL (mg/dL)   Date Value   02/12/2019 44     CHOL/HDL (no units)   Date Value   02/12/2019 3.3     TRIGLYCERIDE (mg/dL)   Date Value   02/12/2019 246 (H)     CALCULATED LDL (mg/dL)   Date Value   02/12/2019 51       MICROALBUMIN  MICROALBUMIN/CREATININE (mg/g)   Date Value   02/12/2019 9.6       CBC  WBC (K/mcL)   Date Value   02/12/2019 10.4     RBC (mil/mcL)   Date Value   02/12/2019 5.00     HCT (%)   Date Value   02/12/2019 46.3     HGB (g/dL)   Date Value   02/12/2019 15.3     PLT (K/mcL)   Date Value   02/12/2019 242       A1C  Hemoglobin A1C (%)   Date Value   02/12/2019 6.4 (H)       Plan:  Jamal was seen today for diabetes, hypertension and refill request.    Diagnoses and all orders for this visit:    Transaminitis  -     COMPREHENSIVE METABOLIC PANEL; Future  -     SERVICE TO GASTROENTEROLOGY    Type 2 diabetes mellitus with complication, unspecified whether long term insulin use (CMS/[facility])  -     lisinopril (ZESTRIL) 2.5 MG tablet; Take 1 tablet by mouth daily.  -     atorvastatin (LIPITOR) 10 MG tablet; Take 1 tablet by mouth daily. TAKE 1 TABLET BY MOUTH EVERY NIGHT AT BEDTIME  -     glipizide-metformin (METAGLIP) 5-500 MG per tablet; Take 2 tablets by mouth  In the morning and 1 in the evening.    Multiple lung nodules  -     SERVICE TO PULMONARY MEDICINE    Fatty liver  -     SERVICE TO GASTROENTEROLOGY    Other orders  -     sildenafil (VIAGRA) 100 MG tablet; Take 1 tablet by mouth as needed for Erectile Dysfunction. TAKE AS DIRECTED        Discussion/Summary:    1.  Transaminitis/fatty liver-check CMP today.  Advised patient to avoid alcohol, Tylenol  use.  Will refer to GI for further evaluation of hepatomegaly on imaging.  2.  Pulmonary multiple lung nodules-last CT scan 6 months ago, will refer to pulmonary medicine for follow-up, patient had quit smoking x5 months now.  3.  DM 2 w/ complication without use of insulin-last A1c 6.4%, continue glipizide-metformin 5-500 mg twice daily.  Continue lisinopril 2.5 mg 1 tab p.o. daily, atorvastatin 10 mg 1 tab p.o. daily.  4.  ED- refilled sildenafil prn        Alexandra Gomes MD       related to decreased oral intake/metastatic lung ca

## 2020-02-03 NOTE — PROGRESS NOTE ADULT - PROBLEM SELECTOR PLAN 4
- Currently receiving CT f2hiefy  - Reports he is due for CT tomorrow, will hold so as to not induce further immunosuppression - Pt with three episodes of v tach overnight, longest lasting 20 beats  - Repeat EKG at time of event grossly unchanged from previous  - STAT blood work revealing hyperphosphatemia and mildly elevated trops, CKMD WNL  - Cardio Dr. Qureshi consulted, trops likely elevated due to demand ischemia  - Repeat trop mildly uptrending, will f/u third set - Pt with three episodes of v tach overnight, longest lasting 20 beats  - Repeat EKG at time of event grossly unchanged from previous  - STAT blood work revealing hyperphosphatemia and mildly elevated trops, CKMB WNL  - Cardio Dr. Qureshi consulted, trops likely elevated due to demand ischemia  - Repeat trop mildly uptrending, will f/u third set

## 2020-02-03 NOTE — PROVIDER CONTACT NOTE (OTHER) - ACTION/TREATMENT ORDERED:
awaiting call back awaiting call back, 1540 >  called back . TO> have the medical team assess the pt.   /Dr. Joe  and his team present on unit , made aware.

## 2020-02-03 NOTE — PROGRESS NOTE ADULT - PROBLEM SELECTOR PLAN 3
- Known h/o stage III CKD  - Cr on admission significantly elevated at 6.3 from baseline 1.3-2.5 in 2019  - Nephro Dr. Brooks consulted, suspecting GERMAN 2/2 dehydration and septic ATN  - Renal US showing echogenic kidneys consistent with medical renal disease  - S/p 0.5NS+75mEq bicarb  - Pt noted to be hyperkalemic without EKG changes and hyperphosphatemic, s/p insulin with dextrose, kayexalate, and calcium carbonate  - Repeat K+ WNL, phos still elevated - Known h/o stage III CKD  - Cr on admission significantly elevated at 6.3 from baseline 1.3-2.5 in 2019  - Nephro Dr. Brooks consulted, suspecting GERMAN 2/2 dehydration and septic ATN, no immediate need for HD  - Renal US showing echogenic kidneys consistent with medical renal disease  - S/p 0.5NS+75mEq bicarb, will d/w nephro Dr. Brooks benefit of adding albumin for increased oncotic pressure as pt obviously third-spacing evidenced by peripheral edema  - Pt noted to be hyperkalemic without EKG changes and hyperphosphatemic, s/p insulin with dextrose, Kayexalate, and calcium carbonate  - Repeat K+ WNL, phos still elevated - Known h/o stage III CKD  - Cr on admission significantly elevated at 6.3 from baseline 1.3-2.5 in 2019  - Nephro Dr. Brooks consulted, suspecting GERMAN 2/2 dehydration and septic ATN, no immediate need for HD  - Renal US showing echogenic kidneys consistent with medical renal disease  - S/p 0.5NS+75mEq bicarb  - Strict I/Os to get better sense of pt's urine output, may consider adding albumin followed by lasix tomorrow in an effort to increase oncotic pressure, increase renal perfusion, and improve third-spacing evidenced by peripheral edema  - Pt noted to be hyperkalemic without EKG changes and hyperphosphatemic, s/p insulin with dextrose, Kayexalate, and calcium carbonate  - Repeat K+ WNL, phos still elevated - Known h/o stage III CKD  - Cr on admission significantly elevated at 6.3 from baseline 1.3-2.5 in 2019  - Nephro Dr. Brooks consulted, suspecting GERMAN 2/2 dehydration and septic ATN, no immediate need for HD  - Renal US showing echogenic kidneys consistent with medical renal disease  - S/p 0.5NS+75mEq bicarb  - After discussing with nephro, will order strict I/Os to get better sense of pt's urine output, may consider adding albumin followed by lasix tomorrow in an effort to increase oncotic pressure, increase renal perfusion, and improve third-spacing evidenced by peripheral edema  - Pt noted to be hyperkalemic without EKG changes and hyperphosphatemic, s/p insulin with dextrose, Kayexalate, and calcium carbonate  - Repeat K+ WNL, phos still elevated

## 2020-02-03 NOTE — PROGRESS NOTE ADULT - SUBJECTIVE AND OBJECTIVE BOX
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  S/P skin neoplasm resection  Hip pain: Right hip replacement in 2008  FH: CABG (coronary artery bypass surgery): 1994 (double)        Medication list         MEDICATIONS  (STANDING):  albuterol/ipratropium for Nebulization 3 milliLiter(s) Nebulizer every 6 hours  cefepime   IVPB 500 milliGRAM(s) IV Intermittent every 24 hours  heparin  Injectable 5000 Unit(s) SubCutaneous every 12 hours  methylPREDNISolone sodium succinate Injectable 20 milliGRAM(s) IV Push every 8 hours  pantoprazole    Tablet 40 milliGRAM(s) Oral before breakfast  sodium chloride 0.45% 1000 milliLiter(s) (75 mL/Hr) IV Continuous <Continuous>    MEDICATIONS  (PRN):  guaiFENesin   Syrup  (Sugar-Free) 200 milliGRAM(s) Oral every 6 hours PRN Cough  HYDROmorphone  Injectable 0.25 milliGRAM(s) IV Push every 4 hours PRN pain, distress, suffering, dyspnea, palliative measures         Vitals log        ICU Vital Signs Last 24 Hrs  T(C): 37.1 (03 Feb 2020 04:46), Max: 37.1 (03 Feb 2020 04:46)  T(F): 98.7 (03 Feb 2020 04:46), Max: 98.7 (03 Feb 2020 04:46)  HR: 92 (03 Feb 2020 04:46) (87 - 99)  BP: 116/68 (03 Feb 2020 04:46) (108/58 - 125/66)  BP(mean): --  ABP: --  ABP(mean): --  RR: 19 (03 Feb 2020 04:46) (17 - 22)  SpO2: 94% (03 Feb 2020 04:46) (84% - 96%)           Input and Output:  I&O's Detail      Lab Data                        10.4   12.58 )-----------( 117      ( 03 Feb 2020 05:51 )             32.1     02-03    143  |  112<H>  |  109<H>  ----------------------------<  140<H>  4.9   |  19<L>  |  6.10<H>    Ca    8.5      03 Feb 2020 05:51  Phos  6.3     02-03  Mg     1.7     02-03    TPro  5.1<L>  /  Alb  2.1<L>  /  TBili  0.5  /  DBili  x   /  AST  21  /  ALT  19  /  AlkPhos  103  02-03    ABG - ( 03 Feb 2020 05:57 )  pH, Arterial: 7.35  pH, Blood: x     /  pCO2: 36    /  pO2: 70    / HCO3: 20    / Base Excess: -5.5  /  SaO2: 93                CARDIAC MARKERS ( 03 Feb 2020 05:51 )  .061 ng/mL / x     / 57 U/L / x     / 3.1 ng/mL        Review of Systems	      Objective     Physical Examination    frail, weak, lung dec BS, congested, head at, poor dentition, heart s1s2, cachectic, verbal -       Pertinent Lab findings & Imaging      Jose Guadalupe:  NO   Adequate UO     I&O's Detail           Discussed with:     Cultures:	        Radiology

## 2020-02-03 NOTE — PROGRESS NOTE ADULT - SUBJECTIVE AND OBJECTIVE BOX
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 Pt states he has baseline HADLEY, but dyspnea has increased in the last week. Of note he is in week 6 of chemo treatment keytruda, gets chemo every 3 weeks, tolerating chemo appropriately. Pt also has been urinating less for the last 4 days. Pt denies ever having dialysis. Pt denies fever, chills, chest pain, orthopnea, dysuria, hematuria, diarrhea, melena, hematochezia.   Renal consulted for GERMAN. Per family, patient had GERMAN a year ago when he previously had PNA. However, it improved after the PNA was treated. Patient admits to decreased urination. Also with poor intake.     States his breathing is improved compared to yesterday, States he is not urinating much.        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  Hip pain: Right hip replacement in   FH: CABG (coronary artery bypass surgery):  (double)       FAMILY HISTORY:  No pertinent family history in first degree relatives  [place]    Social History:Non smoker    MEDICATIONS  (STANDING):  albuterol/ipratropium for Nebulization 3 milliLiter(s) Nebulizer every 6 hours  cefepime   IVPB 500 milliGRAM(s) IV Intermittent every 24 hours  heparin  Injectable 5000 Unit(s) SubCutaneous every 12 hours  methylPREDNISolone sodium succinate Injectable 20 milliGRAM(s) IV Push every 8 hours  pantoprazole    Tablet 40 milliGRAM(s) Oral before breakfast  sodium chloride 0.45% 1000 milliLiter(s) (75 mL/Hr) IV Continuous <Continuous>    MEDICATIONS  (PRN):  guaiFENesin   Syrup  (Sugar-Free) 200 milliGRAM(s) Oral every 6 hours PRN Cough  HYDROmorphone  Injectable 0.25 milliGRAM(s) IV Push every 4 hours PRN pain, distress, suffering, dyspnea, palliative measures   Meds reviewed    Allergies    No Known Allergies    Intolerances         REVIEW OF SYSTEMS:    CONSTITUTIONAL:  No weight loss , +Poor intake  EYES: No eye pain, visual disturbances, or discharge  ENMT:  No difficulty hearing, tinnitus, vertigo; No sinus or throat pain  NECK: No pain or stiffness  BREASTS: No pain, masses, or nipple discharge  RESPIRATORY: +SOB. +wheeze. +Cough  CARDIOVASCULAR: No chest pain, palpitations, dizziness,   GASTROINTESTINAL: No abdominal or epigastric pain. No nausea, vomiting, or hematemesis; No diarrhea or constipation. No melena   GENITOURINARY: No dysuria, frequency, hematuria, or incontinence  NEUROLOGICAL: No headaches, memory loss, loss of strength, numbness, or tremors  SKIN: No Diffuse erythema, no blisters  LYMPH NODES: No enlarged glands  ENDOCRINE: No heat or cold intolerance; No hair loss  MUSCULOSKELETAL: No joint pain or swelling   PSYCHIATRIC: No depression, anxiety, mood swings, or difficulty sleeping  HEME/LYMPH: No easy bruising, or bleeding gums  ALLERGY AND IMMUNOLOGIC: No hives or eczema      Vital Signs Last 24 Hrs  T(C): 36.3 (2020 17:48), Max: 36.9 (2020 16:36)  T(F): 97.3 (2020 17:48), Max: 98.4 (2020 16:36)  HR: 99 (2020 17:48) (92 - 99)  BP: 112/66 (2020 17:48) (108/58 - 124/72)  BP(mean): --  RR: 17 (2020 17:48) (17 - 20)  SpO2: 90% (2020 17:48) (84% - 96%)  Daily Height in cm: 182.88 (2020 12:13)    Daily Weight in k.3 (2020 17:48)    PHYSICAL EXAM:    GENERAL: No Distress  HEAD:  Atraumatic, Normocephalic  EYES: EOMI, conjunctiva and sclera clear  ENMT: No tonsillar erythema, exudates, or enlargement; dry mucous membranes, NECK: Supple, neck veins full  NERVOUS SYSTEM:  Alert & Oriented X3, Good concentration;   CHEST/LUNG: Rhonchi B/L, no rales  HEART: Regular rate and rhythm; No murmurs, rubs, or gallops  ABDOMEN: Soft, Nontender, Nondistended; Bowel sounds present, No hepatomegaly  EXTREMITIES: trace Edema  SKIN: No rashes or lesions, dry; poor turgor      LABS:                        13.3   18.45 )-----------( 170      ( 2020 12:48 )             41.6         x   |  x   |  109  ----------------------------<  x   4.7   |  19   |  6.3     Ca    9.0      2020 12:48    TPro  6.4  /  Alb  2.7<L>  /  TBili  0.8  /  DBili  x   /  AST  26  /  ALT  23  /  AlkPhos  164<H>      PT/INR - ( 2020 12:48 )   PT: 13.7 sec;   INR: 1.22 ratio         PTT - ( 2020 12:48 )  PTT:29.8 sec      ABG - ( 2020 15:57 )  pH, Arterial: 7.25  pH, Blood: x     /  pCO2: 36    /  pO2: 64    / HCO3: 16    / Base Excess: -10.8 /  SaO2: 88                    RADIOLOGY & ADDITIONAL TESTS:

## 2020-02-04 DIAGNOSIS — J44.9 CHRONIC OBSTRUCTIVE PULMONARY DISEASE, UNSPECIFIED: ICD-10-CM

## 2020-02-04 DIAGNOSIS — Z71.89 OTHER SPECIFIED COUNSELING: ICD-10-CM

## 2020-02-04 LAB
ANION GAP SERPL CALC-SCNC: 13 MMOL/L — SIGNIFICANT CHANGE UP (ref 5–17)
BASOPHILS # BLD AUTO: 0.02 K/UL — SIGNIFICANT CHANGE UP (ref 0–0.2)
BASOPHILS NFR BLD AUTO: 0.1 % — SIGNIFICANT CHANGE UP (ref 0–2)
BUN SERPL-MCNC: 124 MG/DL — HIGH (ref 7–23)
CALCIUM SERPL-MCNC: 9.2 MG/DL — SIGNIFICANT CHANGE UP (ref 8.5–10.1)
CHLORIDE SERPL-SCNC: 111 MMOL/L — HIGH (ref 96–108)
CO2 SERPL-SCNC: 19 MMOL/L — LOW (ref 22–31)
CREAT SERPL-MCNC: 6.7 MG/DL — HIGH (ref 0.5–1.3)
EOSINOPHIL # BLD AUTO: 0 K/UL — SIGNIFICANT CHANGE UP (ref 0–0.5)
EOSINOPHIL NFR BLD AUTO: 0 % — SIGNIFICANT CHANGE UP (ref 0–6)
GLUCOSE SERPL-MCNC: 104 MG/DL — HIGH (ref 70–99)
HCT VFR BLD CALC: 32 % — LOW (ref 39–50)
HGB BLD-MCNC: 10.5 G/DL — LOW (ref 13–17)
IMM GRANULOCYTES NFR BLD AUTO: 0.5 % — SIGNIFICANT CHANGE UP (ref 0–1.5)
LEGIONELLA AG UR QL: NEGATIVE — SIGNIFICANT CHANGE UP
LYMPHOCYTES # BLD AUTO: 0.5 K/UL — LOW (ref 1–3.3)
LYMPHOCYTES # BLD AUTO: 3.1 % — LOW (ref 13–44)
MAGNESIUM SERPL-MCNC: 3 MG/DL — HIGH (ref 1.6–2.6)
MCHC RBC-ENTMCNC: 29.3 PG — SIGNIFICANT CHANGE UP (ref 27–34)
MCHC RBC-ENTMCNC: 32.8 GM/DL — SIGNIFICANT CHANGE UP (ref 32–36)
MCV RBC AUTO: 89.4 FL — SIGNIFICANT CHANGE UP (ref 80–100)
MONOCYTES # BLD AUTO: 0.21 K/UL — SIGNIFICANT CHANGE UP (ref 0–0.9)
MONOCYTES NFR BLD AUTO: 1.3 % — LOW (ref 2–14)
MRSA PCR RESULT.: SIGNIFICANT CHANGE UP
NEUTROPHILS # BLD AUTO: 15.13 K/UL — HIGH (ref 1.8–7.4)
NEUTROPHILS NFR BLD AUTO: 95 % — HIGH (ref 43–77)
NRBC # BLD: 0 /100 WBCS — SIGNIFICANT CHANGE UP (ref 0–0)
PLATELET # BLD AUTO: 153 K/UL — SIGNIFICANT CHANGE UP (ref 150–400)
POTASSIUM SERPL-MCNC: 4.7 MMOL/L — SIGNIFICANT CHANGE UP (ref 3.5–5.3)
POTASSIUM SERPL-SCNC: 4.7 MMOL/L — SIGNIFICANT CHANGE UP (ref 3.5–5.3)
RBC # BLD: 3.58 M/UL — LOW (ref 4.2–5.8)
RBC # FLD: 15 % — HIGH (ref 10.3–14.5)
S AUREUS DNA NOSE QL NAA+PROBE: SIGNIFICANT CHANGE UP
SODIUM SERPL-SCNC: 143 MMOL/L — SIGNIFICANT CHANGE UP (ref 135–145)
WBC # BLD: 15.94 K/UL — HIGH (ref 3.8–10.5)
WBC # FLD AUTO: 15.94 K/UL — HIGH (ref 3.8–10.5)

## 2020-02-04 PROCEDURE — 99233 SBSQ HOSP IP/OBS HIGH 50: CPT | Mod: GC

## 2020-02-04 PROCEDURE — 99233 SBSQ HOSP IP/OBS HIGH 50: CPT

## 2020-02-04 RX ORDER — SODIUM CHLORIDE 9 MG/ML
1000 INJECTION, SOLUTION INTRAVENOUS
Refills: 0 | Status: DISCONTINUED | OUTPATIENT
Start: 2020-02-04 | End: 2020-02-05

## 2020-02-04 RX ADMIN — Medication 12.5 MILLIGRAM(S): at 23:34

## 2020-02-04 RX ADMIN — EZETIMIBE 10 MILLIGRAM(S): 10 TABLET ORAL at 13:46

## 2020-02-04 RX ADMIN — Medication 12.5 MILLIGRAM(S): at 13:47

## 2020-02-04 RX ADMIN — PANTOPRAZOLE SODIUM 40 MILLIGRAM(S): 20 TABLET, DELAYED RELEASE ORAL at 05:14

## 2020-02-04 RX ADMIN — SODIUM CHLORIDE 4 MILLILITER(S): 9 INJECTION INTRAMUSCULAR; INTRAVENOUS; SUBCUTANEOUS at 13:33

## 2020-02-04 RX ADMIN — Medication 12.5 MILLIGRAM(S): at 05:13

## 2020-02-04 RX ADMIN — Medication 3 MILLILITER(S): at 07:57

## 2020-02-04 RX ADMIN — Medication 5 MILLIGRAM(S): at 18:42

## 2020-02-04 RX ADMIN — Medication 3 MILLILITER(S): at 13:33

## 2020-02-04 RX ADMIN — Medication 12.5 MILLIGRAM(S): at 18:42

## 2020-02-04 RX ADMIN — Medication 5 MILLIGRAM(S): at 05:13

## 2020-02-04 RX ADMIN — Medication 200 MILLIGRAM(S): at 21:08

## 2020-02-04 RX ADMIN — ATORVASTATIN CALCIUM 20 MILLIGRAM(S): 80 TABLET, FILM COATED ORAL at 21:08

## 2020-02-04 RX ADMIN — SODIUM CHLORIDE 4 MILLILITER(S): 9 INJECTION INTRAMUSCULAR; INTRAVENOUS; SUBCUTANEOUS at 07:58

## 2020-02-04 RX ADMIN — SODIUM CHLORIDE 4 MILLILITER(S): 9 INJECTION INTRAMUSCULAR; INTRAVENOUS; SUBCUTANEOUS at 20:49

## 2020-02-04 RX ADMIN — HEPARIN SODIUM 5000 UNIT(S): 5000 INJECTION INTRAVENOUS; SUBCUTANEOUS at 05:13

## 2020-02-04 RX ADMIN — Medication 3 MILLILITER(S): at 20:49

## 2020-02-04 RX ADMIN — SODIUM CHLORIDE 50 MILLILITER(S): 9 INJECTION, SOLUTION INTRAVENOUS at 20:53

## 2020-02-04 RX ADMIN — Medication 5 MILLIGRAM(S): at 10:02

## 2020-02-04 RX ADMIN — Medication 1 MILLIGRAM(S): at 13:47

## 2020-02-04 RX ADMIN — Medication 20 MILLIGRAM(S): at 05:13

## 2020-02-04 RX ADMIN — Medication 20 MILLIGRAM(S): at 18:00

## 2020-02-04 RX ADMIN — HEPARIN SODIUM 5000 UNIT(S): 5000 INJECTION INTRAVENOUS; SUBCUTANEOUS at 18:00

## 2020-02-04 RX ADMIN — LATANOPROST 1 DROP(S): 0.05 SOLUTION/ DROPS OPHTHALMIC; TOPICAL at 21:08

## 2020-02-04 RX ADMIN — CEFEPIME 100 MILLIGRAM(S): 1 INJECTION, POWDER, FOR SOLUTION INTRAMUSCULAR; INTRAVENOUS at 18:00

## 2020-02-04 NOTE — PROGRESS NOTE ADULT - PROBLEM SELECTOR PLAN 4
- Pt with episodes of v tach overnight, longest lasting 20 beats  - Repeat EKG grossly unchanged  - Cardio Dr. Qureshi consulted, trops likely elevated due to demand ischemia  - Third set of CE downtrending  - Mg repleted  - Continue metoprolol 12.5mg q6h, may titrate up if episodes continue  - Continue to monitor on remote tele

## 2020-02-04 NOTE — PROGRESS NOTE ADULT - ASSESSMENT
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

## 2020-02-04 NOTE — PROVIDER CONTACT NOTE (OTHER) - BACKGROUND
Marcie RN on break at this time.
Pt admitted with pneumonia  PMH: lung Ca, CKD, CAD, dyslipidemia, ASHD, HTN, ventricular tach, hyperkalemia, respiratory distress, leukocytois
hx of multiple v tachs

## 2020-02-04 NOTE — PROGRESS NOTE ADULT - PROBLEM SELECTOR PLAN 1
[facility] completed and discussed - pt is DNR DNI -   Hospice assessment - poss Home Hospice eval  resp distress - lung ca with mets - progressive disease - multifocal pna -   CKD and GERMAN - Dehydration - Dec PO Intake - IVF -   Renal EVAL noted - replete lytes and serial renal indices - avoid nephrotoxins  cachexia and mets ca progression - hospice appropriate - will refer to Hospice eval  broad spectrum ABX - multifocal PNA - poss Aspiration event  COPD - Emphysema - NEBS and Steroids - Hypertonic Saline NEBS  pt was on Keytruda in the past - currently off - as per Family - Follows with Dr. ADAMSON Onc in [place] -   prognosis is very poor - pt is DNR DNI  discussed plan of care with family  CT chest reviewed  Dilaudid 0.25 mg IVP prn for resp distress - suffering - dyspnea - pain - palliative measures.

## 2020-02-04 NOTE — PROGRESS NOTE ADULT - SUBJECTIVE AND OBJECTIVE BOX
[facility] - Aziza Valdivia, Blayne, Edmond, Melanie, Randee Qureshi  Office Number:  [phone]    Patient resting comfortably in bed in NAD.  Laying flat with no respiratory distress.  No complaints of chest pain, dyspnea, palpitations, PND, or orthopnea.    F/U for: Continued runs of vtach   Patient states he follows Dr. Ervin, unsure when he was last seen. Echocardiography performed on 9/15/18 revealed moderate aortic stenosis with satisfactory left ventricular systolic function (estimated ejection fraction 55%). Pharmacological nuclear stress testing performed on 9/17/18 revealed evidence for an inferior infarction, however, no significant myocardial ischemia was demonstrated. Overnight, patient had multiple episodes of vtach, with the longest at 6 beats. Patient denies any chest pain, SOB, palpitations.     MEDICATIONS  (STANDING):  albuterol/ipratropium for Nebulization 3 milliLiter(s) Nebulizer every 6 hours  atorvastatin 20 milliGRAM(s) Oral at bedtime  cefepime   IVPB 500 milliGRAM(s) IV Intermittent every 24 hours  dronabinol 5 milliGRAM(s) Oral every 12 hours  ezetimibe 10 milliGRAM(s) Oral daily  folic acid 1 milliGRAM(s) Oral daily  heparin  Injectable 5000 Unit(s) SubCutaneous every 12 hours  latanoprost 0.005% Ophthalmic Solution 1 Drop(s) Both EYES at bedtime  methylPREDNISolone sodium succinate Injectable 20 milliGRAM(s) IV Push two times a day  metoprolol tartrate 12.5 milliGRAM(s) Oral every 6 hours  pantoprazole    Tablet 40 milliGRAM(s) Oral before breakfast  sodium chloride 0.45% 1000 milliLiter(s) (75 mL/Hr) IV Continuous <Continuous>  sodium chloride 3%  Inhalation 4 milliLiter(s) Inhalation every 6 hours    MEDICATIONS  (PRN):  bisacodyl 5 milliGRAM(s) Oral every 12 hours PRN Constipation  guaiFENesin   Syrup  (Sugar-Free) 200 milliGRAM(s) Oral every 6 hours PRN Cough  HYDROmorphone  Injectable 0.25 milliGRAM(s) IV Push every 4 hours PRN pain, distress, suffering, dyspnea, palliative measures      Allergies  No Known Allergies  Intolerances      Vital Signs Last 24 Hrs  T(C): 36.4 (04 Feb 2020 04:55), Max: 37 (03 Feb 2020 12:49)  T(F): 97.5 (04 Feb 2020 04:55), Max: 98.6 (03 Feb 2020 12:49)  HR: 90 (04 Feb 2020 08:00) (81 - 105)  BP: 116/72 (04 Feb 2020 04:55) (112/63 - 120/76)  BP(mean): --  RR: 18 (04 Feb 2020 04:55) (18 - 20)  SpO2: 97% (04 Feb 2020 08:00) (92% - 98%)    I&O's Summary    ON EXAM:  General: NAD, elderly  HEENT: NCAT, PERRLA, EOMI bl,  Neck: Supple, nontender, no mass  Neurology: A&Ox3, nonfocal, sensation intact   Respiratory: +decreased BS B/L, diffuse rhonchi R>L  CV: RRR, +S1/S2, +systolic murmur heard at RSB  Abdominal: Soft, NT, ND +BSx4, no palpable masses  Extremities: 2+ pitting edema  Heme: diffuse ecchymosis  LABS: All Labs Reviewed:                        10.4   12.58 )-----------( 117      ( 03 Feb 2020 05:51 )             32.1                         13.3   18.45 )-----------( 170      ( 02 Feb 2020 12:48 )             41.6     03 Feb 2020 05:51    143    |  112    |  109    ----------------------------<  140    4.9     |  19     |  6.10   02 Feb 2020 16:46    x      |  x      |  x      ----------------------------<  x      4.7     |  x      |  x      02 Feb 2020 12:48    142    |  109    |  109    ----------------------------<  109    5.4     |  19     |  6.30     Ca    8.5        03 Feb 2020 05:51  Ca    9.0        02 Feb 2020 12:48  Phos  6.3       03 Feb 2020 05:51  Phos  6.0       02 Feb 2020 20:51  Mg     1.7       03 Feb 2020 05:51  Mg     1.7       02 Feb 2020 20:51    TPro  5.1    /  Alb  2.1    /  TBili  0.5    /  DBili  x      /  AST  21     /  ALT  19     /  AlkPhos  103    03 Feb 2020 05:51  TPro  6.4    /  Alb  2.7    /  TBili  0.8    /  DBili  x      /  AST  26     /  ALT  23     /  AlkPhos  164    02 Feb 2020 12:48    PT/INR - ( 02 Feb 2020 12:48 )   PT: 13.7 sec;   INR: 1.22 ratio         PTT - ( 02 Feb 2020 12:48 )  PTT:29.8 sec  CARDIAC MARKERS ( 03 Feb 2020 16:52 )  .070 ng/mL / x     / 53 U/L / x     / 2.5 ng/mL  CARDIAC MARKERS ( 03 Feb 2020 11:31 )  .074 ng/mL / x     / 53 U/L / x     / 2.7 ng/mL  CARDIAC MARKERS ( 03 Feb 2020 05:51 )  .061 ng/mL / x     / 57 U/L / x     / 3.1 ng/mL      Blood Culture: Organism --  Gram Stain Blood -- Gram Stain --  Specimen Source .Blood Blood-Peripheral  Culture-Blood --

## 2020-02-04 NOTE — PROGRESS NOTE ADULT - PROBLEM SELECTOR PLAN 5
- Currently receiving CT [id number]  - Will hold CT so as to not induce further immunosuppression  - Follows with outpatient oncologist Dr. Mahan in [place]

## 2020-02-04 NOTE — CONSULT NOTE ADULT - ASSESSMENT
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 Admit for acute hypooxic respiratory failure likely 2/2 PNA in setting of cancer also noted to have GERMAN.    Episode of vtach  -Longest run at 20beats of vtach at 4:00AM on 2/3 with no subsequent ekg changes  -O/N, patient had multiple episodes of vtach, with longest run at 6 beats  -In setting of CAD hx s/p CABG likely 2/2 electrolyte imbalances and multiple stressors (pneumonia, GERMAN) leading to ectopic beats  -Patient asymptomatic at that time of episodes  -Trop mildly elevated x3. Likely secondary to demand ischemia 2/2 hypoxia 2/2 pneumonia  -No clear evidence of acute ischemia  -His CKs are flat, suggesting against acute atherosclerotic plaque rupture.  -Patient was previously on metoprolol per [facility] and [facility] records, unsure if he is still taking.  -Continue metoprolol 12.5mg q6. May need to titrate up if patient continues to have runs of vtach  -F/u echo    -Continue statin therapy  -Monitor and replete lytes, keep K>4, Mg>2.  -Patient is DNR/DNI, poor prognosis with pending palliative and hospice eval.   -All other workup per primary team. Will followup.

## 2020-02-04 NOTE — PROGRESS NOTE ADULT - PROBLEM SELECTOR PLAN 1
- Met severe sepsis criteria with leukocytosis, tachycardia, and elevated lactate with pulmonary source  - CXR and CT chest demonstrating multifocal PNA with ELIZABETH mass extension through intercostal space suspicious for disease progression  - Pulm Dr. Yu following, recommending hospice eval  - C/w Cefepime 500mg BID for 5-7 day course, ID Dr. Junior following  - BCx NGTD  - Leukocytosis trending up today possibly d/t steroids, though overall curve is decreasing  - Pt has remained afebrile  - Monitor daily CBC and temperatures

## 2020-02-04 NOTE — PROGRESS NOTE ADULT - PROBLEM SELECTOR PLAN 6
- H/o emphysema  - Pulm Dr. Yu following  - DuoNebs q6h, SoluMedrol 20mg IVP BID, hypertonic saline nebs q6h

## 2020-02-04 NOTE — PROGRESS NOTE ADULT - PROBLEM SELECTOR PLAN 3
- Known h/o stage III CKD  - Cr continues to be elevataed  - Nephro Dr. Brooks consulted, suspecting GERMAN 2/2 dehydration and septic ATN, no immediate need for HD as pt is poor candidate due to overall debility and comorbidities  - Renal US showing echogenic kidneys consistent with medical renal disease  - S/p 0.5NS+75mEq bicarb, c/w gentle hydration with 0.5 NS  - Strict I/Os must be documented to get better sense of pt's urine output, may consider adding albumin followed by lasix in an effort to increase oncotic pressure and increase renal perfusion - Known h/o stage III CKD  - Cr continues to be elevated  - Nephro Dr. Brooks consulted, suspecting GERMAN 2/2 dehydration and septic ATN, no immediate need for HD as pt is poor candidate due to overall debility and comorbidities  - Renal US showing echogenic kidneys consistent with medical renal disease  - S/p 0.5NS+75mEq bicarb, c/w gentle hydration with 0.5 NS  - Strict I/Os must be documented to get better sense of pt's urine output, may consider adding albumin followed by lasix in an effort to increase oncotic pressure and increase renal perfusion

## 2020-02-04 NOTE — PROGRESS NOTE ADULT - PROBLEM SELECTOR PLAN 8
- Chronic, stable  - Home ASA was stopped  - Statin restarted in hospital, continue  - Metoprolol 12.5mg q6h restarted per cardio  - F/u TTE as last is from 2018

## 2020-02-04 NOTE — PROGRESS NOTE ADULT - PROBLEM SELECTOR PLAN 7
- Poor prognosis, hospice eval pending  - Pt is DNR/DNI, MOLST completed - Poor prognosis, hospice eval pending  - Pt is DNR/DNI, MOLST completed  - Lengthy discussion had with pt and family at bedside, amenable to meeting with hospice RN to discuss further options

## 2020-02-04 NOTE — PROGRESS NOTE ADULT - SUBJECTIVE AND OBJECTIVE BOX
Date/Time Patient Seen:  		  Referring MD:   Data Reviewed	       Patient is a 86y old  Male who presents with a chief complaint of shortness of breath (03 Feb 2020 17:16)      Subjective/HPI     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  S/P skin neoplasm resection  Hip pain: Right hip replacement in 2008  FH: CABG (coronary artery bypass surgery): 1994 (double)        Medication list         MEDICATIONS  (STANDING):  albuterol/ipratropium for Nebulization 3 milliLiter(s) Nebulizer every 6 hours  atorvastatin 20 milliGRAM(s) Oral at bedtime  cefepime   IVPB 500 milliGRAM(s) IV Intermittent every 24 hours  dronabinol 5 milliGRAM(s) Oral every 12 hours  ezetimibe 10 milliGRAM(s) Oral daily  folic acid 1 milliGRAM(s) Oral daily  heparin  Injectable 5000 Unit(s) SubCutaneous every 12 hours  latanoprost 0.005% Ophthalmic Solution 1 Drop(s) Both EYES at bedtime  methylPREDNISolone sodium succinate Injectable 20 milliGRAM(s) IV Push two times a day  metoprolol tartrate 12.5 milliGRAM(s) Oral every 6 hours  pantoprazole    Tablet 40 milliGRAM(s) Oral before breakfast  sodium chloride 0.45% 1000 milliLiter(s) (75 mL/Hr) IV Continuous <Continuous>  sodium chloride 3%  Inhalation 4 milliLiter(s) Inhalation every 6 hours    MEDICATIONS  (PRN):  bisacodyl 5 milliGRAM(s) Oral every 12 hours PRN Constipation  guaiFENesin   Syrup  (Sugar-Free) 200 milliGRAM(s) Oral every 6 hours PRN Cough  HYDROmorphone  Injectable 0.25 milliGRAM(s) IV Push every 4 hours PRN pain, distress, suffering, dyspnea, palliative measures         Vitals log        ICU Vital Signs Last 24 Hrs  T(C): 36.4 (04 Feb 2020 04:55), Max: 37 (03 Feb 2020 12:49)  T(F): 97.5 (04 Feb 2020 04:55), Max: 98.6 (03 Feb 2020 12:49)  HR: 88 (04 Feb 2020 04:55) (81 - 105)  BP: 116/72 (04 Feb 2020 04:55) (112/63 - 120/76)  BP(mean): --  ABP: --  ABP(mean): --  RR: 18 (04 Feb 2020 04:55) (18 - 20)  SpO2: 95% (04 Feb 2020 04:55) (92% - 98%)           Input and Output:  I&O's Detail      Lab Data                        10.4   12.58 )-----------( 117      ( 03 Feb 2020 05:51 )             32.1     02-03    143  |  112<H>  |  109<H>  ----------------------------<  140<H>  4.9   |  19<L>  |  6.10<H>    Ca    8.5      03 Feb 2020 05:51  Phos  6.3     02-03  Mg     1.7     02-03    TPro  5.1<L>  /  Alb  2.1<L>  /  TBili  0.5  /  DBili  x   /  AST  21  /  ALT  19  /  AlkPhos  103  02-03    ABG - ( 03 Feb 2020 05:57 )  pH, Arterial: 7.35  pH, Blood: x     /  pCO2: 36    /  pO2: 70    / HCO3: 20    / Base Excess: -5.5  /  SaO2: 93                CARDIAC MARKERS ( 03 Feb 2020 16:52 )  .070 ng/mL / x     / 53 U/L / x     / 2.5 ng/mL  CARDIAC MARKERS ( 03 Feb 2020 11:31 )  .074 ng/mL / x     / 53 U/L / x     / 2.7 ng/mL  CARDIAC MARKERS ( 03 Feb 2020 05:51 )  .061 ng/mL / x     / 57 U/L / x     / 3.1 ng/mL        Review of Systems	      Objective     Physical Examination    heart s1s2  lung dec BS  abd soft  head nc  head at      Pertinent Lab findings & Imaging      Jose Guadalupe:  NO   Adequate UO     I&O's Detail           Discussed with:     Cultures:	        Radiology

## 2020-02-04 NOTE — PROGRESS NOTE ADULT - PROBLEM SELECTOR PLAN 1
pt appears to be clinically improved on current therapy and although there is an increase in wbc this may be due to steroids  -recommend continued cefepime for now with anticipated 5-7 course of abx

## 2020-02-04 NOTE — PROGRESS NOTE ADULT - SUBJECTIVE AND OBJECTIVE BOX
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 Pt states he has baseline HADLEY, but dyspea has increased in the last week. Of note he is in week 6 of chemo treatment keytruda, gets chemo every 3 weeks, tolerating chemo appropriately. Pt also has been urinating less for the last 4 days. Pt denies ever having dialysis. Pt denies fever, chills, chest pain, orthopnea, dysuria, hematuria, diarrhea, melena, hematochezia.      ED vitals: T: 96.8, HR: 99, BP: 108/58, RR: 18, O2 Saturation: 96% on 2L  Pt recieved albuterol neb x 3, ipratropium x 1 for neb, solumedrol injectable  mg x1, kayexalate 30 mg x1, 2L NS   Labs significant for: WBC: 18.45 w/ left shift, Lactate: 2.5, Potassium 5.4, Chloride: 109, CO2: 19, BUN/Cr: 109/6.3, Albumin: 2.7, Alk Phos: 164, ProCal: 4.5   EKG: sinus tachycardia 106 bpm  CXR: R sided PNA (02 Feb 2020 14:08)      INTERVAL EVENTS: Patient seen and examined at bedside. Patient had one asymptomatic episode of 3 beats of vtach overnight. This AM, he is much more interactive and states continued improvement. Endorses nonproductive cough, constipation, and decreased urination but otherwise denies fever, chills, headache, vision changes, chest pain, worsening SOB, abd pain, or N/V/D.        REVIEW OF SYSTEMS:  CONSTITUTIONAL: No weakness, fevers or chills  EYES/ENT: No visual changes, no throat pain   RESPIRATORY: admits cough; No wheezing, hemoptysis; No shortness of breath  CARDIOVASCULAR: No chest pain or palpitations  GASTROINTESTINAL: admits constipation; No abdominal, nausea, vomiting, or hematemesis; No diarrhea. No melena or hematochezia.  GENITOURINARY: admits decreased urine output; No dysuria, frequency or hematuria  NEUROLOGICAL: No dizziness, numbness, or weakness  SKIN: No itching, burning, rashes, or lesions   All other review of systems is negative unless indicated above.      VITAL SIGNS:  Vital Signs Last 24 Hrs  T(C): 36.4 (04 Feb 2020 04:55), Max: 37 (03 Feb 2020 12:49)  T(F): 97.5 (04 Feb 2020 04:55), Max: 98.6 (03 Feb 2020 12:49)  HR: 90 (04 Feb 2020 08:00) (81 - 105)  BP: 116/72 (04 Feb 2020 04:55) (112/63 - 120/76)  RR: 18 (04 Feb 2020 04:55) (18 - 20)  SpO2: 97% (04 Feb 2020 08:00) (92% - 98%)      PHYSICAL EXAM:   GENERAL: sitting upright in bed comfortably, appropriately interactive, in no acute distress  HEENT: NC/AT, EOMI, neck supple, MMM  RESPIRATORY: R basilar crackles,   CARDIOVASCULAR: RRR, no murmurs, gallops, rubs  ABDOMINAL: soft, non-tender, non-distended, positive bowel sounds   EXTREMITIES: no clubbing, cyanosis, or edema  NEUROLOGICAL: alert and oriented x 3, non-focal  SKIN: no rashes or lesions   MUSCULOSKELETAL: no gross joint deformity                              10.5   15.94 )-----------( 153      ( 04 Feb 2020 08:53 )             32.0     02-04    143  |  111<H>  |  124<H>  ----------------------------<  104<H>  4.7   |  19<L>  |  6.70<H>    Ca    9.2      04 Feb 2020 08:53  Phos  6.3     02-03  Mg     3.0     02-04    TPro  5.1<L>  /  Alb  2.1<L>  /  TBili  0.5  /  DBili  x   /  AST  21  /  ALT  19  /  AlkPhos  103  02-03        MEDICATIONS  (STANDING):  albuterol/ipratropium for Nebulization 3 milliLiter(s) Nebulizer every 6 hours  atorvastatin 20 milliGRAM(s) Oral at bedtime  bisacodyl 5 milliGRAM(s) Oral every 12 hours  cefepime   IVPB 500 milliGRAM(s) IV Intermittent every 24 hours  dronabinol 5 milliGRAM(s) Oral every 12 hours  ezetimibe 10 milliGRAM(s) Oral daily  folic acid 1 milliGRAM(s) Oral daily  heparin  Injectable 5000 Unit(s) SubCutaneous every 12 hours  latanoprost 0.005% Ophthalmic Solution 1 Drop(s) Both EYES at bedtime  methylPREDNISolone sodium succinate Injectable 20 milliGRAM(s) IV Push two times a day  metoprolol tartrate 12.5 milliGRAM(s) Oral every 6 hours  pantoprazole    Tablet 40 milliGRAM(s) Oral before breakfast  sodium chloride 0.45%. 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 Pt states he has baseline HADLEY, but dyspea has increased in the last week. Of note he is in week 6 of chemo treatment keytruda, gets chemo every 3 weeks, tolerating chemo appropriately. Pt also has been urinating less for the last 4 days. Pt denies ever having dialysis. Pt denies fever, chills, chest pain, orthopnea, dysuria, hematuria, diarrhea, melena, hematochezia.      ED vitals: T: 96.8, HR: 99, BP: 108/58, RR: 18, O2 Saturation: 96% on 2L  Pt recieved albuterol neb x 3, ipratropium x 1 for neb, solumedrol injectable  mg x1, kayexalate 30 mg x1, 2L NS   Labs significant for: WBC: 18.45 w/ left shift, Lactate: 2.5, Potassium 5.4, Chloride: 109, CO2: 19, BUN/Cr: 109/6.3, Albumin: 2.7, Alk Phos: 164, ProCal: 4.5   EKG: sinus tachycardia 106 bpm  CXR: R sided PNA (02 Feb 2020 14:08)      INTERVAL EVENTS: Patient seen and examined at bedside. Patient had one asymptomatic episode of 3 beats of vtach overnight. This AM, he is much more interactive and states continued improvement. Endorses nonproductive cough, constipation, and decreased urination but otherwise denies fever, chills, headache, vision changes, chest pain, worsening SOB, abd pain, or N/V/D.        REVIEW OF SYSTEMS:  CONSTITUTIONAL: No weakness, fevers or chills  EYES/ENT: No visual changes, no throat pain   RESPIRATORY: admits cough; No wheezing, hemoptysis; No shortness of breath  CARDIOVASCULAR: No chest pain or palpitations  GASTROINTESTINAL: admits constipation; No abdominal, nausea, vomiting, or hematemesis; No diarrhea. No melena or hematochezia.  GENITOURINARY: admits decreased urine output; No dysuria, frequency or hematuria  NEUROLOGICAL: No dizziness, numbness, or weakness  SKIN: No itching, burning, rashes, or lesions   All other review of systems is negative unless indicated above.      VITAL SIGNS:  Vital Signs Last 24 Hrs  T(C): 36.4 (04 Feb 2020 04:55), Max: 37 (03 Feb 2020 12:49)  T(F): 97.5 (04 Feb 2020 04:55), Max: 98.6 (03 Feb 2020 12:49)  HR: 90 (04 Feb 2020 08:00) (81 - 105)  BP: 116/72 (04 Feb 2020 04:55) (112/63 - 120/76)  RR: 18 (04 Feb 2020 04:55) (18 - 20)  SpO2: 97% (04 Feb 2020 08:00) (92% - 98%)      PHYSICAL EXAM:   GENERAL: sitting upright in bed comfortably, appropriately interactive, in no acute distress  HEENT: NC/AT, EOMI, neck supple, MMM  RESPIRATORY: R basilar crackles, no wheezes or rales  CARDIOVASCULAR: RRR, no murmurs, gallops, rubs  ABDOMINAL: soft, non-tender, non-distended, positive bowel sounds   EXTREMITIES: no clubbing or cyanosis, 2+ edema of b/l LE  NEUROLOGICAL: alert and oriented x 3, non-focal, 5/5 muscle strength of b/l LE, no sensory deficits  SKIN: diffuse ecchymoses of b/l UE and LE  MUSCULOSKELETAL: no gross joint deformity                              10.5   15.94 )-----------( 153      ( 04 Feb 2020 08:53 )             32.0     02-04    143  |  111<H>  |  124<H>  ----------------------------<  104<H>  4.7   |  19<L>  |  6.70<H>    Ca    9.2      04 Feb 2020 08:53  Phos  6.3     02-03  Mg     3.0     02-04    TPro  5.1<L>  /  Alb  2.1<L>  /  TBili  0.5  /  DBili  x   /  AST  21  /  ALT  19  /  AlkPhos  103  02-03        MEDICATIONS  (STANDING):  albuterol/ipratropium for Nebulization 3 milliLiter(s) Nebulizer every 6 hours  atorvastatin 20 milliGRAM(s) Oral at bedtime  bisacodyl 5 milliGRAM(s) Oral every 12 hours  cefepime   IVPB 500 milliGRAM(s) IV Intermittent every 24 hours  dronabinol 5 milliGRAM(s) Oral every 12 hours  ezetimibe 10 milliGRAM(s) Oral daily  folic acid 1 milliGRAM(s) Oral daily  heparin  Injectable 5000 Unit(s) SubCutaneous every 12 hours  latanoprost 0.005% Ophthalmic Solution 1 Drop(s) Both EYES at bedtime  methylPREDNISolone sodium succinate Injectable 20 milliGRAM(s) IV Push two times a day  metoprolol tartrate 12.5 milliGRAM(s) Oral every 6 hours  pantoprazole    Tablet 40 milliGRAM(s) Oral before breakfast  sodium chloride 0.45%. 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 Pt states he has baseline HADLEY, but dyspea has increased in the last week. Of note he is in week 6 of chemo treatment keytruda, gets chemo every 3 weeks, tolerating chemo appropriately. Pt also has been urinating less for the last 4 days. Pt denies ever having dialysis. Pt denies fever, chills, chest pain, orthopnea, dysuria, hematuria, diarrhea, melena, hematochezia.      ED vitals: T: 96.8, HR: 99, BP: 108/58, RR: 18, O2 Saturation: 96% on 2L  Pt recieved albuterol neb x 3, ipratropium x 1 for neb, solumedrol injectable  mg x1, kayexalate 30 mg x1, 2L NS   Labs significant for: WBC: 18.45 w/ left shift, Lactate: 2.5, Potassium 5.4, Chloride: 109, CO2: 19, BUN/Cr: 109/6.3, Albumin: 2.7, Alk Phos: 164, ProCal: 4.5   EKG: sinus tachycardia 106 bpm  CXR: R sided PNA (02 Feb 2020 14:08)      INTERVAL EVENTS: Patient seen and examined at bedside. Patient had one asymptomatic episode of 3 beats of vtach overnight. This AM, he is much more interactive and states continued improvement. Endorses nonproductive cough, constipation, and decreased urination but otherwise denies fever, chills, headache, vision changes, chest pain, worsening SOB, abd pain, or N/V/D.        REVIEW OF SYSTEMS:  CONSTITUTIONAL: No weakness, fevers or chills  EYES/ENT: No visual changes, no throat pain   RESPIRATORY: admits cough; No wheezing, hemoptysis; No shortness of breath  CARDIOVASCULAR: No chest pain or palpitations  GASTROINTESTINAL: admits constipation; No abdominal, nausea, vomiting, or hematemesis; No diarrhea. No melena or hematochezia.  GENITOURINARY: admits decreased urine output; No dysuria, frequency or hematuria  NEUROLOGICAL: No dizziness, numbness, or weakness  SKIN: No itching, burning, rashes, or lesions   All other review of systems is negative unless indicated above.      VITAL SIGNS:  Vital Signs Last 24 Hrs  T(C): 36.4 (04 Feb 2020 04:55), Max: 37 (03 Feb 2020 12:49)  T(F): 97.5 (04 Feb 2020 04:55), Max: 98.6 (03 Feb 2020 12:49)  HR: 90 (04 Feb 2020 08:00) (81 - 105)  BP: 116/72 (04 Feb 2020 04:55) (112/63 - 120/76)  RR: 18 (04 Feb 2020 04:55) (18 - 20)  SpO2: 97% (04 Feb 2020 08:00) (92% - 98%)      PHYSICAL EXAM:   GENERAL: sitting upright in bed comfortably, appropriately interactive, in no acute distress, cachectic male  HEENT: NC/AT, EOMI, neck supple, MMM, dentition in poor repair  RESPIRATORY: R basilar crackles, no wheezes or rales  CARDIOVASCULAR: RRR, no murmurs, gallops, rubs  ABDOMINAL: soft, non-tender, non-distended, positive bowel sounds   EXTREMITIES: no clubbing or cyanosis, 2+ edema of b/l LE  NEUROLOGICAL: alert and oriented x 3, non-focal  SKIN: diffuse ecchymoses of b/l UE and LE  MUSCULOSKELETAL: no gross joint deformity                              10.5   15.94 )-----------( 153      ( 04 Feb 2020 08:53 )             32.0     02-04    143  |  111<H>  |  124<H>  ----------------------------<  104<H>  4.7   |  19<L>  |  6.70<H>    Ca    9.2      04 Feb 2020 08:53  Phos  6.3     02-03  Mg     3.0     02-04    TPro  5.1<L>  /  Alb  2.1<L>  /  TBili  0.5  /  DBili  x   /  AST  21  /  ALT  19  /  AlkPhos  103  02-03        MEDICATIONS  (STANDING):  albuterol/ipratropium for Nebulization 3 milliLiter(s) Nebulizer every 6 hours  atorvastatin 20 milliGRAM(s) Oral at bedtime  bisacodyl 5 milliGRAM(s) Oral every 12 hours  cefepime   IVPB 500 milliGRAM(s) IV Intermittent every 24 hours  dronabinol 5 milliGRAM(s) Oral every 12 hours  ezetimibe 10 milliGRAM(s) Oral daily  folic acid 1 milliGRAM(s) Oral daily  heparin  Injectable 5000 Unit(s) SubCutaneous every 12 hours  latanoprost 0.005% Ophthalmic Solution 1 Drop(s) Both EYES at bedtime  methylPREDNISolone sodium succinate Injectable 20 milliGRAM(s) IV Push two times a day  metoprolol tartrate 12.5 milliGRAM(s) Oral every 6 hours  pantoprazole    Tablet 40 milliGRAM(s) Oral before breakfast  sodium chloride 0.45%. 1000 milliLiter(s) (50 mL/Hr) IV Continuous <Continuous>  sodium chloride 3%  Inhalation 4 milliLiter(s) Inhalation every 6 hours

## 2020-02-04 NOTE — CONSULT NOTE ADULT - SUBJECTIVE AND OBJECTIVE BOX
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 Pt states he has baseline HADLEY, but dyspea has increased in the last week. Of note he is in week 6 of chemo treatment keytruda, gets chemo every 3 weeks, tolerating chemo appropriately. Pt also has been urinating less for the last 4 days. Pt denies ever having dialysis. Pt denies fever, chills, chest pain, orthopnea, dysuria, hematuria, diarrhea, melena, hematochezia.      ED vitals: T: 96.8, HR: 99, BP: 108/58, RR: 18, O2 Saturation: 96% on 2L  Pt recieved albuterol neb x 3, ipratropium x 1 for neb, solumedrol injectable  mg x1, kayexalate 30 mg x1, 2L NS   Labs significant for: WBC: 18.45 w/ left shift, Lactate: 2.5, Potassium 5.4, Chloride: 109, CO2: 19, BUN/Cr: 109/6.3, Albumin: 2.7, Alk Phos: 164, ProCal: 4.5   EKG: sinus tachycardia 106 bpm  CXR: R sided PNA    Interval History: Patient states he follows Dr. Ervin, unsure when he was last seen. Echocardiography performed on 9/15/18 revealed moderate aortic stenosis with satisfactory left ventricular systolic function (estimated ejection fraction 55%). Pharmacological nuclear stress testing performed on 18 revealed evidence for an inferior infarction, however, no significant myocardial ischemia was demonstrated. Overnight, patient had multiple episodes of vtach, with the longest at 6 beats. Patient denies any chest pain, SOB, palpitations.       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       MEDICATIONS  (STANDING):  albuterol/ipratropium for Nebulization 3 milliLiter(s) Nebulizer every 6 hours  cefepime   IVPB 500 milliGRAM(s) IV Intermittent every 24 hours  heparin  Injectable 5000 Unit(s) SubCutaneous every 12 hours  methylPREDNISolone sodium succinate Injectable 20 milliGRAM(s) IV Push every 8 hours  pantoprazole    Tablet 40 milliGRAM(s) Oral before breakfast  sodium chloride 0.45% 1000 milliLiter(s) (75 mL/Hr) IV Continuous <Continuous>  sodium chloride 3%  Inhalation 4 milliLiter(s) Inhalation every 6 hours    MEDICATIONS  (PRN):  guaiFENesin   Syrup  (Sugar-Free) 200 milliGRAM(s) Oral every 6 hours PRN Cough  HYDROmorphone  Injectable 0.25 milliGRAM(s) IV Push every 4 hours PRN pain, distress, suffering, dyspnea, palliative measures      FAMILY HISTORY:  No pertinent family history in first degree relatives  Denies Family history of CAD or early MI      Constitutional: denies fever, chills  Respiratory: denies SOB, HADLEY, cough  Cardiovascular: denies CP, palpitations, orthopnea, PND, LE edema  Gastrointestinal: denies nausea, vomiting, abdominal pain  Genitourinary: denies urinary changes  Neurologic: denies headache, weakness, dizziness  Hematology/Oncology: admits easy bruising 2/2 steroid use  MSK: admits chronic back pain      SOCIAL HISTORY:  Quit smoking , smoked 3 ppd 25 years (75 pack years)   Lives with wife, performs all ADLs, uses walker and cane    Vital Signs Last 24 Hrs  T(C): 37.1 (2020 04:46), Max: 37.1 (2020 04:46)  T(F): 98.7 (2020 04:46), Max: 98.7 (2020 04:46)  HR: 92 (2020 04:46) (87 - 99)  BP: 116/68 (2020 04:46) (108/58 - 125/66)  BP(mean): --  RR: 19 (2020 04:46) (17 - 22)  SpO2: 94% (2020 04:46) (84% - 96%)    Physical Exam:  General: NAD, elderly  HEENT: NCAT, PERRLA, EOMI bl,  Neck: Supple, nontender, no mass  Neurology: A&Ox3, nonfocal, sensation intact   Respiratory: +decreased BS B/L, diffuse rhonchi R>L  CV: RRR, +S1/S2, +systolic murmur heard at RSB  Abdominal: Soft, NT, ND +BSx4, no palpable masses  Extremities: 2+ pitting edema  Heme: diffuse ecchymosis      I&O's Detail      LABS:                        10.4   12.58 )-----------( 117      ( 2020 05:51 )             32.1     02-03    143  |  112<H>  |  109<H>  ----------------------------<  140<H>  4.9   |  19<L>  |  6.10<H>    Ca    8.5      2020 05:51  Phos  6.3     02-03  Mg     1.7     02-03    TPro  5.1<L>  /  Alb  2.1<L>  /  TBili  0.5  /  DBili  x   /  AST  21  /  ALT  19  /  AlkPhos  103  02-03    CARDIAC MARKERS ( 2020 05:51 )  .061 ng/mL / x     / 57 U/L / x     / 3.1 ng/mL      PT/INR - ( 2020 12:48 )   PT: 13.7 sec;   INR: 1.22 ratio         PTT - ( 2020 12:48 )  PTT:29.8 sec  Urinalysis Basic - ( 2020 00:16 )    Color: Yellow / Appearance: Slightly Turbid / S.020 / pH: x  Gluc: x / Ketone: Negative  / Bili: Negative / Urobili: Negative   Blood: x / Protein: 100 / Nitrite: Negative   Leuk Esterase: Trace / RBC: 6-10 /HPF / WBC 6-10   Sq Epi: x / Non Sq Epi: Occasional / Bacteria: Few      I&O's Summary    BNP  RADIOLOGY & ADDITIONAL STUDIES:  < from: CT Chest No Cont (20 @ 15:13) >    EXAM:  CT CHEST                            PROCEDURE DATE:  2020          INTERPRETATION:  Clinical information: Suspected pneumonia. History of lung cancer.    Comparison: 2019 CT scan of the chest.    PROCEDURE:   CT of the Chest was performed without intravenous contrast.     FINDINGS:    Lungs and airways: Emphysema. Interval development of multifocal opacities in the right upper lobe and right lower lobe compatible with multifocal pneumonia. Again noted is a left upper lobe and para-mediastinal mass adjacent to surgical clips inseparable from the proximal aortic arch. The area on series 2 image 45 measures 4.1 x 3.7 cm previously 3.8 x 2.3 cm. Slightly more superiorly there is soft tissue that extends anteriorly through the intercostal space on series 2 image 36 measuring 3.1 x 3.0 cm.  Findings are suspicious for progression of disease.    Pleura: Within normal limits. No pleural effusion.    Mediastinum and Ginny: No abnormally enlarged lymph nodes. The esophagus is distended with fluid.    Heart: Normal size. No pericardial effusion.     Vessels: There has been median sternotomy and CABG. Main pulmonary artery is enlarged to 4.2 cm.    Chest wall and lower neck: Within normal limits.    Upper abdomen: Bilateral renal hypodensities again noted including some which are not simple cysts. For example there is a 3.0 cm lesion in the right mid kidney which is not a simple cyst and a 1.4 cm lesion in the left mid kidney which is not a simple cyst are stable. The gallbladder is distended containing a stone.    Bones: Within normal limits.    IMPRESSION:     Interval development of multifocal pneumonia involving the right upper and lower lobes.    Increase in size of soft tissue mass associated with surgical clips in the left upper lobe paramediastinal location inseparable from the aortic arch which now extends anteriorly in an intercostal location highly suspicious for progression of disease.                      BLANCA GOODRICH M.D., ATTENDING RADIOLOGIST  This document has been electronically signed. 2020  4:00PM                < end of copied text >

## 2020-02-04 NOTE — PROGRESS NOTE ADULT - ASSESSMENT
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

## 2020-02-04 NOTE — PROGRESS NOTE ADULT - PROBLEM SELECTOR PLAN 2
- Pt p/w SOB and productive cough x 1 week  - CXR and CT chest demonstrating multifocal PNA with ELIZABETH mass extension through intercostal space suspicious for disease progression  - Pulm Dr. Yu following  - C/w Cefepime for likely 5-7 day course

## 2020-02-04 NOTE — PROGRESS NOTE ADULT - SUBJECTIVE AND OBJECTIVE BOX
infectious diseases progress note:    WILDA PORTILLO is a 86y y. o. Male patient    Patient reports: "I am feeling better"    ROS:    EYES:  Negative  blurry vision or double vision  GASTROINTESTINAL:  Negative for nausea, vomiting, diarrhea  -otherwise negative except for subjective    Allergies    No Known Allergies    Intolerances        ANTIBIOTICS/RELEVANT:  antimicrobials  cefepime   IVPB 500 milliGRAM(s) IV Intermittent every 24 hours    immunologic:    OTHER:  albuterol/ipratropium for Nebulization 3 milliLiter(s) Nebulizer every 6 hours  atorvastatin 20 milliGRAM(s) Oral at bedtime  bisacodyl 5 milliGRAM(s) Oral every 12 hours  dronabinol 5 milliGRAM(s) Oral every 12 hours  ezetimibe 10 milliGRAM(s) Oral daily  folic acid 1 milliGRAM(s) Oral daily  guaiFENesin   Syrup  (Sugar-Free) 200 milliGRAM(s) Oral every 6 hours PRN  heparin  Injectable 5000 Unit(s) SubCutaneous every 12 hours  HYDROmorphone  Injectable 0.25 milliGRAM(s) IV Push every 4 hours PRN  latanoprost 0.005% Ophthalmic Solution 1 Drop(s) Both EYES at bedtime  methylPREDNISolone sodium succinate Injectable 20 milliGRAM(s) IV Push two times a day  metoprolol tartrate 12.5 milliGRAM(s) Oral every 6 hours  pantoprazole    Tablet 40 milliGRAM(s) Oral before breakfast  sodium chloride 0.45%. 1000 milliLiter(s) IV Continuous <Continuous>  sodium chloride 3%  Inhalation 4 milliLiter(s) Inhalation every 6 hours      Objective:  Last 24-Vital Signs Last 24 Hrs  T(C): 36.4 (2020 04:55), Max: 37 (2020 12:49)  T(F): 97.5 (2020 04:55), Max: 98.6 (2020 12:49)  HR: 90 (2020 08:00) (81 - 105)  BP: 116/72 (2020 04:55) (112/63 - 120/76)  BP(mean): --  RR: 18 (2020 04:55) (18 - 20)  SpO2: 97% (2020 08:00) (92% - 98%)    T(C): 36.4 (20 @ 04:55), Max: 37.1 (20 @ 04:46)  T(F): 97.5 (20 @ 04:55), Max: 98.7 (20:46)  T(C): 36.4 (20 @ 04:55), Max: 37.1 (20 @ :46)  T(F): 97.5 (20 @ :55), Max: 98.7 (20 @ :46)  T(C): 36.4 (20 @ 04:55), Max: 37.1 (20 @ :46)  T(F): 97.5 (20 @ :55), Max: 98.7 (20 @ 04:46)    PHYSICAL EXAM:  Constitutional: Well-developed, well nourished  Eyes: PERRLA, EOMI  Ear/Nose/Throat: oropharynx normal	  Neck: no JVD, no lymphadenopathy, supple  Respiratory: no accessory muscle use, lung fields with anterior crackles  Cardiovascular: distant  Gastrointestinal: soft, NT, no HSM, BS-normal  Extremities: no clubbing, no cyanosis, edema absent  Neuro: patient alert, oriented and appropriate  Skin: no sig lesions      LABS:                        10.5   15.94 )-----------( 153      ( 2020 08:53 )             32.0       WBC 15.94   @ 08:53  WBC 12.58  02-03 @ 05:51  WBC 18.45  - @ 12:48      02-04    143  |  111<H>  |  124<H>  ----------------------------<  104<H>  4.7   |  19<L>  |  6.70<H>    Ca    9.2      2020 08:53  Phos  6.3     -  Mg     3.0     -    TPro  5.1<L>  /  Alb  2.1<L>  /  TBili  0.5  /  DBili  x   /  AST  21  /  ALT  19  /  AlkPhos  103  02-03      Creatinine, Serum: 6.70 mg/dL (20 @ 08:53)  Creatinine, Serum: 6.10 mg/dL (20 @ 05:51)  Creatinine, Serum: 6.30 mg/dL (20 @ 12:48)      PT/INR - ( 2020 12:48 )   PT: 13.7 sec;   INR: 1.22 ratio         PTT - ( 2020 12:48 )  PTT:29.8 sec  Urinalysis Basic - ( 2020 00:16 )    Color: Yellow / Appearance: Slightly Turbid / S.020 / pH: x  Gluc: x / Ketone: Negative  / Bili: Negative / Urobili: Negative   Blood: x / Protein: 100 / Nitrite: Negative   Leuk Esterase: Trace / RBC: 6-10 /HPF / WBC 6-10   Sq Epi: x / Non Sq Epi: Occasional / Bacteria: Few      MICROBIOLOGY:              RADIOLOGY & ADDITIONAL STUDIES:

## 2020-02-04 NOTE — PROGRESS NOTE ADULT - ASSESSMENT
Acute on CKD Stage 3  Metabolic Acidosis  Hyperkalemia  Sepsis/PNA  Dehydration      -GERMAN is likely due to dehydration along with septic ATN  -FeUrea 31%  -Renal sonogram reviewed showing no evidence of Hydro, but echogenic kidneys noted  -S/P Hyperkalemic cocktail with improvement in potassium levels  -Abx, renal dosing  -Monitor urine output, strict I&Os; no urine output documented.   -No acute indication for hemodialysis  -Mild improvement in creatinine noted s/p bicarb fluids. Will follow up repeat BMP  -Continue with gentle 1/2NS; Patient does not appear fluid overloaded, will hold off on Lasix for now; needs output documented      Thank you Acute on CKD Stage 3  Metabolic Acidosis  Hyperkalemia  Sepsis/PNA  Dehydration      -GERMAN is likely due to dehydration along with septic ATN  -FeUrea 31%  -Renal sonogram reviewed showing no evidence of Hydro, but echogenic kidneys noted  -S/P Hyperkalemic cocktail with improvement in potassium levels  -Abx, renal dosing  -Monitor urine output, strict I&Os; no urine output documented.   -Mild improvement in creatinine initially, now with worsening renal function  -Continue with gentle 1/2NS; Patient does not appear fluid overloaded, will hold off on Lasix for now; needs output documented  -If not having good urine output, we can try Lasix+Albumin to try to convert oliguric ATN to non-oliguric ATN  -No acute indication for dialysis; poor candidate especially with Lung Ca and overall debility.   -Palliative eval recommended    Will discuss with family    Thank you

## 2020-02-04 NOTE — PROGRESS NOTE ADULT - SUBJECTIVE AND OBJECTIVE BOX
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 Pt states he has baseline HADLEY, but dyspnea has increased in the last week. Of note he is in week 6 of chemo treatment keytruda, gets chemo every 3 weeks, tolerating chemo appropriately. Pt also has been urinating less for the last 4 days. Pt denies ever having dialysis. Pt denies fever, chills, chest pain, orthopnea, dysuria, hematuria, diarrhea, melena, hematochezia.   Renal consulted for GERMAN. Per family, patient had GERMAN a year ago when he previously had PNA. However, it improved after the PNA was treated. Patient admits to decreased urination. Also with poor intake.     States his breathing is improved compared to yesterday, States he is not urinating much.      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  Hip pain: Right hip replacement in   FH: CABG (coronary artery bypass surgery):  (double)       FAMILY HISTORY:  No pertinent family history in first degree relatives  [place]    Social History:Non smoker    MEDICATIONS  (STANDING):  albuterol/ipratropium for Nebulization 3 milliLiter(s) Nebulizer every 6 hours  cefepime   IVPB 500 milliGRAM(s) IV Intermittent every 24 hours  heparin  Injectable 5000 Unit(s) SubCutaneous every 12 hours  methylPREDNISolone sodium succinate Injectable 20 milliGRAM(s) IV Push every 8 hours  pantoprazole    Tablet 40 milliGRAM(s) Oral before breakfast  sodium chloride 0.45% 1000 milliLiter(s) (75 mL/Hr) IV Continuous <Continuous>    MEDICATIONS  (PRN):  guaiFENesin   Syrup  (Sugar-Free) 200 milliGRAM(s) Oral every 6 hours PRN Cough  HYDROmorphone  Injectable 0.25 milliGRAM(s) IV Push every 4 hours PRN pain, distress, suffering, dyspnea, palliative measures   Meds reviewed    Allergies    No Known Allergies    Intolerances         REVIEW OF SYSTEMS:    CONSTITUTIONAL:  No weight loss , +Poor intake  EYES: No eye pain, visual disturbances, or discharge  ENMT:  No difficulty hearing, tinnitus, vertigo; No sinus or throat pain  NECK: No pain or stiffness  BREASTS: No pain, masses, or nipple discharge  RESPIRATORY: no SOB, +coughing  CARDIOVASCULAR: No chest pain, palpitations, dizziness,   GASTROINTESTINAL: No abdominal or epigastric pain. No nausea, vomiting, or hematemesis; No diarrhea or constipation. No melena   GENITOURINARY: No dysuria, frequency, hematuria, or incontinence  NEUROLOGICAL: No headaches, memory loss, loss of strength, numbness, or tremors  SKIN: No Diffuse erythema, no blisters  LYMPH NODES: No enlarged glands  ENDOCRINE: No heat or cold intolerance; No hair loss  MUSCULOSKELETAL: No joint pain or swelling   PSYCHIATRIC: No depression, anxiety, mood swings, or difficulty sleeping  HEME/LYMPH: No easy bruising, or bleeding gums  ALLERGY AND IMMUNOLOGIC: No hives or eczema      Vital Signs Last 24 Hrs  T(C): 36.3 (2020 17:48), Max: 36.9 (2020 16:36)  T(F): 97.3 (2020 17:48), Max: 98.4 (2020 16:36)  HR: 99 (2020 17:48) (92 - 99)  BP: 112/66 (2020 17:48) (108/58 - 124/72)  BP(mean): --  RR: 17 (2020 17:48) (17 - 20)  SpO2: 90% (2020 17:48) (84% - 96%)  Daily Height in cm: 182.88 (2020 12:13)    Daily Weight in k.3 (2020 17:48)    PHYSICAL EXAM:    GENERAL: No Distress  HEAD:  Atraumatic, Normocephalic  EYES: EOMI, conjunctiva and sclera clear  ENMT: No tonsillar erythema, exudates, or enlargement; dry mucous membranes, NECK: Supple, neck veins full  NERVOUS SYSTEM:  Alert & Oriented X3, Good concentration;   CHEST/LUNG: Few Rhonchi B/L, no rales  HEART: Regular rate and rhythm; No murmurs, rubs, or gallops  ABDOMEN: Soft, Nontender, Nondistended; Bowel sounds present, No hepatomegaly  EXTREMITIES: trace Edema  SKIN: No rashes or lesions, dry; poor turgor      LABS:             Reviewed; BMP pending for 20. Will follow up

## 2020-02-05 LAB
ALBUMIN SERPL ELPH-MCNC: 2.1 G/DL — LOW (ref 3.3–5)
ALP SERPL-CCNC: 104 U/L — SIGNIFICANT CHANGE UP (ref 40–120)
ALT FLD-CCNC: 24 U/L — SIGNIFICANT CHANGE UP (ref 12–78)
ANION GAP SERPL CALC-SCNC: 12 MMOL/L — SIGNIFICANT CHANGE UP (ref 5–17)
ANION GAP SERPL CALC-SCNC: 13 MMOL/L — SIGNIFICANT CHANGE UP (ref 5–17)
AST SERPL-CCNC: 22 U/L — SIGNIFICANT CHANGE UP (ref 15–37)
BASOPHILS # BLD AUTO: 0.02 K/UL — SIGNIFICANT CHANGE UP (ref 0–0.2)
BASOPHILS NFR BLD AUTO: 0.1 % — SIGNIFICANT CHANGE UP (ref 0–2)
BILIRUB SERPL-MCNC: 0.5 MG/DL — SIGNIFICANT CHANGE UP (ref 0.2–1.2)
BUN SERPL-MCNC: 129 MG/DL — HIGH (ref 7–23)
BUN SERPL-MCNC: 133 MG/DL — HIGH (ref 7–23)
CALCIUM SERPL-MCNC: 8.6 MG/DL — SIGNIFICANT CHANGE UP (ref 8.5–10.1)
CALCIUM SERPL-MCNC: 8.8 MG/DL — SIGNIFICANT CHANGE UP (ref 8.5–10.1)
CHLORIDE SERPL-SCNC: 110 MMOL/L — HIGH (ref 96–108)
CHLORIDE SERPL-SCNC: 110 MMOL/L — HIGH (ref 96–108)
CO2 SERPL-SCNC: 18 MMOL/L — LOW (ref 22–31)
CO2 SERPL-SCNC: 19 MMOL/L — LOW (ref 22–31)
CREAT SERPL-MCNC: 6.6 MG/DL — HIGH (ref 0.5–1.3)
CREAT SERPL-MCNC: 6.8 MG/DL — HIGH (ref 0.5–1.3)
EOSINOPHIL # BLD AUTO: 0 K/UL — SIGNIFICANT CHANGE UP (ref 0–0.5)
EOSINOPHIL NFR BLD AUTO: 0 % — SIGNIFICANT CHANGE UP (ref 0–6)
GLUCOSE SERPL-MCNC: 119 MG/DL — HIGH (ref 70–99)
GLUCOSE SERPL-MCNC: 122 MG/DL — HIGH (ref 70–99)
HCT VFR BLD CALC: 32.6 % — LOW (ref 39–50)
HGB BLD-MCNC: 10.4 G/DL — LOW (ref 13–17)
IMM GRANULOCYTES NFR BLD AUTO: 0.8 % — SIGNIFICANT CHANGE UP (ref 0–1.5)
LYMPHOCYTES # BLD AUTO: 0.5 K/UL — LOW (ref 1–3.3)
LYMPHOCYTES # BLD AUTO: 3.3 % — LOW (ref 13–44)
MAGNESIUM SERPL-MCNC: 2.5 MG/DL — SIGNIFICANT CHANGE UP (ref 1.6–2.6)
MCHC RBC-ENTMCNC: 29.1 PG — SIGNIFICANT CHANGE UP (ref 27–34)
MCHC RBC-ENTMCNC: 31.9 GM/DL — LOW (ref 32–36)
MCV RBC AUTO: 91.1 FL — SIGNIFICANT CHANGE UP (ref 80–100)
MONOCYTES # BLD AUTO: 0.29 K/UL — SIGNIFICANT CHANGE UP (ref 0–0.9)
MONOCYTES NFR BLD AUTO: 1.9 % — LOW (ref 2–14)
NEUTROPHILS # BLD AUTO: 14.19 K/UL — HIGH (ref 1.8–7.4)
NEUTROPHILS NFR BLD AUTO: 93.9 % — HIGH (ref 43–77)
NRBC # BLD: 0 /100 WBCS — SIGNIFICANT CHANGE UP (ref 0–0)
PHOSPHATE SERPL-MCNC: 6.7 MG/DL — HIGH (ref 2.5–4.5)
PLATELET # BLD AUTO: 127 K/UL — LOW (ref 150–400)
POTASSIUM SERPL-MCNC: 4.8 MMOL/L — SIGNIFICANT CHANGE UP (ref 3.5–5.3)
POTASSIUM SERPL-MCNC: 5.1 MMOL/L — SIGNIFICANT CHANGE UP (ref 3.5–5.3)
POTASSIUM SERPL-SCNC: 4.8 MMOL/L — SIGNIFICANT CHANGE UP (ref 3.5–5.3)
POTASSIUM SERPL-SCNC: 5.1 MMOL/L — SIGNIFICANT CHANGE UP (ref 3.5–5.3)
PROT SERPL-MCNC: 5.3 G/DL — LOW (ref 6–8.3)
RBC # BLD: 3.58 M/UL — LOW (ref 4.2–5.8)
RBC # FLD: 14.8 % — HIGH (ref 10.3–14.5)
SODIUM SERPL-SCNC: 140 MMOL/L — SIGNIFICANT CHANGE UP (ref 135–145)
SODIUM SERPL-SCNC: 142 MMOL/L — SIGNIFICANT CHANGE UP (ref 135–145)
WBC # BLD: 15.12 K/UL — HIGH (ref 3.8–10.5)
WBC # FLD AUTO: 15.12 K/UL — HIGH (ref 3.8–10.5)

## 2020-02-05 PROCEDURE — 99233 SBSQ HOSP IP/OBS HIGH 50: CPT

## 2020-02-05 PROCEDURE — 99233 SBSQ HOSP IP/OBS HIGH 50: CPT | Mod: GC

## 2020-02-05 PROCEDURE — 93010 ELECTROCARDIOGRAM REPORT: CPT

## 2020-02-05 RX ORDER — ALBUMIN HUMAN 25 %
250 VIAL (ML) INTRAVENOUS ONCE
Refills: 0 | Status: COMPLETED | OUTPATIENT
Start: 2020-02-05 | End: 2020-02-05

## 2020-02-05 RX ORDER — LIDOCAINE HCL 20 MG/ML
100 VIAL (ML) INJECTION ONCE
Refills: 0 | Status: COMPLETED | OUTPATIENT
Start: 2020-02-05 | End: 2020-02-05

## 2020-02-05 RX ORDER — PHENYLEPHRINE HYDROCHLORIDE 10 MG/ML
1 INJECTION INTRAVENOUS
Qty: 40 | Refills: 0 | Status: DISCONTINUED | OUTPATIENT
Start: 2020-02-05 | End: 2020-02-07

## 2020-02-05 RX ORDER — MIDAZOLAM HYDROCHLORIDE 1 MG/ML
2 INJECTION, SOLUTION INTRAMUSCULAR; INTRAVENOUS ONCE
Refills: 0 | Status: DISCONTINUED | OUTPATIENT
Start: 2020-02-05 | End: 2020-02-07

## 2020-02-05 RX ORDER — AMIODARONE HYDROCHLORIDE 400 MG/1
150 TABLET ORAL ONCE
Refills: 0 | Status: DISCONTINUED | OUTPATIENT
Start: 2020-02-05 | End: 2020-02-07

## 2020-02-05 RX ORDER — SODIUM BICARBONATE 1 MEQ/ML
0.06 SYRINGE (ML) INTRAVENOUS
Qty: 75 | Refills: 0 | Status: DISCONTINUED | OUTPATIENT
Start: 2020-02-05 | End: 2020-02-06

## 2020-02-05 RX ORDER — SEVELAMER CARBONATE 2400 MG/1
800 POWDER, FOR SUSPENSION ORAL
Refills: 0 | Status: DISCONTINUED | OUTPATIENT
Start: 2020-02-05 | End: 2020-02-08

## 2020-02-05 RX ORDER — METOPROLOL TARTRATE 50 MG
25 TABLET ORAL EVERY 6 HOURS
Refills: 0 | Status: DISCONTINUED | OUTPATIENT
Start: 2020-02-05 | End: 2020-02-07

## 2020-02-05 RX ORDER — AMIODARONE HYDROCHLORIDE 400 MG/1
1 TABLET ORAL
Qty: 900 | Refills: 0 | Status: DISCONTINUED | OUTPATIENT
Start: 2020-02-05 | End: 2020-02-06

## 2020-02-05 RX ORDER — METOPROLOL TARTRATE 50 MG
5 TABLET ORAL ONCE
Refills: 0 | Status: COMPLETED | OUTPATIENT
Start: 2020-02-05 | End: 2020-02-05

## 2020-02-05 RX ORDER — FUROSEMIDE 40 MG
20 TABLET ORAL ONCE
Refills: 0 | Status: DISCONTINUED | OUTPATIENT
Start: 2020-02-05 | End: 2020-02-06

## 2020-02-05 RX ORDER — LIDOCAINE HCL 20 MG/ML
0.5 VIAL (ML) INJECTION
Qty: 2 | Refills: 0 | Status: DISCONTINUED | OUTPATIENT
Start: 2020-02-05 | End: 2020-02-06

## 2020-02-05 RX ADMIN — EZETIMIBE 10 MILLIGRAM(S): 10 TABLET ORAL at 11:34

## 2020-02-05 RX ADMIN — AMIODARONE HYDROCHLORIDE 33.33 MG/MIN: 400 TABLET ORAL at 23:15

## 2020-02-05 RX ADMIN — PHENYLEPHRINE HYDROCHLORIDE 23.81 MICROGRAM(S)/KG/MIN: 10 INJECTION INTRAVENOUS at 23:14

## 2020-02-05 RX ADMIN — SEVELAMER CARBONATE 800 MILLIGRAM(S): 2400 POWDER, FOR SUSPENSION ORAL at 11:34

## 2020-02-05 RX ADMIN — SEVELAMER CARBONATE 800 MILLIGRAM(S): 2400 POWDER, FOR SUSPENSION ORAL at 17:27

## 2020-02-05 RX ADMIN — Medication 5 MILLIGRAM(S): at 17:26

## 2020-02-05 RX ADMIN — Medication 125 MILLILITER(S): at 21:07

## 2020-02-05 RX ADMIN — Medication 12.5 MILLIGRAM(S): at 11:34

## 2020-02-05 RX ADMIN — Medication 12.5 MILLIGRAM(S): at 05:05

## 2020-02-05 RX ADMIN — LATANOPROST 1 DROP(S): 0.05 SOLUTION/ DROPS OPHTHALMIC; TOPICAL at 21:05

## 2020-02-05 RX ADMIN — Medication 5 MILLIGRAM(S): at 17:28

## 2020-02-05 RX ADMIN — CEFEPIME 100 MILLIGRAM(S): 1 INJECTION, POWDER, FOR SOLUTION INTRAMUSCULAR; INTRAVENOUS at 17:28

## 2020-02-05 RX ADMIN — Medication 3 MILLILITER(S): at 07:30

## 2020-02-05 RX ADMIN — SODIUM CHLORIDE 4 MILLILITER(S): 9 INJECTION INTRAMUSCULAR; INTRAVENOUS; SUBCUTANEOUS at 02:17

## 2020-02-05 RX ADMIN — Medication 20 MILLIGRAM(S): at 05:05

## 2020-02-05 RX ADMIN — Medication 3 MILLILITER(S): at 20:11

## 2020-02-05 RX ADMIN — Medication 3 MILLILITER(S): at 13:46

## 2020-02-05 RX ADMIN — SODIUM CHLORIDE 4 MILLILITER(S): 9 INJECTION INTRAMUSCULAR; INTRAVENOUS; SUBCUTANEOUS at 13:46

## 2020-02-05 RX ADMIN — Medication 3 MILLILITER(S): at 02:16

## 2020-02-05 RX ADMIN — Medication 1 MILLIGRAM(S): at 11:34

## 2020-02-05 RX ADMIN — SODIUM CHLORIDE 4 MILLILITER(S): 9 INJECTION INTRAMUSCULAR; INTRAVENOUS; SUBCUTANEOUS at 07:30

## 2020-02-05 RX ADMIN — ATORVASTATIN CALCIUM 20 MILLIGRAM(S): 80 TABLET, FILM COATED ORAL at 21:05

## 2020-02-05 RX ADMIN — Medication 100 MILLIGRAM(S): at 23:00

## 2020-02-05 RX ADMIN — Medication 200 MILLIGRAM(S): at 21:05

## 2020-02-05 RX ADMIN — Medication 5 MILLIGRAM(S): at 05:05

## 2020-02-05 RX ADMIN — PANTOPRAZOLE SODIUM 40 MILLIGRAM(S): 20 TABLET, DELAYED RELEASE ORAL at 05:05

## 2020-02-05 RX ADMIN — HEPARIN SODIUM 5000 UNIT(S): 5000 INJECTION INTRAVENOUS; SUBCUTANEOUS at 17:27

## 2020-02-05 RX ADMIN — SODIUM CHLORIDE 4 MILLILITER(S): 9 INJECTION INTRAMUSCULAR; INTRAVENOUS; SUBCUTANEOUS at 20:11

## 2020-02-05 RX ADMIN — Medication 50 MEQ/KG/HR: at 11:34

## 2020-02-05 RX ADMIN — Medication 5 MILLIGRAM(S): at 23:40

## 2020-02-05 RX ADMIN — HEPARIN SODIUM 5000 UNIT(S): 5000 INJECTION INTRAVENOUS; SUBCUTANEOUS at 05:06

## 2020-02-05 NOTE — PROGRESS NOTE ADULT - SUBJECTIVE AND OBJECTIVE BOX
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 Pt states he has baseline HADLEY, but dyspnea has increased in the last week. Of note he is in week 6 of chemo treatment keytruda, gets chemo every 3 weeks, tolerating chemo appropriately. Pt also has been urinating less for the last 4 days. Pt denies ever having dialysis. Pt denies fever, chills, chest pain, orthopnea, dysuria, hematuria, diarrhea, melena, hematochezia.   Renal consulted for GERMAN. Per family, patient had GERMAN a year ago when he previously had PNA. However, it improved after the PNA was treated. Patient admits to decreased urination. Also with poor intake.     States his breathing is improved compared to yesterday, States he is not urinating much.      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  Hip pain: Right hip replacement in   FH: CABG (coronary artery bypass surgery):  (double)       FAMILY HISTORY:  No pertinent family history in first degree relatives  [place]    Social History:Non smoker    MEDICATIONS  (STANDING):  albuterol/ipratropium for Nebulization 3 milliLiter(s) Nebulizer every 6 hours  cefepime   IVPB 500 milliGRAM(s) IV Intermittent every 24 hours  heparin  Injectable 5000 Unit(s) SubCutaneous every 12 hours  methylPREDNISolone sodium succinate Injectable 20 milliGRAM(s) IV Push every 8 hours  pantoprazole    Tablet 40 milliGRAM(s) Oral before breakfast  sodium chloride 0.45% 1000 milliLiter(s) (75 mL/Hr) IV Continuous <Continuous>    MEDICATIONS  (PRN):  guaiFENesin   Syrup  (Sugar-Free) 200 milliGRAM(s) Oral every 6 hours PRN Cough  HYDROmorphone  Injectable 0.25 milliGRAM(s) IV Push every 4 hours PRN pain, distress, suffering, dyspnea, palliative measures   Meds reviewed    Allergies    No Known Allergies    Intolerances         REVIEW OF SYSTEMS:    CONSTITUTIONAL:  No weight loss , +Poor intake  EYES: No eye pain, visual disturbances, or discharge  ENMT:  No difficulty hearing, tinnitus, vertigo; No sinus or throat pain  NECK: No pain or stiffness  BREASTS: No pain, masses, or nipple discharge  RESPIRATORY: no SOB, no coughing  CARDIOVASCULAR: No chest pain, palpitations, dizziness,   GASTROINTESTINAL: No abdominal or epigastric pain. No nausea, vomiting, or hematemesis; No diarrhea or constipation. No melena   GENITOURINARY: No dysuria, frequency, hematuria, or incontinence  NEUROLOGICAL: No headaches, memory loss, loss of strength, numbness, or tremors  SKIN: No Diffuse erythema, no blisters  LYMPH NODES: No enlarged glands  ENDOCRINE: No heat or cold intolerance; No hair loss  MUSCULOSKELETAL: No joint pain or swelling   PSYCHIATRIC: No depression, anxiety, mood swings, or difficulty sleeping  HEME/LYMPH: No easy bruising, or bleeding gums  ALLERGY AND IMMUNOLOGIC: No hives or eczema      Vital Signs Last 24 Hrs  T(C): 36.3 (2020 17:48), Max: 36.9 (2020 16:36)  T(F): 97.3 (2020 17:48), Max: 98.4 (2020 16:36)  HR: 99 (2020 17:48) (92 - 99)  BP: 112/66 (2020 17:48) (108/58 - 124/72)  BP(mean): --  RR: 17 (2020 17:48) (17 - 20)  SpO2: 90% (2020 17:48) (84% - 96%)  Daily Height in cm: 182.88 (2020 12:13)    Daily Weight in k.3 (2020 17:48)    PHYSICAL EXAM:    GENERAL: No Distress  HEAD:  Atraumatic, Normocephalic  EYES: EOMI, conjunctiva and sclera clear  ENMT: No tonsillar erythema, exudates, or enlargement; dry mucous membranes, NECK: Supple, neck veins full  NERVOUS SYSTEM:  Alert & Oriented X3, Good concentration;   CHEST/LUNG: Reduced basilar breath sounds  HEART: Regular rate and rhythm; No murmurs, rubs, or gallops  ABDOMEN: Soft, Nontender, Nondistended; Bowel sounds present, No hepatomegaly  EXTREMITIES: trace Edema  SKIN: No rashes or lesions, dry; poor turgor      LABS:             20: pending  20: BUN/Cr: 124/6.7

## 2020-02-05 NOTE — PROGRESS NOTE ADULT - SUBJECTIVE AND OBJECTIVE BOX
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 Pt states he has baseline HADLEY, but dyspea has increased in the last week. Of note he is in week 6 of chemo treatment keytruda, gets chemo every 3 weeks, tolerating chemo appropriately. Pt also has been urinating less for the last 4 days. Pt denies ever having dialysis. Pt denies fever, chills, chest pain, orthopnea, dysuria, hematuria, diarrhea, melena, hematochezia.      ED vitals: T: 96.8, HR: 99, BP: 108/58, RR: 18, O2 Saturation: 96% on 2L  Pt recieved albuterol neb x 3, ipratropium x 1 for neb, solumedrol injectable  mg x1, kayexalate 30 mg x1, 2L NS   Labs significant for: WBC: 18.45 w/ left shift, Lactate: 2.5, Potassium 5.4, Chloride: 109, CO2: 19, BUN/Cr: 109/6.3, Albumin: 2.7, Alk Phos: 164, ProCal: 4.5   EKG: sinus tachycardia 106 bpm  CXR: R sided PNA (02 Feb 2020 14:08)      ----  INTERVAL HPI/OVERNIGHT EVENTS: Pt seen and evaluated at the bedside. He continues to have episodes of vtach overnight and throughout the morning. Pt has no acute concerns at this time, but he admits to new "daydreams" which have been pleasant. Pt still able to maintain logic conversation, but family at bedside notices lapses in coherence. Endorses constipation and persistently decreased urine output but otherwise denies fever, chills, headache, vision changes, chest pain, palpitations, SOB, cough, abd pain, or N/V.    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  Hip pain: Right hip replacement in 2008  FH: CABG (coronary artery bypass surgery): 1994 (double)      FAMILY HISTORY:  No pertinent family history in first degree relatives      Allergies  No Known Allergies      ----  REVIEW OF SYSTEMS:  CONSTITUTIONAL: admits decreased appetite; denies fever, chills, fatigue, weakness  HEENT: denies blurred vision, sore throat  SKIN: denies new lesions, rash  CARDIOVASCULAR: denies chest pain, chest pressure, palpitations  RESPIRATORY: denies shortness of breath, sputum production  GASTROINTESTINAL: denies nausea, vomiting, diarrhea, abdominal pain  GENITOURINARY: denies dysuria, discharge  NEUROLOGICAL: admits "daydreaming;" denies numbness, headache, focal weakness  MUSCULOSKELETAL: denies new joint pain, muscle aches  HEMATOLOGIC: denies gross bleeding, bruising  LYMPHATICS: admits LUE swelling; denies enlarged lymph nodes    ----  PHYSICAL EXAM:  GENERAL: patient appears well, no acute distress, appropriately interactive  EYES: sclera clear, no exudates  ENMT: oropharynx clear without erythema, moist mucous membranes  NECK: supple, soft, no thyromegaly noted  LUNGS: diminished breath sounds b/l, no w/r/r  HEART: soft S1/S2, regular rate and rhythm, systolic murmur noted, 2+ pitting edema to b/l LE  GASTROINTESTINAL: abdomen is soft, nontender, nondistended, normoactive bowel sounds, no palpable masses  INTEGUMENT: diffuse ecchymoses of b/l UE and LE  MUSCULOSKELETAL: no clubbing or cyanosis, no obvious deformity, LUE swelling at IV site  NEUROLOGIC: awake, alert, oriented x3, 4/5 muscle strength in 4 extremities, no obvious sensory deficits  PSYCHIATRIC: mood is good, affect is congruent with mood, linear and logical thought process  HEME/LYMPH: no palpable supraclavicular nodules    T(C): 36.3 (02-05-20 @ 05:23), Max: 36.3 (02-05-20 @ 05:23)  HR: 82 (02-05-20 @ 07:58) (62 - 96)  BP: 137/74 (02-05-20 @ 05:23) (116/73 - 137/74)  RR: 17 (02-05-20 @ 05:23) (17 - 20)  SpO2: 94% (02-05-20 @ 07:58) (94% - 98%)      ----  I&O's Summary    04 Feb 2020 07:01  -  05 Feb 2020 07:00  --------------------------------------------------------  IN: 650 mL / OUT: 400 mL / NET: 250 mL    05 Feb 2020 07:01  -  05 Feb 2020 11:13  --------------------------------------------------------  IN: 120 mL / OUT: 0 mL / NET: 120 mL        LABS:                        10.4   15.12 )-----------( 127      ( 05 Feb 2020 08:58 )             32.6     02-05    142  |  110<H>  |  129<H>  ----------------------------<  119<H>  5.1   |  19<L>  |  6.60<H>    Ca    8.8      05 Feb 2020 08:58  Phos  6.6     02-05  Mg     2.6     02-05      02-02 @ 14:55   No growth to date.  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 Pt states he has baseline HADLEY, but dyspea has increased in the last week. Of note he is in week 6 of chemo treatment keytruda, gets chemo every 3 weeks, tolerating chemo appropriately. Pt also has been urinating less for the last 4 days. Pt denies ever having dialysis. Pt denies fever, chills, chest pain, orthopnea, dysuria, hematuria, diarrhea, melena, hematochezia.     ----  INTERVAL HPI/OVERNIGHT EVENTS: Pt seen and evaluated at the bedside. He continues to have episodes of vtach overnight and throughout the morning (this is asymptomatic). Pt has no acute concerns at this time, but he admits to new "daydreams" which have been pleasant. Pt still able to maintain logical conversation, but family at bedside notices lapses in coherence. Endorses constipation and persistently decreased urine output but otherwise denies fever, chills, headache, vision changes, chest pain, palpitations, SOB, cough, abd pain, or N/V.    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  Hip pain: Right hip replacement in 2008  FH: CABG (coronary artery bypass surgery): 1994 (double)      FAMILY HISTORY:  No pertinent family history in first degree relatives      Allergies  No Known Allergies      ----  REVIEW OF SYSTEMS:  CONSTITUTIONAL: admits decreased appetite; denies fever, chills, fatigue, weakness  HEENT: denies blurred vision, sore throat  SKIN: denies new lesions, rash  CARDIOVASCULAR: denies chest pain, chest pressure, palpitations  RESPIRATORY: denies shortness of breath, sputum production  GASTROINTESTINAL: as per HPI  GENITOURINARY: denies dysuria, discharge  NEUROLOGICAL: admits "daydreaming;" denies numbness, headache, focal weakness  MUSCULOSKELETAL: denies new joint pain, muscle aches  HEMATOLOGIC: denies gross bleeding, bruising  LYMPHATICS: admits LUE swelling at site of IV; denies enlarged lymph nodes    ----  PHYSICAL EXAM:  GENERAL: patient appears chronically ill but no acute distress, appears stated age, pleasant and interactive but appears to be limited historian   ENMT: oropharynx clear without erythema, moist mucous membranes, poor dentition  LUNGS: diminished breath sounds b/l, no w/r/r  HEART: soft S1/S2, regular rate and rhythm, systolic murmur noted, 2+ pitting edema to b/l LE  GASTROINTESTINAL: abdomen is soft, nontender, nondistended, normoactive bowel sounds, no palpable masses  INTEGUMENT: diffuse ecchymoses of b/l UE and LE  MUSCULOSKELETAL: no clubbing or cyanosis, no obvious deformity, LUE swelling at IV site  NEUROLOGIC: awake, alert, oriented x3, 4/5 muscle strength in 4 extremities, no obvious sensory deficits   HEME/LYMPH: no palpable supraclavicular nodules    T(C): 36.3 (02-05-20 @ 05:23), Max: 36.3 (02-05-20 @ 05:23)  HR: 82 (02-05-20 @ 07:58) (62 - 96)  BP: 137/74 (02-05-20 @ 05:23) (116/73 - 137/74)  RR: 17 (02-05-20 @ 05:23) (17 - 20)  SpO2: 94% (02-05-20 @ 07:58) (94% - 98%)      ----  I&O's Summary    04 Feb 2020 07:01  -  05 Feb 2020 07:00  --------------------------------------------------------  IN: 650 mL / OUT: 400 mL / NET: 250 mL    05 Feb 2020 07:01  -  05 Feb 2020 11:13  --------------------------------------------------------  IN: 120 mL / OUT: 0 mL / NET: 120 mL        LABS:                        10.4   15.12 )-----------( 127      ( 05 Feb 2020 08:58 )             32.6     02-05    142  |  110<H>  |  129<H>  ----------------------------<  119<H>  5.1   |  19<L>  |  6.60<H>    Ca    8.8      05 Feb 2020 08:58  Phos  6.6     02-05  Mg     2.6     02-05      02-02 @ 14:55   No growth to date.  --  --          ----  Personally reviewed:  Vital sign trends: [x  ] yes    [  ] no     [  ] n/a  Laboratory results: [x  ] yes    [  ] no     [  ] n/a  Radiology results: [x  ] yes    [  ] no     [  ] n/a  Culture results: [x  ] yes    [  ] no     [  ] n/a  Consultant recommendations: [x  ] yes    [  ] no     [  ] n/a

## 2020-02-05 NOTE — PROGRESS NOTE ADULT - ASSESSMENT
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 Admit for acute hypooxic respiratory failure likely 2/2 PNA in setting of cancer also noted to have GERMAN.    ACUTE HYPOXIC RESPIRATORY FAILURE in setting of advanced lung cancer with mets with multifocal pneumonia  -pulm is following, recommendations reviewed  -CT chest reviewed  -continue steroids  -continue nebs  -s/p ketruda 6 rounds-now off  -poor prognosis as per pulm  -continue cefepime IV ABX    Episode of vtach/slightly elevated troponin  -likely in setting of demand ischemia and metabolic derangement, no further episodes of VT overnight  troponin mildly elevated to 0.070, downtrending from 0.074  -would continue to monitor on tele until electrolyte disturbances are corrected  -keep K>4, monitor for hyperkalemia  -mag >2 and  -hyperphosphatemia 6.6  -worsening renal function/dehydration as well  -no further episodes overnight  -Longest run at 20beats of vtach at 4:00AM on 2/3 with no subsequent ekg changes  -His CKs are flat, suggesting against acute atherosclerotic plaque rupture.  -Patient was previously on metoprolol per [facility] and [facility] records, unsure if he is still taking.  -Continue metoprolol 12.5mg q6. May need to titrate up if patient continues to have runs of vtach  -echo reviewed, EF 55-60, grossly normal LV  -no need to trend enzymes    GOALS OF CARE:  -Patient is DNR/DNI, poor prognosis, hospice referral done as per primary team  -All other workup per primary team. Will followup.

## 2020-02-05 NOTE — PHYSICAL THERAPY INITIAL EVALUATION ADULT - GENERAL OBSERVATIONS, REHAB EVAL
Pt was received lying in bed /c family members present, in NAD, & /c 2L O2 via NC. Pt appeared to be alert yet lethargic. Pt has several bruises on bilateral legs and arms. Pt was agreeable to PT evaluation.

## 2020-02-05 NOTE — PHYSICAL THERAPY INITIAL EVALUATION ADULT - PHYSICAL ASSIST/NONPHYSICAL ASSIST: SIT/STAND, REHAB EVAL
verbal cues/Pt required VC to tuck feet posterior & lean forward at trunk./set-up required/1 person assist

## 2020-02-05 NOTE — PROGRESS NOTE ADULT - PROBLEM SELECTOR PLAN 4
- Pt with episodes of v tach overnight, longest lasting 20 beats  - Repeat EKG grossly unchanged  - Cardio Dr. Qureshi consulted, trops likely elevated due to demand ischemia  - Third set of CE downtrending  - Mg repleted  - Continue metoprolol 12.5mg q6h, may titrate up if episodes continue  - Continue to monitor on remote tele - Pt with continued episode of vtach  - Mg repleted  - Continue metoprolol 12.5mg q6h, may titrate up if episodes continue  - Continue to monitor on remote tele - Pt with continued episodes of vtach  - Mg repleted  - Continue metoprolol 12.5mg q6h, may titrate up if episodes continue  - Continue to monitor on remote tele  - cardio is following. pt is DNR and DNI

## 2020-02-05 NOTE — PROGRESS NOTE ADULT - ASSESSMENT
Acute on CKD Stage 3  Metabolic Acidosis  Hyperkalemia  Sepsis/PNA  Dehydration      -GERMAN is likely due to dehydration along with septic ATN  -FeUrea 31%  -Renal sonogram reviewed showing no evidence of Hydro, but echogenic kidneys noted  -S/P Hyperkalemic cocktail with improvement in potassium levels  -Abx per ID; renal dosing  -Monitor urine output; poor output documented  -Worsening renal function; will follow up repeat BMP  -Continue with gentle 1/2NS; Patient does not appear fluid overloaded.  -If Labs show worsening renal function, will attempt a dose of Lasix+Albumin to help convert oliguric ATN to non-oliguric ATN  -No acute indication for dialysis; poor candidate especially with Lung Ca and overall debility. Dialysis not recommended  -Pulm follow up noted  -Hospice graciela    D/W RN    Thank you Acute on CKD Stage 3  Metabolic Acidosis  Hyperkalemia  Sepsis/PNA  Dehydration  Hyperphosphatemia      -GERMAN is likely due to dehydration along with septic ATN  -FeUrea 31%  -Renal sonogram reviewed showing no evidence of Hydro, but echogenic kidneys noted  -S/P Hyperkalemic cocktail with improvement in potassium levels  -Abx per ID; renal dosing  -Monitor urine output; poor output documented  -Worsening renal function; will follow up repeat BMP  -Continue with gentle 1/2NS; Patient does not appear fluid overloaded.  -If Labs show worsening renal function, will attempt a dose of Lasix+Albumin to help convert oliguric ATN to non-oliguric ATN  -No acute indication for dialysis; poor candidate especially with Lung Ca and overall debility. Dialysis not recommended  -Pulm follow up noted  -Hospice eval  -Lj OLIVARES/W RN    Thank you

## 2020-02-05 NOTE — CHART NOTE - NSCHARTNOTEFT_GEN_A_CORE
Resident Rapid Response Note    Rapid response was called at 21:30 for sustained vtach in the 200's.     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 Admitted for acute hypoxic respiratory failure in setting of advanced lung cancer with mets with multifocal pneumonia. Of note patient has been having episodes of vtach and elevated troponins. Patient started to desat a few minutes before the rapid on room air 87. Prior to that he was sating fine.     Patient was seen and examined at the bedside by the rapid response team and resident medicine team. Dr. Harrison / ICU PA at bedside.    Rapid Response Vital Signs:  BP:113/63  HR:200  RR:21  SpO2: 95% on 3L  Temp:97.6  FS:115    Physical Exam:  GENERAL: patient appears chronically ill but no acute distress, appears stated age, pleasant and interactive   ENMT: oropharynx clear without erythema, moist mucous membranes, poor dentition  LUNGS: diminished breath sounds b/l, no w/r/r  HEART: soft S1/S2, regular rate and rhythm, systolic murmur noted, 2+ pitting edema to b/l LE  GASTROINTESTINAL: abdomen is soft, nontender, nondistended, normoactive bowel sounds, no palpable masses  INTEGUMENT: diffuse ecchymoses of b/l UE and LE  MUSCULOSKELETAL: no clubbing or cyanosis, no obvious deformity, LUE swelling at IV site  NEUROLOGIC: awake, alert, oriented x3, 4/5 muscle strength in 4 extremities, no obvious sensory deficits       Assessment/Plan:    87yo M, with PMH/o CAD s/p CABG (1994), HTN, HLD, stage III CKD, lung cancer (currently receiving CT every 3 weeks), COPD, and back neoplasm s/p right scapula exploration and partial resection, presenting to the ED with cough productive of yellow sputum, wheezing, and increased SOB x 1 week, admitted for acute hypoxic respiratory failure likely 2/2 PNA in the setting of lung CA, also noted to have GERMAN. Now found to be in sustained vtach in the 200's.    Plan  - Sustained Vtach   - Patient is aysmptomatic   - cmp, mg, phos given german and elevated potassium  - s/p 150mg Amiodarone x3, will be started on a continuous amiodarone drip after   - Discussed with the son Stuart, who is aware  -Discussed with Dr. Harrison, who agreed with above plan.  - Patient will be transferred to ICU, will continue monitor there  -Will continue to follow, RN to call if any changes. Resident Rapid Response Note    Rapid response was called at 21:30 for sustained vtach in the 200's.     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 Admitted for acute hypoxic respiratory failure in setting of advanced lung cancer with mets with multifocal pneumonia. Of note patient has been having episodes of vtach and elevated troponins. Patient started to desat a few minutes before the rapid on room air 87. Prior to that he was sating fine.     Patient was seen and examined at the bedside by the rapid response team and resident medicine team. Dr. Harrison / ICU PA at bedside.    Rapid Response Vital Signs:  BP:113/63  HR:200  RR:21  SpO2: 95% on 3L  Temp:97.6  FS:115    Physical Exam:  GENERAL: patient appears chronically ill but no acute distress, appears stated age, pleasant and interactive   ENMT: oropharynx clear without erythema, moist mucous membranes, poor dentition  LUNGS: diminished breath sounds b/l, no w/r/r  HEART: soft S1/S2, regular rate and rhythm, systolic murmur noted, 2+ pitting edema to b/l LE  GASTROINTESTINAL: abdomen is soft, nontender, nondistended, normoactive bowel sounds, no palpable masses  INTEGUMENT: diffuse ecchymoses of b/l UE and LE  MUSCULOSKELETAL: no clubbing or cyanosis, no obvious deformity, LUE swelling at IV site  NEUROLOGIC: awake, alert, oriented x3, 4/5 muscle strength in 4 extremities, no obvious sensory deficits       Assessment/Plan:    85yo M, with PMH/o CAD s/p CABG (1994), HTN, HLD, stage III CKD, lung cancer (currently receiving CT every 3 weeks), COPD, and back neoplasm s/p right scapula exploration and partial resection, presenting to the ED with cough productive of yellow sputum, wheezing, and increased SOB x 1 week, admitted for acute hypoxic respiratory failure likely 2/2 PNA in the setting of lung CA, also noted to have GERMAN. Now found to be in sustained vtach in the 200's.    Plan  - Sustained Vtach in 200s  - Patient is in no acute distress  - cmp, mg, phos given german and elevated potassium  - s/p 150mg Amiodarone x3, will be started on a continuous amiodarone drip after   - Discussed with the son Stuart, who is aware  - Discussed with Dr. Harrison, who agreed with above plan.  - Discussed with Dr. Pruitt, recs appreciated   - Patient will be transferred to ICU, will continue monitor there  - Will continue to follow, RN to call if any changes.

## 2020-02-05 NOTE — PHYSICAL THERAPY INITIAL EVALUATION ADULT - PHYSICAL ASSIST/NONPHYSICAL ASSIST: SUPINE/SIT, REHAB EVAL
set-up required/Pt required verbal cue to pull self up /c bed rails & assist/verbal cues/1 person assist

## 2020-02-05 NOTE — PROGRESS NOTE ADULT - PROBLEM SELECTOR PLAN 1
- Met severe sepsis criteria with leukocytosis, tachycardia, and elevated lactate with pulmonary source  - CXR and CT chest demonstrating multifocal PNA with ELIZABETH mass extension through intercostal space suspicious for disease progression  - Pulm Dr. Yu following  - C/w Cefepime 500mg BID for 5 day course to end 2/6, ID Dr. Junior following  - BCx NGTD  - Leukocytosis still present possibly d/t steroids, though overall curve is decreasing  - Pt has remained afebrile  - Monitor daily CBC and temperatures - severe sepsis POA  - CXR and CT chest demonstrating multifocal PNA with ELIZABETH mass extension through intercostal space suspicious for disease progression  - Pulm Dr. Yu following  - C/w Cefepime 500mg BID for 5 day course to end 2/6, ID Dr. Junior following  - BCx NGTD  - Leukocytosis still present possibly d/t steroids, though overall curve is decreasing  - Pt has remained afebrile without clinical symptoms to suggest uncontrolled   - Monitor daily CBC and temperatures

## 2020-02-05 NOTE — PROGRESS NOTE ADULT - PROBLEM SELECTOR PLAN 8
- Chronic, stable  - Home ASA was stopped  - Statin restarted in hospital, continue  - Metoprolol 12.5mg q6h restarted per cardio  - F/u TTE as last is from 2018 - Chronic, stable  - Home ASA was stopped  - Statin restarted in hospital, continue  - Metoprolol 12.5mg q6h restarted per cardio  - TTE with EF 55-60%, mild LVH and AS - Chronic, stable  - Home ASA was stopped  - Statin restarted in hospital, continue  - increase metoprolol to 25mg q6h restarted per cardio  - TTE with EF 55-60%, mild LVH and AS

## 2020-02-05 NOTE — PROGRESS NOTE ADULT - ATTENDING COMMENTS
The patient was personally seen and examined, in addition to being examined and evaluated by NP.  All elements of the note were edited where appropriate.    cad, normal ef  long run of nsvt today  cont meds  attributable to acute illness, and normal lvef suggests a benign prognosis

## 2020-02-05 NOTE — CONSULT NOTE ADULT - REASON FOR ADMISSION
shortness of breath
(4) no impairment

## 2020-02-05 NOTE — PROGRESS NOTE ADULT - PROBLEM SELECTOR PLAN 2
- Pt p/w SOB and productive cough x 1 week  - CXR and CT chest demonstrating multifocal PNA with ELIZABETH mass extension through intercostal space suspicious for disease progression  - Pulm Dr. Yu following  - C/w Cefepime, last day 2/6 per ID - pt p/w SOB and productive cough x 1wk  - CXR and CT chest demonstrating multifocal PNA with ELIZABETH mass extension through intercostal space suspicious for disease progression  - Pulm Dr. uY following  - C/w Cefepime, last day 2/6 per ID

## 2020-02-05 NOTE — CONSULT NOTE ADULT - ASSESSMENT
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   s/p 2 cycles of Carboplatin-Alimta-Keytruda.   In Winter 2018 - 2019 Admitted with generalized weakness, muscle aches, ? pneumonia. Noted to have marked elevated LFTs.   AlkPhos 1085, , . CKD /GERMAN with Cr 1.6  CTA chest with no PE.   Distended GB with stones.   Possible tylenol toxicity due to excessive use, 5000mg APAP/day.  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 Viral serologies negative  Started on PO steroids.     Post DC from Jan 2019 - June 2019 on basically best supportive care.      Requested resumption of some therapy in Summer 2019.   At time Elevated alkPhos, likely due to bone disease,   LFts normalized.   PET scan without significant progression.   s/p multiple cycles of Keytruda.   With stable disease on scans 10/2019 and Jan 2020.   AlkPhos improved with tx.       Admitted now with GERMAN on top of chronic CKD.   Baseline Cr ~ 2.3.   Likely due to dehydration, volume depletion,       Anemia due to cancer  Anemia due to CKD  Anemia due to recent chemo.       RECOMMENDATIONS  Start prednisone 80mg q24hr. (currently ordered for 10d)   If decline in LFTs in 3d taper to 60mg daily x 7-10d thereafter slow taper to 40mg.   MRCP per GI  protonix 40mg daily.   Await tylenol levels.   Chemotx to be held.   Supportive measures.   monitor Cr. Monitor LFTs daily.   monitor CBC, as risk for increased toxicity from Alimta given increased Cr.     Continue Folic acid.   B12 1000mcg once.   SCD for DVT prophylaxis.   Can consider SQ heparin if Plts stable.          I have discussed the above plan of care with patient in detail. They expressed understanding of the treatment plan . Risks, benefits and alternatives discussed in detail. I have asked if they have any questions or concerns and appropriately addressed them; all questions answered to their satisfactions and in lay terms. [ASSESSMENT and  PLAN]  Stage IV lung cancer. \Mmetastatic to bone at least, NSCLC-adenoca, PDL1+ 10% on bx.   s/p 2 cycles of Carboplatin-Alimta-Keytruda in Winter 2018.   In Dec 2018 - Jan 2019 Admitted with generalized weakness, muscle aches, ? pneumonia. Noted to have marked elevated LFTs.   AlkPhos 1085, , . CKD /GERMAN with Cr 1.6  CTA chest with no PE. Distended GB with stones.   Possible tylenol toxicity due to excessive use, 5000mg APAP/day.  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 Viral serologies negative. Started on PO steroids.   Post DC from Jan 2019 - June 2019 on basically best supportive care.      Requested resumption of some therapy in Summer 2019.   At time had elevated AlkPhos, likely due to bone disease, LFts normalized.   PET scan without significant progression.   s/p multiple cycles of Keytruda.   Stable disease on scans 10/2019 and Jan 2020.   AlkPhos improved with tx.     Worsening performance status.     Admitted now with GERMAN on top of chronic CKD.   Baseline Cr ~ 2.3.   Likely due to dehydration, volume depletion,     Anemia due to cancer  Anemia due to CKD  Anemia due to recent chemo.       RECOMMENDATIONS  IVF per renal.   PO intake Poor.     Obtain MRI brain, non-contrast to eval for brain mets.   referred Fall 2019 but never underwent.     Chemotx to be held.   Last dose was Dec 16.     Supportive measures.   Monitor Cr.  monitor CBC    Continue Folic acid.   Continue B12 1000mcg once.     SCD for DVT prophylaxis.   Can consider SQ heparin.    I have discussed the above plan of care with patient in detail. They expressed understanding of the treatment plan . Risks, benefits and alternatives discussed in detail. I have asked if they have any questions or concerns and appropriately addressed them; all questions answered to their satisfactions and in lay terms.

## 2020-02-05 NOTE — PHYSICAL THERAPY INITIAL EVALUATION ADULT - PERTINENT HX OF CURRENT PROBLEM, REHAB EVAL
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 As per EMR, presented to the ED with SOB x 1wk, wheezing mucus producing cough (yellow sputum). Pt states he has baseline HADLEY, but dyspea has increased in the last wk. Pt has end stage lung cancer, gets chemo every 3 wks. Pt has been urinating less the last 4 days.

## 2020-02-05 NOTE — PHYSICAL THERAPY INITIAL EVALUATION ADULT - ADDITIONAL COMMENTS
Pt is retired and lives at home /c his spouse. Pt's spouse reports that pt lives in a ranch style home /c 3-4 steps to enter /c one railing. Pt & pt's family report the pt was ambulating at home independently until 2 weeks ago he required a cane, and then about a week ago he began to use a walker. Pt has end stage lung cancer & receives chemo every 3 weeks. Pt has baseline HADLEY, but has increased in last wk. Pt was recieving chemo 3-4 wks prior to admission. Pt does not use home O2.

## 2020-02-05 NOTE — CONSULT NOTE ADULT - SUBJECTIVE AND OBJECTIVE BOX
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 Pt states he has baseline HADLEY, but dyspea has increased in the last week. Of note he is in week 6 of chemo treatment keytruda, gets chemo every 3 weeks, tolerating chemo appropriately. Pt also has been urinating less for the last 4 days. Pt denies ever having dialysis. Pt denies fever, chills, chest pain, orthopnea, dysuria, hematuria, diarrhea, melena, hematochezia.      ED vitals: T: 96.8, HR: 99, BP: 108/58, RR: 18, O2 Saturation: 96% on 2L  Pt recieved albuterol neb x 3, ipratropium x 1 for neb, solumedrol injectable  mg x1, kayexalate 30 mg x1, 2L NS   Labs significant for: WBC: 18.45 w/ left shift, Lactate: 2.5, Potassium 5.4, Chloride: 109, CO2: 19, BUN/Cr: 109/6.3, Albumin: 2.7, Alk Phos: 164, ProCal: 4.5   EKG: sinus tachycardia 106 bpm  CXR: R sided PNA (2020 14:08)    heme-onc asked to see for NSCLC. pt known to office.   Follows with me for treatment of NSCLC.   on Keytruda alone since Summer 2019  Recent chemotx last week no given due to poor performance status, and decreased PO intake.     Pt diagnosed with metastatic NSCLC-Adenoca. Followed by Dr Yakov Nunn.   Treated with Carboplatin-Alimta-Keytruda. s/p 2 cycles of chemo, last 19.   In 2019 admitted with hepatitis, abn LFTs. At time had been taking ~ 10 pills of 500mg tylenol daily since ~ mid November for R scapula pain.   had  [facility]    Between 2019 and 2019 was essentially at home.  Last saw Dr Nunn in 19, last chemotx. Had not seen since.   Did not want to take more chemotherapy.  States post C1 chemo felt awful.  C2 19 felt worse, ended in hospital.  Since DC from hospital 2019, had not seen other physicians.  Was unable to get out of house. Weather was terrible. Felt overall weak.  No physical therapy. Was recovering on own. Walking at home.  Started getting out of house ~ 2019.    In 2019 came to office.   Underwent restaging PET scan and MRI: Radiographically with stable disease vs 10/2018, prior to start of chemo.  19 MRI negative for metastasis.  19 PET stable disease    Started Keytruda q3wk.   PET scan [id number] with stable disease  PET scan 2020 with stable disease    If 2019 with epidoses of dehydration. Cr increased to new baseline ~Cr 2.3  Given IVF hydration.   In Dec / 2020 noted more difficulty with PO intake, despite IVF hydration.     Last visit 20 for planned chemo, held as felt PO intake too poor.   Given IVF in office. felt better, Fri and Sat and Increased PO fluid intake.   However, Sat night started not to feel well.   Admitted Sun with pneumonia with worsening renal failure.           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  Hip pain: Right hip replacement in   FH: CABG (coronary artery bypass surgery): 1994 (double)      HEALTH ISSUES - PROBLEM Dx:  Goals of care, counseling/discussion: Goals of care, counseling/discussion  COPD (chronic obstructive pulmonary disease): COPD (chronic obstructive pulmonary disease)  Ventricular tachycardia: Ventricular tachycardia  Leukocytosis, unspecified type: Leukocytosis, unspecified type  Respiratory distress: Respiratory distress  Hyperkalemia: Hyperkalemia  Sepsis: Sepsis  Need for prophylactic measure: Need for prophylactic measure  Hypertension: Hypertension  Coronary artery disease: Coronary artery disease  Lung cancer: Lung cancer  GERMAN (acute kidney injury): GERMAN (acute kidney injury)  PNA (pneumonia): PNA (pneumonia)          Pneumonia due to organism (J18.9)  CKD (chronic kidney disease) (N18.9)  Lung cancer (C34.90)  Disorder of bone, unspecified (M89.9)  Coronary artery disease (I25.10)  Dyslipidemia (E78.5)  Arteriosclerotic heart disease (ASHD) (I25.10)  Hypertension (I10)  Benign neoplasm of scapula (D16.00)  S/P skin neoplasm resection (Z98.890)  Hip pain (M25.559)  FH: CABG (coronary artery bypass surgery) (Z84.89)  Dehydration (E86.0)  GERMAN (acute kidney injury) (N17.9)    FAMILY HISTORY:  No pertinent family history in first degree relatives  mother   father     [SOCIAL HISTORY: ]     smoking:  ex-smoker 2-3PPD from 12yo ( - )     EtOH:  prior 2 scotch-whiskey per day from 31yo to 81yo.     illicit drugs:  denies     occupation:  retired  ([employer])     marital status:  , wife 86yo     Other: 3 children.   1st dtr Chayo 61yo, In [place]  2nd Son Pete, lives in [place], [place]  3rd son Stuart, lives in [place], [place]      [ALLERGIES/INTOLERANCES:]  Allergies     No Known Allergies  Intolerances          [MEDICATIONS]  MEDICATIONS  (STANDING):  albuterol/ipratropium for Nebulization 3 milliLiter(s) Nebulizer every 6 hours  atorvastatin 20 milliGRAM(s) Oral at bedtime  bisacodyl 5 milliGRAM(s) Oral every 12 hours  cefepime   IVPB 500 milliGRAM(s) IV Intermittent every 24 hours  dronabinol 5 milliGRAM(s) Oral every 12 hours  ezetimibe 10 milliGRAM(s) Oral daily  folic acid 1 milliGRAM(s) Oral daily  heparin  Injectable 5000 Unit(s) SubCutaneous every 12 hours  latanoprost 0.005% Ophthalmic Solution 1 Drop(s) Both EYES at bedtime  metoprolol tartrate 25 milliGRAM(s) Oral every 6 hours  pantoprazole    Tablet 40 milliGRAM(s) Oral before breakfast  predniSONE   Tablet 20 milliGRAM(s) Oral daily  sevelamer carbonate 800 milliGRAM(s) Oral three times a day with meals  sodium bicarbonate  Infusion 0.059 mEq/kG/Hr (50 mL/Hr) IV Continuous <Continuous>  sodium chloride 3%  Inhalation 4 milliLiter(s) Inhalation every 6 hours    MEDICATIONS  (PRN):  guaiFENesin   Syrup  (Sugar-Free) 200 milliGRAM(s) Oral every 6 hours PRN Cough  HYDROmorphone  Injectable 0.25 milliGRAM(s) IV Push every 4 hours PRN pain, distress, suffering, dyspnea, palliative measures      [REVIEW OF SYSTEMS: ]  CONSTITUTIONAL: normal, no fever, no shakes, no chills   EYES: No eye pain, no visual disturbances, no discharge  ENMT:  no discharge  NECK: No pain, no stiffness  BREASTS: No pain, no masses, no nipple discharge  RESPIRATORY: No cough, no wheezing, no chills, no hemoptysis; No shortness of breath  CARDIOVASCULAR: No chest pain, no palpitations, no dizziness, no leg swelling  GASTROINTESTINAL: No abdominal, no epigastric pain. No nausea, no vomiting, no hematemesis; No diarrhea , no constipation. No melena, no hematochezia.  GENITOURINARY: No dysuria, no frequency, no hematuria, no incontinence  NEUROLOGICAL: No headaches, no memory loss, no loss of strength, no numbness, no tremors  SKIN: No itching, no burning, no rashes, no lesions   LYMPH NODES: No enlarged glands  ENDOCRINE: No heat or cold intolerance; No hair loss  MUSCULOSKELETAL: No joint pain or swelling; No muscle, no back, no extremity pain  PSYCHIATRIC: No depression, no anxiety, no mood swings, no difficulty sleeping  HEME/LYMPH: No easy bruising, no bleeding gums      [VITALS SIGNS 24hrs]  Vital Signs Last 24 Hrs  T(C): 36.6 (2020 12:17), Max: 36.6 (2020 12:17)  T(F): 97.9 (2020 12:17), Max: 97.9 (2020 12:17)  HR: 80 (2020 13:48) (62 - 91)  BP: 101/62 (2020 12:17) (101/62 - 137/74)  BP(mean): --  RR: 18 (2020 12:17) (17 - 18)  SpO2: 93% (2020 13:48) (93% - 98%)      [PHYSICAL EXAM]  General: adult in NAD,  WN,  WD.  HEENT: clear oropharynx, anicteric sclera, pink conjunctivae.  Neck: supple, no masses.  CV: normal S1S2, no murmur, no rubs, no gallops.  Lungs: clear to auscultation, no wheezes, no rales, no rhonchi.  Abdomen: soft, non-tender, non-distended, no hepatosplenomegaly, normal BS, no guarding.  Ext: no clubbing, no cyanosis, no edema.  Skin: no rashes,  no petechiae, no venous stasis changes.  Neuro: alert and oriented X3, no focal motor deficits.  LN: no SC NIDIA.        [LABS:]                        10.4   15.12 )-----------( 127      ( 2020 08:58 )             32.6     CBC Full  -  ( 2020 08:58 )  WBC Count : 15.12 K/uL  RBC Count : 3.58 M/uL  Hemoglobin : 10.4 g/dL  Hematocrit : 32.6 %  Platelet Count - Automated : 127 K/uL  Mean Cell Volume : 91.1 fl  Mean Cell Hemoglobin : 29.1 pg  Mean Cell Hemoglobin Concentration : 31.9 gm/dL  Auto Neutrophil # : 14.19 K/uL  Auto Lymphocyte # : 0.50 K/uL  Auto Monocyte # : 0.29 K/uL  Auto Eosinophil # : 0.00 K/uL  Auto Basophil # : 0.02 K/uL  Auto Neutrophil % : 93.9 %  Auto Lymphocyte % : 3.3 %  Auto Monocyte % : 1.9 %  Auto Eosinophil % : 0.0 %  Auto Basophil % : 0.1 %        142  |  110<H>  |  129<H>  ----------------------------<  119<H>  5.1   |  19<L>  |  6.60<H>    Ca    8.8      2020 08:58  Phos  6.6     -  Mg     2.6     -          CARDIAC MARKERS ( 2020 16:52 )  .070 ng/mL / x     / 53 U/L / x     / 2.5 ng/mL          WBC  TREND (5 Days)  WBC Count: 15.12 K/uL ( @ 08:58)  WBC Count: 15.94 K/uL ( @ 08:53)  WBC Count: 12.58 K/uL ( @ 05:51)    HGB  TREND (5 Days)  Hemoglobin: 10.4 g/dL ( @ 08:58)  Hemoglobin: 10.5 g/dL ( @ 08:53)  Hemoglobin: 10.4 g/dL ( @ 05:51)    HCT  TREND (5 Days)  Hematocrit: 32.6 % ( @ 08:58)  Hematocrit: 32.0 % ( @ 08:53)  Hematocrit: 32.1 % ( @ 05:51)    PLT  TREND (5 Days)  Platelet Count - Automated: 127 K/uL ( @ 08:58)  Platelet Count - Automated: 153 K/uL ( @ 08:53)  Platelet Count - Automated: 117 K/uL ( @ 05:51)            [RADIOLOGY & ADDITIONAL STUDIES:] Patient is a 86y old  Male who presents with a chief complaint of shortness of breath (2020 11:12)      HPI:  86 year old male PMH coronary artery disease s/p CABG , HLD, HTN, CKD stage III, 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 Pt states he has baseline HADLEY, but dyspea has increased in the last week. Of note he is in week 6 of chemo treatment keytruda, gets chemo every 3 weeks, tolerating chemo appropriately. Pt also has been urinating less for the last 4 days. Pt denies ever having dialysis. Pt denies fever, chills, chest pain, orthopnea, dysuria, hematuria, diarrhea, melena, hematochezia.      ED vitals: T: 96.8, HR: 99, BP: 108/58, RR: 18, O2 Saturation: 96% on 2L  Pt recieved albuterol neb x 3, ipratropium x 1 for neb, solumedrol injectable  mg x1, kayexalate 30 mg x1, 2L NS   Labs significant for: WBC: 18.45 w/ left shift, Lactate: 2.5, Potassium 5.4, Chloride: 109, CO2: 19, BUN/Cr: 109/6.3, Albumin: 2.7, Alk Phos: 164, ProCal: 4.5   EKG: sinus tachycardia 106 bpm  CXR: R sided PNA (2020 14:08)    heme-onc asked to see for NSCLC. pt known to office.   Follows with me for treatment of NSCLC.   on Keytruda alone since Summer 2019  Recent chemotx last week no given due to poor performance status, and decreased PO intake.     Pt diagnosed with metastatic NSCLC-Adenoca. Followed by Dr Yakov Nunn.   Treated with Carboplatin-Alimta-Keytruda. s/p 2 cycles of chemo, last 19.   In 2019 admitted with hepatitis, abn LFTs. At time had been taking ~ 10 pills of 500mg tylenol daily since ~ mid November for R scapula pain.   had  PET scan done at [facility]    Between 2019 and 2019 was essentially at home.  Last saw Dr Nunn in 19, last chemotx. Had not seen since.   Did not want to take more chemotherapy.  States post C1 chemo felt awful.  C2 19 felt worse, ended in hospital.  Since DC from hospital 2019, had not seen other physicians.  Was unable to get out of house. Weather was terrible. Felt overall weak.  No physical therapy. Was recovering on own. Walking at home.  Started getting out of house ~ 2019.    In 2019 came to office.   Underwent restaging PET scan and MRI: Radiographically with stable disease vs 10/2018, prior to start of chemo.  19 MRI negative for metastasis.  19 PET stable disease    Started Keytruda q3wk.   PET scan [id number] with stable disease  PET scan 2020 with stable disease    If 2019 with epidoses of dehydration. Cr increased to new baseline ~Cr 2.3  Given IVF hydration.   In Dec / 2020 noted more difficulty with PO intake. Chemo was held.   Despite IVF, noted to be worse late 2020.    Last visit 20 in office for planned chemo, held again as felt PO intake too poor.   Given IVF in office. [place] better, Fri and Sat and Increased PO fluid intake.   However, Sat night started not to feel well.   Admitted Sun with pneumonia with worsening renal failure.       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  Hip pain: Right hip replacement in   FH: CABG (coronary artery bypass surgery): 1994 (double)      HEALTH ISSUES - PROBLEM Dx:  Goals of care, counseling/discussion: Goals of care, counseling/discussion  COPD (chronic obstructive pulmonary disease): COPD (chronic obstructive pulmonary disease)  Ventricular tachycardia: Ventricular tachycardia  Leukocytosis, unspecified type: Leukocytosis, unspecified type  Respiratory distress: Respiratory distress  Hyperkalemia: Hyperkalemia  Sepsis: Sepsis  Need for prophylactic measure: Need for prophylactic measure  Hypertension: Hypertension  Coronary artery disease: Coronary artery disease  Lung cancer: Lung cancer  GERMAN (acute kidney injury): GERMAN (acute kidney injury)  PNA (pneumonia): PNA (pneumonia)          Pneumonia due to organism (J18.9)  CKD (chronic kidney disease) (N18.9)  Lung cancer (C34.90)  Disorder of bone, unspecified (M89.9)  Coronary artery disease (I25.10)  Dyslipidemia (E78.5)  Arteriosclerotic heart disease (ASHD) (I25.10)  Hypertension (I10)  Benign neoplasm of scapula (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     illicit drugs:  denies     occupation:  retired  ([employer])     marital status:  , wife 86yo     Other: 3 children.   1st dtr Chayo 61yo, In [place]  2nd Son Pete, lives in [place], [place]  3rd son Stuart, lives in [place], [place]      [ALLERGIES/INTOLERANCES:]  Allergies     No Known Allergies  Intolerances          [MEDICATIONS]  MEDICATIONS  (STANDING):  albuterol/ipratropium for Nebulization 3 milliLiter(s) Nebulizer every 6 hours  atorvastatin 20 milliGRAM(s) Oral at bedtime  bisacodyl 5 milliGRAM(s) Oral every 12 hours  cefepime   IVPB 500 milliGRAM(s) IV Intermittent every 24 hours  dronabinol 5 milliGRAM(s) Oral every 12 hours  ezetimibe 10 milliGRAM(s) Oral daily  folic acid 1 milliGRAM(s) Oral daily  heparin  Injectable 5000 Unit(s) SubCutaneous every 12 hours  latanoprost 0.005% Ophthalmic Solution 1 Drop(s) Both EYES at bedtime  metoprolol tartrate 25 milliGRAM(s) Oral every 6 hours  pantoprazole    Tablet 40 milliGRAM(s) Oral before breakfast  predniSONE   Tablet 20 milliGRAM(s) Oral daily  sevelamer carbonate 800 milliGRAM(s) Oral three times a day with meals  sodium bicarbonate  Infusion 0.059 mEq/kG/Hr (50 mL/Hr) IV Continuous <Continuous>  sodium chloride 3%  Inhalation 4 milliLiter(s) Inhalation every 6 hours    MEDICATIONS  (PRN):  guaiFENesin   Syrup  (Sugar-Free) 200 milliGRAM(s) Oral every 6 hours PRN Cough  HYDROmorphone  Injectable 0.25 milliGRAM(s) IV Push every 4 hours PRN pain, distress, suffering, dyspnea, palliative measures      [REVIEW OF SYSTEMS: ]  CONSTITUTIONAL: normal, no fever, no shakes, no chills   EYES: No eye pain, no visual disturbances, no discharge  ENMT:  no discharge  NECK: No pain, no stiffness  BREASTS: No pain, no masses, no nipple discharge  RESPIRATORY: No cough, no wheezing, no chills, no hemoptysis; No shortness of breath  CARDIOVASCULAR: No chest pain, no palpitations, no dizziness, no leg swelling  GASTROINTESTINAL: No abdominal, no epigastric pain. No nausea, no vomiting, no hematemesis; No diarrhea , no constipation. No melena, no hematochezia.  GENITOURINARY: No dysuria, no frequency, no hematuria, no incontinence  NEUROLOGICAL: No headaches, no memory loss, no loss of strength, no numbness, no tremors  SKIN: No itching, no burning, no rashes, no lesions   LYMPH NODES: No enlarged glands  ENDOCRINE: No heat or cold intolerance; No hair loss  MUSCULOSKELETAL: No joint pain or swelling; No muscle, no back, no extremity pain  PSYCHIATRIC: No depression, no anxiety, no mood swings, no difficulty sleeping  HEME/LYMPH: No easy bruising, no bleeding gums      [VITALS SIGNS 24hrs]  Vital Signs Last 24 Hrs  T(C): 36.6 (2020 12:17), Max: 36.6 (2020 12:17)  T(F): 97.9 (2020 12:17), Max: 97.9 (2020 12:17)  HR: 80 (2020 13:48) (62 - 91)  BP: 101/62 (2020 12:17) (101/62 - 137/74)  BP(mean): --  RR: 18 (2020 12:17) (17 - 18)  SpO2: 93% (2020 13:48) (93% - 98%)      [PHYSICAL EXAM]  General: adult in NAD,  WN,  WD.  HEENT: clear oropharynx, anicteric sclera, pink conjunctivae.  Neck: supple, no masses.  CV: normal S1S2, no murmur, no rubs, no gallops.  Lungs: clear to auscultation, no wheezes, no rales, no rhonchi.  Abdomen: soft, non-tender, non-distended, no hepatosplenomegaly, normal BS, no guarding.  Ext: no clubbing, no cyanosis, no edema.  Skin: no rashes,  no petechiae, no venous stasis changes.  Neuro: alert and oriented X3, no focal motor deficits.  LN: no SC NIDIA.        [LABS:]                        10.4   15.12 )-----------( 127      ( 2020 08:58 )             32.6     CBC Full  -  ( 2020 08:58 )  WBC Count : 15.12 K/uL  RBC Count : 3.58 M/uL  Hemoglobin : 10.4 g/dL  Hematocrit : 32.6 %  Platelet Count - Automated : 127 K/uL  Mean Cell Volume : 91.1 fl  Mean Cell Hemoglobin : 29.1 pg  Mean Cell Hemoglobin Concentration : 31.9 gm/dL  Auto Neutrophil # : 14.19 K/uL  Auto Lymphocyte # : 0.50 K/uL  Auto Monocyte # : 0.29 K/uL  Auto Eosinophil # : 0.00 K/uL  Auto Basophil # : 0.02 K/uL  Auto Neutrophil % : 93.9 %  Auto Lymphocyte % : 3.3 %  Auto Monocyte % : 1.9 %  Auto Eosinophil % : 0.0 %  Auto Basophil % : 0.1 %    -    142  |  110<H>  |  129<H>  ----------------------------<  119<H>  5.1   |  19<L>  |  6.60<H>    Ca    8.8      2020 08:58  Phos  6.6     02-  Mg     2.6     -05          CARDIAC MARKERS ( 2020 16:52 )  .070 ng/mL / x     / 53 U/L / x     / 2.5 ng/mL          WBC  TREND (5 Days)  WBC Count: 15.12 K/uL ( @ 08:58)  WBC Count: 15.94 K/uL ( @ 08:53)  WBC Count: 12.58 K/uL ( @ 05:51)    HGB  TREND (5 Days)  Hemoglobin: 10.4 g/dL ( @ 08:58)  Hemoglobin: 10.5 g/dL ( @ 08:53)  Hemoglobin: 10.4 g/dL ( @ 05:51)    HCT  TREND (5 Days)  Hematocrit: 32.6 % ( @ :58)  Hematocrit: 32.0 % ( @ 08:53)  Hematocrit: 32.1 % ( @ 05:51)    PLT  TREND (5 Days)  Platelet Count - Automated: 127 K/uL ( @ 08:58)  Platelet Count - Automated: 153 K/uL ( @ 08:53)  Platelet Count - Automated: 117 K/uL ( @ 05:51)            [RADIOLOGY & ADDITIONAL STUDIES:]

## 2020-02-05 NOTE — PROGRESS NOTE ADULT - PROBLEM SELECTOR PLAN 3
- Known h/o stage III CKD  - Cr continues to be elevated  - Nephro Dr. Brooks following, suspecting GERMAN 2/2 dehydration and septic ATN, no recommendation for HD as pt is poor candidate due to overall debility and comorbidities  - Renal US showing echogenic kidneys consistent with medical renal disease  - S/p 0.5NS+75mEq bicarb, c/w gentle hydration with 0.5 NS  - Strict I/Os must be documented to get better sense of pt's urine output: 400cc of urine output document in last 24hrs  - Will attempt 1x albumin followed by IV Lasic 20mg to improve renal perfusion and convert to non-oliguric ATN - Known h/o stage III CKD  - Cr continues to be elevated  - Nephro Dr. Brooks following, suspecting GERMAN 2/2 dehydration and septic ATN, no recommendation for HD as pt is poor candidate due to overall debility and comorbidities  - Renal US showing echogenic kidneys consistent with medical renal disease  - S/p 0.5NS+75mEq bicarb, c/w gentle hydration with 0.5 NS  - Strict I/Os must be documented to get better sense of pt's urine output: 400cc of urine output document in last 24hrs  - Will attempt 1x albumin followed by IV Lasix 20mg to improve renal perfusion and convert to non-oliguric ATN - Known h/o stage III CKD  - Cr continues to be elevated  - Nephro Dr. Brooks following, suspecting GERMAN 2/2 dehydration and septic ATN, no recommendation for HD as pt is poor candidate due to overall debility and comorbidities  - Renal US showing echogenic kidneys consistent with medical renal disease  - strict I/Os must be documented to get better sense of pt's urine output: 400cc of urine output document in last 24hrs  - will attempt 1x albumin followed by IV Lasix 20mg to improve renal perfusion and convert to non-oliguric ATN

## 2020-02-05 NOTE — PROVIDER CONTACT NOTE (EICU) - BACKGROUND
85 y/o male with h/o HTN, HLD, CAD h/o CABG, CKD stage 3 COPD, lung cancer on keytruda who initially presented with shortness of breath, productive cough.

## 2020-02-05 NOTE — PROGRESS NOTE ADULT - ATTENDING COMMENTS
I personally conducted a physical examination of the patient. I personally gathered the patient's history. I edited the above listed findings which were prepared by the listed resident physician. I personally discussed the plan of care with the patient. The questions and concerns were addressed to the best of my ability. The patient is in agreement with the listed treatment plan.     - no major events today. although he contniues to have episdoes of NSVT. briefly discussed prognosis and GOC this morning w/ the pt's spouse and children at the bedside. they verbalized "it doesn't seem there is anything positive happening" so without agreeing directly with her impression, I did generally describe the overall poor prognosis associated w/ multiorgan dysfunction and. BUN up to 129 today and pt's mental status is worsening as per staff and family assessment both. otherwise, no significant changes today. pt appears to be borderline an inpatient hospice candidate pending renal function and mental status. his appetite is poor he's consuming very little of his trays (10% breakfast today, 25% of dinner last night).   - prognosis is very poor. advanced lung ca, renal failure, worsening mental status. not a candidate for RRT. hospice appropriate at home vs. inpatient to be determined pending clinical course but I suspect that he if renal function doesn't improve, he will continue to decompensate and will likely become inpatient hospice candidate in coming days. will continue to  and educate family daily on my assessment. emotional support provided.

## 2020-02-05 NOTE — CHART NOTE - NSCHARTNOTEFT_GEN_A_CORE
Patient with episode of VT 29 beats on monitor today. Increased lopressor to 25 qid.Would like to avoid amiodarone given advanced lung cancer

## 2020-02-05 NOTE — PROGRESS NOTE ADULT - PROBLEM SELECTOR PLAN 1
pt appears to be clinically improved on current therapy and although there is an increase in wbc this may be due to steroids  -recommend continued cefepime for now with anticipated 5 day course of abx so last day 2/6

## 2020-02-05 NOTE — PROGRESS NOTE ADULT - SUBJECTIVE AND OBJECTIVE BOX
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  S/P skin neoplasm resection  Hip pain: Right hip replacement in 2008  FH: CABG (coronary artery bypass surgery): 1994 (double)        Medication list         MEDICATIONS  (STANDING):  albuterol/ipratropium for Nebulization 3 milliLiter(s) Nebulizer every 6 hours  atorvastatin 20 milliGRAM(s) Oral at bedtime  bisacodyl 5 milliGRAM(s) Oral every 12 hours  cefepime   IVPB 500 milliGRAM(s) IV Intermittent every 24 hours  dronabinol 5 milliGRAM(s) Oral every 12 hours  ezetimibe 10 milliGRAM(s) Oral daily  folic acid 1 milliGRAM(s) Oral daily  heparin  Injectable 5000 Unit(s) SubCutaneous every 12 hours  latanoprost 0.005% Ophthalmic Solution 1 Drop(s) Both EYES at bedtime  metoprolol tartrate 12.5 milliGRAM(s) Oral every 6 hours  pantoprazole    Tablet 40 milliGRAM(s) Oral before breakfast  predniSONE   Tablet 20 milliGRAM(s) Oral daily  sodium chloride 0.45%. 1000 milliLiter(s) (50 mL/Hr) IV Continuous <Continuous>  sodium chloride 3%  Inhalation 4 milliLiter(s) Inhalation every 6 hours    MEDICATIONS  (PRN):  guaiFENesin   Syrup  (Sugar-Free) 200 milliGRAM(s) Oral every 6 hours PRN Cough  HYDROmorphone  Injectable 0.25 milliGRAM(s) IV Push every 4 hours PRN pain, distress, suffering, dyspnea, palliative measures         Vitals log        ICU Vital Signs Last 24 Hrs  T(C): 36.3 (05 Feb 2020 05:23), Max: 36.3 (05 Feb 2020 05:23)  T(F): 97.4 (05 Feb 2020 05:23), Max: 97.4 (05 Feb 2020 05:23)  HR: 88 (05 Feb 2020 05:23) (62 - 96)  BP: 137/74 (05 Feb 2020 05:23) (116/73 - 137/74)  BP(mean): --  ABP: --  ABP(mean): --  RR: 17 (05 Feb 2020 05:23) (17 - 20)  SpO2: 97% (05 Feb 2020 05:23) (96% - 97%)           Input and Output:  I&O's Detail    04 Feb 2020 07:01  -  05 Feb 2020 06:49  --------------------------------------------------------  IN:    sodium chloride 0.45%.: 600 mL    Solution: 50 mL  Total IN: 650 mL    OUT:    Voided: 400 mL  Total OUT: 400 mL    Total NET: 250 mL          Lab Data                        10.5   15.94 )-----------( 153      ( 04 Feb 2020 08:53 )             32.0     02-04    143  |  111<H>  |  124<H>  ----------------------------<  104<H>  4.7   |  19<L>  |  6.70<H>    Ca    9.2      04 Feb 2020 08:53  Mg     3.0     02-04        CARDIAC MARKERS ( 03 Feb 2020 16:52 )  .070 ng/mL / x     / 53 U/L / x     / 2.5 ng/mL  CARDIAC MARKERS ( 03 Feb 2020 11:31 )  .074 ng/mL / x     / 53 U/L / x     / 2.7 ng/mL        Review of Systems	      Objective     Physical Examination    heart s1s2  lung dc BS  abd soft  head nc  head at  frail  weak  poor dentition      Pertinent Lab findings & Imaging      Jose Guadalupe:  NO   Adequate UO     I&O's Detail    04 Feb 2020 07:01  -  05 Feb 2020 06:49  --------------------------------------------------------  IN:    sodium chloride 0.45%.: 600 mL    Solution: 50 mL  Total IN: 650 mL    OUT:    Voided: 400 mL  Total OUT: 400 mL    Total NET: 250 mL               Discussed with:     Cultures:	        Radiology

## 2020-02-05 NOTE — PROGRESS NOTE ADULT - PROBLEM SELECTOR PLAN 7
- Poor prognosis, hospice eval pending  - Pt is DNR/DNI, MOLST completed  - Lengthy discussion had with pt and family at bedside, amenable to meeting with hospice RN to discuss further options - Poor prognosis, hospice eval pending  - Pt is DNR/DNI, MOLST completed  - Lengthy discussion had with pt and family at bedside, has received information regarding home and inpatient hospice care

## 2020-02-05 NOTE — PHYSICAL THERAPY INITIAL EVALUATION ADULT - CRITERIA FOR SKILLED THERAPEUTIC INTERVENTIONS
functional limitations in following categories/anticipated equipment needs at discharge/anticipated discharge recommendation/impairments found/risk reduction/prevention

## 2020-02-05 NOTE — PHYSICAL THERAPY INITIAL EVALUATION ADULT - RANGE OF MOTION EXAMINATION, REHAB EVAL
Left UE limited due to c/o scapular pain./bilateral lower extremity ROM was WFL (within functional limits)/deficits as listed below/Right UE ROM was WFL (within functional limits)

## 2020-02-05 NOTE — PROGRESS NOTE ADULT - SUBJECTIVE AND OBJECTIVE BOX
infectious diseases progress note:    WILDA PORTILLO is a 86y y. o. Male patient    Patient reports: "feeling better"    ROS:    EYES:  Negative  blurry vision or double vision  GASTROINTESTINAL:  Negative for nausea, vomiting, diarrhea  -otherwise negative except for subjective    Allergies    No Known Allergies    Intolerances        ANTIBIOTICS/RELEVANT:  antimicrobials  cefepime   IVPB 500 milliGRAM(s) IV Intermittent every 24 hours    immunologic:    OTHER:  albuterol/ipratropium for Nebulization 3 milliLiter(s) Nebulizer every 6 hours  atorvastatin 20 milliGRAM(s) Oral at bedtime  bisacodyl 5 milliGRAM(s) Oral every 12 hours  dronabinol 5 milliGRAM(s) Oral every 12 hours  ezetimibe 10 milliGRAM(s) Oral daily  folic acid 1 milliGRAM(s) Oral daily  guaiFENesin   Syrup  (Sugar-Free) 200 milliGRAM(s) Oral every 6 hours PRN  heparin  Injectable 5000 Unit(s) SubCutaneous every 12 hours  HYDROmorphone  Injectable 0.25 milliGRAM(s) IV Push every 4 hours PRN  latanoprost 0.005% Ophthalmic Solution 1 Drop(s) Both EYES at bedtime  metoprolol tartrate 12.5 milliGRAM(s) Oral every 6 hours  pantoprazole    Tablet 40 milliGRAM(s) Oral before breakfast  predniSONE   Tablet 20 milliGRAM(s) Oral daily  sodium chloride 0.45%. 1000 milliLiter(s) IV Continuous <Continuous>  sodium chloride 3%  Inhalation 4 milliLiter(s) Inhalation every 6 hours      Objective:  Last 24-Vital Signs Last 24 Hrs  T(C): 36.3 (05 Feb 2020 05:23), Max: 36.3 (05 Feb 2020 05:23)  T(F): 97.4 (05 Feb 2020 05:23), Max: 97.4 (05 Feb 2020 05:23)  HR: 82 (05 Feb 2020 07:58) (62 - 96)  BP: 137/74 (05 Feb 2020 05:23) (116/73 - 137/74)  BP(mean): --  RR: 17 (05 Feb 2020 05:23) (17 - 20)  SpO2: 94% (05 Feb 2020 07:58) (94% - 98%)    T(C): 36.3 (02-05-20 @ 05:23), Max: 37 (02-03-20 @ 12:49)  T(F): 97.4 (02-05-20 @ 05:23), Max: 98.6 (02-03-20 @ 12:49)  T(C): 36.3 (02-05-20 @ 05:23), Max: 37.1 (02-03-20 @ 04:46)  T(F): 97.4 (02-05-20 @ 05:23), Max: 98.7 (02-03-20 @ 04:46)  T(C): 36.3 (02-05-20 @ 05:23), Max: 37.1 (02-03-20 @ 04:46)  T(F): 97.4 (02-05-20 @ 05:23), Max: 98.7 (02-03-20 @ 04:46)    PHYSICAL EXAM:  Constitutional: Well-developed, well nourished  Eyes: PERRLA, EOMI  Ear/Nose/Throat: oropharynx normal	  Neck: no JVD, no lymphadenopathy, supple  Respiratory: no accessory muscle use, lung fields bilaterally clear  Cardiovascular: RRR, normal S1, S2 no m/r/g  Gastrointestinal: soft, NT, no HSM, BS-normal  Extremities: no clubbing, no cyanosis, edema absent  Neuro: patient alert, oriented and appropriate  Skin: no sig lesions      LABS:                        10.4   15.12 )-----------( 127      ( 05 Feb 2020 08:58 )             32.6       WBC 15.12  02-05 @ 08:58  WBC 15.94  02-04 @ 08:53  WBC 12.58  02-03 @ 05:51  WBC 18.45  02-02 @ 12:48      02-04    143  |  111<H>  |  124<H>  ----------------------------<  104<H>  4.7   |  19<L>  |  6.70<H>    Ca    9.2      04 Feb 2020 08:53  Mg     3.0     02-04        Creatinine, Serum: 6.70 mg/dL (02-04-20 @ 08:53)  Creatinine, Serum: 6.10 mg/dL (02-03-20 @ 05:51)  Creatinine, Serum: 6.30 mg/dL (02-02-20 @ 12:48)                MICROBIOLOGY:              RADIOLOGY & ADDITIONAL STUDIES:

## 2020-02-05 NOTE — PROVIDER CONTACT NOTE (EICU) - RECOMMENDATIONS
Pt asymptomatic. However BP low at times.  Given 4 doses of amiodarone 150 mg each, amiodarone gtt, albumin 5%,  Transferred to ICU. Pt evaluated by 2 way camera.   Since transfer pt also given lidocaine 100 mg iv with rate now in 120s. /70  A.fib with RVR. Metoprolol iv pushes of 5 mg  Discussed with RYAN Alves

## 2020-02-05 NOTE — PROGRESS NOTE ADULT - PROBLEM SELECTOR PLAN 10
- DVT ppx: heparin 5000 q 12     BB IMPROVE VTE Individual Risk Assessment        RISK                                                          Points  [  ] Previous VTE                                                3  [  ] Thrombophilia                                             2  [  ] Lower limb paralysis                                   2        (unable to hold up >15 seconds)    [ x ] Current Cancer                                            2         (within 6 months)  [  ] Immobilization > 24 hrs                              1  [  ] ICU/CCU stay > 24 hours                            1  [  x] Age > 60                                                    1    IMPROVE VTE Score: 3
- DVT ppx: heparin 5000 q 12     BB IMPROVE VTE Individual Risk Assessment        RISK                                                          Points  [  ] Previous VTE                                                3  [  ] Thrombophilia                                             2  [  ] Lower limb paralysis                                   2        (unable to hold up >15 seconds)    [ x ] Current Cancer                                            2         (within 6 months)  [  ] Immobilization > 24 hrs                              1  [  ] ICU/CCU stay > 24 hours                            1  [  x] Age > 60                                                    1    IMPROVE VTE Score: 3

## 2020-02-05 NOTE — PROGRESS NOTE ADULT - SUBJECTIVE AND OBJECTIVE BOX
[facility] -- Aziza Valdivia, Blayne, [place], Titus Navarro Savella, Goodger  Office # [phone]    Follow Up:    continuous runs of VTACH  Subjective/Observations:     Patient seen and examined. He reports shortness of breath at rest and some labored breathing.  OVERNIGHT EVENTS: sinus rhythm with no reports of VT    REVIEW OF SYSTEMS:    CONSTITUTIONAL: endorses weakness  EYES/ENT: No visual changes;  No vertigo or throat pain   NECK: No pain or stiffness  RESPIRATORY: endorses cough and shortness of breath  CARDIOVASCULAR: No chest pain or palpitations  GASTROINTESTINAL: No abdominal or epigastric pain. No nausea, vomiting, or hematemesis; No diarrhea or constipation. No melena or hematochezia.  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  Hip pain: Right hip replacement in 2008  FH: CABG (coronary artery bypass surgery): 1994 (double)    MEDICATIONS  (STANDING):  albuterol/ipratropium for Nebulization 3 milliLiter(s) Nebulizer every 6 hours  atorvastatin 20 milliGRAM(s) Oral at bedtime  bisacodyl 5 milliGRAM(s) Oral every 12 hours  cefepime   IVPB 500 milliGRAM(s) IV Intermittent every 24 hours  dronabinol 5 milliGRAM(s) Oral every 12 hours  ezetimibe 10 milliGRAM(s) Oral daily  folic acid 1 milliGRAM(s) Oral daily  heparin  Injectable 5000 Unit(s) SubCutaneous every 12 hours  latanoprost 0.005% Ophthalmic Solution 1 Drop(s) Both EYES at bedtime  metoprolol tartrate 12.5 milliGRAM(s) Oral every 6 hours  pantoprazole    Tablet 40 milliGRAM(s) Oral before breakfast  predniSONE   Tablet 20 milliGRAM(s) Oral daily  sevelamer carbonate 800 milliGRAM(s) Oral three times a day with meals  sodium bicarbonate  Infusion 0.059 mEq/kG/Hr (50 mL/Hr) IV Continuous <Continuous>  sodium chloride 3%  Inhalation 4 milliLiter(s) Inhalation every 6 hours    MEDICATIONS  (PRN):  guaiFENesin   Syrup  (Sugar-Free) 200 milliGRAM(s) Oral every 6 hours PRN Cough  HYDROmorphone  Injectable 0.25 milliGRAM(s) IV Push every 4 hours PRN pain, distress, suffering, dyspnea, palliative measures    Allergies    No Known Allergies    Intolerances      Vital Signs Last 24 Hrs  T(C): 36.3 (05 Feb 2020 05:23), Max: 36.3 (05 Feb 2020 05:23)  T(F): 97.4 (05 Feb 2020 05:23), Max: 97.4 (05 Feb 2020 05:23)  HR: 82 (05 Feb 2020 07:58) (62 - 96)  BP: 137/74 (05 Feb 2020 05:23) (116/73 - 137/74)  BP(mean): --  RR: 17 (05 Feb 2020 05:23) (17 - 20)  SpO2: 94% (05 Feb 2020 07:58) (94% - 98%)  I&O's Summary    04 Feb 2020 07:01  -  05 Feb 2020 07:00  --------------------------------------------------------  IN: 650 mL / OUT: 400 mL / NET: 250 mL    05 Feb 2020 07:01  -  05 Feb 2020 11:07  --------------------------------------------------------  IN: 120 mL / OUT: 0 mL / NET: 120 mL        PHYSICAL EXAM:  TELE: sinus rhythm  Constitutional: NAD, awake and alert, well-developed  HEENT: Moist Mucous Membranes, Anicteric  Pulmonary: absent breath sounds bibasilar, very diminished in lung fields  Cardiovascular: Regular, S1 and S2, No murmurs, rubs, gallops or clicks  Gastrointestinal: Bowel Sounds present, soft, nontender.   Lymph: No peripheral edema. No lymphadenopathy.  Skin: No visible rashes or ulcers.  Psych:  Mood & affect appropriate  LABS: All Labs Reviewed:                        10.4   15.12 )-----------( 127      ( 05 Feb 2020 08:58 )             32.6                         10.5   15.94 )-----------( 153      ( 04 Feb 2020 08:53 )             32.0                         10.4   12.58 )-----------( 117      ( 03 Feb 2020 05:51 )             32.1     05 Feb 2020 08:58    142    |  110    |  129    ----------------------------<  119    5.1     |  19     |  6.60   04 Feb 2020 08:53    143    |  111    |  124    ----------------------------<  104    4.7     |  19     |  6.70   03 Feb 2020 05:51    143    |  112    |  109    ----------------------------<  140    4.9     |  19     |  6.10     Ca    8.8        05 Feb 2020 08:58  Ca    9.2        04 Feb 2020 08:53  Ca    8.5        03 Feb 2020 05:51  Phos  6.6       05 Feb 2020 08:58  Phos  6.3       03 Feb 2020 05:51  Phos  6.0       02 Feb 2020 20:51  Mg     2.6       05 Feb 2020 08:58  Mg     3.0       04 Feb 2020 08:53  Mg     1.7       03 Feb 2020 05:51    TPro  5.1    /  Alb  2.1    /  TBili  0.5    /  DBili  x      /  AST  21     /  ALT  19     /  AlkPhos  103    03 Feb 2020 05:51  TPro  6.4    /  Alb  2.7    /  TBili  0.8    /  DBili  x      /  AST  26     /  ALT  23     /  AlkPhos  164    02 Feb 2020 12:48      CARDIAC MARKERS ( 03 Feb 2020 16:52 )  .070 ng/mL / x     / 53 U/L / x     / 2.5 ng/mL  CARDIAC MARKERS ( 03 Feb 2020 11:31 )  .074 ng/mL / x     / 53 U/L / x     / 2.7 ng/mL         < from: TTE Echo Doppler w/o Cont (02.03.20 @ 21:01) >     EXAM:  ECHO TTE WO CON COMP W DOPPLR         PROCEDURE DATE:  02/03/2020        INTERPRETATION:  INDICATION: Abnormal EKG  Sonographer AS    Blood Pressure 118/67    Height 182.8 cm     Weight 63.5 kg       BSA 1.88 sq m    Dimensions:    LA 2.4    Normal Values: 2.0 - 4.0 cm    Ao 3.8        Normal Values: 2.0 - 3.8 cm  SEPTUM 1.2       Normal Values: 0.6 - 1.2 cm  PWT 1.3       Normal Values: 0.6 - 1.1 cm  LVIDd 4.8         Normal Values: 3.0 - 5.6 cm  LVIDs 3.3         Normal Values: 1.8- 4.0 cm      OBSERVATIONS:  Technically difficult and limited study  Mitral Valve: Thickened leaflets, mild MR.  Aortic Valve/Aorta: Calcified trileaflet aortic valve with decreased opening. Peak transaortic valve gradient is 23.6 mmHg with a mean transaortic valve gradient of 13.9 mmHg. Aortic valve area is calculated to be 1.5 sq cm, this is consistent with mild aortic stenosis Trace AI  Tricuspid Valve: normal with mild TR.  Pulmonic Valve: Trace PI  Left Atrium: normal  Right Atrium: Not well-visualized  Left Ventricle: normal LV size and systolic function, estimated LVEF of 55-60%. Mild LVH  Right Ventricle: Grossly normal size and systolic function.  Pericardium/Pleura: normal, no significant pericardial effusion.  Pulmonary/RV Pressure: estimated PA systolic pressure of 28 mmHg       Conclusion:   Technically difficult and limited study  Mild concentric LVH with normal internal dimensions and systolic function, estimated LVEF of 55-60%.   Grossly normal RV size and systolic function.   Biatrial enlargement  Calcified trileaflet aortic valve with mild aortic stenosis, trace AI.   Mild MR  Mild TR.    Estimated PA systolic pressure of 28 mmHg.    No significant pericardial effusion.    < end of copied text >  < from: CT Chest No Cont (02.02.20 @ 15:13) >  XAM:  CT CHEST                            PROCEDURE DATE:  02/02/2020          INTERPRETATION:  Clinical information: Suspected pneumonia. History of lung cancer.    Comparison: 1/13/2019 CT scan of the chest.    PROCEDURE:   CT of the Chest was performed without intravenous contrast.     FINDINGS:    Lungs and airways: Emphysema. Interval development of multifocal opacities in the right upper lobe and right lower lobe compatible with multifocal pneumonia. Again noted is a left upper lobe and para-mediastinal mass adjacent to surgical clips inseparable from the proximal aortic arch. The area on series 2 image 45 measures 4.1 x 3.7 cm previously 3.8 x 2.3 cm. Slightly more superiorly there is soft tissue that extends anteriorly through the intercostal space on series 2 image 36 measuring 3.1 x 3.0 cm.  Findings are suspicious for progression of disease.    Pleura: Within normal limits. No pleural effusion.    Mediastinum and Ginny: No abnormally enlarged lymph nodes. The esophagus is distended with fluid.    Heart: Normal size. No pericardial effusion.     Vessels: There has been median sternotomy and CABG. Main pulmonary artery is enlarged to 4.2 cm.    Chest wall and lower neck: Within normal limits.    Upper abdomen: Bilateral renal hypodensities again noted including some which are not simple cysts. For example there is a 3.0 cm lesion in the right mid kidney which is not a simple cyst and a 1.4 cm lesion in the left mid kidney which is not a simple cyst are stable. The gallbladder is distended containing a stone.    Bones: Within normal limits.    IMPRESSION:     Interval development of multifocal pneumonia involving the right upper and lower lobes.    Increase in size of soft tissue mass associated with surgical clips in the left upper lobe paramediastinal location inseparable from the aortic arch which now extends anteriorly in an intercostal location highly suspicious for progression of disease.          < end of copied text >    Pearl Zacarias, JIMMY   #[phone]

## 2020-02-05 NOTE — PROGRESS NOTE ADULT - PROBLEM SELECTOR PLAN 5
- Currently receiving CT b0ctfit  - Will hold CT so as to not induce further immunosuppression  - Follows with outpatient oncologist Dr. Mahan in [place] - was receiving chemotherapy [id number] prior to arrival  - will hold chemorx for now so as to not induce further immunosuppression in setting of active infection on IV abx  - Follows with outpatient oncologist Dr. Mahan in [place]

## 2020-02-05 NOTE — PHYSICAL THERAPY INITIAL EVALUATION ADULT - GAIT DEVIATIONS NOTED, PT EVAL
decreased velocity of limb motion/decreased step length/decreased stride length/decreased patsy/increased time in double stance/decreased swing-to-stance ratio

## 2020-02-05 NOTE — PHYSICAL THERAPY INITIAL EVALUATION ADULT - MANUAL MUSCLE TESTING RESULTS, REHAB EVAL
Pt is at least a 3+/5 in bilateral LE & right UE. Left UE not assessed due to c/o left scapular pain./grossly assessed due to

## 2020-02-05 NOTE — PROGRESS NOTE ADULT - PROBLEM SELECTOR PLAN 1
GOC and Hospice discussion noted - family and pt wish to think about it -   MRSA neg, Legionella ag neg  resp distress - lung ca with mets - progressive disease - multifocal pna -   CKD and GERMAN - Dehydration - s/p IVF  Renal EVAL noted - replete lytes and serial renal indices - avoid nephrotoxins - not a good candidate for Dialysis   cachexia and mets ca progression - hospice appropriate -   on Cefepime for PNA - renal dose   COPD - Emphysema - NEBS and Steroids (taper in progress) - Hypertonic Saline NEBS  pt was on Keytruda in the past - currently off - as per Family - Follows with Dr. ADAMSON Onc in [place] Office -   prognosis is very poor - pt is DNR DNI  discussed plan of care with family  CT chest reviewed  Dilaudid 0.25 mg IVP prn for resp distress - suffering - dyspnea - pain - palliative measures.

## 2020-02-06 DIAGNOSIS — I47.2 VENTRICULAR TACHYCARDIA: ICD-10-CM

## 2020-02-06 LAB
ANION GAP SERPL CALC-SCNC: 13 MMOL/L — SIGNIFICANT CHANGE UP (ref 5–17)
APTT BLD: 131.4 SEC — CRITICAL HIGH (ref 28.5–37)
APTT BLD: 28.6 SEC — SIGNIFICANT CHANGE UP (ref 28.5–37)
BUN SERPL-MCNC: 121 MG/DL — HIGH (ref 7–23)
CALCIUM SERPL-MCNC: 8.2 MG/DL — LOW (ref 8.5–10.1)
CHLORIDE SERPL-SCNC: 108 MMOL/L — SIGNIFICANT CHANGE UP (ref 96–108)
CK MB BLD-MCNC: 6.2 % — HIGH (ref 0–3.5)
CK MB BLD-MCNC: 7 % — HIGH (ref 0–3.5)
CK MB BLD-MCNC: 7 % — HIGH (ref 0–3.5)
CK MB CFR SERPL CALC: 2.6 NG/ML — SIGNIFICANT CHANGE UP (ref 0–3.6)
CK MB CFR SERPL CALC: 2.8 NG/ML — SIGNIFICANT CHANGE UP (ref 0–3.6)
CK MB CFR SERPL CALC: 2.8 NG/ML — SIGNIFICANT CHANGE UP (ref 0–3.6)
CK SERPL-CCNC: 40 U/L — SIGNIFICANT CHANGE UP (ref 26–308)
CK SERPL-CCNC: 40 U/L — SIGNIFICANT CHANGE UP (ref 26–308)
CK SERPL-CCNC: 42 U/L — SIGNIFICANT CHANGE UP (ref 26–308)
CO2 SERPL-SCNC: 20 MMOL/L — LOW (ref 22–31)
CREAT SERPL-MCNC: 6.3 MG/DL — HIGH (ref 0.5–1.3)
GLUCOSE SERPL-MCNC: 120 MG/DL — HIGH (ref 70–99)
HCT VFR BLD CALC: 31.5 % — LOW (ref 39–50)
HCT VFR BLD CALC: 33.7 % — LOW (ref 39–50)
HGB BLD-MCNC: 10.2 G/DL — LOW (ref 13–17)
HGB BLD-MCNC: 11.1 G/DL — LOW (ref 13–17)
LACTATE SERPL-SCNC: 0.9 MMOL/L — SIGNIFICANT CHANGE UP (ref 0.7–2)
MAGNESIUM SERPL-MCNC: 2.4 MG/DL — SIGNIFICANT CHANGE UP (ref 1.6–2.6)
MCHC RBC-ENTMCNC: 29.1 PG — SIGNIFICANT CHANGE UP (ref 27–34)
MCHC RBC-ENTMCNC: 29.5 PG — SIGNIFICANT CHANGE UP (ref 27–34)
MCHC RBC-ENTMCNC: 32.4 GM/DL — SIGNIFICANT CHANGE UP (ref 32–36)
MCHC RBC-ENTMCNC: 32.9 GM/DL — SIGNIFICANT CHANGE UP (ref 32–36)
MCV RBC AUTO: 89.6 FL — SIGNIFICANT CHANGE UP (ref 80–100)
MCV RBC AUTO: 90 FL — SIGNIFICANT CHANGE UP (ref 80–100)
NRBC # BLD: 0 /100 WBCS — SIGNIFICANT CHANGE UP (ref 0–0)
NRBC # BLD: 0 /100 WBCS — SIGNIFICANT CHANGE UP (ref 0–0)
PHOSPHATE SERPL-MCNC: 7.1 MG/DL — HIGH (ref 2.5–4.5)
PLATELET # BLD AUTO: 168 K/UL — SIGNIFICANT CHANGE UP (ref 150–400)
PLATELET # BLD AUTO: 180 K/UL — SIGNIFICANT CHANGE UP (ref 150–400)
POTASSIUM SERPL-MCNC: 5.1 MMOL/L — SIGNIFICANT CHANGE UP (ref 3.5–5.3)
POTASSIUM SERPL-SCNC: 5.1 MMOL/L — SIGNIFICANT CHANGE UP (ref 3.5–5.3)
RBC # BLD: 3.5 M/UL — LOW (ref 4.2–5.8)
RBC # BLD: 3.76 M/UL — LOW (ref 4.2–5.8)
RBC # FLD: 15 % — HIGH (ref 10.3–14.5)
RBC # FLD: 15 % — HIGH (ref 10.3–14.5)
SODIUM SERPL-SCNC: 141 MMOL/L — SIGNIFICANT CHANGE UP (ref 135–145)
TROPONIN I SERPL-MCNC: 0.09 NG/ML — HIGH (ref 0.01–0.04)
TROPONIN I SERPL-MCNC: 0.09 NG/ML — HIGH (ref 0.01–0.04)
TROPONIN I SERPL-MCNC: 0.1 NG/ML — HIGH (ref 0.01–0.04)
WBC # BLD: 20.66 K/UL — HIGH (ref 3.8–10.5)
WBC # BLD: 21.95 K/UL — HIGH (ref 3.8–10.5)
WBC # FLD AUTO: 20.66 K/UL — HIGH (ref 3.8–10.5)
WBC # FLD AUTO: 21.95 K/UL — HIGH (ref 3.8–10.5)

## 2020-02-06 PROCEDURE — 99291 CRITICAL CARE FIRST HOUR: CPT

## 2020-02-06 PROCEDURE — 93306 TTE W/DOPPLER COMPLETE: CPT | Mod: 26

## 2020-02-06 PROCEDURE — 99233 SBSQ HOSP IP/OBS HIGH 50: CPT | Mod: GC

## 2020-02-06 RX ORDER — ALBUMIN HUMAN 25 %
250 VIAL (ML) INTRAVENOUS ONCE
Refills: 0 | Status: COMPLETED | OUTPATIENT
Start: 2020-02-06 | End: 2020-02-07

## 2020-02-06 RX ORDER — AMIODARONE HYDROCHLORIDE 400 MG/1
0.5 TABLET ORAL
Qty: 900 | Refills: 0 | Status: DISCONTINUED | OUTPATIENT
Start: 2020-02-06 | End: 2020-02-07

## 2020-02-06 RX ORDER — DIGOXIN 250 MCG
0.25 TABLET ORAL ONCE
Refills: 0 | Status: COMPLETED | OUTPATIENT
Start: 2020-02-06 | End: 2020-02-06

## 2020-02-06 RX ORDER — MIDODRINE HYDROCHLORIDE 2.5 MG/1
10 TABLET ORAL EVERY 8 HOURS
Refills: 0 | Status: DISCONTINUED | OUTPATIENT
Start: 2020-02-06 | End: 2020-02-07

## 2020-02-06 RX ORDER — DIGOXIN 250 MCG
0.12 TABLET ORAL EVERY OTHER DAY
Refills: 0 | Status: DISCONTINUED | OUTPATIENT
Start: 2020-02-08 | End: 2020-02-08

## 2020-02-06 RX ORDER — DIGOXIN 250 MCG
0.12 TABLET ORAL
Refills: 0 | Status: COMPLETED | OUTPATIENT
Start: 2020-02-06 | End: 2020-02-06

## 2020-02-06 RX ORDER — DIGOXIN 250 MCG
0.12 TABLET ORAL
Refills: 0 | Status: COMPLETED | OUTPATIENT
Start: 2020-02-07 | End: 2020-02-07

## 2020-02-06 RX ORDER — DIGOXIN 250 MCG
0.5 TABLET ORAL ONCE
Refills: 0 | Status: DISCONTINUED | OUTPATIENT
Start: 2020-02-06 | End: 2020-02-06

## 2020-02-06 RX ORDER — DIGOXIN 250 MCG
0.25 TABLET ORAL
Refills: 0 | Status: DISCONTINUED | OUTPATIENT
Start: 2020-02-06 | End: 2020-02-06

## 2020-02-06 RX ORDER — DILTIAZEM HCL 120 MG
10 CAPSULE, EXT RELEASE 24 HR ORAL ONCE
Refills: 0 | Status: COMPLETED | OUTPATIENT
Start: 2020-02-06 | End: 2020-02-06

## 2020-02-06 RX ORDER — CEFEPIME 1 G/1
500 INJECTION, POWDER, FOR SOLUTION INTRAMUSCULAR; INTRAVENOUS EVERY 24 HOURS
Refills: 0 | Status: DISCONTINUED | OUTPATIENT
Start: 2020-02-06 | End: 2020-02-07

## 2020-02-06 RX ORDER — HEPARIN SODIUM 5000 [USP'U]/ML
INJECTION INTRAVENOUS; SUBCUTANEOUS
Qty: 25000 | Refills: 0 | Status: DISCONTINUED | OUTPATIENT
Start: 2020-02-06 | End: 2020-02-10

## 2020-02-06 RX ADMIN — Medication 3.75 MG/MIN: at 01:14

## 2020-02-06 RX ADMIN — SEVELAMER CARBONATE 800 MILLIGRAM(S): 2400 POWDER, FOR SUSPENSION ORAL at 17:07

## 2020-02-06 RX ADMIN — LATANOPROST 1 DROP(S): 0.05 SOLUTION/ DROPS OPHTHALMIC; TOPICAL at 21:35

## 2020-02-06 RX ADMIN — Medication 5 MILLIGRAM(S): at 06:14

## 2020-02-06 RX ADMIN — PHENYLEPHRINE HYDROCHLORIDE 23.81 MICROGRAM(S)/KG/MIN: 10 INJECTION INTRAVENOUS at 16:40

## 2020-02-06 RX ADMIN — ATORVASTATIN CALCIUM 20 MILLIGRAM(S): 80 TABLET, FILM COATED ORAL at 21:35

## 2020-02-06 RX ADMIN — Medication 1 MILLIGRAM(S): at 12:46

## 2020-02-06 RX ADMIN — CEFEPIME 100 MILLIGRAM(S): 1 INJECTION, POWDER, FOR SOLUTION INTRAMUSCULAR; INTRAVENOUS at 21:34

## 2020-02-06 RX ADMIN — SEVELAMER CARBONATE 800 MILLIGRAM(S): 2400 POWDER, FOR SUSPENSION ORAL at 10:48

## 2020-02-06 RX ADMIN — SEVELAMER CARBONATE 800 MILLIGRAM(S): 2400 POWDER, FOR SUSPENSION ORAL at 12:46

## 2020-02-06 RX ADMIN — Medication 25 MILLIGRAM(S): at 17:07

## 2020-02-06 RX ADMIN — Medication 3 MILLILITER(S): at 07:15

## 2020-02-06 RX ADMIN — MIDODRINE HYDROCHLORIDE 10 MILLIGRAM(S): 2.5 TABLET ORAL at 21:35

## 2020-02-06 RX ADMIN — SODIUM CHLORIDE 4 MILLILITER(S): 9 INJECTION INTRAMUSCULAR; INTRAVENOUS; SUBCUTANEOUS at 07:15

## 2020-02-06 RX ADMIN — SODIUM CHLORIDE 4 MILLILITER(S): 9 INJECTION INTRAMUSCULAR; INTRAVENOUS; SUBCUTANEOUS at 20:33

## 2020-02-06 RX ADMIN — HEPARIN SODIUM 5000 UNIT(S): 5000 INJECTION INTRAVENOUS; SUBCUTANEOUS at 06:15

## 2020-02-06 RX ADMIN — HEPARIN SODIUM 1100 UNIT(S)/HR: 5000 INJECTION INTRAVENOUS; SUBCUTANEOUS at 15:00

## 2020-02-06 RX ADMIN — SODIUM CHLORIDE 4 MILLILITER(S): 9 INJECTION INTRAMUSCULAR; INTRAVENOUS; SUBCUTANEOUS at 13:41

## 2020-02-06 RX ADMIN — CEFEPIME 100 MILLIGRAM(S): 1 INJECTION, POWDER, FOR SOLUTION INTRAMUSCULAR; INTRAVENOUS at 12:51

## 2020-02-06 RX ADMIN — Medication 10 MILLIGRAM(S): at 03:33

## 2020-02-06 RX ADMIN — Medication 25 MILLIGRAM(S): at 12:46

## 2020-02-06 RX ADMIN — Medication 25 MILLIGRAM(S): at 06:14

## 2020-02-06 RX ADMIN — Medication 0.12 MILLIGRAM(S): at 21:36

## 2020-02-06 RX ADMIN — PHENYLEPHRINE HYDROCHLORIDE 23.81 MICROGRAM(S)/KG/MIN: 10 INJECTION INTRAVENOUS at 21:36

## 2020-02-06 RX ADMIN — MIDODRINE HYDROCHLORIDE 10 MILLIGRAM(S): 2.5 TABLET ORAL at 18:57

## 2020-02-06 RX ADMIN — PANTOPRAZOLE SODIUM 40 MILLIGRAM(S): 20 TABLET, DELAYED RELEASE ORAL at 06:14

## 2020-02-06 RX ADMIN — Medication 0.25 MILLIGRAM(S): at 16:41

## 2020-02-06 RX ADMIN — Medication 5 MILLIGRAM(S): at 17:10

## 2020-02-06 RX ADMIN — Medication 5 MILLIGRAM(S): at 17:07

## 2020-02-06 RX ADMIN — HEPARIN SODIUM 0 UNIT(S)/HR: 5000 INJECTION INTRAVENOUS; SUBCUTANEOUS at 22:57

## 2020-02-06 RX ADMIN — Medication 20 MILLIGRAM(S): at 06:14

## 2020-02-06 RX ADMIN — Medication 3 MILLILITER(S): at 20:34

## 2020-02-06 NOTE — PROGRESS NOTE ADULT - SUBJECTIVE AND OBJECTIVE BOX
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  S/P skin neoplasm resection  Hip pain: Right hip replacement in 2008  FH: CABG (coronary artery bypass surgery): 1994 (double)        Medication list         MEDICATIONS  (STANDING):  albuterol/ipratropium for Nebulization 3 milliLiter(s) Nebulizer every 6 hours  aMIOdarone Infusion 0.5 mG/Min (16.667 mL/Hr) IV Continuous <Continuous>  aMIOdarone IVPB 150 milliGRAM(s) IV Intermittent once  aMIOdarone IVPB 150 milliGRAM(s) IV Intermittent once  aMIOdarone IVPB 150 milliGRAM(s) IV Intermittent once  atorvastatin 20 milliGRAM(s) Oral at bedtime  bisacodyl 5 milliGRAM(s) Oral every 12 hours  dronabinol 5 milliGRAM(s) Oral every 12 hours  ezetimibe 10 milliGRAM(s) Oral daily  folic acid 1 milliGRAM(s) Oral daily  furosemide   Injectable 20 milliGRAM(s) IV Push once  heparin  Infusion.  Unit(s)/Hr (11 mL/Hr) IV Continuous <Continuous>  latanoprost 0.005% Ophthalmic Solution 1 Drop(s) Both EYES at bedtime  metoprolol tartrate 25 milliGRAM(s) Oral every 6 hours  midazolam Injectable 2 milliGRAM(s) IV Push once  pantoprazole    Tablet 40 milliGRAM(s) Oral before breakfast  phenylephrine    Infusion 1 MICROgram(s)/kG/Min (23.813 mL/Hr) IV Continuous <Continuous>  predniSONE   Tablet 20 milliGRAM(s) Oral daily  sevelamer carbonate 800 milliGRAM(s) Oral three times a day with meals  sodium bicarbonate  Infusion 0.059 mEq/kG/Hr (50 mL/Hr) IV Continuous <Continuous>  sodium chloride 3%  Inhalation 4 milliLiter(s) Inhalation every 6 hours    MEDICATIONS  (PRN):  guaiFENesin   Syrup  (Sugar-Free) 200 milliGRAM(s) Oral every 6 hours PRN Cough  HYDROmorphone  Injectable 0.25 milliGRAM(s) IV Push every 4 hours PRN pain, distress, suffering, dyspnea, palliative measures         Vitals log        ICU Vital Signs Last 24 Hrs  T(C): 36.3 (06 Feb 2020 11:10), Max: 36.5 (05 Feb 2020 22:39)  T(F): 97.4 (06 Feb 2020 11:10), Max: 97.7 (05 Feb 2020 22:39)  HR: 113 (06 Feb 2020 11:30) (80 - 167)  BP: 98/66 (06 Feb 2020 11:30) (86/51 - 133/105)  BP(mean): 78 (06 Feb 2020 11:30) (57 - 111)  ABP: --  ABP(mean): --  RR: 21 (06 Feb 2020 11:30) (9 - 34)  SpO2: 96% (06 Feb 2020 11:30) (87% - 100%)           Input and Output:  I&O's Detail    05 Feb 2020 07:01  -  06 Feb 2020 07:00  --------------------------------------------------------  IN:    Albumin 5%  - 250 mL: 250 mL    amiodarone Infusion: 299.7 mL    lidocaine   Infusion: 44.8 mL    Oral Fluid: 270 mL    phenylephrine   Infusion: 468 mL    sodium bicarbonate  Infusion: 750 mL    sodium chloride 0.45%: 250 mL    Solution: 50 mL  Total IN: 2382.5 mL    OUT:    Voided: 250 mL  Total OUT: 250 mL    Total NET: 2132.5 mL          Lab Data                        10.2   20.66 )-----------( 180      ( 06 Feb 2020 04:44 )             31.5     02-06    141  |  108  |  121<H>  ----------------------------<  120<H>  5.1   |  20<L>  |  6.30<H>    Ca    8.2<L>      06 Feb 2020 04:44  Phos  7.1     02-06  Mg     2.4     02-06    TPro  5.3<L>  /  Alb  2.1<L>  /  TBili  0.5  /  DBili  x   /  AST  22  /  ALT  24  /  AlkPhos  104  02-05      CARDIAC MARKERS ( 06 Feb 2020 10:30 )  .095 ng/mL / x     / 40 U/L / x     / 2.8 ng/mL  CARDIAC MARKERS ( 06 Feb 2020 04:44 )  .089 ng/mL / x     / 42 U/L / x     / 2.6 ng/mL        Review of Systems	      Objective     Physical Examination    heart s1s2  lung dec BS  abd soft  head nc  poor dentition  on IV Amio    Pertinent Lab findings & Imaging      Jose Guadalupe:  NO   Adequate UO     I&O's Detail    05 Feb 2020 07:01  -  06 Feb 2020 07:00  --------------------------------------------------------  IN:    Albumin 5%  - 250 mL: 250 mL    amiodarone Infusion: 299.7 mL    lidocaine   Infusion: 44.8 mL    Oral Fluid: 270 mL    phenylephrine   Infusion: 468 mL    sodium bicarbonate  Infusion: 750 mL    sodium chloride 0.45%: 250 mL    Solution: 50 mL  Total IN: 2382.5 mL    OUT:    Voided: 250 mL  Total OUT: 250 mL    Total NET: 2132.5 mL               Discussed with:     Cultures:	        Radiology

## 2020-02-06 NOTE — PROGRESS NOTE ADULT - ASSESSMENT
87 yo M pmh CAD, HLD, HTN, CKD III, neoplasm of back right scapula, lung Ca (Keytuda session 6), COPD (not on home O2), presented to ED sunday for SOB X 1 week with productive yellow sputum and wheezing admitted for right sided pneumonia, and transferred to ICU for management of acute onset of sustained ventricular tachycardia, now stable in atrial fibrillation.    Plan:    1. Neuro  - dilaudid 0.25 mg IVP q4 for pain  - versed 2 mg     2. Cardiovascular  - Atrial Fibrillation: Amiodarone 900 mg STAT, amiodarone infusion, sodium bicarbonate infusion, metropolol 25 mg q6 (rate control), lidocain d/c'ed   - Hypotension: phenylephrine 40 mg   - HLD: lipitor 20 mg, zetia 10 mg      3. Pulmonary   - PNA: cefepime day 4/5, lasix 20 mg, prednisone 20 mg, duonebs + NaCal for pulmonary infiltrate, guanifisen for cough, prednisone 20 mg     4. GI  - GI PPx: pantoprazole 40 mg    - Nausea: dronabinol   - regular diet     5.   - sevelemar 800 mg   - Cr 6.30 pt not hemodialysis candidate as per nephrology     6. ID  - cefepime 500 mg   - WBC 15.12 --> 20.66     7. DVT PPx  - heparin infusion 85 yo M pmh CAD, HLD, HTN, CKD III, neoplasm of back right scapula, lung Ca (Keytuda session 6), COPD (not on home O2), presented to ED sunday for SOB X 1 week with productive yellow sputum and wheezing admitted for right sided pneumonia, and transferred to ICU for management of acute onset of sustained ventricular tachycardia, now stable in atrial fibrillation.    Plan:    1. Neuro  - dilaudid 0.25 mg IVP q4 for pain  - versed 2 mg     2. Cardiovascular  - Atrial Fibrillation: Amiodarone 900 mg STAT, amiodarone infusion, sodium bicarbonate infusion, metropolol 25 mg q6 (rate control), lidocain d/c'ed   - Hypotension: phenylephrine 40 mg   - HLD: lipitor 20 mg, zetia 10 mg      3. Pulmonary   - PNA: cefepime day 4/5, lasix 20 mg, prednisone 20 mg, duonebs + NaCal for pulmonary infiltrate, guanifisen for cough, prednisone 20 mg     4. GI  - GI PPx: pantoprazole 40 mg    - Nausea: dronabinol   - regular diet   - bowel regimen for constipation     5.   - sevelemar 800 mg   - Cr 6.30 pt not hemodialysis candidate as per nephrology     6. ID  - cefepime 500 mg   - WBC 15.12 --> 20.66     7. DVT PPx  - heparin infusion 85 yo M pmh CAD, HLD, HTN, CKD III, neoplasm of back right scapula, lung Ca (Keytuda session 6), COPD (not on home O2), presented to ED sunday for SOB X 1 week with productive yellow sputum and wheezing admitted for right sided pneumonia, and transferred to ICU for management of acute onset of sustained ventricular tachycardia, now stable in atrial fibrillation.    Plan:    1. Neuro  - dilaudid 0.25 mg IVP q4 for pain  - versed 2 mg  - f/u MR head to r/o mets to brain    2. Cardiovascular  - Atrial Fibrillation: s/p Amiodarone 900 mg, c/w amiodarone infusion, sodium bicarbonate infusion, and metropolol 25 mg q6 (rate control). Lidocaine d/c'ed. f/u Echo; F/u CE at 16:30  - Hypotension: phenylephrine 40 mg   - HLD: lipitor 20 mg, zetia 10 mg      3. Pulmonary   - PNA: cefepime to be completed todat, lasix 20 mg, prednisone 20 mg, duonebs + NaCal for pulmonary infiltrate, guanifisen for cough  - Chemo on hold for lung cancer 2/2 immunosuppressed state in setting of active infection    4. GI  - GI PPx: pantoprazole 40 mg    - Nausea: dronabinol   - regular diet   - bowel regimen for constipation     5.   - sevelemar 800 mg for hyperphosphatemia   - Cr 6.30 pt not hemodialysis candidate as per nephrology. Will continue to monitor. Poor urinary output.     6. ID  - cefepime 500 mg to be completed today  - WBC 15.12 --> 20.66. Likely reactive and 2/2 steroids. Pt afebrile. Will continue to monitor    7. DVT PPx  - heparin gtt    8. Dispo: Continue with ICU level of care. Poor prognosis. DNR.

## 2020-02-06 NOTE — PROGRESS NOTE ADULT - SUBJECTIVE AND OBJECTIVE BOX
Patient is a 86y old  Male who presents with a chief complaint of shortness of breath (06 Feb 2020 09:06)      FROM ADMISSION H+P:   HPI:  86 year old male PMH coronary artery disease s/p CABG 1994, HLD, HTN, CKD stage III, 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 Pt states he has baseline HADLEY, but dyspea has increased in the last week. Of note he is in week 6 of chemo treatment keytruda, gets chemo every 3 weeks, tolerating chemo appropriately. Pt also has been urinating less for the last 4 days. Pt denies ever having dialysis. Pt denies fever, chills, chest pain, orthopnea, dysuria, hematuria, diarrhea, melena, hematochezia.     ----  INTERVAL HPI/OVERNIGHT EVENTS: Pt seen and evaluated at the bedside. Overnight pt developed sustained VT and was started on lidocaine gtt, phenylephrine gtt, amio load followed by amiodarone gtt. The pt now is in afib w/ RVR and remains on amio infusion. Lidocaine gtt was d/c'd but pt remains on vasopressors currently. He reports "feeling fine" no acute complaints at this point. Feels generally well. Denies dyspnea. Hasn't had anything to eat. Feels tired.     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  Hip pain: Right hip replacement in 2008  FH: CABG (coronary artery bypass surgery): 1994 (double)      FAMILY HISTORY:  No pertinent family history in first degree relatives      Allergies    No Known Allergies    Intolerances        ----  REVIEW OF SYSTEMS:  CONSTITUTIONAL: admits decreased appetite  HEENT: denies blurred vision, sore throat  SKIN: denies new lesions, rash  CARDIOVASCULAR: denies chest pain, chest pressure, palpitations  RESPIRATORY: denies shortness of breath, sputum production  GASTROINTESTINAL: admits reduced appetite, denies nausea or vomiting  GENITOURINARY: denies dysuria, discharge  NEUROLOGICAL: denies numbness, headache, focal weakness  MUSCULOSKELETAL: denies new joint pain, muscle aches  HEMATOLOGIC: denies gross bleeding, bruising  LYMPHATICS: admits LUE swelling at site of IV but this is improving, denies pain at the corresponding site. denies enlarged lymph nodes    ----  PHYSICAL EXAM:  GENERAL: patient appears chronically ill but no acute distress, appears stated age  ENMT: oropharynx clear without erythema, dry mucous membranes, poor dentition  LUNGS: diminished breath sounds b/l, coarse breath sounds in R upper chest  HEART: soft S1/S2, regular rate and rhythm, systolic murmur noted, 2+ dependent pitting edema to b/l LE  GASTROINTESTINAL: abdomen is soft, nontender, nondistended, normoactive bowel sounds, no palpable masses  INTEGUMENT: diffuse ecchymoses of b/l UE and LE  MUSCULOSKELETAL: no clubbing or cyanosis, no obvious deformity, LUE swelling at IV site without warmth or tenderness to palpation  NEUROLOGIC: awake, alert, oriented x3, good muscle tone in 4 extremities, no reported sensory deficit on 4 extrremities  HEME/LYMPH: no palpable supraclavicular nodules    T(C): 36.3 (02-06-20 @ 11:10), Max: 36.5 (02-05-20 @ 22:39)  HR: 113 (02-06-20 @ 11:30) (80 - 167)  BP: 98/66 (02-06-20 @ 11:30) (86/51 - 133/105)  RR: 21 (02-06-20 @ 11:30) (9 - 34)  SpO2: 96% (02-06-20 @ 11:30) (87% - 100%)  Wt(kg): --    ----  I&O's Summary    05 Feb 2020 07:01  -  06 Feb 2020 07:00  --------------------------------------------------------  IN: 2382.5 mL / OUT: 250 mL / NET: 2132.5 mL        LABS:                        10.2   20.66 )-----------( 180      ( 06 Feb 2020 04:44 )             31.5     02-06    141  |  108  |  121<H>  ----------------------------<  120<H>  5.1   |  20<L>  |  6.30<H>    Ca    8.2<L>      06 Feb 2020 04:44  Phos  7.1     02-06  Mg     2.4     02-06    TPro  5.3<L>  /  Alb  2.1<L>  /  TBili  0.5  /  DBili  x   /  AST  22  /  ALT  24  /  AlkPhos  104  02-05        CAPILLARY BLOOD GLUCOSE      POCT Blood Glucose.: 115 mg/dL (05 Feb 2020 21:51)      02-02 @ 14:55   No growth to date.  --  --            ----  Personally reviewed:  Vital sign trends: [ x ] yes    [  ] no     [  ] n/a  Laboratory results: [ x ] yes    [  ] no     [  ] n/a  Radiology results: [ x ] yes    [  ] no     [  ] n/a  Culture results: [ x ] yes    [  ] no     [  ] n/a  Consultant recommendations: [ x ] yes    [  ] no     [  ] n/a

## 2020-02-06 NOTE — PROGRESS NOTE ADULT - ATTENDING COMMENTS
86M h/o CAD, HLD, HTN, CKD III, neoplasm of R scapula, lung Ca on Keytuda, COPD (not on home O2) initially a/w SOB 2/2 PNA, s/p RRT o/n for rapid AFib with aberrancy s/p amio load and lidocaine, requiring sergio gtt for pressure support, now with AFib to 110s-120s reporting improvement in SOB.     Neuro: prn pain control  CV: AFib w/ RVR s/p Amio, sodium bicarbonate infusion and lidocaine; f/u TTE, elevated troponin likely 2/2 rapid rate, demand ischemia, continue to trend; continue pressor support and wean as tolerated, will start midodrine now to help wean from IV pressor support, also renally dosed dig load, will give beta blocker for rate control when BP allows   Pulm: c/w cefepime additional day, stable on nasal canula; c/w duonebs and hypersal nebs, guanifisen for cough; hold keytruda in setting of active infection  GI: c/w protonix; c/w dronabinol for appetite stimulation; reg diet as tolerated; bowel regimen for constipation   Renal: remains in oliguric renal failure, not HD candidate due to metastatic disease continue sevelemar, trend UOP and elctrolytes  ID: c/w cefepime for additional day per ID though increase in WBC likely reactive 2/2 steroids and RVR, remains afebrile  Heme: started on full AC with heparin gtt 2/2 AFib, though unclear if benefit to continue as pt's overall prognosis is poor   Dispo: pt DNR/DNI, would likely be good candidate for home hospice as onc with no plan to continue chemo and pt with progression of metastatic disease on recent CT, will continue to monitor in ICU until rate better controlled, weaned off pressor support

## 2020-02-06 NOTE — PROGRESS NOTE ADULT - SUBJECTIVE AND OBJECTIVE BOX
Patient is a 86y old  Male who presents with a chief complaint of shortness of breath (06 Feb 2020 13:40)    24 hour events: RRT called for sustained wide complex tachycardia with rate 200s, upon arrival patient was in bed had no complaints, no CP SOB or lightheadedness,  his SBP was around 100.  He was given amiodarone x 3 boluses without response, BP dropped to SBP 70s, remained asymptomatic otherwise.  Taken to ICU, upon arrival to ICU phenylephrine was started, pads were placed for shock, one additional amio bolus was given and 100mg of lidocaine pushed.  The heart rate slowed to 120s, rhythm noted to now be atrial fibrillation, shock was held. Patient now stable and in A Fib with .     Patient experiencing cough and chest congestion.     Patient seen and examined at bedside.       REVIEW OF SYSTEMS  Constitutional: No fever, chills, fatigue  Neuro: No headache, numbness, weakness  Resp: No cough, wheezing, shortness of breath  CVS: No chest pain, palpitations, leg swelling  GI: No abdominal pain, nausea, vomiting, diarrhea   : No dysuria, frequency, incontinence  Skin: No itching, burning, rashes, or lesions   Msk: No joint pain or swelling  Psych: No depression, anxiety, mood swings  Heme: No bleeding    T(F): 97.4 (02-06-20 @ 11:10), Max: 97.7 (02-05-20 @ 22:39)  HR: 120 (02-06-20 @ 13:42) (87 - 167)  BP: 98/66 (02-06-20 @ 11:30) (86/51 - 133/105)  RR: 21 (02-06-20 @ 11:30) (9 - 34)  SpO2: 95% (02-06-20 @ 13:42) (87% - 100%)  Wt(kg): --            I&O's Summary    02-05 @ 07:01  -  02-06 @ 07:00  --------------------------------------------------------  IN: 2382.5 mL / OUT: 250 mL / NET: 2132.5 mL      PHYSICAL EXAM  General: ill appearing, cachectic male. appears stated age. NAD   CNS: AAO X 3  HEENT: NCAT, dry mucous membranes, no JVD. PERRLA    Resp: coarse breath sounds on the left middle lobe.   CVS: tachycardic. irregular rhythm.   Abd: soft, nontender, nondistended. BS X 4   Ext: UE: ecchymosis, abrasions, no CCE         LE: single fluid filled bullae left leg (> 3cm), abrasions and ecchymosis b/l   Skin: warm, dry intact, no lesions or rashes noted     MEDICATIONS    aMIOdarone Infusion IV Continuous  aMIOdarone IVPB IV Intermittent  aMIOdarone IVPB IV Intermittent  aMIOdarone IVPB IV Intermittent  furosemide   Injectable IV Push  metoprolol tartrate Oral  phenylephrine    Infusion IV Continuous    atorvastatin Oral  ezetimibe Oral  predniSONE   Tablet Oral    albuterol/ipratropium for Nebulization Nebulizer  guaiFENesin   Syrup  (Sugar-Free) Oral PRN  sodium chloride 3%  Inhalation Inhalation    dronabinol Oral  HYDROmorphone  Injectable IV Push PRN  midazolam Injectable IV Push      heparin  Infusion. IV Continuous    bisacodyl Oral  pantoprazole    Tablet Oral      folic acid Oral  sodium bicarbonate  Infusion IV Continuous      latanoprost 0.005% Ophthalmic Solution Both EYES    sevelamer carbonate Oral                          10.2   20.66 )-----------( 180      ( 06 Feb 2020 04:44 )             31.5       02-06    141  |  108  |  121<H>  ----------------------------<  120<H>  5.1   |  20<L>  |  6.30<H>    Ca    8.2<L>      06 Feb 2020 04:44  Phos  7.1     02-06  Mg     2.4     02-06    TPro  5.3<L>  /  Alb  2.1<L>  /  TBili  0.5  /  DBili  x   /  AST  22  /  ALT  24  /  AlkPhos  104  02-05    Lactate 0.9           02-06 @ 04:44      CARDIAC MARKERS ( 06 Feb 2020 10:30 )  .095 ng/mL / x     / 40 U/L / x     / 2.8 ng/mL  CARDIAC MARKERS ( 06 Feb 2020 04:44 )  .089 ng/mL / x     / 42 U/L / x     / 2.6 ng/mL      PTT - ( 06 Feb 2020 12:54 )  PTT:28.6 sec    .Blood Blood-Peripheral   No growth to date. -- 02-02 @ 14:55        Radiology:   CT Chest (2/2/20): Interval development of multifocal pneumonia involving the right upper and lower lobes.    Increase in size of soft tissue mass associated with surgical clips in the left upper lobe paramediastinal location inseparable from the aortic arch which now extends anteriorly in an intercostal location highly suspicious for progression of disease.    Renal US (2/2/20): Echogenic kidneys, compatible with medical renal disease. No hydronephrosis.    Chest X Ray (2/2/20): Right upper and lower lobe pneumonia. CABG.    TTE (2/2/20): Technically difficult and limited study  Mild concentric LVH with normal internal dimensions and systolic function, estimated LVEF of 55-60%.   Grossly normal RV size and systolic function.   Biatrial enlargement  Calcified trileaflet aortic valve with mild aortic stenosis, trace AI.   Mild MR  Mild TR.    Estimated PA systolic pressure of 28 mmHg.    No significant pericardial effusion.            Bedside lung ultrasound: ***  Bedside ECHO: ***    CENTRAL LINE: N          DATE INSERTED:              REMOVE: Y/N  FREIRE: N                        DATE INSERTED:              REMOVE: Y/N  A-LINE: N                       DATE INSERTED:              REMOVE: Y/N    GLOBAL ISSUE/BEST PRACTICE  Analgesia: Y  Sedation: N  CAM-ICU: Y  HOB elevation: yes  Stress ulcer prophylaxis: Y  VTE prophylaxis: Y  Glycemic control: N  Nutrition:     CODE STATUS: DNR   GOC discussion: Y Patient is a 86y old  Male who presents with a chief complaint of shortness of breath (06 Feb 2020 13:40)    24 hour events: RRT called for sustained wide complex tachycardia with rate 200s, upon arrival patient was in bed had no complaints, no CP SOB or lightheadedness,  his SBP was around 100.  He was given amiodarone x 3 boluses without response, BP dropped to SBP 70s, remained asymptomatic otherwise.  Taken to ICU, upon arrival to ICU phenylephrine was started, pads were placed for shock, one additional amio bolus was given and 100mg of lidocaine pushed.  The heart rate slowed to 120s, rhythm noted to now be atrial fibrillation, shock was held. Patient now stable and in A Fib with .     Patient seen and examined at bedside. Patient admits to experiencing cough and chest congestion. Denies CP, SOB, palpitations fevers, chills. Admits to producing minimal urine.         REVIEW OF SYSTEMS  Constitutional: No fever, chills, admits fatigue  Neuro: No headache, numbness, weakness  Resp: Admits cough and congestion, no wheezing, shortness of breath  CVS: No chest pain, palpitations, leg swelling  GI: No abdominal pain, nausea, vomiting, diarrhea   : No dysuria, frequency, incontinence  Skin: No itching, burning, admits to blisters on leg  Msk: No joint pain or swelling  Heme: No bleeding    T(F): 97.4 (02-06-20 @ 11:10), Max: 97.7 (02-05-20 @ 22:39)  HR: 120 (02-06-20 @ 13:42) (87 - 167)  BP: 98/66 (02-06-20 @ 11:30) (86/51 - 133/105)  RR: 21 (02-06-20 @ 11:30) (9 - 34)  SpO2: 95% (02-06-20 @ 13:42) (87% - 100%)  Wt(kg): --            I&O's Summary    02-05 @ 07:01  -  02-06 @ 07:00  --------------------------------------------------------  IN: 2382.5 mL / OUT: 250 mL / NET: 2132.5 mL      PHYSICAL EXAM  General: ill appearing, cachectic male. appears stated age. NAD   CNS: AAO X 3, no obvious focal deficits  HEENT: NCAT, dry mucous membranes, no JVD. PERRLA    Resp: coarse breath sounds on the left middle lobe, no wheezing, rhonchi, or rales   CVS: tachycardic. irregular rhythm. +S1+S2, no murmurs, rubs, or gallops  Abd: soft, nontender, nondistended. BS X 4   Ext: UE: ecchymosis, abrasions, no CCE         LE: single fluid filled bullae left leg (> 3cm), abrasions and ecchymosis b/l   Skin: warm, dry intact, bruising on extremities     MEDICATIONS    aMIOdarone Infusion IV Continuous  aMIOdarone IVPB IV Intermittent  aMIOdarone IVPB IV Intermittent  aMIOdarone IVPB IV Intermittent  furosemide   Injectable IV Push  metoprolol tartrate Oral  phenylephrine    Infusion IV Continuous    atorvastatin Oral  ezetimibe Oral  predniSONE   Tablet Oral    albuterol/ipratropium for Nebulization Nebulizer  guaiFENesin   Syrup  (Sugar-Free) Oral PRN  sodium chloride 3%  Inhalation Inhalation    dronabinol Oral  HYDROmorphone  Injectable IV Push PRN  midazolam Injectable IV Push      heparin  Infusion. IV Continuous    bisacodyl Oral  pantoprazole    Tablet Oral      folic acid Oral  sodium bicarbonate  Infusion IV Continuous      latanoprost 0.005% Ophthalmic Solution Both EYES    sevelamer carbonate Oral                          10.2   20.66 )-----------( 180      ( 06 Feb 2020 04:44 )             31.5       02-06    141  |  108  |  121<H>  ----------------------------<  120<H>  5.1   |  20<L>  |  6.30<H>    Ca    8.2<L>      06 Feb 2020 04:44  Phos  7.1     02-06  Mg     2.4     02-06    TPro  5.3<L>  /  Alb  2.1<L>  /  TBili  0.5  /  DBili  x   /  AST  22  /  ALT  24  /  AlkPhos  104  02-05    Lactate 0.9           02-06 @ 04:44      CARDIAC MARKERS ( 06 Feb 2020 10:30 )  .095 ng/mL / x     / 40 U/L / x     / 2.8 ng/mL  CARDIAC MARKERS ( 06 Feb 2020 04:44 )  .089 ng/mL / x     / 42 U/L / x     / 2.6 ng/mL      PTT - ( 06 Feb 2020 12:54 )  PTT:28.6 sec    .Blood Blood-Peripheral   No growth to date. -- 02-02 @ 14:55        Radiology:   CT Chest (2/2/20): Interval development of multifocal pneumonia involving the right upper and lower lobes.    Increase in size of soft tissue mass associated with surgical clips in the left upper lobe paramediastinal location inseparable from the aortic arch which now extends anteriorly in an intercostal location highly suspicious for progression of disease.    Renal US (2/2/20): Echogenic kidneys, compatible with medical renal disease. No hydronephrosis.    Chest X Ray (2/2/20): Right upper and lower lobe pneumonia. CABG.    TTE (2/2/20): Technically difficult and limited study  Mild concentric LVH with normal internal dimensions and systolic function, estimated LVEF of 55-60%.   Grossly normal RV size and systolic function.   Biatrial enlargement  Calcified trileaflet aortic valve with mild aortic stenosis, trace AI.   Mild MR  Mild TR.    Estimated PA systolic pressure of 28 mmHg.    No significant pericardial effusion.            Bedside lung ultrasound: B lines left side      CENTRAL LINE: N           FREIRE: N                         A-LINE: N                           GLOBAL ISSUE/BEST PRACTICE  Analgesia: Y  Sedation: N  CAM-ICU: Y  HOB elevation: yes  Stress ulcer prophylaxis: Y  VTE prophylaxis: Y  Glycemic control: N  Nutrition:     CODE STATUS: DNR

## 2020-02-06 NOTE — PROGRESS NOTE ADULT - SUBJECTIVE AND OBJECTIVE BOX
infectious diseases progress note:    WILDA PORTILLO is a 86y y. o. Male patient    Patient with difficult last 24 hours with cardiac issues and transfer to ICU    Allergies    No Known Allergies    Intolerances        ANTIBIOTICS/RELEVANT:  antimicrobials  cefepime   IVPB 500 milliGRAM(s) IV Intermittent every 24 hours    immunologic:    OTHER:  albuterol/ipratropium for Nebulization 3 milliLiter(s) Nebulizer every 6 hours  aMIOdarone Infusion 1 mG/Min IV Continuous <Continuous>  aMIOdarone IVPB 150 milliGRAM(s) IV Intermittent once  aMIOdarone IVPB 150 milliGRAM(s) IV Intermittent once  aMIOdarone IVPB 150 milliGRAM(s) IV Intermittent once  atorvastatin 20 milliGRAM(s) Oral at bedtime  bisacodyl 5 milliGRAM(s) Oral every 12 hours  dronabinol 5 milliGRAM(s) Oral every 12 hours  ezetimibe 10 milliGRAM(s) Oral daily  folic acid 1 milliGRAM(s) Oral daily  furosemide   Injectable 20 milliGRAM(s) IV Push once  guaiFENesin   Syrup  (Sugar-Free) 200 milliGRAM(s) Oral every 6 hours PRN  heparin  Injectable 5000 Unit(s) SubCutaneous every 12 hours  HYDROmorphone  Injectable 0.25 milliGRAM(s) IV Push every 4 hours PRN  latanoprost 0.005% Ophthalmic Solution 1 Drop(s) Both EYES at bedtime  lidocaine   Infusion 0.5 mG/Min IV Continuous <Continuous>  metoprolol tartrate 25 milliGRAM(s) Oral every 6 hours  midazolam Injectable 2 milliGRAM(s) IV Push once  pantoprazole    Tablet 40 milliGRAM(s) Oral before breakfast  phenylephrine    Infusion 1 MICROgram(s)/kG/Min IV Continuous <Continuous>  predniSONE   Tablet 20 milliGRAM(s) Oral daily  sevelamer carbonate 800 milliGRAM(s) Oral three times a day with meals  sodium bicarbonate  Infusion 0.059 mEq/kG/Hr IV Continuous <Continuous>  sodium chloride 3%  Inhalation 4 milliLiter(s) Inhalation every 6 hours      Objective:  Last 24-Vital Signs Last 24 Hrs  T(C): 36.3 (06 Feb 2020 07:37), Max: 36.6 (05 Feb 2020 12:17)  T(F): 97.4 (06 Feb 2020 07:37), Max: 97.9 (05 Feb 2020 12:17)  HR: 104 (06 Feb 2020 07:17) (80 - 167)  BP: 94/64 (06 Feb 2020 07:00) (86/51 - 135/87)  BP(mean): 74 (06 Feb 2020 07:00) (57 - 111)  RR: 13 (06 Feb 2020 07:00) (9 - 27)  SpO2: 92% (06 Feb 2020 07:17) (88% - 100%)    T(C): 36.3 (02-06-20 @ 07:37), Max: 36.6 (02-05-20 @ 12:17)  T(F): 97.4 (02-06-20 @ 07:37), Max: 97.9 (02-05-20 @ 12:17)  T(C): 36.3 (02-06-20 @ 07:37), Max: 37 (02-03-20 @ 12:49)  T(F): 97.4 (02-06-20 @ 07:37), Max: 98.6 (02-03-20 @ 12:49)  T(C): 36.3 (02-06-20 @ 07:37), Max: 37.1 (02-03-20 @ 04:46)  T(F): 97.4 (02-06-20 @ 07:37), Max: 98.7 (02-03-20 @ 04:46)    PHYSICAL EXAM:  Constitutional: Well-developed, well nourished  Eyes: PERRLA, EOMI  Ear/Nose/Throat: oropharynx normal	  Neck: no JVD, no lymphadenopathy, supple  Respiratory: no accessory muscle use, lung fields bilaterally clear  Cardiovascular: rapid irreg irreg  Gastrointestinal: soft, NT, no HSM, BS-normal  Extremities: no clubbing, no cyanosis, edema absent  Neuro: patient alert, oriented and appropriate  Skin: no sig lesions      LABS:                        10.2   20.66 )-----------( 180      ( 06 Feb 2020 04:44 )             31.5       WBC 20.66  02-06 @ 04:44  WBC 15.12  02-05 @ 08:58  WBC 15.94  02-04 @ 08:53  WBC 12.58  02-03 @ 05:51  WBC 18.45  02-02 @ 12:48      02-06    141  |  108  |  121<H>  ----------------------------<  120<H>  5.1   |  20<L>  |  6.30<H>    Ca    8.2<L>      06 Feb 2020 04:44  Phos  7.1     02-06  Mg     2.4     02-06    TPro  5.3<L>  /  Alb  2.1<L>  /  TBili  0.5  /  DBili  x   /  AST  22  /  ALT  24  /  AlkPhos  104  02-05      Creatinine, Serum: 6.30 mg/dL (02-06-20 @ 04:44)  Creatinine, Serum: 6.80 mg/dL (02-05-20 @ 22:12)  Creatinine, Serum: 6.60 mg/dL (02-05-20 @ 08:58)  Creatinine, Serum: 6.70 mg/dL (02-04-20 @ 08:53)  Creatinine, Serum: 6.10 mg/dL (02-03-20 @ 05:51)  Creatinine, Serum: 6.30 mg/dL (02-02-20 @ 12:48)                MICROBIOLOGY:              RADIOLOGY & ADDITIONAL STUDIES:

## 2020-02-06 NOTE — PROGRESS NOTE ADULT - PROBLEM SELECTOR PLAN 7
- Poor prognosis, hospice eval appropriate - had GOC discussion on 2/5 and I suggested a family meeting to further discuss plan of care  - Pt is DNR/DNI, JOSÉ ANTONIOST completed  - Lengthy discussion had with pt and family at bedside

## 2020-02-06 NOTE — PROGRESS NOTE ADULT - PROBLEM SELECTOR PLAN 5
- was receiving chemotherapy [id number] prior to arrival  - will hold chemorx for now so as to not induce further immunosuppression in setting of active infection on IV abx  - Follows with outpatient oncologist Dr. Mahan in [place]

## 2020-02-06 NOTE — PROGRESS NOTE ADULT - PROBLEM SELECTOR PLAN 2
- now in renal failure. remains oliguric although he did have slight improvement in renal indices last 24hrs  - bicarb remains low. continue w/ bicarb infusion  - Nephro Dr. Brooks following, suspecting GERMAN 2/2 dehydration and septic ATN, pt is not a candidate for RRT now given lung ca and poor performance status, can reassess  - will attempt to closely monitor strict i/o's while in the MICU  - s/p albumin and IV lasix on 2/5

## 2020-02-06 NOTE — CHART NOTE - NSCHARTNOTEFT_GEN_A_CORE
Assessment: Pt seen in ICU for malnutrition follow-up. 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 Pt with rapid response (2/5) for sustained Vtach and hypotension, transferred to the ICU. Pt continues with poor appetite and PO intake, per chart pt is only consuming 10-25% of meals in house. Also pt is refusing to consume breakfast this morning, pt encouraged adequate PO intake. Patient is DNR/DNI, poor prognosis, hospice referral done as per primary team. No nausea/ vomiting noted at this time, noted with fecal incontinence.     Factors impacting intake:   [x ] other: persistent lack of appetite     Diet Presciption: Diet, Renal Restrictions:   For patients receiving Renal Replacement - No Protein Restr, No Conc K, No Conc Phos, Low Sodium (02-02-20 @ 15:39)    Intake: poor     Current Weight: (2/5) 141.3lbs  Admission Weight: 140lbs   0.9% Weight Change- weight change likely in setting of fluid shifts, pt currently with edema     Pertinent Medications: MEDICATIONS  (STANDING):  albuterol/ipratropium for Nebulization 3 milliLiter(s) Nebulizer every 6 hours  aMIOdarone Infusion 1 mG/Min (33.333 mL/Hr) IV Continuous <Continuous>  aMIOdarone IVPB 150 milliGRAM(s) IV Intermittent once  aMIOdarone IVPB 150 milliGRAM(s) IV Intermittent once  aMIOdarone IVPB 150 milliGRAM(s) IV Intermittent once  atorvastatin 20 milliGRAM(s) Oral at bedtime  bisacodyl 5 milliGRAM(s) Oral every 12 hours  cefepime   IVPB 500 milliGRAM(s) IV Intermittent every 24 hours  dronabinol 5 milliGRAM(s) Oral every 12 hours  ezetimibe 10 milliGRAM(s) Oral daily  folic acid 1 milliGRAM(s) Oral daily  furosemide   Injectable 20 milliGRAM(s) IV Push once  heparin  Injectable 5000 Unit(s) SubCutaneous every 12 hours  latanoprost 0.005% Ophthalmic Solution 1 Drop(s) Both EYES at bedtime  lidocaine   Infusion 0.5 mG/Min (3.75 mL/Hr) IV Continuous <Continuous>  metoprolol tartrate 25 milliGRAM(s) Oral every 6 hours  midazolam Injectable 2 milliGRAM(s) IV Push once  pantoprazole    Tablet 40 milliGRAM(s) Oral before breakfast  phenylephrine    Infusion 1 MICROgram(s)/kG/Min (23.813 mL/Hr) IV Continuous <Continuous>  predniSONE   Tablet 20 milliGRAM(s) Oral daily  sevelamer carbonate 800 milliGRAM(s) Oral three times a day with meals  sodium bicarbonate  Infusion 0.059 mEq/kG/Hr (50 mL/Hr) IV Continuous <Continuous>  sodium chloride 3%  Inhalation 4 milliLiter(s) Inhalation every 6 hours    MEDICATIONS  (PRN):  guaiFENesin   Syrup  (Sugar-Free) 200 milliGRAM(s) Oral every 6 hours PRN Cough  HYDROmorphone  Injectable 0.25 milliGRAM(s) IV Push every 4 hours PRN pain, distress, suffering, dyspnea, palliative measures    Pertinent Labs: 02-06 Na141 mmol/L Glu 120 mg/dL<H> K+ 5.1 mmol/L Cr  6.30 mg/dL<H>  mg/dL<H> 02-06 Phos 7.1 mg/dL<H>, Hgb 10.2, Hct 31.5, Calcium 8.2,  02-05 Alb 2.1 g/dL<L>     CAPILLARY BLOOD GLUCOSE    POCT Blood Glucose.: 115 mg/dL (05 Feb 2020 21:51)    Skin: Right hip and sacrum DTI     Estimated Needs:   [x ] no change since previous assessment  [ ] recalculated:     Previous Nutrition Diagnosis:    [x ] Malnutrition     Nutrition Diagnosis is [x ] ongoing- being addressed with order placement for liberalized diet and oral nutritional supplement     New Nutrition Diagnosis: [x ] not applicable       Interventions:   Recommend  [x ] Change Diet To: Recommend to liberalize diet to low phosphorus to allow pt more food options   [ x] Nutrition Supplement: Recommend Ensure Enlive TID to encourage PO intake   [ ] Nutrition Support  [x ] Other:   1) Pt encouraged adequate PO intake  2) Monitor pt's PO intake, weight, skin, edema, GI distress       Monitoring and Evaluation:   [x ] PO intake [ x ] Tolerance to diet prescription [ x ] weights [ x ] labs[ x ] follow up per protocol  [x ] other: RD to remain available Assessment: Pt seen in ICU for malnutrition follow-up. 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 Pt with rapid response (2/5) for sustained Vtach and hypotension, transferred to the ICU. Pt continues with poor appetite and PO intake, per chart pt is only consuming 10-25% of meals in house. Also pt is refusing to consume breakfast this morning, pt encouraged adequate PO intake. Pt is on Marinol. Patient is DNR/DNI, poor prognosis, hospice referral done as per primary team. No nausea/ vomiting noted at this time, noted with fecal incontinence.     Factors impacting intake:   [x ] other: persistent lack of appetite     Diet Presciption: Diet, Renal Restrictions:   For patients receiving Renal Replacement - No Protein Restr, No Conc K, No Conc Phos, Low Sodium (02-02-20 @ 15:39)    Intake: poor     Current Weight: (2/5) 141.3lbs  Admission Weight: 140lbs   0.9% Weight Change- weight change likely in setting of fluid shifts, pt currently with edema     Pertinent Medications: MEDICATIONS  (STANDING):  albuterol/ipratropium for Nebulization 3 milliLiter(s) Nebulizer every 6 hours  aMIOdarone Infusion 1 mG/Min (33.333 mL/Hr) IV Continuous <Continuous>  aMIOdarone IVPB 150 milliGRAM(s) IV Intermittent once  aMIOdarone IVPB 150 milliGRAM(s) IV Intermittent once  aMIOdarone IVPB 150 milliGRAM(s) IV Intermittent once  atorvastatin 20 milliGRAM(s) Oral at bedtime  bisacodyl 5 milliGRAM(s) Oral every 12 hours  cefepime   IVPB 500 milliGRAM(s) IV Intermittent every 24 hours  dronabinol 5 milliGRAM(s) Oral every 12 hours  ezetimibe 10 milliGRAM(s) Oral daily  folic acid 1 milliGRAM(s) Oral daily  furosemide   Injectable 20 milliGRAM(s) IV Push once  heparin  Injectable 5000 Unit(s) SubCutaneous every 12 hours  latanoprost 0.005% Ophthalmic Solution 1 Drop(s) Both EYES at bedtime  lidocaine   Infusion 0.5 mG/Min (3.75 mL/Hr) IV Continuous <Continuous>  metoprolol tartrate 25 milliGRAM(s) Oral every 6 hours  midazolam Injectable 2 milliGRAM(s) IV Push once  pantoprazole    Tablet 40 milliGRAM(s) Oral before breakfast  phenylephrine    Infusion 1 MICROgram(s)/kG/Min (23.813 mL/Hr) IV Continuous <Continuous>  predniSONE   Tablet 20 milliGRAM(s) Oral daily  sevelamer carbonate 800 milliGRAM(s) Oral three times a day with meals  sodium bicarbonate  Infusion 0.059 mEq/kG/Hr (50 mL/Hr) IV Continuous <Continuous>  sodium chloride 3%  Inhalation 4 milliLiter(s) Inhalation every 6 hours    MEDICATIONS  (PRN):  guaiFENesin   Syrup  (Sugar-Free) 200 milliGRAM(s) Oral every 6 hours PRN Cough  HYDROmorphone  Injectable 0.25 milliGRAM(s) IV Push every 4 hours PRN pain, distress, suffering, dyspnea, palliative measures    Pertinent Labs: 02-06 Na141 mmol/L Glu 120 mg/dL<H> K+ 5.1 mmol/L Cr  6.30 mg/dL<H>  mg/dL<H> 02-06 Phos 7.1 mg/dL<H>, Hgb 10.2, Hct 31.5, Calcium 8.2,  02-05 Alb 2.1 g/dL<L>     CAPILLARY BLOOD GLUCOSE    POCT Blood Glucose.: 115 mg/dL (05 Feb 2020 21:51)    Skin: Right hip and sacrum DTI     Estimated Needs:   [x ] no change since previous assessment  [ ] recalculated:     Previous Nutrition Diagnosis:    [x ] Malnutrition     Nutrition Diagnosis is [x ] ongoing- being addressed with order placement for liberalized diet and oral nutritional supplement     New Nutrition Diagnosis: [x ] not applicable       Interventions:   Recommend  [x ] Change Diet To: Recommend to liberalize diet to low phosphorus to allow pt more food options   [ x] Nutrition Supplement: Recommend Ensure Enlive TID to encourage PO intake   [ ] Nutrition Support  [x ] Other:   1) Pt encouraged adequate PO intake  2) Continue with Marinol   3) Monitor pt's PO intake, weight, skin, edema, GI distress       Monitoring and Evaluation:   [x ] PO intake [ x ] Tolerance to diet prescription [ x ] weights [ x ] labs[ x ] follow up per protocol  [x ] other: RD to remain available

## 2020-02-06 NOTE — PROGRESS NOTE ADULT - PROBLEM SELECTOR PLAN 4
- pt p/w SOB and productive cough x 1wk  - CXR and CT chest demonstrating multifocal PNA with ELIZABETH mass extension through intercostal space suspicious for disease progression  - Pulm Dr. Yu following  - abx as above

## 2020-02-06 NOTE — PROGRESS NOTE ADULT - PROBLEM SELECTOR PLAN 1
pt appears to be clinically improved on current therapy and although there is an increase in wbc this may be due to steroids  -recommend continued cefepime for now with anticipated 5 day course of abx so potentially last day 2/6=-recommend continuing abx for now and will assess am 2/7 as potential timing to dc abx.

## 2020-02-06 NOTE — PROGRESS NOTE ADULT - ASSESSMENT
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

## 2020-02-06 NOTE — PROGRESS NOTE ADULT - PROBLEM SELECTOR PLAN 3
- severe sepsis POA  - CXR and CT chest demonstrating multifocal PNA with ELIZABETH mass extension through intercostal space suspicious for disease progression  - Pulm Dr. Yu following  - C/w renally dosed cefepime for now, ID Dr. Junior following  - BCx NGTD  - Leukocytosis still present possibly 2/2 steroids  - Pt has remained afebrile without clinical symptoms to suggest uncontrolled infection, although his lung exam w/ more pronounded rhonchi today  - Monitor daily CBC and temperature curve

## 2020-02-06 NOTE — PROGRESS NOTE ADULT - SUBJECTIVE AND OBJECTIVE BOX
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 Pt states he has baseline HADLEY, but dyspnea has increased in the last week. Of note he is in week 6 of chemo treatment keytruda, gets chemo every 3 weeks, tolerating chemo appropriately. Pt also has been urinating less for the last 4 days. Pt denies ever having dialysis. Pt denies fever, chills, chest pain, orthopnea, dysuria, hematuria, diarrhea, melena, hematochezia.   Renal consulted for GERMAN. Per family, patient had GERMAN a year ago when he previously had PNA. However, it improved after the PNA was treated. Patient admits to decreased urination. Also with poor intake.     States his breathing is improved compared to yesterday, States he is not urinating much.      Follow up Acute on CKD stage 3  No acute complaints this morning; still with poor appetite,  No N/V.  Minimal asterixis      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  Hip pain: Right hip replacement in   FH: CABG (coronary artery bypass surgery):  (double)       FAMILY HISTORY:  No pertinent family history in first degree relatives  [place]    Social History:Non smoker    MEDICATIONS  (STANDING):  albuterol/ipratropium for Nebulization 3 milliLiter(s) Nebulizer every 6 hours  cefepime   IVPB 500 milliGRAM(s) IV Intermittent every 24 hours  heparin  Injectable 5000 Unit(s) SubCutaneous every 12 hours  methylPREDNISolone sodium succinate Injectable 20 milliGRAM(s) IV Push every 8 hours  pantoprazole    Tablet 40 milliGRAM(s) Oral before breakfast  sodium chloride 0.45% 1000 milliLiter(s) (75 mL/Hr) IV Continuous <Continuous>    MEDICATIONS  (PRN):  guaiFENesin   Syrup  (Sugar-Free) 200 milliGRAM(s) Oral every 6 hours PRN Cough  HYDROmorphone  Injectable 0.25 milliGRAM(s) IV Push every 4 hours PRN pain, distress, suffering, dyspnea, palliative measures   Meds reviewed    Allergies    No Known Allergies    Intolerances         REVIEW OF SYSTEMS:    CONSTITUTIONAL:  No weight loss , +Poor intake  EYES: No eye pain, visual disturbances, or discharge  ENMT:  No difficulty hearing, tinnitus, vertigo; No sinus or throat pain  NECK: No pain or stiffness  BREASTS: No pain, masses, or nipple discharge  RESPIRATORY: no SOB, no coughing  CARDIOVASCULAR: No chest pain, palpitations, dizziness,   GASTROINTESTINAL: No abdominal or epigastric pain. No nausea, vomiting, or hematemesis; No diarrhea or constipation. No melena   GENITOURINARY: No dysuria, frequency, hematuria, or incontinence  NEUROLOGICAL: No headaches, memory loss, loss of strength, numbness, or tremors  SKIN: No Diffuse erythema, no blisters  LYMPH NODES: No enlarged glands  ENDOCRINE: No heat or cold intolerance; No hair loss  MUSCULOSKELETAL: No joint pain or swelling   PSYCHIATRIC: No depression, anxiety, mood swings, or difficulty sleeping  HEME/LYMPH: No easy bruising, or bleeding gums  ALLERGY AND IMMUNOLOGIC: No hives or eczema      Vital Signs Last 24 Hrs  T(C): 36.3 (2020 17:48), Max: 36.9 (2020 16:36)  T(F): 97.3 (2020 17:48), Max: 98.4 (2020 16:36)  HR: 99 (2020 17:48) (92 - 99)  BP: 112/66 (2020 17:48) (108/58 - 124/72)  BP(mean): --  RR: 17 (2020 17:48) (17 - 20)  SpO2: 90% (2020 17:48) (84% - 96%)  Daily Height in cm: 182.88 (2020 12:13)    Daily Weight in k.3 (2020 17:48)    PHYSICAL EXAM:    GENERAL: No Distress  HEAD:  Atraumatic, Normocephalic  EYES: EOMI, conjunctiva and sclera clear  ENMT: No tonsillar erythema, exudates, or enlargement; dry mucous membranes, NECK: Supple, neck veins full  NERVOUS SYSTEM:  Alert & Oriented X3, Good concentration;   CHEST/LUNG: Reduced basilar breath sounds  HEART: Regular rate and rhythm; No murmurs, rubs, or gallops  ABDOMEN: Soft, Nontender, Nondistended; Bowel sounds present, No hepatomegaly  EXTREMITIES: trace Edema  SKIN: No rashes or lesions, dry; poor turgor      LABS:             20: pending  20: BUN/Cr: 124/6.7

## 2020-02-06 NOTE — PROGRESS NOTE ADULT - SUBJECTIVE AND OBJECTIVE BOX
CHARTING IN PROGRESS    [facility]    Aziza Valdivia, Blayne, Edmond, Titus Navarro Savella      [phone]    CHIEF COMPLAINT: Patient is a 86y old  Male who presents with a chief complaint of shortness of breath (05 Feb 2020 22:00)      Follow Up: RRT called last night for sustained Vtach with rates in the 200s. Patient received amiodarone 150 mg bolus x3, then became hypotensive. Transferred to ICU, started on pressors and got additional amio bolus and lidocaine 100 mg with improvement in HR. No DCCV given.    Interim history: Patient seen at bedside. Currently sedated on pressors, with lidocaine and amiodarone drips running.     MEDICATIONS  (STANDING):  albuterol/ipratropium for Nebulization 3 milliLiter(s) Nebulizer every 6 hours  aMIOdarone Infusion 1 mG/Min (33.333 mL/Hr) IV Continuous <Continuous>  aMIOdarone IVPB 150 milliGRAM(s) IV Intermittent once  aMIOdarone IVPB 150 milliGRAM(s) IV Intermittent once  aMIOdarone IVPB 150 milliGRAM(s) IV Intermittent once  atorvastatin 20 milliGRAM(s) Oral at bedtime  bisacodyl 5 milliGRAM(s) Oral every 12 hours  cefepime   IVPB 500 milliGRAM(s) IV Intermittent every 24 hours  dronabinol 5 milliGRAM(s) Oral every 12 hours  ezetimibe 10 milliGRAM(s) Oral daily  folic acid 1 milliGRAM(s) Oral daily  furosemide   Injectable 20 milliGRAM(s) IV Push once  heparin  Injectable 5000 Unit(s) SubCutaneous every 12 hours  latanoprost 0.005% Ophthalmic Solution 1 Drop(s) Both EYES at bedtime  lidocaine   Infusion 0.5 mG/Min (3.75 mL/Hr) IV Continuous <Continuous>  metoprolol tartrate 25 milliGRAM(s) Oral every 6 hours  midazolam Injectable 2 milliGRAM(s) IV Push once  pantoprazole    Tablet 40 milliGRAM(s) Oral before breakfast  phenylephrine    Infusion 1 MICROgram(s)/kG/Min (23.813 mL/Hr) IV Continuous <Continuous>  predniSONE   Tablet 20 milliGRAM(s) Oral daily  sevelamer carbonate 800 milliGRAM(s) Oral three times a day with meals  sodium bicarbonate  Infusion 0.059 mEq/kG/Hr (50 mL/Hr) IV Continuous <Continuous>  sodium chloride 3%  Inhalation 4 milliLiter(s) Inhalation every 6 hours    MEDICATIONS  (PRN):  guaiFENesin   Syrup  (Sugar-Free) 200 milliGRAM(s) Oral every 6 hours PRN Cough  HYDROmorphone  Injectable 0.25 milliGRAM(s) IV Push every 4 hours PRN pain, distress, suffering, dyspnea, palliative measures      REVIEW OF SYSTEMS:  Unable to obtain, patient sedated    Vital Signs Last 24 Hrs  T(C): 36.3 (06 Feb 2020 07:37), Max: 36.6 (05 Feb 2020 12:17)  T(F): 97.4 (06 Feb 2020 07:37), Max: 97.9 (05 Feb 2020 12:17)  HR: 104 (06 Feb 2020 07:17) (80 - 167)  BP: 94/64 (06 Feb 2020 07:00) (86/51 - 135/87)  BP(mean): 74 (06 Feb 2020 07:00) (57 - 111)  RR: 13 (06 Feb 2020 07:00) (9 - 27)  SpO2: 92% (06 Feb 2020 07:17) (88% - 100%)    I&O's Summary    05 Feb 2020 07:01  -  06 Feb 2020 07:00  --------------------------------------------------------  IN: 2382.5 mL / OUT: 250 mL / NET: 2132.5 mL        Telemetry past 24h:    PHYSICAL EXAM:    Constitutional: sedated, not responsive to voice  HEENT:  poor dentition, sclerae anicteric, unable to assess pupil reflex, patient's eyes deviated up  Pulmonary: Coarse breath sounds diffusely  Cardiovascular: tachycardic, irregular rhythm  Gastrointestinal: Bowel Sounds present, soft, nontender.   Lymph: Trace LE edema  Neurological: not responsive to voice. Responds to painful stimuli  Skin: ecchymoses on lower extremities      LABS: All Labs Reviewed:                        10.2   20.66 )-----------( 180      ( 06 Feb 2020 04:44 )             31.5                         10.4   15.12 )-----------( 127      ( 05 Feb 2020 08:58 )             32.6                         10.5   15.94 )-----------( 153      ( 04 Feb 2020 08:53 )             32.0     06 Feb 2020 04:44    141    |  108    |  121    ----------------------------<  120    5.1     |  20     |  6.30   05 Feb 2020 22:12    140    |  110    |  133    ----------------------------<  122    4.8     |  18     |  6.80   05 Feb 2020 08:58    142    |  110    |  129    ----------------------------<  119    5.1     |  19     |  6.60     Ca    8.2        06 Feb 2020 04:44  Ca    8.6        05 Feb 2020 22:12  Ca    8.8        05 Feb 2020 08:58  Phos  7.1       06 Feb 2020 04:44  Phos  6.7       05 Feb 2020 22:12  Phos  6.6       05 Feb 2020 08:58  Mg     2.4       06 Feb 2020 04:44  Mg     2.5       05 Feb 2020 22:12  Mg     2.6       05 Feb 2020 08:58    TPro  5.3    /  Alb  2.1    /  TBili  0.5    /  DBili  x      /  AST  22     /  ALT  24     /  AlkPhos  104    05 Feb 2020 22:12      CARDIAC MARKERS ( 06 Feb 2020 04:44 )  .089 ng/mL / x     / 42 U/L / x     / 2.6 ng/mL      Blood Culture: Organism --  Gram Stain Blood -- Gram Stain --  Specimen Source .Blood Blood-Peripheral  Culture-Blood --    EKG: From last night during rapid, Vtach at 202 bpm with PVCs, LAD,     Echo:  EXAM:  ECHO TTE WO CON COMP W DOPPLR         PROCEDURE DATE:  02/03/2020        INTERPRETATION:  INDICATION: Abnormal EKG  Sonographer AS    Blood Pressure 118/67    Height 182.8 cm     Weight 63.5 kg       BSA 1.88 sq m    Dimensions:    LA 2.4       Normal Values: 2.0 - 4.0 cm    Ao 3.8        Normal Values: 2.0 - 3.8 cm  SEPTUM 1.2       Normal Values: 0.6 - 1.2 cm  PWT 1.3       Normal Values: 0.6 - 1.1 cm  LVIDd 4.8         Normal Values: 3.0 - 5.6 cm  LVIDs 3.3         Normal Values: 1.8 - 4.0 cm      OBSERVATIONS:  Technically difficult and limited study  Mitral Valve: Thickened leaflets, mild MR.  Aortic Valve/Aorta: Calcified trileaflet aortic valve with decreased opening. Peak transaortic valve gradient is 23.6 mmHg with a mean transaortic valve gradient of 13.9 mmHg. Aortic valve area is calculated to be 1.5 sq cm, this is consistent with mild aortic stenosis Trace AI  Tricuspid Valve: normal with mild TR.  Pulmonic Valve: Trace PI  Left Atrium: normal  Right Atrium: Not well-visualized  Left Ventricle: normal LV size and systolic function, estimated LVEF of 55-60%. Mild LVH  Right Ventricle: Grossly normal size and systolic function.  Pericardium/Pleura: normal, no significant pericardial effusion.  Pulmonary/RV Pressure: estimated PA systolic pressure of 28 mmHg       Conclusion:   Technically difficult and limited study  Mild concentric LVH with normal internal dimensions and systolic function, estimated LVEF of 55-60%.   Grossly normal RV size and systolic function.   Biatrial enlargement  Calcified trileaflet aortic valve with mild aortic stenosis, trace AI.   Mild MR  Mild TR.    Estimated PA systolic pressure of 28 mmHg.    No significant pericardial effusion.            MADAI BRADLEY   This document has been electronically signed. Feb 4 2020  4:54PM CHARTING IN PROGRESS    [facility]    Aziza Valdivia, Blayne, Edmond, Titus Navarro Savella      [phone]    CHIEF COMPLAINT: Patient is a 86y old  Male who presents with a chief complaint of shortness of breath (05 Feb 2020 22:00)      Follow Up: RRT called last night for sustained Vtach with rates in the 200s. Patient received amiodarone 150 mg bolus x3, then became hypotensive. Transferred to ICU, started on pressors and got additional amio bolus and lidocaine 100 mg with improvement in HR. No DCCV given.    Interim history: Patient seen at bedside. Currently sleeping/sedated on pressors, with lidocaine and amiodarone drips running.     MEDICATIONS  (STANDING):  albuterol/ipratropium for Nebulization 3 milliLiter(s) Nebulizer every 6 hours  aMIOdarone Infusion 1 mG/Min (33.333 mL/Hr) IV Continuous <Continuous>  aMIOdarone IVPB 150 milliGRAM(s) IV Intermittent once  aMIOdarone IVPB 150 milliGRAM(s) IV Intermittent once  aMIOdarone IVPB 150 milliGRAM(s) IV Intermittent once  atorvastatin 20 milliGRAM(s) Oral at bedtime  bisacodyl 5 milliGRAM(s) Oral every 12 hours  cefepime   IVPB 500 milliGRAM(s) IV Intermittent every 24 hours  dronabinol 5 milliGRAM(s) Oral every 12 hours  ezetimibe 10 milliGRAM(s) Oral daily  folic acid 1 milliGRAM(s) Oral daily  furosemide   Injectable 20 milliGRAM(s) IV Push once  heparin  Injectable 5000 Unit(s) SubCutaneous every 12 hours  latanoprost 0.005% Ophthalmic Solution 1 Drop(s) Both EYES at bedtime  lidocaine   Infusion 0.5 mG/Min (3.75 mL/Hr) IV Continuous <Continuous>  metoprolol tartrate 25 milliGRAM(s) Oral every 6 hours  midazolam Injectable 2 milliGRAM(s) IV Push once  pantoprazole    Tablet 40 milliGRAM(s) Oral before breakfast  phenylephrine    Infusion 1 MICROgram(s)/kG/Min (23.813 mL/Hr) IV Continuous <Continuous>  predniSONE   Tablet 20 milliGRAM(s) Oral daily  sevelamer carbonate 800 milliGRAM(s) Oral three times a day with meals  sodium bicarbonate  Infusion 0.059 mEq/kG/Hr (50 mL/Hr) IV Continuous <Continuous>  sodium chloride 3%  Inhalation 4 milliLiter(s) Inhalation every 6 hours    MEDICATIONS  (PRN):  guaiFENesin   Syrup  (Sugar-Free) 200 milliGRAM(s) Oral every 6 hours PRN Cough  HYDROmorphone  Injectable 0.25 milliGRAM(s) IV Push every 4 hours PRN pain, distress, suffering, dyspnea, palliative measures      REVIEW OF SYSTEMS:  Unable to obtain, patient sedated    Vital Signs Last 24 Hrs  T(C): 36.3 (06 Feb 2020 07:37), Max: 36.6 (05 Feb 2020 12:17)  T(F): 97.4 (06 Feb 2020 07:37), Max: 97.9 (05 Feb 2020 12:17)  HR: 104 (06 Feb 2020 07:17) (80 - 167)  BP: 94/64 (06 Feb 2020 07:00) (86/51 - 135/87)  BP(mean): 74 (06 Feb 2020 07:00) (57 - 111)  RR: 13 (06 Feb 2020 07:00) (9 - 27)  SpO2: 92% (06 Feb 2020 07:17) (88% - 100%)    I&O's Summary    05 Feb 2020 07:01  -  06 Feb 2020 07:00  --------------------------------------------------------  IN: 2382.5 mL / OUT: 250 mL / NET: 2132.5 mL        Telemetry past 24h:    PHYSICAL EXAM:    Constitutional: sedated, not responsive to voice  HEENT:  poor dentition, sclerae anicteric, unable to assess pupil reflex, patient's eyes deviated up  Pulmonary: Coarse breath sounds diffusely  Cardiovascular: tachycardic, irregular rhythm  Gastrointestinal: Bowel Sounds present, soft, nontender.   Lymph: Trace LE edema  Neurological: not responsive to voice. Responds to painful stimuli  Skin: ecchymoses on lower extremities      LABS: All Labs Reviewed:                        10.2 20.66 )-----------( 180      ( 06 Feb 2020 04:44 )             31.5                         10.4   15.12 )-----------( 127      ( 05 Feb 2020 08:58 )             32.6                         10.5   15.94 )-----------( 153      ( 04 Feb 2020 08:53 )             32.0     06 Feb 2020 04:44    141    |  108    |  121    ----------------------------<  120    5.1     |  20     |  6.30   05 Feb 2020 22:12    140    |  110    |  133    ----------------------------<  122    4.8     |  18     |  6.80   05 Feb 2020 08:58    142    |  110    |  129    ----------------------------<  119    5.1     |  19     |  6.60     Ca    8.2        06 Feb 2020 04:44  Ca    8.6        05 Feb 2020 22:12  Ca    8.8        05 Feb 2020 08:58  Phos  7.1       06 Feb 2020 04:44  Phos  6.7       05 Feb 2020 22:12  Phos  6.6       05 Feb 2020 08:58  Mg     2.4       06 Feb 2020 04:44  Mg     2.5       05 Feb 2020 22:12  Mg     2.6       05 Feb 2020 08:58    TPro  5.3    /  Alb  2.1    /  TBili  0.5    /  DBili  x      /  AST  22     /  ALT  24     /  AlkPhos  104    05 Feb 2020 22:12      CARDIAC MARKERS ( 06 Feb 2020 04:44 )  .089 ng/mL / x     / 42 U/L / x     / 2.6 ng/mL      Blood Culture: Organism --  Gram Stain Blood -- Gram Stain --  Specimen Source .Blood Blood-Peripheral  Culture-Blood --    EKG: From last night during rapid, Vtach at 202 bpm with PVCs, LAD,     Echo:  EXAM:  ECHO TTE WO CON COMP W DOPPLR         PROCEDURE DATE:  02/03/2020        INTERPRETATION:  INDICATION: Abnormal EKG  Sonographer AS    Blood Pressure 118/67    Height 182.8 cm     Weight 63.5 kg       BSA 1.88 sq m    Dimensions:    LA 2.4       Normal Values: 2.0 - 4.0 cm    Ao 3.8        Normal Values: 2.0 - 3.8 cm  SEPTUM 1.2       Normal Values: 0.6 - 1.2 cm  PWT 1.3       Normal Values: 0.6 - 1.1 cm  LVIDd 4.8         Normal Values: 3.0 - 5.6 cm  LVIDs 3.3         Normal Values: 1.8 - 4.0 cm      OBSERVATIONS:  Technically difficult and limited study  Mitral Valve: Thickened leaflets, mild MR.  Aortic Valve/Aorta: Calcified trileaflet aortic valve with decreased opening. Peak transaortic valve gradient is 23.6 mmHg with a mean transaortic valve gradient of 13.9 mmHg. Aortic valve area is calculated to be 1.5 sq cm, this is consistent with mild aortic stenosis Trace AI  Tricuspid Valve: normal with mild TR.  Pulmonic Valve: Trace PI  Left Atrium: normal  Right Atrium: Not well-visualized  Left Ventricle: normal LV size and systolic function, estimated LVEF of 55-60%. Mild LVH  Right Ventricle: Grossly normal size and systolic function.  Pericardium/Pleura: normal, no significant pericardial effusion.  Pulmonary/RV Pressure: estimated PA systolic pressure of 28 mmHg       Conclusion:   Technically difficult and limited study  Mild concentric LVH with normal internal dimensions and systolic function, estimated LVEF of 55-60%.   Grossly normal RV size and systolic function.   Biatrial enlargement  Calcified trileaflet aortic valve with mild aortic stenosis, trace AI.   Mild MR  Mild TR.    Estimated PA systolic pressure of 28 mmHg.    No significant pericardial effusion.            MADAI BRADLEY   This document has been electronically signed. Feb 4 2020  4:54PM

## 2020-02-06 NOTE — PROGRESS NOTE ADULT - ASSESSMENT
Acute on CKD Stage 3  Metabolic Acidosis  Hyperkalemia  Sepsis/PNA  Dehydration  Hyperphosphatemia      -GERMAN is likely due to dehydration along with septic ATN  -FeUrea 31%  -Renal sonogram reviewed showing no evidence of Hydro, but echogenic kidneys noted  -S/P Hyperkalemic cocktail with improvement in potassium levels  -Abx per ID; renal dosing  -Monitor urine output; poor output documented  -Worsening renal function; will follow up repeat BMP  -Continue with gentle 1/2NS; Patient does not appear fluid overloaded.  -Given albumin, with mild improvement in creatinine  -No acute indication for dialysis; poor candidate especially with Lung Ca and overall debility. Dialysis not recommended  -Pulm follow up noted  -Hospice eval  -Add renvela    D/W primary    Thank you

## 2020-02-06 NOTE — PROGRESS NOTE ADULT - PROBLEM SELECTOR PLAN 8
- Chronic, stable  - Home ASA was stopped  - Statin restarted in hospital, continue  - increase metoprolol to 25mg q6h restarted per cardio  - TTE with EF 55-60%, mild LVH and AS

## 2020-02-06 NOTE — PROGRESS NOTE ADULT - SUBJECTIVE AND OBJECTIVE BOX
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 Admitted for acute hypoxic respiratory failure secondary to pneumonia. Hospital course complicated by GERMAN and runs of SVT    Events in last 24 hours: Chart reviewed and patient examined at bedside. RRT called overnight for tachycardia and hypotension. Transferred to ICU where pt. received multiple Amiodarone bolus without improvement. Started on Neosynephrine gatt for BP control. At bedside patient admits to some shortness of breath. 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  Hip pain: Right hip replacement in 2008  FH: CABG (coronary artery bypass surgery): 1994 (double)    FAMILY HISTORY:  No pertinent family history in first degree relatives    SOCIAL HISTORY:    Home Medications:    Review of Systems:  Constitutional: no fever, chills, fatigue  Neuro: no headache, numbness, weakness  Resp: +shortness of breath, no cough, wheezing  CVS: no chest pain, palpitations, leg swelling  GI: no abdominal pain, nausea, vomiting, diarrhea   : no dysuria, frequency, incontinence  Skin: no itching, burning, rashes, or lesions   Msk: no joint pain or swelling  Psych: no depression, anxiety, mood swings    T(F): 97.4 (02-06-20 @ 20:30), Max: 97.9 (02-06-20 @ 15:39)  HR: 117 (02-06-20 @ 20:45) (104 - 167)  BP: 116/73 (02-06-20 @ 18:30) (75/50 - 133/105)  RR: 22 (02-06-20 @ 18:30) (9 - 34)  SpO2: 92% (02-06-20 @ 20:45)  Wt(kg): --    CAPILLARY BLOOD GLUCOSE      POCT Blood Glucose.: 115 mg/dL (05 Feb 2020 21:51)    I&O's Summary    05 Feb 2020 07:01  -  06 Feb 2020 07:00  --------------------------------------------------------  IN: 2382.5 mL / OUT: 250 mL / NET: 2132.5 mL    06 Feb 2020 07:01  -  06 Feb 2020 22:10  --------------------------------------------------------  IN: 1019.2 mL / OUT: 380 mL / NET: 639.2 mL        Physical Exam:     Gen: critically ill appearing  Neuro: awake and alert, follows commands  CVS: IRR  Resp: decreased breath sounds b/l base  Abd: soft, NT, ND  Ext: warm, dry, +LE edema    Meds:  cefepime   IVPB 500 milliGRAM(s) IV Intermittent every 24 hours    aMIOdarone Infusion 0.5 mG/Min IV Continuous <Continuous>  aMIOdarone IVPB 150 milliGRAM(s) IV Intermittent once  aMIOdarone IVPB 150 milliGRAM(s) IV Intermittent once  aMIOdarone IVPB 150 milliGRAM(s) IV Intermittent once  metoprolol tartrate 25 milliGRAM(s) Oral every 6 hours  midodrine 10 milliGRAM(s) Oral every 8 hours  phenylephrine    Infusion 1 MICROgram(s)/kG/Min IV Continuous <Continuous>     atorvastatin 20 milliGRAM(s) Oral at bedtime  ezetimibe 10 milliGRAM(s) Oral daily  predniSONE   Tablet 20 milliGRAM(s) Oral daily     albuterol/ipratropium for Nebulization 3 milliLiter(s) Nebulizer every 6 hours  guaiFENesin   Syrup  (Sugar-Free) 200 milliGRAM(s) Oral every 6 hours PRN  sodium chloride 3%  Inhalation 4 milliLiter(s) Inhalation every 6 hours     dronabinol 5 milliGRAM(s) Oral every 12 hours  HYDROmorphone  Injectable 0.25 milliGRAM(s) IV Push every 4 hours PRN  midazolam Injectable 2 milliGRAM(s) IV Push once        heparin  Infusion.  Unit(s)/Hr IV Continuous <Continuous>     bisacodyl 5 milliGRAM(s) Oral every 12 hours  pantoprazole    Tablet 40 milliGRAM(s) Oral before breakfast        folic acid 1 milliGRAM(s) Oral daily  sodium bicarbonate  Infusion 0.059 mEq/kG/Hr IV Continuous <Continuous>        latanoprost 0.005% Ophthalmic Solution 1 Drop(s) Both EYES at bedtime     sevelamer carbonate 800 milliGRAM(s) Oral three times a day with meals                           11.1   21.95 )-----------( 168      ( 06 Feb 2020 21:23 )             33.7       02-06    141  |  108  |  121<H>  ----------------------------<  120<H>  5.1   |  20<L>  |  6.30<H>    Ca    8.2<L>      06 Feb 2020 04:44  Phos  7.1     02-06  Mg     2.4     02-06    TPro  5.3<L>  /  Alb  2.1<L>  /  TBili  0.5  /  DBili  x   /  AST  22  /  ALT  24  /  AlkPhos  104  02-05    Lactate 0.9           02-06 @ 04:44      CARDIAC MARKERS ( 06 Feb 2020 16:45 )  .094 ng/mL / x     / 40 U/L / x     / 2.8 ng/mL  CARDIAC MARKERS ( 06 Feb 2020 10:30 )  .095 ng/mL / x     / 40 U/L / x     / 2.8 ng/mL  CARDIAC MARKERS ( 06 Feb 2020 04:44 )  .089 ng/mL / x     / 42 U/L / x     / 2.6 ng/mL      PTT - ( 06 Feb 2020 12:54 )  PTT:28.6 sec    .Blood Blood-Peripheral   No growth to date. -- 02-02 @ 14:55          Radiology:   EXAM:  CT CHEST                            PROCEDURE DATE:  02/02/2020          INTERPRETATION:  Clinical information: Suspected pneumonia. History of lung cancer.    Comparison: 1/13/2019 CT scan of the chest.    PROCEDURE:   CT of the Chest was performed without intravenous contrast.     FINDINGS:    Lungs and airways: Emphysema. Interval development of multifocal opacities in the right upper lobe and right lower lobe compatible with multifocal pneumonia. Again noted is a left upper lobe and para-mediastinal mass adjacent to surgical clips inseparable from the proximal aortic arch. The area on series 2 image 45 measures 4.1 x 3.7 cm previously 3.8 x 2.3 cm. Slightly more superiorly there is soft tissue that extends anteriorly through the intercostal space on series 2 image 36 measuring 3.1 x 3.0 cm.  Findings are suspicious for progression of disease.    Pleura: Within normal limits. No pleural effusion.    Mediastinum and Ginny: No abnormally enlarged lymph nodes. The esophagus is distended with fluid.    Heart: Normal size. No pericardial effusion.     Vessels: There has been median sternotomy and CABG. Main pulmonary artery is enlarged to 4.2 cm.    Chest wall and lower neck: Within normal limits.    Upper abdomen: Bilateral renal hypodensities again noted including some which are not simple cysts. For example there is a 3.0 cm lesion in the right mid kidney which is not a simple cyst and a 1.4 cm lesion in the left mid kidney which is not a simple cyst are stable. The gallbladder is distended containing a stone.    Bones: Within normal limits.    IMPRESSION:     Interval development of multifocal pneumonia involving the right upper and lower lobes.    Increase in size of soft tissue mass associated with surgical clips in the left upper lobe paramediastinal location inseparable from the aortic arch which now extends anteriorly in an intercostal location highly suspicious for progression of disease.                      BLANCA GOODRICH M.D., ATTENDING RADIOLOGIST  This document has been electronically signed. 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 Admitted for acute hypoxic respiratory failure secondary to pneumonia. Hospital course complicated by GERMAN and Tachycardia with hypotension    -Tachycardia noted to be Afib w/ aberrancy. s/p multiple Amiodarone bolus, currently on Amiodarone gtt for rate control. Unable to receive beta blockade while in shock state. Renally dosing Digoxin for further rate control. Full dose AC w/ Heparin gtt, tirate per nomogram. Maintain K >4 and Mg >2  -Shock state in setting of hypovolemia vs. poor rate control. Neosynephrine gtt required for hypotension, actively titrating to maintain MAP >65. Trace edema with improvement in HR with Albumin earlier, will order additional SPA in attempt to transition off pressors. Midodrine Q8 for BP support  -Acute hypoxic respiratory failure secondary to Pneumonia with underlying lung CA. More SOB tonight, NC requirements increased to 6L. Actively titrating to maintain O2 sat >92%. Empiric abx coverage w/ Cefepime. RVP and culture negative to date.  -GERMAN on CKD. Progressive decline in renal function in shock state. Dopplers negative. Adequate UO, Nephrology following, not candidate for HD  -GI PPX: Protonix    CODE STATUS: DNR/DNI  Critical care time spent (mins): 34 minutes including time spent reviewing chart, ordering tests/labs, discussing with interdisciplinary team. Not including time spent performing procedures

## 2020-02-06 NOTE — PROGRESS NOTE ADULT - PROBLEM SELECTOR PLAN 1
overnight events noted  on Amio gtt - for ventricular arrhythmia -   resp distress - lung ca with mets - progressive disease - multifocal pna - s/p ABX  CKD and GERMAN - Dehydration - s/p IVF  Renal EVAL noted - replete lytes and serial renal indices - avoid nephrotoxins - not a good candidate for Dialysis   cachexia and mets ca progression - hospice appropriate -   COPD - Emphysema - NEBS and Steroids (taper in progress) - Hypertonic Saline NEBS  pt was on Keytruda in the past - currently off - as per Family - Follows with Dr. ADAMSON Onc in [place] Office -   prognosis is very poor - pt is DNR DNI  discussed plan of care with family - hospice discussion ongoing -   CT chest reviewed  Dilaudid 0.25 mg IVP prn for resp distress - suffering - dyspnea - pain - palliative measures.

## 2020-02-06 NOTE — PROGRESS NOTE ADULT - ASSESSMENT
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 Admit for acute hypoxic respiratory failure likely 2/2 PNA in setting of cancer also noted to have GERMAN.    2/6/2020: Now in ICU after sustained Vtach and hypotension, s/p amio loads x2, lidocaine push. Now on pressors, lidocaine and amio drips, currently in Afib with rates in 120s.    Vtach  - Wean antiarrhythmic gtt as tolerated  - Would continue amiodarone  - Troponin elevation with normal CKs likely demand ischemia in the setting of Vtach. Continue to trend cardiac enzymes until downtrending       ACUTE HYPOXIC RESPIRATORY FAILURE in setting of advanced lung cancer with mets with multifocal pneumonia  -continue steroids  -continue nebs  -s/p ketruda 6 rounds-now off  -poor prognosis as per pulm  -continue cefepime IV ABX      GOALS OF CARE:  -Patient is DNR/DNI, poor prognosis, hospice referral done as per primary team  -All other workup per primary team. Will followup.     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 Admit for acute hypoxic respiratory failure likely 2/2 PNA in setting of cancer also noted to have GERMAN.    2/6/2020: Now in ICU after sustained RRT called for sustained Vtach and hypotension, s/p amio loads x2, lidocaine push. Now on pressors, lidocaine and amio drips, currently in Afib with rates in 120s. Upon review of EKG from time of rapid, doubt true Vtach. Rate is irregular, no evidence of AV dissociation with no change in axis; patient does mpt ,eet brugada criteria. Most likely SVT or Afib with variance. Currently in rate controlled atrial fibrillation.     Afib   - Continue amiodarone gtt  - Stop lidocaine, as patient is unlikely in true ventricular arrhythmia  - Troponin elevation with normal CKs likely demand ischemia in the setting of Afib with RVR. Continue to trend cardiac enzymes until downtrending     Shock  - cardiogenic vs septic shock  - Continue pressors, wean as tolerated  - Repeat echo    Acute hypoxic respiratory failure in setting of advanced lung cancer with mets with multifocal pneumonia  - continue steroids  - continue nebs  - s/p ketruda 6 rounds-now off  - continue cefepime IV ABX      GOALS OF CARE:  -Patient is DNR/DNI, poor prognosis, hospice referral done as per primary team    - keep K>4, monitor for hyperkalemia  - mag >2   - echo reviewed, EF 55-60, grossly normal LV  - All other care per ICU  - Will follow 86 year old male PMH coronary artery disease s/p CABG 1994, HLD, HTN, CKD stage III, 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 Admit for acute hypoxic respiratory failure likely 2/2 PNA in setting of cancer also noted to have GERMAN.    2/6/2020: Now in ICU after sustained RRT called for sustained Vtach and hypotension, s/p amio loads x2, lidocaine push. Now on pressors, lidocaine and amio drips, currently in Afib with rates in 120s. Upon review of EKG from time of rapid, doubt true Vtach. Rate is irregular, no evidence of AV dissociation with no change in axis; patient does mpt ,eet brugada criteria. Most likely SVT or Afib with variance. Currently in rate controlled atrial fibrillation.     Afib   - Continue amiodarone gtt  - Stop lidocaine, as patient is unlikely in true ventricular arrhythmia  - Troponin elevation with normal CKs likely demand ischemia in the setting of Afib with RVR. Continue to trend cardiac enzymes until downtrending   - Start full dose anticoagulation    Shock  - cardiogenic vs septic shock  - Continue pressors, wean as tolerated  - Repeat echo    Acute hypoxic respiratory failure in setting of advanced lung cancer with mets with multifocal pneumonia  - continue steroids  - continue nebs  - s/p ketruda 6 rounds-now off  - continue cefepime IV ABX      GOALS OF CARE:  -Patient is DNR/DNI, poor prognosis, hospice referral done as per primary team    - keep K>4, monitor for hyperkalemia  - mag >2   - echo reviewed, EF 55-60, grossly normal LV  - All other care per ICU  - Will follow 86 year old male PMH coronary artery disease s/p CABG 1994, HLD, HTN, CKD stage III, 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 Admit for acute hypoxic respiratory failure likely 2/2 PNA in setting of cancer also noted to have GERMAN.    2/6/2020: Now in ICU after sustained RRT called for sustained WCT and hypotension, s/p amio loads x2, lidocaine push. Now on pressors, lidocaine and amio drips, currently in Afib with rates in 120s. Upon review of EKG from time of rapid, doubt true Vtach. Rate is irregular, no evidence of AV dissociation with no change in axis; patient does meet brugada criteria. Most likely  Afib with aberrancy. Currently in rate controlled atrial fibrillation.     Afib   - Continue amiodarone gtt  - Stop lidocaine, as patient is unlikely in true ventricular arrhythmia  - Troponin elevation with normal CKs likely demand ischemia in the setting of Afib with RVR. Continue to trend cardiac enzymes until downtrending   - Start full dose anticoagulation    Shock  - cardiogenic vs septic shock  - Continue pressors, wean as tolerated  - Repeat echo    Acute hypoxic respiratory failure in setting of advanced lung cancer with mets with multifocal pneumonia  - continue steroids  - continue nebs  - s/p ketruda 6 rounds-now off  - continue cefepime IV ABX      GOALS OF CARE:  -Patient is DNR/DNI, poor prognosis, hospice referral done as per primary team    - keep K>4, monitor for hyperkalemia  - mag >2   - echo reviewed, EF 55-60, grossly normal LV  - All other care per ICU  - Will follow

## 2020-02-06 NOTE — PROGRESS NOTE ADULT - ASSESSMENT
[ASSESSMENT and  PLAN]  Stage IV lung cancer. \Mmetastatic to bone at least, NSCLC-adenoca, PDL1+ 10% on bx.   s/p 2 cycles of Carboplatin-Alimta-Keytruda in Winter 2018.   In Dec 2018 - Jan 2019 Admitted with generalized weakness, muscle aches, ? pneumonia. Noted to have marked elevated LFTs.   AlkPhos 1085, , . CKD /GERMAN with Cr 1.6  CTA chest with no PE. Distended GB with stones.   Possible tylenol toxicity due to excessive use, 5000mg APAP/day.  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 Viral serologies negative. Started on PO steroids.   Post DC from Jan 2019 - June 2019 on basically best supportive care.      Requested resumption of some therapy in Summer 2019.   At time had elevated AlkPhos, likely due to bone disease, LFts normalized.   PET scan without significant progression.   s/p multiple cycles of Keytruda.   Stable disease on scans 10/2019 and Jan 2020.   AlkPhos improved with tx.     Worsening performance status.     Admitted now with GERMAN on top of chronic CKD.   Baseline Cr ~ 2.3.   Likely due to dehydration, volume depletion,     Anemia due to cancer  Anemia due to CKD  Anemia due to recent chemo.       RECOMMENDATIONS  IVF per renal.   PO intake Poor.     discussed with patient, family, icu team and Dr. Tavarez. continue present supportive care measures. is not a candidate for chemotherapy or dialysis. is a candidate for hospice but unable to go home. wife can not care for him.

## 2020-02-06 NOTE — ADVANCED PRACTICE NURSE CONSULT - ASSESSMENT
Patient is an 87yo male admitted for shortness of breath: Hx of Arteriosclerotic heart disease, CKD Coronary artery disease Dyslipidemia   Hypertension Lung cancer taking keytruda, Orders: Prednisone 20mg/po/d. I was asked to see patient for deep tissue pressure injury (DTPI) at the sacrum and right hip: sacral region DTPI is resolving now with normal skin discoloration and moisture associated skin damage (MASD), right hip DTPI is now a stage 1 pressure injury: Skin itself is damaged, paper thin, shiny, ecchymosis throughout the body, many scabs throughout his body patient states he has these because of the prednisone: There is a large 10 x 10 serous filled blister at right patellar area: In the groin there is a large amount of bruising and a 1.5 x 1.5 blister

## 2020-02-06 NOTE — PROGRESS NOTE ADULT - PROBLEM SELECTOR PLAN 1
there was concern for persistent vetricular tachyarrhythmia overnight and pt was started on lidocaine infusion and phenyleprine, rates slowed and underlying rhythm now suspected to be afib w/ RVR  - now on amiodarone infusion s/p multiple loading doses  - d/c'd lidocaine  - remains hypotensive, on phenylephrine  - pacer pads in place  - cardiology following  - start therapeutic AC w/ heparin. unable to start enoxaparin given renal failure

## 2020-02-06 NOTE — PROGRESS NOTE ADULT - SUBJECTIVE AND OBJECTIVE BOX
Interval History:  continues in ICU, continues on treatment for atrial fib  Chart reviewed and events noted;   Overnight events:    MEDICATIONS  (STANDING):  albuterol/ipratropium for Nebulization 3 milliLiter(s) Nebulizer every 6 hours  aMIOdarone Infusion 0.5 mG/Min (16.667 mL/Hr) IV Continuous <Continuous>  aMIOdarone IVPB 150 milliGRAM(s) IV Intermittent once  aMIOdarone IVPB 150 milliGRAM(s) IV Intermittent once  aMIOdarone IVPB 150 milliGRAM(s) IV Intermittent once  atorvastatin 20 milliGRAM(s) Oral at bedtime  bisacodyl 5 milliGRAM(s) Oral every 12 hours  cefepime   IVPB 500 milliGRAM(s) IV Intermittent every 24 hours  dronabinol 5 milliGRAM(s) Oral every 12 hours  ezetimibe 10 milliGRAM(s) Oral daily  folic acid 1 milliGRAM(s) Oral daily  furosemide   Injectable 20 milliGRAM(s) IV Push once  heparin  Infusion.  Unit(s)/Hr (11 mL/Hr) IV Continuous <Continuous>  latanoprost 0.005% Ophthalmic Solution 1 Drop(s) Both EYES at bedtime  metoprolol tartrate 25 milliGRAM(s) Oral every 6 hours  midazolam Injectable 2 milliGRAM(s) IV Push once  pantoprazole    Tablet 40 milliGRAM(s) Oral before breakfast  phenylephrine    Infusion 1 MICROgram(s)/kG/Min (23.813 mL/Hr) IV Continuous <Continuous>  predniSONE   Tablet 20 milliGRAM(s) Oral daily  sevelamer carbonate 800 milliGRAM(s) Oral three times a day with meals  sodium bicarbonate  Infusion 0.059 mEq/kG/Hr (50 mL/Hr) IV Continuous <Continuous>  sodium chloride 3%  Inhalation 4 milliLiter(s) Inhalation every 6 hours    MEDICATIONS  (PRN):  guaiFENesin   Syrup  (Sugar-Free) 200 milliGRAM(s) Oral every 6 hours PRN Cough  HYDROmorphone  Injectable 0.25 milliGRAM(s) IV Push every 4 hours PRN pain, distress, suffering, dyspnea, palliative measures      Vital Signs Last 24 Hrs  T(C): 36.3 (06 Feb 2020 11:10), Max: 36.5 (05 Feb 2020 22:39)  T(F): 97.4 (06 Feb 2020 11:10), Max: 97.7 (05 Feb 2020 22:39)  HR: 116 (06 Feb 2020 14:30) (87 - 167)  BP: 105/71 (06 Feb 2020 14:30) (86/51 - 133/105)  BP(mean): 83 (06 Feb 2020 14:30) (57 - 111)  RR: 21 (06 Feb 2020 14:30) (9 - 34)  SpO2: 92% (06 Feb 2020 14:30) (87% - 100%)    PHYSICAL EXAM  General: adult in NAD, lethargic chronically ill appearing  HEENT: clear oropharynx, anicteric sclera, pink conjunctivae  Neck: supple  CV: normal S1S2 with no murmur rubs or gallops  Lungs: clear to auscultation, no wheezes, no rhales  Abdomen: soft non-tender non-distended, no hepato/splenomegaly  Ext: no clubbing cyanosis or edema  Skin: no rashes and no petichiae  Neuro: lethargic      LABS:  CBC Full  -  ( 06 Feb 2020 04:44 )  WBC Count : 20.66 K/uL  RBC Count : 3.50 M/uL  Hemoglobin : 10.2 g/dL  Hematocrit : 31.5 %  Platelet Count - Automated : 180 K/uL  Mean Cell Volume : 90.0 fl  Mean Cell Hemoglobin : 29.1 pg  Mean Cell Hemoglobin Concentration : 32.4 gm/dL  Auto Neutrophil # : x  Auto Lymphocyte # : x  Auto Monocyte # : x  Auto Eosinophil # : x  Auto Basophil # : x  Auto Neutrophil % : x  Auto Lymphocyte % : x  Auto Monocyte % : x  Auto Eosinophil % : x  Auto Basophil % : x    02-06    141  |  108  |  121<H>  ----------------------------<  120<H>  5.1   |  20<L>  |  6.30<H>    Ca    8.2<L>      06 Feb 2020 04:44  Phos  7.1     02-06  Mg     2.4     02-06    TPro  5.3<L>  /  Alb  2.1<L>  /  TBili  0.5  /  DBili  x   /  AST  22  /  ALT  24  /  AlkPhos  104  02-05    PTT - ( 06 Feb 2020 12:54 )  PTT:28.6 sec    fe studies      WBC trend  20.66 K/uL (02-06-20 @ 04:44)  15.12 K/uL (02-05-20 @ 08:58)  15.94 K/uL (02-04-20 @ 08:53)      Hgb trend  10.2 g/dL (02-06-20 @ 04:44)  10.4 g/dL (02-05-20 @ 08:58)  10.5 g/dL (02-04-20 @ 08:53)      plt trend  180 K/uL (02-06-20 @ 04:44)  127 K/uL (02-05-20 @ 08:58)  153 K/uL (02-04-20 @ 08:53)        RADIOLOGY & ADDITIONAL STUDIES:

## 2020-02-06 NOTE — CHART NOTE - NSCHARTNOTEFT_GEN_A_CORE
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 Admitted for acute hypoxic respiratory failure in setting of advanced lung cancer with mets with multifocal pneumonia. Of note patient has been having episodes of vtach and elevated troponins. Patient started to desat a few minutes before the rapid on room air 87. Prior to that he was sating fine. Patient was transferred to ICU.     Patient was seen and examined at the bedside for followup. Patient continues to be asymptomatic and denies chest pain, shortness of breath, dizziness, nausea and vomiting at this time. In the ICU, patient received Since transfer pt also given lidocaine 100 mg iv with rate now in 120s. /70  A.fib with RVR. Metoprolol iv pushes of 5 mg. Patient still currently asymptomatic.     RRT F/U Vital Signs:  BP:98/57  HR:120  RR:24  SpO2: 90% on 3L NC  Temp:97.2  FS:    Vital Signs Last 24 Hrs  T(C): 36.2 (06 Feb 2020 00:18), Max: 36.6 (05 Feb 2020 12:17)  T(F): 97.2 (06 Feb 2020 00:18), Max: 97.9 (05 Feb 2020 12:17)  HR: 120 (06 Feb 2020 00:15) (80 - 167)  BP: 98/57 (06 Feb 2020 00:15) (86/51 - 137/74)  BP(mean): 73 (06 Feb 2020 00:15) (57 - 111)  RR: 24 (06 Feb 2020 00:15) (16 - 24)  SpO2: 90% (06 Feb 2020 00:15) (90% - 99%)    Physical Exam:  General: Well developed, well nourished, in no acute distress  GENERAL: patient appears chronically ill but no acute distress, appears stated age, pleasant and interactive   ENMT: oropharynx clear without erythema, moist mucous membranes, poor dentition  LUNGS: diminished breath sounds b/l, no w/r/r  HEART: soft S1/S2, regular rate and rhythm, systolic murmur noted, 2+ pitting edema to b/l LE  GASTROINTESTINAL: abdomen is soft, nontender, nondistended, normoactive bowel sounds, no palpable masses  INTEGUMENT: diffuse ecchymoses of b/l UE and LE  MUSCULOSKELETAL: no clubbing or cyanosis, LUE swelling at IV site  NEUROLOGIC: awake, alert, oriented x3    LABS:                        10.4   15.12 )-----------( 127      ( 05 Feb 2020 08:58 )             32.6     05 Feb 2020 22:12    140    |  110    |  133    ----------------------------<  122    4.8     |  18     |  6.80     Ca    8.6        05 Feb 2020 22:12  Phos  6.7       05 Feb 2020 22:12  Mg     2.5       05 Feb 2020 22:12    TPro  5.3    /  Alb  2.1    /  TBili  0.5    /  DBili  x      /  AST  22     /  ALT  24     /  AlkPhos  104    05 Feb 2020 22:12        CAPILLARY BLOOD GLUCOSE      POCT Blood Glucose.: 115 mg/dL (05 Feb 2020 21:51)        RADIOLOGY & ADDITIONAL TESTS:      Assessment/Plan:  87yo M, with PMH/o CAD s/p CABG (1994), HTN, HLD, stage III CKD, lung cancer (currently receiving CT every 3 weeks), COPD, and back neoplasm s/p right scapula exploration and partial resection, presenting to the ED with cough productive of yellow sputum, wheezing, and increased SOB x 1 week, admitted for acute hypoxic respiratory failure likely 2/2 PNA in the setting of lung CA, also noted to have GERMAN. Now found to be in sustained vtach in the 200's. Patient transferred to ICU.    Plan   - Patient currently managed by ICU  - Since transfer pt also given lidocaine 100 mg iv with rate now in 120s. /70  - A.fib with RVR. Metoprolol iv pushes of 5 mg  - Stat chemistry showed metabolic acidosis w/ bicarb 18, Ca 8.6, Mg 2.5 K 4.8. Give  1 amp sodium bicarb, continue sodium bicarb gtt  - Echo done 2/3, EF 55-60, mild LVH, biatrial enlargement, normal RV, PA pressure 28.   - Given 4 doses of amiodarone 150 mg each, amiodarone gtt, albumin 5%,   - continue antibiotics for pneumonia  - Continue management as per ICU  - will defer decision to AC for now  - RN to call if any changes.

## 2020-02-06 NOTE — ADVANCED PRACTICE NURSE CONSULT - RECOMMEDATIONS
No tape to skin: unit secretary to place sign in room  1.  Apply cavilon daily and  rupinder q-shift and PRN.   2.Maintain a moisture free environment.   3.Use supersorb purple pads and  no diapers.  4.Avoid friction and shear.  5.T&P Q2H.   6.Contact [facility] and provider if injury progresses.

## 2020-02-07 LAB
ALBUMIN SERPL ELPH-MCNC: 2.2 G/DL — LOW (ref 3.3–5)
ALP SERPL-CCNC: 103 U/L — SIGNIFICANT CHANGE UP (ref 40–120)
ALT FLD-CCNC: 24 U/L — SIGNIFICANT CHANGE UP (ref 12–78)
ANION GAP SERPL CALC-SCNC: 13 MMOL/L — SIGNIFICANT CHANGE UP (ref 5–17)
APTT BLD: 133.6 SEC — CRITICAL HIGH (ref 28.5–37)
APTT BLD: 85.9 SEC — HIGH (ref 28.5–37)
APTT BLD: 90.1 SEC — HIGH (ref 28.5–37)
AST SERPL-CCNC: 24 U/L — SIGNIFICANT CHANGE UP (ref 15–37)
BASOPHILS # BLD AUTO: 0.03 K/UL — SIGNIFICANT CHANGE UP (ref 0–0.2)
BASOPHILS NFR BLD AUTO: 0.1 % — SIGNIFICANT CHANGE UP (ref 0–2)
BILIRUB SERPL-MCNC: 0.6 MG/DL — SIGNIFICANT CHANGE UP (ref 0.2–1.2)
BUN SERPL-MCNC: 118 MG/DL — HIGH (ref 7–23)
CALCIUM SERPL-MCNC: 8.2 MG/DL — LOW (ref 8.5–10.1)
CHLORIDE SERPL-SCNC: 110 MMOL/L — HIGH (ref 96–108)
CO2 SERPL-SCNC: 18 MMOL/L — LOW (ref 22–31)
CORTIS AM PEAK SERPL-MCNC: 11.3 UG/DL — SIGNIFICANT CHANGE UP (ref 6–18.4)
CREAT SERPL-MCNC: 6.2 MG/DL — HIGH (ref 0.5–1.3)
CULTURE RESULTS: SIGNIFICANT CHANGE UP
CULTURE RESULTS: SIGNIFICANT CHANGE UP
EOSINOPHIL # BLD AUTO: 0 K/UL — SIGNIFICANT CHANGE UP (ref 0–0.5)
EOSINOPHIL NFR BLD AUTO: 0 % — SIGNIFICANT CHANGE UP (ref 0–6)
GLUCOSE SERPL-MCNC: 93 MG/DL — SIGNIFICANT CHANGE UP (ref 70–99)
HCT VFR BLD CALC: 33.3 % — LOW (ref 39–50)
HGB BLD-MCNC: 10.6 G/DL — LOW (ref 13–17)
IMM GRANULOCYTES NFR BLD AUTO: 0.6 % — SIGNIFICANT CHANGE UP (ref 0–1.5)
LYMPHOCYTES # BLD AUTO: 0.92 K/UL — LOW (ref 1–3.3)
LYMPHOCYTES # BLD AUTO: 4.4 % — LOW (ref 13–44)
MAGNESIUM SERPL-MCNC: 2.4 MG/DL — SIGNIFICANT CHANGE UP (ref 1.6–2.6)
MCHC RBC-ENTMCNC: 28.6 PG — SIGNIFICANT CHANGE UP (ref 27–34)
MCHC RBC-ENTMCNC: 31.8 GM/DL — LOW (ref 32–36)
MCV RBC AUTO: 90 FL — SIGNIFICANT CHANGE UP (ref 80–100)
MONOCYTES # BLD AUTO: 0.85 K/UL — SIGNIFICANT CHANGE UP (ref 0–0.9)
MONOCYTES NFR BLD AUTO: 4.1 % — SIGNIFICANT CHANGE UP (ref 2–14)
NEUTROPHILS # BLD AUTO: 18.96 K/UL — HIGH (ref 1.8–7.4)
NEUTROPHILS NFR BLD AUTO: 90.8 % — HIGH (ref 43–77)
NRBC # BLD: 0 /100 WBCS — SIGNIFICANT CHANGE UP (ref 0–0)
PHOSPHATE SERPL-MCNC: 6.6 MG/DL — HIGH (ref 2.5–4.5)
PLATELET # BLD AUTO: 160 K/UL — SIGNIFICANT CHANGE UP (ref 150–400)
POTASSIUM SERPL-MCNC: 5.1 MMOL/L — SIGNIFICANT CHANGE UP (ref 3.5–5.3)
POTASSIUM SERPL-SCNC: 5.1 MMOL/L — SIGNIFICANT CHANGE UP (ref 3.5–5.3)
PROT SERPL-MCNC: 5.3 G/DL — LOW (ref 6–8.3)
RBC # BLD: 3.7 M/UL — LOW (ref 4.2–5.8)
RBC # FLD: 15 % — HIGH (ref 10.3–14.5)
SODIUM SERPL-SCNC: 141 MMOL/L — SIGNIFICANT CHANGE UP (ref 135–145)
SPECIMEN SOURCE: SIGNIFICANT CHANGE UP
SPECIMEN SOURCE: SIGNIFICANT CHANGE UP
TROPONIN I SERPL-MCNC: 0.1 NG/ML — HIGH (ref 0.01–0.04)
WBC # BLD: 20.88 K/UL — HIGH (ref 3.8–10.5)
WBC # FLD AUTO: 20.88 K/UL — HIGH (ref 3.8–10.5)

## 2020-02-07 PROCEDURE — 99232 SBSQ HOSP IP/OBS MODERATE 35: CPT

## 2020-02-07 PROCEDURE — 99233 SBSQ HOSP IP/OBS HIGH 50: CPT | Mod: GC

## 2020-02-07 PROCEDURE — 99291 CRITICAL CARE FIRST HOUR: CPT

## 2020-02-07 RX ORDER — WARFARIN SODIUM 2.5 MG/1
2 TABLET ORAL ONCE
Refills: 0 | Status: COMPLETED | OUTPATIENT
Start: 2020-02-07 | End: 2020-02-07

## 2020-02-07 RX ORDER — MIDODRINE HYDROCHLORIDE 2.5 MG/1
10 TABLET ORAL ONCE
Refills: 0 | Status: COMPLETED | OUTPATIENT
Start: 2020-02-07 | End: 2020-02-07

## 2020-02-07 RX ORDER — PHENYLEPHRINE HYDROCHLORIDE 10 MG/ML
1 INJECTION INTRAVENOUS
Qty: 40 | Refills: 0 | Status: DISCONTINUED | OUTPATIENT
Start: 2020-02-07 | End: 2020-02-08

## 2020-02-07 RX ORDER — MIDODRINE HYDROCHLORIDE 2.5 MG/1
20 TABLET ORAL EVERY 8 HOURS
Refills: 0 | Status: DISCONTINUED | OUTPATIENT
Start: 2020-02-07 | End: 2020-02-08

## 2020-02-07 RX ORDER — AMIODARONE HYDROCHLORIDE 400 MG/1
200 TABLET ORAL DAILY
Refills: 0 | Status: DISCONTINUED | OUTPATIENT
Start: 2020-02-07 | End: 2020-02-08

## 2020-02-07 RX ADMIN — Medication 5 MILLIGRAM(S): at 05:46

## 2020-02-07 RX ADMIN — Medication 3 MILLILITER(S): at 13:42

## 2020-02-07 RX ADMIN — SODIUM CHLORIDE 4 MILLILITER(S): 9 INJECTION INTRAMUSCULAR; INTRAVENOUS; SUBCUTANEOUS at 21:17

## 2020-02-07 RX ADMIN — SODIUM CHLORIDE 4 MILLILITER(S): 9 INJECTION INTRAMUSCULAR; INTRAVENOUS; SUBCUTANEOUS at 01:32

## 2020-02-07 RX ADMIN — MIDODRINE HYDROCHLORIDE 10 MILLIGRAM(S): 2.5 TABLET ORAL at 10:03

## 2020-02-07 RX ADMIN — Medication 25 MILLIGRAM(S): at 05:46

## 2020-02-07 RX ADMIN — Medication 1 MILLIGRAM(S): at 11:57

## 2020-02-07 RX ADMIN — PANTOPRAZOLE SODIUM 40 MILLIGRAM(S): 20 TABLET, DELAYED RELEASE ORAL at 05:47

## 2020-02-07 RX ADMIN — HEPARIN SODIUM 700 UNIT(S)/HR: 5000 INJECTION INTRAVENOUS; SUBCUTANEOUS at 09:28

## 2020-02-07 RX ADMIN — SEVELAMER CARBONATE 800 MILLIGRAM(S): 2400 POWDER, FOR SUSPENSION ORAL at 11:57

## 2020-02-07 RX ADMIN — Medication 125 MILLILITER(S): at 00:20

## 2020-02-07 RX ADMIN — AMIODARONE HYDROCHLORIDE 200 MILLIGRAM(S): 400 TABLET ORAL at 11:57

## 2020-02-07 RX ADMIN — SODIUM CHLORIDE 4 MILLILITER(S): 9 INJECTION INTRAMUSCULAR; INTRAVENOUS; SUBCUTANEOUS at 13:42

## 2020-02-07 RX ADMIN — MIDODRINE HYDROCHLORIDE 10 MILLIGRAM(S): 2.5 TABLET ORAL at 05:47

## 2020-02-07 RX ADMIN — SEVELAMER CARBONATE 800 MILLIGRAM(S): 2400 POWDER, FOR SUSPENSION ORAL at 09:31

## 2020-02-07 RX ADMIN — HEPARIN SODIUM 0 UNIT(S)/HR: 5000 INJECTION INTRAVENOUS; SUBCUTANEOUS at 07:30

## 2020-02-07 RX ADMIN — Medication 3 MILLILITER(S): at 21:16

## 2020-02-07 RX ADMIN — Medication 3 MILLILITER(S): at 01:32

## 2020-02-07 RX ADMIN — SODIUM CHLORIDE 4 MILLILITER(S): 9 INJECTION INTRAMUSCULAR; INTRAVENOUS; SUBCUTANEOUS at 07:47

## 2020-02-07 RX ADMIN — HEPARIN SODIUM 700 UNIT(S)/HR: 5000 INJECTION INTRAVENOUS; SUBCUTANEOUS at 17:18

## 2020-02-07 RX ADMIN — Medication 25 MILLIGRAM(S): at 00:31

## 2020-02-07 RX ADMIN — HEPARIN SODIUM 900 UNIT(S)/HR: 5000 INJECTION INTRAVENOUS; SUBCUTANEOUS at 00:19

## 2020-02-07 RX ADMIN — Medication 3 MILLILITER(S): at 07:47

## 2020-02-07 RX ADMIN — PHENYLEPHRINE HYDROCHLORIDE 23.81 MICROGRAM(S)/KG/MIN: 10 INJECTION INTRAVENOUS at 02:58

## 2020-02-07 RX ADMIN — AMIODARONE HYDROCHLORIDE 16.67 MG/MIN: 400 TABLET ORAL at 00:32

## 2020-02-07 RX ADMIN — LATANOPROST 1 DROP(S): 0.05 SOLUTION/ DROPS OPHTHALMIC; TOPICAL at 22:01

## 2020-02-07 RX ADMIN — Medication 20 MILLIGRAM(S): at 05:46

## 2020-02-07 RX ADMIN — Medication 0.12 MILLIGRAM(S): at 05:48

## 2020-02-07 NOTE — PROGRESS NOTE ADULT - PROBLEM SELECTOR PLAN 7
- Poor prognosis, hospice eval appropriate - had GOC discussion on 2/5 and I suggested a family meeting to further discuss plan of care  - Pt is DNR/DNI, MOLST completed

## 2020-02-07 NOTE — SWALLOW BEDSIDE ASSESSMENT ADULT - ASR SWALLOW ASPIRATION MONITOR
change of breathing pattern/cough/fever/gurgly voice/pneumonia/oral hygiene/position upright (90Y)/throat clearing/upper respiratory infection

## 2020-02-07 NOTE — PROGRESS NOTE ADULT - ASSESSMENT
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 Admit for acute hypoxic respiratory failure likely 2/2 PNA in setting of cancer also noted to have GERMAN.    2/6/2020: Now in ICU after sustained RRT called for sustained WCT and hypotension, s/p amio loads x2, lidocaine push. Now on pressors, lidocaine and amio drips, currently in Afib with rates in 120s. Upon review of EKG from time of rapid, doubt true Vtach. Rate is irregular, no evidence of AV dissociation with no change in axis; patient does meet brugada criteria. Most likely  Afib with aberrancy. Currently in atrial fibrillation.     Afib   - Continue amiodarone gtt  - Troponin elevation with normal CKs likely demand ischemia in the setting of Afib with RVR  - Continue heparin gtt  - Renally dosed digoxin load given  - d/c metoprolol as patient hypotensive and on sergio    Shock  - cardiogenic vs septic shock  - Continue pressors, wean as tolerated  - Repeat echo  - Currently on midodrine  - stopping metoprolol    Acute hypoxic respiratory failure in setting of advanced lung cancer with mets with multifocal pneumonia  - continue steroids  - continue nebs  - s/p ketruda 6 rounds-now off  - continue cefepime IV ABX      GOALS OF CARE:  -Patient is DNR/DNI, poor prognosis, hospice referral done as per primary team    - keep K>4, monitor for hyperkalemia  - mag >2   - echo reviewed, EF 55-60, grossly normal LV  - All other care per ICU  - Will follow

## 2020-02-07 NOTE — DISCHARGE NOTE PROVIDER - HOSPITAL COURSE
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 Pt states he has baseline HADLEY, but dyspea has increased in the last week. Of note he is in week 6 of chemo treatment keytruda, gets chemo every 3 weeks, tolerating chemo appropriately. Pt also has been urinating less for the last 4 days. Pt denies ever having dialysis. Pt denies fever, chills, chest pain, orthopnea, dysuria, hematuria, diarrhea, melena, hematochezia.          ED vitals: T: 96.8, HR: 99, BP: 108/58, RR: 18, O2 Saturation: 96% on 2L    Pt recieved albuterol neb x 3, ipratropium x 1 for neb, solumedrol injectable  mg x1, kayexalate 30 mg x1, 2L NS     Labs significant for: WBC: 18.45 w/ left shift, Lactate: 2.5, Potassium 5.4, Chloride: 109, CO2: 19, BUN/Cr: 109/6.3, Albumin: 2.7, Alk Phos: 164, ProCal: 4.5     EKG: sinus tachycardia 106 bpm    CXR: R sided PNA (02 Feb 2020 14:08)            ---    HOSPITAL COURSE:     The patient was admitted for acute hypoxic respiratory failure and sepsis likely 2/2 PNA in setting of cancer also noted to have GERMAN. CT chest revealed a multifocal pneumonia and lung mass suggesting progression of his lung cancer. He was started on a course for cefipime, and nebulizer treatments for his pneumonia. Patient remained afebrile, blood cultures revealed no growth, urinalysis was negative and white count trended down. Troponins were followed and elevated likely due to demand ischemia. On his first night in the hospital, the patient began to experience new episodes of sustained ventricular tachycardia with hypotension. A rapid response was called for sustained wide complex tachycardia with rate 200s He was given amiodarone x 3 boluses without response, BP dropped to SBP 70s, but the patient remained asymptomatic otherwise.  He was then transferred to ICU for further management phenylephrine and midodrine were started for hypotension, pads were placed for shock, one additional amiodarone bolus was given and 100mg of lidocaine pushed.  The heart rate slowed to 120s and rhythm noted to be atrial fibrillation, shock was held. The patient was started on amiodarone and digoxin for afib, and was started on full dose heparin bridged to coumadin for anticoagulation. Patient's antibiotic course was completed. 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 Pt states he has baseline HADLEY, but dyspea has increased in the last week. Of note he is in week 6 of chemo treatment keytruda, gets chemo every 3 weeks, tolerating chemo appropriately. Pt also has been urinating less for the last 4 days. Pt denies ever having dialysis. Pt denies fever, chills, chest pain, orthopnea, dysuria, hematuria, diarrhea, melena, hematochezia.          ED vitals: T: 96.8, HR: 99, BP: 108/58, RR: 18, O2 Saturation: 96% on 2L    Pt recieved albuterol neb x 3, ipratropium x 1 for neb, solumedrol injectable  mg x1, kayexalate 30 mg x1, 2L NS     Labs significant for: WBC: 18.45 w/ left shift, Lactate: 2.5, Potassium 5.4, Chloride: 109, CO2: 19, BUN/Cr: 109/6.3, Albumin: 2.7, Alk Phos: 164, ProCal: 4.5     EKG: sinus tachycardia 106 bpm    CXR: R sided PNA (02 Feb 2020 14:08)        ---    HOSPITAL COURSE:     The patient was admitted for acute hypoxic respiratory failure and sepsis likely 2/2 PNA in setting of cancer also noted to have GERMAN. CT chest revealed a multifocal pneumonia and lung mass suggesting progression of his lung cancer. He was started on a course for cefipime, and nebulizer treatments for his pneumonia. Patient remained afebrile, blood cultures revealed no growth, urinalysis was negative and white count trended down. Troponins were followed and elevated likely due to demand ischemia. On his first night in the hospital, the patient began to experience new episodes of sustained ventricular tachycardia with hypotension. A rapid response was called for sustained wide complex tachycardia with rate 200s He was given amiodarone x 3 boluses without response, BP dropped to SBP 70s, but the patient remained asymptomatic otherwise.  He was then transferred to ICU for further management phenylephrine and midodrine were started for hypotension, pads were placed for shock, one additional amiodarone bolus was given and 100mg of lidocaine pushed.  The heart rate slowed to 120s and rhythm noted to be atrial fibrillation, shock was held. The patient was started on amiodarone and digoxin for afib, and was started on full dose heparin bridge to start coumadin for anticoagulation. Patient's antibiotic course was completed. On 2/7 patient failed a speech and swallow and oral nutrition was deemed contraindicated at this time.         Given patient's poor prognosis, anxiety, pain and suffering, goals of care discussion was had with family. Various treatment options were offered to the family, and inpatient hospice was pursued by the family at this time. Family requested comfort care measures. Amiodarone gtt, heparin gtt, digoxin discontinued. Patient placed on Dilaudid infusion, Ativan PRN for agitation, atropine drops sublingual PRN. Patient's family elected for transfer to inpatient hospice.         Patient seen and examined on day of discharge. Patient is medically optimized for discharge to             ---    CONSULTANTS:

## 2020-02-07 NOTE — PROGRESS NOTE ADULT - SUBJECTIVE AND OBJECTIVE BOX
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  Unit(s)/Hr (11 mL/Hr) IV Continuous <Continuous>  latanoprost 0.005% Ophthalmic Solution 1 Drop(s) Both EYES at bedtime  midodrine 20 milliGRAM(s) Oral every 8 hours  pantoprazole    Tablet 40 milliGRAM(s) Oral before breakfast  phenylephrine    Infusion 1 MICROgram(s)/kG/Min (23.813 mL/Hr) IV Continuous <Continuous>  predniSONE   Tablet 20 milliGRAM(s) Oral daily  sevelamer carbonate 800 milliGRAM(s) Oral three times a day with meals  sodium chloride 3%  Inhalation 4 milliLiter(s) Inhalation every 6 hours  warfarin 2 milliGRAM(s) Oral once    MEDICATIONS  (PRN):  guaiFENesin   Syrup  (Sugar-Free) 200 milliGRAM(s) Oral every 6 hours PRN Cough  HYDROmorphone  Injectable 0.25 milliGRAM(s) IV Push every 4 hours PRN pain, distress, suffering, dyspnea, palliative measures         Vitals log        ICU Vital Signs Last 24 Hrs  T(C): 36.3 (07 Feb 2020 07:58), Max: 36.6 (06 Feb 2020 15:39)  T(F): 97.3 (07 Feb 2020 07:58), Max: 97.9 (06 Feb 2020 15:39)  HR: 115 (07 Feb 2020 10:00) (96 - 127)  BP: 110/67 (07 Feb 2020 10:00) (75/50 - 140/86)  BP(mean): 82 (07 Feb 2020 10:00) (60 - 108)  ABP: --  ABP(mean): --  RR: 18 (07 Feb 2020 10:00) (11 - 43)  SpO2: 95% (07 Feb 2020 10:00) (81% - 100%)           Input and Output:  I&O's Detail    06 Feb 2020 07:01  -  07 Feb 2020 07:00  --------------------------------------------------------  IN:    Albumin 5%  - 250 mL: 250 mL    amiodarone Infusion: 336.8 mL    amiodarone Infusion: 66.4 mL    heparin  Infusion.: 11 mL    Oral Fluid: 220 mL    phenylephrine   Infusion: 1032 mL    sodium bicarbonate  Infusion: 150 mL    Solution: 50 mL    Solution: 149 mL  Total IN: 2265.2 mL    OUT:    Voided: 700 mL  Total OUT: 700 mL    Total NET: 1565.2 mL          Lab Data                        10.6   20.88 )-----------( 160      ( 07 Feb 2020 06:01 )             33.3     02-07    141  |  110<H>  |  118<H>  ----------------------------<  93  5.1   |  18<L>  |  6.20<H>    Ca    8.2<L>      07 Feb 2020 06:01  Phos  6.6     02-07  Mg     2.4     02-07    TPro  5.3<L>  /  Alb  2.2<L>  /  TBili  0.6  /  DBili  x   /  AST  24  /  ALT  24  /  AlkPhos  103  02-07      CARDIAC MARKERS ( 07 Feb 2020 06:01 )  .101 ng/mL / x     / 46 U/L / x     / 2.6 ng/mL  CARDIAC MARKERS ( 06 Feb 2020 16:45 )  .094 ng/mL / x     / 40 U/L / x     / 2.8 ng/mL  CARDIAC MARKERS ( 06 Feb 2020 10:30 )  .095 ng/mL / x     / 40 U/L / x     / 2.8 ng/mL  CARDIAC MARKERS ( 06 Feb 2020 04:44 )  .089 ng/mL / x     / 42 U/L / x     / 2.6 ng/mL        Review of Systems	      Objective     Physical Examination    heart s1s2  lung dec BS  abd soft  head nc      Pertinent Lab findings & Imaging      Jose Guadalupe:  NO   Adequate UO     I&O's Detail    06 Feb 2020 07:01  -  07 Feb 2020 07:00  --------------------------------------------------------  IN:    Albumin 5%  - 250 mL: 250 mL    amiodarone Infusion: 336.8 mL    amiodarone Infusion: 66.4 mL    heparin  Infusion.: 11 mL    Oral Fluid: 220 mL    phenylephrine   Infusion: 1032 mL    sodium bicarbonate  Infusion: 150 mL    Solution: 50 mL    Solution: 149 mL  Total IN: 2265.2 mL    OUT:    Voided: 700 mL  Total OUT: 700 mL    Total NET: 1565.2 mL               Discussed with:     Cultures:	        Radiology

## 2020-02-07 NOTE — PROGRESS NOTE ADULT - SUBJECTIVE AND OBJECTIVE BOX
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 Admitted for acute hypoxic respiratory failure secondary to pneumonia. Hospital course complicated by GERMAN and runs of SVT    Events in last 24 hours: Chart reviewed and patient examined at bedside. Titrated off Neosynephrine gtt. Failed swallow evaluation.    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  Hip pain: Right hip replacement in 2008  FH: CABG (coronary artery bypass surgery): 1994 (double)    FAMILY HISTORY:  No pertinent family history in first degree relatives    SOCIAL HISTORY:    Home Medications:    Review of Systems:  Constitutional: no fever, chills, fatigue  Neuro: no headache, numbness, weakness  Resp: no cough, wheezing, shortness of breath  CVS: no chest pain, palpitations, leg swelling  GI: no abdominal pain, nausea, vomiting, diarrhea   : no dysuria, frequency, incontinence  Skin: no itching, burning, rashes, or lesions   Msk: no joint pain or swelling  Psych: no depression, anxiety, mood swings    T(F): 97.4 (02-07-20 @ 15:25), Max: 97.4 (02-07-20 @ 00:17)  HR: 72 (02-07-20 @ 19:00) (68 - 134)  BP: 123/63 (02-07-20 @ 19:00) (76/56 - 144/74)  RR: 22 (02-07-20 @ 19:00) (11 - 36)  SpO2: 99% (02-07-20 @ 19:00)  Wt(kg): --    CAPILLARY BLOOD GLUCOSE      POCT Blood Glucose.: 115 mg/dL (05 Feb 2020 21:51)    I&O's Summary    06 Feb 2020 07:01  -  07 Feb 2020 07:00  --------------------------------------------------------  IN: 2265.2 mL / OUT: 700 mL / NET: 1565.2 mL    07 Feb 2020 07:01  -  07 Feb 2020 20:53  --------------------------------------------------------  IN: 387 mL / OUT: 700 mL / NET: -313 mL        Physical Exam:     Gen: critically ill appearing  Neuro: awake and alert, follows commands  CVS: IRR  Resp: decreased breath sounds b/l base  Abd: soft, NT, ND  Ext: warm, dry, +LE edema    Meds:    aMIOdarone    Tablet 200 milliGRAM(s) Oral daily  midodrine 20 milliGRAM(s) Oral every 8 hours  phenylephrine    Infusion 1 MICROgram(s)/kG/Min IV Continuous <Continuous>     atorvastatin 20 milliGRAM(s) Oral at bedtime  ezetimibe 10 milliGRAM(s) Oral daily  predniSONE   Tablet 20 milliGRAM(s) Oral daily     albuterol/ipratropium for Nebulization 3 milliLiter(s) Nebulizer every 6 hours  guaiFENesin   Syrup  (Sugar-Free) 200 milliGRAM(s) Oral every 6 hours PRN  sodium chloride 3%  Inhalation 4 milliLiter(s) Inhalation every 6 hours     dronabinol 5 milliGRAM(s) Oral every 12 hours  HYDROmorphone  Injectable 0.25 milliGRAM(s) IV Push every 4 hours PRN        heparin  Infusion.  Unit(s)/Hr IV Continuous <Continuous>  warfarin 2 milliGRAM(s) Oral once     bisacodyl 5 milliGRAM(s) Oral every 12 hours  pantoprazole    Tablet 40 milliGRAM(s) Oral before breakfast        folic acid 1 milliGRAM(s) Oral daily        latanoprost 0.005% Ophthalmic Solution 1 Drop(s) Both EYES at bedtime     sevelamer carbonate 800 milliGRAM(s) Oral three times a day with meals                           10.6   20.88 )-----------( 160      ( 07 Feb 2020 06:01 )             33.3       02-07    141  |  110<H>  |  118<H>  ----------------------------<  93  5.1   |  18<L>  |  6.20<H>    Ca    8.2<L>      07 Feb 2020 06:01  Phos  6.6     02-07  Mg     2.4     02-07    TPro  5.3<L>  /  Alb  2.2<L>  /  TBili  0.6  /  DBili  x   /  AST  24  /  ALT  24  /  AlkPhos  103  02-07      CARDIAC MARKERS ( 07 Feb 2020 12:54 )  .104 ng/mL / x     / x     / x     / x      CARDIAC MARKERS ( 07 Feb 2020 06:01 )  .101 ng/mL / x     / 46 U/L / x     / 2.6 ng/mL  CARDIAC MARKERS ( 06 Feb 2020 16:45 )  .094 ng/mL / x     / 40 U/L / x     / 2.8 ng/mL  CARDIAC MARKERS ( 06 Feb 2020 10:30 )  .095 ng/mL / x     / 40 U/L / x     / 2.8 ng/mL  CARDIAC MARKERS ( 06 Feb 2020 04:44 )  .089 ng/mL / x     / 42 U/L / x     / 2.6 ng/mL      PT/INR - ( 07 Feb 2020 16:10 )   PT: 13.4 sec;   INR: 1.19 ratio         PTT - ( 07 Feb 2020 16:10 )  PTT:90.1 sec            Radiology:   EXAM:  CT CHEST                            PROCEDURE DATE:  02/02/2020          INTERPRETATION:  Clinical information: Suspected pneumonia. History of lung cancer.    Comparison: 1/13/2019 CT scan of the chest.    PROCEDURE:   CT of the Chest was performed without intravenous contrast.     FINDINGS:    Lungs and airways: Emphysema. Interval development of multifocal opacities in the right upper lobe and right lower lobe compatible with multifocal pneumonia. Again noted is a left upper lobe and para-mediastinal mass adjacent to surgical clips inseparable from the proximal aortic arch. The area on series 2 image 45 measures 4.1 x 3.7 cm previously 3.8 x 2.3 cm. Slightly more superiorly there is soft tissue that extends anteriorly through the intercostal space on series 2 image 36 measuring 3.1 x 3.0 cm.  Findings are suspicious for progression of disease.    Pleura: Within normal limits. No pleural effusion.    Mediastinum and Ginny: No abnormally enlarged lymph nodes. The esophagus is distended with fluid.    Heart: Normal size. No pericardial effusion.     Vessels: There has been median sternotomy and CABG. Main pulmonary artery is enlarged to 4.2 cm.    Chest wall and lower neck: Within normal limits.    Upper abdomen: Bilateral renal hypodensities again noted including some which are not simple cysts. For example there is a 3.0 cm lesion in the right mid kidney which is not a simple cyst and a 1.4 cm lesion in the left mid kidney which is not a simple cyst are stable. The gallbladder is distended containing a stone.    Bones: Within normal limits.    IMPRESSION:     Interval development of multifocal pneumonia involving the right upper and lower lobes.    Increase in size of soft tissue mass associated with surgical clips in the left upper lobe paramediastinal location inseparable from the aortic arch which now extends anteriorly in an intercostal location highly suspicious for progression of disease.                      BLANCA GOODRICH M.D., ATTENDING RADIOLOGIST  This document has been electronically signed. 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 Admitted for acute hypoxic respiratory failure secondary to pneumonia. Hospital course complicated by GERMAN and Tachycardia with hypotension    -Afib w/ RVR now rate controlled s/p multiple Amiodarone bolus/gtt. Transitioned to Amiodarone PO however failed swallow evaluation; NGT vs. comfort measures in AM. Will re-institute Amiodarone gtt if required. Full dose AC w/ Heparin gtt, tirate per nomogram. Maintain K >4 and Mg >2  -Shock state in setting of hypovolemia vs. poor rate control. Neosynephrine gtt able to be titrated off earlier. Midodrine Q8 for BP support.   -Acute hypoxic respiratory failure secondary to underlying lung CA. NC requirements of 5L. Actively titrating to maintain O2 sat >90%. Empiric Cefepime coverage discontinued. RVP and culture negative to date.  -GERMAN on CKD. Progressive decline in renal function in shock state with Oliguria. Patient would not like to pursue HD. Dopplers negative.  -Failed swallow evaluation earlier. NGT vs. Comfort measures to be evaluated in AM.   -GI PPX: Protonix    CODE STATUS: DNR/DNI  Critical care time spent (mins): 34 minutes including time spent reviewing chart, ordering tests/labs, discussing with interdisciplinary team. Not including time spent performing procedures

## 2020-02-07 NOTE — PROGRESS NOTE ADULT - PROBLEM SELECTOR PLAN 1
ID recs monitoring off ABX  Cardio follow up reviewed - cont Amio gtt and AC - AF with aberrancy -   resp distress - lung ca with mets - progressive disease - multifocal pna - s/p ABX  CKD and GEMRAN - Dehydration - s/p IVF  Renal EVAL noted - replete lytes and serial renal indices - avoid nephrotoxins - not a good candidate for Dialysis   cachexia and mets ca progression - hospice appropriate -   COPD - Emphysema - NEBS and Steroids (taper in progress) - Hypertonic Saline NEBS  pt was on Keytruda in the past - currently off - as per Family - Follows with Dr. ADAMSON Onc in [place] Office -   prognosis is very poor - pt is DNR DNI  discussed plan of care with family - hospice discussion ongoing -   CT chest reviewed  Dilaudid 0.25 mg IVP prn for resp distress - suffering - dyspnea - pain - palliative measures.

## 2020-02-07 NOTE — PROGRESS NOTE ADULT - ATTENDING COMMENTS
86M h/o CAD, HLD, HTN, CKD III, neoplasm of R scapula, lung Ca on Keytuda, COPD (not on home O2) initially a/w SOB 2/2 PNA, s/p RRT o/n for rapid AFib with aberrancy s/p amio load and lidocaine, requiring sergio gtt for pressure support, now with rate controlled AFib and weaning pressor requirements with resolution of SOB    Neuro: prn pain control  CV: AFib w/ RVR s/p Amio load, now on maintenance dose, will also continue renally dosed digoxin while remains on pressors and unable to give beta blockers; f/u TTE results, elevated troponin likely 2/2 rapid rate, demand ischemia and renal impairment, appears stable now, will continue to trend; continue pressor support and wean as tolerated with midodrine   Pulm: stable on nasal canula; c/w duonebs and hypersal nebs, guanifisen for cough; hold keytruda in setting of active infection  GI: pt NPO after failed S/S eval, after discussion with pt he WOULD NOT want PEG placement, would like to discuss with his family coming tonight, will maintain NPO for now; c/w protonix; c/w dronabinol, bowel regimen   Renal: remains in oliguric renal failure, not HD candidate due to metastatic disease continue sevelemar, trend UOP and electrolytes  ID: dc cefepime and monitor off abx for now; leukocytosis yesterday likely related to stress response from rapid Afib  Heme: started on full AC with heparin gtt 2/2 AFib, will start transition to coumadin for INR 2-3, though unclear if benefit to continue as pt's overall prognosis is poor   Dispo: pt DNR/DNI, would likely be good candidate for home hospice as onc with no plan to continue chemo and pt with progression of metastatic disease on recent CT, will continue discussion with pt and his family when they arrive today, though he seems amenable to pursuiong palliative care rather than aggressive treatment measures

## 2020-02-07 NOTE — PROGRESS NOTE ADULT - ASSESSMENT
[ASSESSMENT and  PLAN]  Stage IV lung cancer. \Mmetastatic to bone at least, NSCLC-adenoca, PDL1+ 10% on bx.   s/p 2 cycles of Carboplatin-Alimta-Keytruda in Winter 2018.   In Dec 2018 - Jan 2019 Admitted with generalized weakness, muscle aches, ? pneumonia. Noted to have marked elevated LFTs.   AlkPhos 1085, , . CKD /GERMAN with Cr 1.6  CTA chest with no PE. Distended GB with stones.   Possible tylenol toxicity due to excessive use, 5000mg APAP/day.  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 Viral serologies negative. Started on PO steroids.   Post DC from Jan 2019 - June 2019 on basically best supportive care.      Requested resumption of some therapy in Summer 2019.   At time had elevated AlkPhos, likely due to bone disease, LFts normalized.   PET scan without significant progression.   s/p multiple cycles of Keytruda.   Stable disease on scans 10/2019 and Jan 2020.   AlkPhos improved with tx.     Worsening performance status.     Admitted now with GERMAN on top of chronic CKD.   Baseline Cr ~ 2.3.   Likely due to dehydration, volume depletion,   Still with poor urine output.   MOunting burden of disease.   Pt declined HD.     Anemia due to cancer  Anemia due to CKD  Anemia due to recent chemo.       RECOMMENDATIONS  Forgo further imaging and treatment for cancer.   Comfort measures.     IVF per renal.   PO intake Poor.   Supportive measures.     Palliative eval for comfort.   Hospice eval .     discussed with patient,  icu team and Dr. Tavarez.   continue present supportive care measures.     is poor candidate for chemotherapy or dialysis given metastatic lung cancer  In addition pt refuses HD.    Pt is appropriate candidate for hospice but unable to go home.  wife can not care for him.   Inpt placement evaluation. [ASSESSMENT and  PLAN]  Stage IV lung cancer. \Mmetastatic to bone at least, NSCLC-adenoca, PDL1+ 10% on bx.   s/p 2 cycles of Carboplatin-Alimta-Keytruda in Winter 2018.   In Dec 2018 - Jan 2019 Admitted with generalized weakness, muscle aches, ? pneumonia. Noted to have marked elevated LFTs.   AlkPhos 1085, , . CKD /GERMAN with Cr 1.6  CTA chest with no PE. Distended GB with stones.   Possible tylenol toxicity due to excessive use, 5000mg APAP/day.  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 Viral serologies negative. Started on PO steroids.   Post DC from Jan 2019 - June 2019 on basically best supportive care.      Requested resumption of some therapy in Summer 2019.   At time had elevated AlkPhos, likely due to bone disease, LFts normalized.   PET scan without significant progression.   s/p multiple cycles of Keytruda.   Stable disease on scans 10/2019 and Jan 2020.   AlkPhos improved with tx.     Worsening performance status.     Admitted now with GERMAN on top of chronic CKD.   Baseline Cr ~ 2.3.   Likely due to dehydration, volume depletion,   Still with poor urine output.   MOunting burden of disease.   Pt declined HD.     Anemia due to cancer  Anemia due to CKD  Anemia due to recent chemo.       RECOMMENDATIONS  Forgo further imaging and treatment for cancer.   Comfort measures.     IVF per renal.   PO intake Poor.   Supportive measures.     Palliative eval for comfort.   Hospice eval .     discussed with patient,  icu team and Dr. Tavarez.   continue present supportive care measures.     is poor candidate for chemotherapy or dialysis given metastatic lung cancer  In addition pt refuses HD.    Pt is appropriate candidate for hospice but unable to go home.  wife can not care for him.   Inpt placement evaluation.       ADDENDUM  Called and spoke with pt son Stuart [phone] and Wife later.   Long discussion had re pt condition.   Discussed pt wishes. Refusal for HD, and deterioration   Family per son, and wife is in agreement for comfort measures.   Anxiously awaiting if will get worse. Worries may get worse overnight.   Discussed current status, where stable but may worse suddenly.   Advised to have family come if wishes to see pt while lucid.   Son and wife asked that tanika Davidson be first point of contact, as wife gets nervous with phone calls now.    Spoke MICU team, and SOLOMON Callahan to call son if any new events.

## 2020-02-07 NOTE — SWALLOW BEDSIDE ASSESSMENT ADULT - SWALLOW EVAL: DIAGNOSIS
Pt was administered PO trials of puree and honey thick liquids via tspn. Pt p/w 1. Functional oral phase marked by adequate retrieval and containment, timely manipulation and transfer, and adequate clearance post swallow. 2. Severe pharyngeal dysphagia marked by suspected delayed swallow onset, reduced laryngeal elevation to palpation, multiple swallows per bolus, and immediate/weak cough response post swallow. 3. Oral nutrition/hydration/medication is contraindicated at this time, consider alternate means and GOC discussion re: nutritional intake. Discussed results with team.

## 2020-02-07 NOTE — DISCHARGE NOTE PROVIDER - NSDCMRMEDTOKEN_GEN_ALL_CORE_FT
Alimta: resume with your oncologist as needed.  amLODIPine 10 mg oral tablet: 1 tab(s) orally once a day  atorvastatin 20 mg oral tablet: 1 tab(s) orally once a day  CARBOplatin: resume with your oncologist if needed  dronabinol 5 mg oral capsule: 1 cap(s) orally 2 times a day  folic acid 1 mg oral tablet: 1 tab(s) orally once a day  Keytruda: Resume with your oncologist as needed  morphine 15 mg/12 hr oral tablet, extended release: 1 tab(s) orally 2 times a day  pantoprazole 20 mg oral delayed release tablet: 1 tab(s) orally once a day  predniSONE 20 mg oral tablet: take 1-3 tablets as directed after chemotherapy  Stiolto Respimat 60 ACT 2.5 mcg-2.5 mcg/inh inhalation aerosol: 2 puff(s) inhaled every 24 hours  Toprol-XL 50 mg oral tablet, extended release: 1 tab(s) orally once a day  Travatan 0.004% ophthalmic solution: 1 drop(s) to each affected eye once a day (in the evening)  Vitamin D3 1000 intl units (25 mcg) oral tablet: 1 tab(s) orally once a day  Zetia 10 mg oral tablet: 1 tab(s) orally once a day

## 2020-02-07 NOTE — PROGRESS NOTE ADULT - ASSESSMENT
Acute on CKD Stage 3  Metabolic Acidosis  Hyperkalemia  Sepsis/PNA  Dehydration  Hyperphosphatemia      -GERMAN is likely due to dehydration along with septic ATN  -FeUrea 31%  -Renal sonogram reviewed showing no evidence of Hydro, but echogenic kidneys noted  -S/P Hyperkalemic cocktail with improvement in potassium levels  -Abx per ID; renal dosing  -Monitor urine output; poor output documented  -Worsening renal function; will follow up repeat BMP  -Continue with gentle 1/2NS; Patient does not appear fluid overloaded.  -Given albumin, creatinine continues to improve  -No acute indication for dialysis; poor candidate especially with Lung Ca and overall debility. Dialysis not recommended  -Pulm follow up noted  -Hospice eval  -Add renvela  -family meeting today    D/W MICU  D/W heme/onc    Thank you

## 2020-02-07 NOTE — PROGRESS NOTE ADULT - PROBLEM SELECTOR PLAN 3
- severe sepsis POA  - CXR and CT chest demonstrating multifocal PNA with ELIZABETH mass extension through intercostal space suspicious for disease progression  - Pulm Dr. Yu following  - ID Dr. Junior following - will d/c abx  - BCx NGTD  - Leukocytosis still present possibly 2/2 steroids/stress  - Pt has remained afebrile without clinical symptoms to suggest uncontrolled infection   - Monitor daily CBC and temperature curve

## 2020-02-07 NOTE — PROGRESS NOTE ADULT - ASSESSMENT
85yo M, with PMH/o CAD s/p CABG (1994), HTN, HLD, stage III CKD, lung cancer (currently receiving CT every 3 weeks), COPD, and back neoplasm s/p right scapula exploration and partial resection, presenting to the ED with cough productive of yellow sputum, wheezing, and increased SOB x 1 week, admitted for acute hypoxic respiratory failure likely 2/2 PNA in the setting of lung CA, also noted to have GERMAN

## 2020-02-07 NOTE — PROGRESS NOTE ADULT - PROBLEM SELECTOR PLAN 1
pt appears to be clinically improved on current therapy and although there is an increase in wbc this may be due to steroids  -recommend dc of abx now that we have finished a course and observation off abx

## 2020-02-07 NOTE — SWALLOW BEDSIDE ASSESSMENT ADULT - COMMENTS
Pt received sitting upright in bed, awake and alert, on supplemental O2 via NC. Pt denied pain. Pt followed simple commands consistently. Pt p/w significantly weak voice, which pt reported has been persistent since diagnosis of lung cancer 4 months ago.  Pre vitals: , SpO2 95%, RR 33. Post vitals: , SpO2 94%, RR 27.    CT chest 2/2: "Interval development of multifocal pneumonia involving the right upper and lower lobes. Increase in size of soft tissue mass associated with surgical clips in the left upper lobe paramediastinal location inseparable from the aortic arch which now extends anteriorly in an intercostal location highly suspicious for progression of disease." Pt's WBC is elevated, no fever.

## 2020-02-07 NOTE — PROGRESS NOTE ADULT - ASSESSMENT
8. Dispo  - GOC discussed. Patient requires NG tube/PEG tube. Plan to discuss with family regarding comfort care. 87 yo M pmh CAD, HLD, HTN, CKD III, neoplasm of back right scapula, lung Ca (Keytuda session 6), COPD (not on home O2), presented to ED sunday for SOB X 1 week with productive yellow sputum and wheezing admitted for right sided pneumonia, and transferred to ICU for management of acute onset of sustained ventricular tachycardia, now stable in atrial fibrillation.    Plan:    1. Neuro  - dilaudid 0.25 mg IVP q4 for pain    2. Cardiovascular  - Atrial Fibrillation: C/w amiodarone and digoxin. Metropolol 25 mg q6 (rate control) dc'ed as BP's not tolerating BB. f/u Echo. Heparin gtt and 2mg Coumadin for AC.  - Hypotension: continue to down titrate phenylephrine. Midodrine increased to 20mg TID  - HLD: lipitor 20 mg, zetia 10 mg  - Elevated troponins likely 2/2 demand and arrythmia.     3. Pulmonary   - PNA: cefepime completed today. Prednisone 20 mg, duonebs + NaCal for pulmonary infiltrate, guanifisen for cough  - Chemo on hold for lung cancer 2/2 immunosuppressed state in setting of active infection    4. GI  - GI PPx: pantoprazole 40 mg    - Nausea: dronabinol   - Diet: Now NPO due to failed speech and swallow eval; pt states he would not like to pursue a NGT or PEG.  - bowel regimen for constipation     5.   - sevelemar 800 mg for hyperphosphatemia   - Cr 6.20 pt not hemodialysis candidate as per nephrology. Will continue to monitor. Poor urinary output.     6. ID  - cefepime 500 mg completed today  - WBC 20.88. Likely reactive and 2/2 steroids. Pt afebrile. Will continue to monitor    7. DVT PPx  - heparin gtt; transition to coumadin--2mg given today. Goal INR 2-3. F/u am INR      8. Dispo  - GOC discussed. Patient requires NG tube/PEG tube, but pt refuses as he "does not want anything else done". Pt expresses wanting to made comfortable, but would like to discuss decision with his family first. Plan to discuss with family regarding comfort care this evening. Pt DNR.

## 2020-02-07 NOTE — SWALLOW BEDSIDE ASSESSMENT ADULT - SLP PERTINENT HISTORY OF CURRENT PROBLEM
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 Admitted for acute hypoxic respiratory failure secondary to pneumonia. Hospital course complicated by GERMAN and Tachycardia with hypotension

## 2020-02-07 NOTE — PROGRESS NOTE ADULT - SUBJECTIVE AND OBJECTIVE BOX
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 Pt states he has baseline HADLEY, but dyspnea has increased in the last week. Of note he is in week 6 of chemo treatment keytruda, gets chemo every 3 weeks, tolerating chemo appropriately. Pt also has been urinating less for the last 4 days. Pt denies ever having dialysis. Pt denies fever, chills, chest pain, orthopnea, dysuria, hematuria, diarrhea, melena, hematochezia.   Renal consulted for GERMAN. Per family, patient had GERMAN a year ago when he previously had PNA. However, it improved after the PNA was treated. Patient admits to decreased urination. Also with poor intake.     States his breathing is improved compared to yesterday, States he is not urinating much.      Follow up Acute on CKD stage 3  No acute complaints this morning; still with poor appetite,  No N/V.  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  Hip pain: Right hip replacement in   FH: CABG (coronary artery bypass surgery):  (double)       FAMILY HISTORY:  No pertinent family history in first degree relatives  [place]    Social History:Non smoker    MEDICATIONS  (STANDING):  albuterol/ipratropium for Nebulization 3 milliLiter(s) Nebulizer every 6 hours  cefepime   IVPB 500 milliGRAM(s) IV Intermittent every 24 hours  heparin  Injectable 5000 Unit(s) SubCutaneous every 12 hours  methylPREDNISolone sodium succinate Injectable 20 milliGRAM(s) IV Push every 8 hours  pantoprazole    Tablet 40 milliGRAM(s) Oral before breakfast  sodium chloride 0.45% 1000 milliLiter(s) (75 mL/Hr) IV Continuous <Continuous>    MEDICATIONS  (PRN):  guaiFENesin   Syrup  (Sugar-Free) 200 milliGRAM(s) Oral every 6 hours PRN Cough  HYDROmorphone  Injectable 0.25 milliGRAM(s) IV Push every 4 hours PRN pain, distress, suffering, dyspnea, palliative measures   Meds reviewed    Allergies    No Known Allergies    Intolerances         REVIEW OF SYSTEMS:    CONSTITUTIONAL:  No weight loss , +Poor intake  EYES: No eye pain, visual disturbances, or discharge  ENMT:  No difficulty hearing, tinnitus, vertigo; No sinus or throat pain  NECK: No pain or stiffness  BREASTS: No pain, masses, or nipple discharge  RESPIRATORY: no SOB, no coughing  CARDIOVASCULAR: No chest pain, palpitations, dizziness,   GASTROINTESTINAL: No abdominal or epigastric pain. No nausea, vomiting, or hematemesis; No diarrhea or constipation. No melena   GENITOURINARY: No dysuria, frequency, hematuria, or incontinence  NEUROLOGICAL: No headaches, memory loss, loss of strength, numbness, or tremors  SKIN: No Diffuse erythema, no blisters  LYMPH NODES: No enlarged glands  ENDOCRINE: No heat or cold intolerance; No hair loss  MUSCULOSKELETAL: No joint pain or swelling   PSYCHIATRIC: No depression, anxiety, mood swings, or difficulty sleeping  HEME/LYMPH: No easy bruising, or bleeding gums  ALLERGY AND IMMUNOLOGIC: No hives or eczema      Vital Signs Last 24 Hrs  T(C): 36.3 (2020 17:48), Max: 36.9 (2020 16:36)  T(F): 97.3 (2020 17:48), Max: 98.4 (2020 16:36)  HR: 99 (2020 17:48) (92 - 99)  BP: 112/66 (2020 17:48) (108/58 - 124/72)  BP(mean): --  RR: 17 (2020 17:48) (17 - 20)  SpO2: 90% (2020 17:48) (84% - 96%)  Daily Height in cm: 182.88 (2020 12:13)    Daily Weight in k.3 (2020 17:48)    PHYSICAL EXAM:    GENERAL: No Distress  HEAD:  Atraumatic, Normocephalic  EYES: EOMI, conjunctiva and sclera clear  ENMT: No tonsillar erythema, exudates, or enlargement; dry mucous membranes, NECK: Supple, neck veins full  NERVOUS SYSTEM:  Alert & Oriented X3, Good concentration;   CHEST/LUNG: Reduced basilar breath sounds  HEART: Regular rate and rhythm; No murmurs, rubs, or gallops  ABDOMEN: Soft, Nontender, Nondistended; Bowel sounds present, No hepatomegaly  EXTREMITIES: trace Edema  SKIN: No rashes or lesions, dry; poor turgor      LABS:             20: pending  20: BUN/Cr: 124/6.7

## 2020-02-07 NOTE — PROGRESS NOTE ADULT - PROBLEM SELECTOR PLAN 1
there was concern for persistent ventricular tachyarrhythmia on 2/5/20 and pt was started on lidocaine infusion and phenyleprine, rates slowed and underlying rhythm now suspected to be afib w/ RVR  - s/p amio load and now on maintenance dose  - renally dosed digoxin while pt remains on vasopressors  - remains hypotensive, on phenylephrine and started on 20mg midodrine q8h but pt cannot tolerate po as he failed bedside swallow eval  - cardiology following  - start therapeutic AC w/ heparin. unable to start enoxaparin given renal failure. will bridge w/ heparin drip and start warfarin. poor candidate for long term AC given overall poor prognosis

## 2020-02-07 NOTE — PROGRESS NOTE ADULT - PROBLEM SELECTOR PLAN 5
- was receiving chemotherapy [id number] prior to arrival  - will hold chemorx for now so as to not induce further immunosuppression in setting of active infection on IV abx  - pt is a poor candidate to continue chemo at this point. unlikely to tolerate moving fwd  - Follows with outpatient oncologist Dr. Mahan in [place]

## 2020-02-07 NOTE — PROGRESS NOTE ADULT - SUBJECTIVE AND OBJECTIVE BOX
infectious diseases progress note:    WILDA PORTILLO is a 86y y. o. Male patient    Patient reports: "feeling much better"    ROS:    EYES:  Negative  blurry vision or double vision  GASTROINTESTINAL:  Negative for nausea, vomiting, diarrhea  -otherwise negative except for subjective    Allergies    No Known Allergies    Intolerances        ANTIBIOTICS/RELEVANT:  antimicrobials  cefepime   IVPB 500 milliGRAM(s) IV Intermittent every 24 hours    immunologic:    OTHER:  albuterol/ipratropium for Nebulization 3 milliLiter(s) Nebulizer every 6 hours  aMIOdarone Infusion 0.5 mG/Min IV Continuous <Continuous>  atorvastatin 20 milliGRAM(s) Oral at bedtime  bisacodyl 5 milliGRAM(s) Oral every 12 hours  dronabinol 5 milliGRAM(s) Oral every 12 hours  ezetimibe 10 milliGRAM(s) Oral daily  folic acid 1 milliGRAM(s) Oral daily  guaiFENesin   Syrup  (Sugar-Free) 200 milliGRAM(s) Oral every 6 hours PRN  heparin  Infusion.  Unit(s)/Hr IV Continuous <Continuous>  HYDROmorphone  Injectable 0.25 milliGRAM(s) IV Push every 4 hours PRN  latanoprost 0.005% Ophthalmic Solution 1 Drop(s) Both EYES at bedtime  midazolam Injectable 2 milliGRAM(s) IV Push once  midodrine 10 milliGRAM(s) Oral every 8 hours  pantoprazole    Tablet 40 milliGRAM(s) Oral before breakfast  phenylephrine    Infusion 1 MICROgram(s)/kG/Min IV Continuous <Continuous>  predniSONE   Tablet 20 milliGRAM(s) Oral daily  sevelamer carbonate 800 milliGRAM(s) Oral three times a day with meals  sodium chloride 3%  Inhalation 4 milliLiter(s) Inhalation every 6 hours      Objective:  Last 24-Vital Signs Last 24 Hrs  T(C): 36.3 (07 Feb 2020 07:58), Max: 36.6 (06 Feb 2020 15:39)  T(F): 97.3 (07 Feb 2020 07:58), Max: 97.9 (06 Feb 2020 15:39)  HR: 96 (07 Feb 2020 08:08) (96 - 127)  BP: 117/72 (07 Feb 2020 08:00) (75/50 - 140/86)  BP(mean): 90 (07 Feb 2020 08:00) (60 - 108)  RR: 19 (07 Feb 2020 08:00) (11 - 43)  SpO2: 95% (07 Feb 2020 08:08) (81% - 100%)    T(C): 36.3 (02-07-20 @ 07:58), Max: 36.6 (02-05-20 @ 12:17)  T(F): 97.3 (02-07-20 @ 07:58), Max: 97.9 (02-05-20 @ 12:17)  T(C): 36.3 (02-07-20 @ 07:58), Max: 36.6 (02-05-20 @ 12:17)  T(F): 97.3 (02-07-20 @ 07:58), Max: 97.9 (02-05-20 @ 12:17)  T(C): 36.3 (02-07-20 @ 07:58), Max: 37 (02-03-20 @ 12:49)  T(F): 97.3 (02-07-20 @ 07:58), Max: 98.6 (02-03-20 @ 12:49)    PHYSICAL EXAM:  Constitutional: Well-developed, well nourished  Eyes: PERRLA, EOMI  Ear/Nose/Throat: oropharynx normal	  Neck: no JVD, no lymphadenopathy, supple  Respiratory: no accessory muscle use, lung fields bilaterally symmetrical but decreased BS  Cardiovascular: RRR, normal S1, S2 no m/r/g  Gastrointestinal: soft, NT, no HSM, BS-normal  Extremities: no clubbing, no cyanosis, edema absent  Neuro: patient alert, oriented and appropriate  Skin: no sig lesions      LABS:                        10.6   20.88 )-----------( 160      ( 07 Feb 2020 06:01 )             33.3       WBC 20.88  02-07 @ 06:01  WBC 21.95  02-06 @ 21:23  WBC 20.66  02-06 @ 04:44  WBC 15.12  02-05 @ 08:58  WBC 15.94  02-04 @ 08:53  WBC 12.58  02-03 @ 05:51  WBC 18.45  02-02 @ 12:48      02-07    141  |  110<H>  |  118<H>  ----------------------------<  93  5.1   |  18<L>  |  6.20<H>    Ca    8.2<L>      07 Feb 2020 06:01  Phos  6.6     02-07  Mg     2.4     02-07    TPro  5.3<L>  /  Alb  2.2<L>  /  TBili  0.6  /  DBili  x   /  AST  24  /  ALT  24  /  AlkPhos  103  02-07      Creatinine, Serum: 6.20 mg/dL (02-07-20 @ 06:01)  Creatinine, Serum: 6.30 mg/dL (02-06-20 @ 04:44)  Creatinine, Serum: 6.80 mg/dL (02-05-20 @ 22:12)  Creatinine, Serum: 6.60 mg/dL (02-05-20 @ 08:58)  Creatinine, Serum: 6.70 mg/dL (02-04-20 @ 08:53)  Creatinine, Serum: 6.10 mg/dL (02-03-20 @ 05:51)  Creatinine, Serum: 6.30 mg/dL (02-02-20 @ 12:48)      PTT - ( 07 Feb 2020 06:01 )  PTT:133.6 sec          MICROBIOLOGY:              RADIOLOGY & ADDITIONAL STUDIES:

## 2020-02-07 NOTE — PROGRESS NOTE ADULT - SUBJECTIVE AND OBJECTIVE BOX
Patient is a 86y old  Male who presents with a chief complaint of shortness of breath (07 Feb 2020 12:34)    24 hour events: No acute overnight events. Patient given dose of digitoxin.     REVIEW OF SYSTEMS  Constitutional: No fever, chills, fatigue  Neuro: No headache, numbness, weakness  Resp: admist to cough, no wheezing, shortness of breath  CVS: No chest pain, palpitations, leg swelling  GI: No abdominal pain, nausea, vomiting, diarrhea   : No dysuria, frequency, incontinence    T(F): 97.3 (02-07-20 @ 12:03), Max: 97.9 (02-06-20 @ 15:39)  HR: 116 (02-07-20 @ 10:30) (96 - 127)  BP: 87/57 (02-07-20 @ 10:30) (75/50 - 140/86)  RR: 20 (02-07-20 @ 10:30) (11 - 43)  SpO2: 95% (02-07-20 @ 10:30) (81% - 100%)  Wt(kg): --            I&O's Summary    02-06 @ 07:01  -  02-07 @ 07:00  --------------------------------------------------------  IN: 2265.2 mL / OUT: 700 mL / NET: 1565.2 mL      PHYSICAL EXAM  General: ill appearing, cachetic male, lying comfortably in bed.   CNS: AAO X 3. No focal neurologic deficits.   HEENT: NCAT, PERRLA, EOMI, no JVD   Resp: coarse breath sounds   CVS:   Abd:   Ext:   Skin:     MEDICATIONS    aMIOdarone    Tablet Oral  midodrine Oral  phenylephrine    Infusion IV Continuous    atorvastatin Oral  ezetimibe Oral  predniSONE   Tablet Oral    albuterol/ipratropium for Nebulization Nebulizer  guaiFENesin   Syrup  (Sugar-Free) Oral PRN  sodium chloride 3%  Inhalation Inhalation    dronabinol Oral  HYDROmorphone  Injectable IV Push PRN      heparin  Infusion. IV Continuous  warfarin Oral    bisacodyl Oral  pantoprazole    Tablet Oral      folic acid Oral      latanoprost 0.005% Ophthalmic Solution Both EYES    sevelamer carbonate Oral                          10.6   20.88 )-----------( 160      ( 07 Feb 2020 06:01 )             33.3       02-07    141  |  110<H>  |  118<H>  ----------------------------<  93  5.1   |  18<L>  |  6.20<H>    Ca    8.2<L>      07 Feb 2020 06:01  Phos  6.6     02-07  Mg     2.4     02-07    TPro  5.3<L>  /  Alb  2.2<L>  /  TBili  0.6  /  DBili  x   /  AST  24  /  ALT  24  /  AlkPhos  103  02-07      CARDIAC MARKERS ( 07 Feb 2020 06:01 )  .101 ng/mL / x     / 46 U/L / x     / 2.6 ng/mL  CARDIAC MARKERS ( 06 Feb 2020 16:45 )  .094 ng/mL / x     / 40 U/L / x     / 2.8 ng/mL  CARDIAC MARKERS ( 06 Feb 2020 10:30 )  .095 ng/mL / x     / 40 U/L / x     / 2.8 ng/mL  CARDIAC MARKERS ( 06 Feb 2020 04:44 )  .089 ng/mL / x     / 42 U/L / x     / 2.6 ng/mL      PTT - ( 07 Feb 2020 06:01 )  PTT:133.6 sec    .Blood Blood-Peripheral   No growth to date. -- 02-02 @ 14:55        Radiology: ***  Bedside lung ultrasound: ***  Bedside ECHO: ***    CENTRAL LINE: Y/N          DATE INSERTED:              REMOVE: Y/N  FREIRE: Y/N                        DATE INSERTED:              REMOVE: Y/N  A-LINE: Y/N                       DATE INSERTED:              REMOVE: Y/N    GLOBAL ISSUE/BEST PRACTICE  Analgesia:   Sedation:   CAM-ICU:   HOB elevation: yes  Stress ulcer prophylaxis:   VTE prophylaxis:   Glycemic control:   Nutrition:     CODE STATUS: ***  [facility] discussion: Y Patient is a 86y old  Male who presents with a chief complaint of shortness of breath (07 Feb 2020 12:34)    24 hour events: The patient was seen and examined at bedside. No acute overnight events. Increased trouble with swallowing today. Patient expressed interest in pursuing comfort measures pending discussion with his family today. Admits to feeling better than yesterday, but with ongoing cough and congestion.    REVIEW OF SYSTEMS  Constitutional: No fever, chills, fatigue  Neuro: No headache, numbness, weakness  Resp: Admits to cough, no wheezing, shortness of breath  CVS: No chest pain, palpitations, leg swelling  GI: No abdominal pain, nausea, vomiting, diarrhea   : No dysuria, frequency, incontinence    T(F): 97.3 (02-07-20 @ 12:03), Max: 97.9 (02-06-20 @ 15:39)  HR: 116 (02-07-20 @ 10:30) (96 - 127)  BP: 87/57 (02-07-20 @ 10:30) (75/50 - 140/86)  RR: 20 (02-07-20 @ 10:30) (11 - 43)  SpO2: 95% (02-07-20 @ 10:30) (81% - 100%)  Wt(kg): --            I&O's Summary    02-06 @ 07:01  -  02-07 @ 07:00  --------------------------------------------------------  IN: 2265.2 mL / OUT: 700 mL / NET: 1565.2 mL      PHYSICAL EXAM  General: ill appearing, cachectic male. NAD   CNS: AAO X 3, no obvious focal deficits  HEENT: NCAT, dry mucous membranes, no JVD. PERRLA    Resp: coarse breath sounds on the left middle lobe, no wheezing, rhonchi, or rales   CVS: tachycardic. irregular rhythm. +S1+S2, no murmurs, rubs, or gallops  Abd: soft, nontender, nondistended. BS X 4   Ext: UE: ecchymosis, abrasions, no CCE         LE: single fluid filled bullae left leg (> 3cm) now wrapped with clean and dry dressing, abrasions and ecchymosis b/l   Skin: warm, dry intact, bruising on extremities    MEDICATIONS    aMIOdarone    Tablet Oral  midodrine Oral  phenylephrine    Infusion IV Continuous    atorvastatin Oral  ezetimibe Oral  predniSONE   Tablet Oral    albuterol/ipratropium for Nebulization Nebulizer  guaiFENesin   Syrup  (Sugar-Free) Oral PRN  sodium chloride 3%  Inhalation Inhalation    dronabinol Oral  HYDROmorphone  Injectable IV Push PRN      heparin  Infusion. IV Continuous  warfarin Oral    bisacodyl Oral  pantoprazole    Tablet Oral      folic acid Oral      latanoprost 0.005% Ophthalmic Solution Both EYES    sevelamer carbonate Oral                          10.6   20.88 )-----------( 160      ( 07 Feb 2020 06:01 )             33.3       02-07    141  |  110<H>  |  118<H>  ----------------------------<  93  5.1   |  18<L>  |  6.20<H>    Ca    8.2<L>      07 Feb 2020 06:01  Phos  6.6     02-07  Mg     2.4     02-07    TPro  5.3<L>  /  Alb  2.2<L>  /  TBili  0.6  /  DBili  x   /  AST  24  /  ALT  24  /  AlkPhos  103  02-07      CARDIAC MARKERS ( 07 Feb 2020 06:01 )  .101 ng/mL / x     / 46 U/L / x     / 2.6 ng/mL  CARDIAC MARKERS ( 06 Feb 2020 16:45 )  .094 ng/mL / x     / 40 U/L / x     / 2.8 ng/mL  CARDIAC MARKERS ( 06 Feb 2020 10:30 )  .095 ng/mL / x     / 40 U/L / x     / 2.8 ng/mL  CARDIAC MARKERS ( 06 Feb 2020 04:44 )  .089 ng/mL / x     / 42 U/L / x     / 2.6 ng/mL      PTT - ( 07 Feb 2020 06:01 )  PTT:133.6 sec    .Blood Blood-Peripheral   No growth to date. -- 02-02 @ 14:55          Bedside lung ultrasound: B lines, no pleural effusions      CENTRAL LINE: N           FREIRE: N                         A-LINE: N                           GLOBAL ISSUE/BEST PRACTICE  Analgesia: Y  Sedation: N  CAM-ICU: Y  HOB elevation: yes  Stress ulcer prophylaxis: Y  VTE prophylaxis: Y  Glycemic control: N  Nutrition:     CODE STATUS: DNR

## 2020-02-07 NOTE — PROGRESS NOTE ADULT - PROBLEM SELECTOR PLAN 2
- oliguric renal failure  - renal indices marginally improved but outlook for renal function is poor  - nephro Dr. Brooks following, suspecting GERMAN 2/2 dehydration and septic ATN, pt is not a candidate for RRT now given lung ca and poor performance status, can reassess  - will attempt to closely monitor strict i/o's while in the MICU  - s/p albumin and IV lasix on 2/5  - 700cc of urine output last 24hrs

## 2020-02-07 NOTE — SWALLOW BEDSIDE ASSESSMENT ADULT - SWALLOW EVAL: RECOMMENDED DIET
oral nutrition/hydration/medication is contraindicated at this time, consider alternate means and GOC discussion re: nutritional intake.

## 2020-02-07 NOTE — PROGRESS NOTE ADULT - SUBJECTIVE AND OBJECTIVE BOX
[INTERVAL HX: ]  Patient seen and examined;  Chart reviewed and events noted;     became tacyhcardia, developed more sOB.   transferred to MICU    Discussed with pt GOC.   Does not want to go on HD. Understands that will succumb to renal failure, and die from renal failure.   Does not want aggressive measures, reflects that knew eventually would be at this juncture and would die form cancer.   However, in this case, may die from complication of renal failure or infection/pneumonia.   Agreeable to comfort measures rather than treatment of cancer.   Aware will stop treatment for cancer.   States family is aware of his wishes.     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 Pt states he has baseline HADLEY, but dyspea has increased in the last week. Of note he is in week 6 of chemo treatment keytruda, gets chemo every 3 weeks, tolerating chemo appropriately. Pt also has been urinating less for the last 4 days. Pt denies ever having dialysis. Pt denies fever, chills, chest pain, orthopnea, dysuria, hematuria, diarrhea, melena, hematochezia.      ED vitals: T: 96.8, HR: 99, BP: 108/58, RR: 18, O2 Saturation: 96% on 2L  Pt recieved albuterol neb x 3, ipratropium x 1 for neb, solumedrol injectable  mg x1, kayexalate 30 mg x1, 2L NS   Labs significant for: WBC: 18.45 w/ left shift, Lactate: 2.5, Potassium 5.4, Chloride: 109, CO2: 19, BUN/Cr: 109/6.3, Albumin: 2.7, Alk Phos: 164, ProCal: 4.5   EKG: sinus tachycardia 106 bpm  CXR: R sided PNA (02 Feb 2020 14:08)            MEDICATIONS  (STANDING):  albuterol/ipratropium for Nebulization 3 milliLiter(s) Nebulizer every 6 hours  aMIOdarone    Tablet 200 milliGRAM(s) Oral daily  atorvastatin 20 milliGRAM(s) Oral at bedtime  bisacodyl 5 milliGRAM(s) Oral every 12 hours  dronabinol 5 milliGRAM(s) Oral every 12 hours  ezetimibe 10 milliGRAM(s) Oral daily  folic acid 1 milliGRAM(s) Oral daily  heparin  Infusion.  Unit(s)/Hr (11 mL/Hr) IV Continuous <Continuous>  latanoprost 0.005% Ophthalmic Solution 1 Drop(s) Both EYES at bedtime  midodrine 20 milliGRAM(s) Oral every 8 hours  pantoprazole    Tablet 40 milliGRAM(s) Oral before breakfast  phenylephrine    Infusion 1 MICROgram(s)/kG/Min (23.813 mL/Hr) IV Continuous <Continuous>  predniSONE   Tablet 20 milliGRAM(s) Oral daily  sevelamer carbonate 800 milliGRAM(s) Oral three times a day with meals  sodium chloride 3%  Inhalation 4 milliLiter(s) Inhalation every 6 hours  warfarin 2 milliGRAM(s) Oral once    MEDICATIONS  (PRN):  guaiFENesin   Syrup  (Sugar-Free) 200 milliGRAM(s) Oral every 6 hours PRN Cough  HYDROmorphone  Injectable 0.25 milliGRAM(s) IV Push every 4 hours PRN pain, distress, suffering, dyspnea, palliative measures      Vital Signs Last 24 Hrs  T(C): 36.3 (07 Feb 2020 15:25), Max: 36.3 (07 Feb 2020 00:17)  T(F): 97.4 (07 Feb 2020 15:25), Max: 97.4 (07 Feb 2020 00:17)  HR: 72 (07 Feb 2020 19:00) (68 - 134)  BP: 123/63 (07 Feb 2020 19:00) (76/56 - 144/74)  BP(mean): 84 (07 Feb 2020 18:30) (61 - 108)  RR: 22 (07 Feb 2020 19:00) (11 - 36)  SpO2: 99% (07 Feb 2020 19:00) (87% - 100%)      [PHYSICAL EXAM]  General: adult in NAD,  WN,  WD. chronically ill looking  HEENT: clear oropharynx, anicteric sclera, pink conjunctivae.  Neck: supple, no masses.  CV: normal S1S2, no murmur, no rubs, no gallops.  Lungs: no wheezes, no rales, +rhonchi.  Abdomen: soft, non-tender, non-distended, no hepatosplenomegaly, normal BS, no guarding.  Ext: no clubbing, no cyanosis, trace edema.  Skin: no rashes,  no petechiae, no venous stasis changes.  Neuro: alert and oriented X3  , no focal motor deficits.  LN: no SC NIDIA.      [LABS:]                        10.6   20.88 )-----------( 160      ( 07 Feb 2020 06:01 )             33.3     02-07    141  |  110<H>  |  118<H>  ----------------------------<  93  5.1   |  18<L>  |  6.20<H>    Ca    8.2<L>      07 Feb 2020 06:01  Phos  6.6     02-07  Mg     2.4     02-07    TPro  5.3<L>  /  Alb  2.2<L>  /  TBili  0.6  /  DBili  x   /  AST  24  /  ALT  24  /  AlkPhos  103  02-07    PT/INR - ( 07 Feb 2020 16:10 )   PT: 13.4 sec;   INR: 1.19 ratio         PTT - ( 07 Feb 2020 16:10 )  PTT:90.1 sec              [RADIOLOGY STUDIES:]

## 2020-02-07 NOTE — PROGRESS NOTE ADULT - SUBJECTIVE AND OBJECTIVE BOX
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 Pt states he has baseline HADLEY, but dyspea has increased in the last week. Of note he is in week 6 of chemo treatment keytruda, gets chemo every 3 weeks, tolerating chemo appropriately. Pt also has been urinating less for the last 4 days. Pt denies ever having dialysis. Pt denies fever, chills, chest pain, orthopnea, dysuria, hematuria, diarrhea, melena, hematochezia.     ----  INTERVAL HPI/OVERNIGHT EVENTS: Pt seen and evaluated at the bedside. No acute overnight events occurred. No complaints or concerns. Pt is awake and interactive but a limited historian. He feels a little confused but is awake and interactive. Failed swallow eval today.     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  Hip pain: Right hip replacement in 2008  FH: CABG (coronary artery bypass surgery): 1994 (double)      FAMILY HISTORY:  No pertinent family history in first degree relatives      Allergies    No Known Allergies    Intolerances        ----  REVIEW OF SYSTEMS:  CONSTITUTIONAL: admits feeling "okay" no acue complaints at this time  HEENT: denies blurred vision, sore throat  SKIN: denies new lesions, rash  CARDIOVASCULAR: denies chest pain, chest pressure, palpitations  RESPIRATORY: denies shortness of breath, sputum production  GASTROINTESTINAL: feels a little more hungry today but failed bedside swallow evaluation  GENITOURINARY: denies dysuria, discharge  NEUROLOGICAL: denies numbness, headache, focal weakness  MUSCULOSKELETAL: denies new joint pain, muscle aches  HEMATOLOGIC: denies gross bleeding, bruising  LYMPHATICS: denies enlarged lymph nodes, denies exremity edema    ----  PHYSICAL EXAM:  GENERAL: patient appears chronically ill but no acute distress   ENMT: oropharynx clear without erythema, dry mucous membranes, poor dentition  LUNGS: diminished breath sounds b/l, coarse breath sounds in R upper chest  HEART: soft S1/S2, regular rate and rhythm, systolic murmur noted, 2+ dependent pitting edema to b/l LE  GASTROINTESTINAL: abdomen is soft, nontender, nondistended, normoactive bowel sounds, no palpable masses  INTEGUMENT: diffuse ecchymoses of b/l UE and LE  MUSCULOSKELETAL: no clubbing or cyanosis, no obvious deformity, LUE swelling at IV site without warmth or tenderness to palpation  NEUROLOGIC: awake, alert, oriented x3 but poor insight, limited insight, good muscle tone in 4 extremities, no reported sensory deficit on 4 extremities  HEME/LYMPH: no palpable supraclavicular nodules    T(C): 36.3 (02-07-20 @ 15:25), Max: 36.3 (02-06-20 @ 20:30)  HR: 129 (02-07-20 @ 15:30) (75 - 129)  BP: 101/62 (02-07-20 @ 15:30) (76/56 - 144/74)  RR: 27 (02-07-20 @ 15:30) (11 - 43)  SpO2: 95% (02-07-20 @ 15:30) (81% - 100%)  Wt(kg): --    ----  I&O's Summary    06 Feb 2020 07:01  -  07 Feb 2020 07:00  --------------------------------------------------------  IN: 2265.2 mL / OUT: 700 mL / NET: 1565.2 mL    07 Feb 2020 07:01  -  07 Feb 2020 16:52  --------------------------------------------------------  IN: 352 mL / OUT: 400 mL / NET: -48 mL        LABS:                        10.6   20.88 )-----------( 160      ( 07 Feb 2020 06:01 )             33.3     02-07    141  |  110<H>  |  118<H>  ----------------------------<  93  5.1   |  18<L>  |  6.20<H>    Ca    8.2<L>      07 Feb 2020 06:01  Phos  6.6     02-07  Mg     2.4     02-07    TPro  5.3<L>  /  Alb  2.2<L>  /  TBili  0.6  /  DBili  x   /  AST  24  /  ALT  24  /  AlkPhos  103  02-07    PT/INR - ( 07 Feb 2020 16:10 )   PT: 13.4 sec;   INR: 1.19 ratio         PTT - ( 07 Feb 2020 16:10 )  PTT:90.1 sec    CAPILLARY BLOOD GLUCOSE                    ----  Personally reviewed:  Vital sign trends: [ x ] yes    [  ] no     [  ] n/a  Laboratory results: [ x ] yes    [  ] no     [  ] n/a  Radiology results: [ x ] yes    [  ] no     [  ] n/a  Culture results: [ x ] yes    [  ] no     [  ] n/a  Consultant recommendations: [ x ] yes    [  ] no     [  ] n/a

## 2020-02-07 NOTE — PROGRESS NOTE ADULT - SUBJECTIVE AND OBJECTIVE BOX
[facility] - Aziza Valdivia, Edmond Cisneros, Titus Navarro Savella  Office Number:  [phone]    Patient resting comfortably in bed in NAD.  Lethargic this AM. Unable to provide much interval history.  On low dose sergio.  -120    F/U for:  AF with RVR, hypotension    Telemetry:  -120    MEDICATIONS  (STANDING):  albuterol/ipratropium for Nebulization 3 milliLiter(s) Nebulizer every 6 hours  aMIOdarone Infusion 0.5 mG/Min (16.667 mL/Hr) IV Continuous <Continuous>  aMIOdarone IVPB 150 milliGRAM(s) IV Intermittent once  aMIOdarone IVPB 150 milliGRAM(s) IV Intermittent once  aMIOdarone IVPB 150 milliGRAM(s) IV Intermittent once  atorvastatin 20 milliGRAM(s) Oral at bedtime  bisacodyl 5 milliGRAM(s) Oral every 12 hours  cefepime   IVPB 500 milliGRAM(s) IV Intermittent every 24 hours  dronabinol 5 milliGRAM(s) Oral every 12 hours  ezetimibe 10 milliGRAM(s) Oral daily  folic acid 1 milliGRAM(s) Oral daily  heparin  Infusion.  Unit(s)/Hr (11 mL/Hr) IV Continuous <Continuous>  latanoprost 0.005% Ophthalmic Solution 1 Drop(s) Both EYES at bedtime  midazolam Injectable 2 milliGRAM(s) IV Push once  midodrine 10 milliGRAM(s) Oral every 8 hours  pantoprazole    Tablet 40 milliGRAM(s) Oral before breakfast  phenylephrine    Infusion 1 MICROgram(s)/kG/Min (23.813 mL/Hr) IV Continuous <Continuous>  predniSONE   Tablet 20 milliGRAM(s) Oral daily  sevelamer carbonate 800 milliGRAM(s) Oral three times a day with meals  sodium chloride 3%  Inhalation 4 milliLiter(s) Inhalation every 6 hours    MEDICATIONS  (PRN):  guaiFENesin   Syrup  (Sugar-Free) 200 milliGRAM(s) Oral every 6 hours PRN Cough  HYDROmorphone  Injectable 0.25 milliGRAM(s) IV Push every 4 hours PRN pain, distress, suffering, dyspnea, palliative measures      Allergies    No Known Allergies              Vital Signs Last 24 Hrs  T(C): 36.3 (07 Feb 2020 03:28), Max: 36.6 (06 Feb 2020 15:39)  T(F): 97.4 (07 Feb 2020 03:28), Max: 97.9 (06 Feb 2020 15:39)  HR: 117 (07 Feb 2020 03:00) (104 - 127)  BP: 83/67 (07 Feb 2020 03:00) (75/50 - 140/86)  BP(mean): 72 (07 Feb 2020 03:00) (60 - 108)  RR: 22 (07 Feb 2020 03:00) (11 - 43)  SpO2: 95% (07 Feb 2020 03:00) (81% - 98%)    I&O's Summary    05 Feb 2020 07:01  -  06 Feb 2020 07:00  --------------------------------------------------------  IN: 2382.5 mL / OUT: 250 mL / NET: 2132.5 mL    06 Feb 2020 07:01  -  07 Feb 2020 06:24  --------------------------------------------------------  IN: 2062.2 mL / OUT: 380 mL / NET: 1682.2 mL        ON EXAM:    General: NAD, awake and alert, lethargic  HEENT: Mucous membranes are dry, anicteric  Lungs: Non-labored, breath sounds are clear bilaterally, No wheezing, rales or rhonchi  Cardiovascular: Irregular, tachy, S1 and S2, no murmurs, rubs, or gallops  Gastrointestinal: Bowel Sounds present, soft, nontender.   Lymph: No peripheral edema. No lymphadenopathy.  Skin: No rashes or ulcers  Psych:  Unable to properly assess    LABS: All Labs Reviewed:                        10.6   20.88 )-----------( 160      ( 07 Feb 2020 06:01 )             33.3                         11.1   21.95 )-----------( 168      ( 06 Feb 2020 21:23 )             33.7                         10.2   20.66 )-----------( 180      ( 06 Feb 2020 04:44 )             31.5     06 Feb 2020 04:44    141    |  108    |  121    ----------------------------<  120    5.1     |  20     |  6.30   05 Feb 2020 22:12    140    |  110    |  133    ----------------------------<  122    4.8     |  18     |  6.80   05 Feb 2020 08:58    142    |  110    |  129    ----------------------------<  119    5.1     |  19     |  6.60     Ca    8.2        06 Feb 2020 04:44  Ca    8.6        05 Feb 2020 22:12  Ca    8.8        05 Feb 2020 08:58  Phos  7.1       06 Feb 2020 04:44  Phos  6.7       05 Feb 2020 22:12  Phos  6.6       05 Feb 2020 08:58  Mg     2.4       06 Feb 2020 04:44  Mg     2.5       05 Feb 2020 22:12  Mg     2.6       05 Feb 2020 08:58    TPro  5.3    /  Alb  2.1    /  TBili  0.5    /  DBili  x      /  AST  22     /  ALT  24     /  AlkPhos  104    05 Feb 2020 22:12    PTT - ( 06 Feb 2020 21:23 )  PTT:131.4 sec  CARDIAC MARKERS ( 06 Feb 2020 16:45 )  .094 ng/mL / x     / 40 U/L / x     / 2.8 ng/mL  CARDIAC MARKERS ( 06 Feb 2020 10:30 )  .095 ng/mL / x     / 40 U/L / x     / 2.8 ng/mL  CARDIAC MARKERS ( 06 Feb 2020 04:44 )  .089 ng/mL / x     / 42 U/L / x     / 2.6 ng/mL      Blood Culture: Organism --  Gram Stain Blood -- Gram Stain --  Specimen Source .Blood Blood-Peripheral  Culture-Blood --

## 2020-02-08 LAB
ALBUMIN SERPL ELPH-MCNC: 2 G/DL — LOW (ref 3.3–5)
ALP SERPL-CCNC: 88 U/L — SIGNIFICANT CHANGE UP (ref 40–120)
ALT FLD-CCNC: 20 U/L — SIGNIFICANT CHANGE UP (ref 12–78)
ANION GAP SERPL CALC-SCNC: 13 MMOL/L — SIGNIFICANT CHANGE UP (ref 5–17)
ANION GAP SERPL CALC-SCNC: 14 MMOL/L — SIGNIFICANT CHANGE UP (ref 5–17)
APTT BLD: 82.3 SEC — HIGH (ref 28.5–37)
AST SERPL-CCNC: 19 U/L — SIGNIFICANT CHANGE UP (ref 15–37)
BASOPHILS # BLD AUTO: 0.01 K/UL — SIGNIFICANT CHANGE UP (ref 0–0.2)
BASOPHILS NFR BLD AUTO: 0.1 % — SIGNIFICANT CHANGE UP (ref 0–2)
BILIRUB SERPL-MCNC: 0.5 MG/DL — SIGNIFICANT CHANGE UP (ref 0.2–1.2)
BUN SERPL-MCNC: 119 MG/DL — HIGH (ref 7–23)
BUN SERPL-MCNC: 121 MG/DL — HIGH (ref 7–23)
CALCIUM SERPL-MCNC: 7.7 MG/DL — LOW (ref 8.5–10.1)
CALCIUM SERPL-MCNC: 8.7 MG/DL — SIGNIFICANT CHANGE UP (ref 8.5–10.1)
CHLORIDE SERPL-SCNC: 112 MMOL/L — HIGH (ref 96–108)
CHLORIDE SERPL-SCNC: 112 MMOL/L — HIGH (ref 96–108)
CO2 SERPL-SCNC: 17 MMOL/L — LOW (ref 22–31)
CO2 SERPL-SCNC: 20 MMOL/L — LOW (ref 22–31)
CREAT SERPL-MCNC: 5.9 MG/DL — HIGH (ref 0.5–1.3)
CREAT SERPL-MCNC: 6 MG/DL — HIGH (ref 0.5–1.3)
EOSINOPHIL # BLD AUTO: 0 K/UL — SIGNIFICANT CHANGE UP (ref 0–0.5)
EOSINOPHIL NFR BLD AUTO: 0 % — SIGNIFICANT CHANGE UP (ref 0–6)
GLUCOSE SERPL-MCNC: 69 MG/DL — LOW (ref 70–99)
GLUCOSE SERPL-MCNC: 74 MG/DL — SIGNIFICANT CHANGE UP (ref 70–99)
HCT VFR BLD CALC: 30.4 % — LOW (ref 39–50)
HGB BLD-MCNC: 9.9 G/DL — LOW (ref 13–17)
IMM GRANULOCYTES NFR BLD AUTO: 0.6 % — SIGNIFICANT CHANGE UP (ref 0–1.5)
INR BLD: 1.18 RATIO — HIGH (ref 0.88–1.16)
LYMPHOCYTES # BLD AUTO: 0.48 K/UL — LOW (ref 1–3.3)
LYMPHOCYTES # BLD AUTO: 3.5 % — LOW (ref 13–44)
MAGNESIUM SERPL-MCNC: 2.3 MG/DL — SIGNIFICANT CHANGE UP (ref 1.6–2.6)
MCHC RBC-ENTMCNC: 29.1 PG — SIGNIFICANT CHANGE UP (ref 27–34)
MCHC RBC-ENTMCNC: 32.6 GM/DL — SIGNIFICANT CHANGE UP (ref 32–36)
MCV RBC AUTO: 89.4 FL — SIGNIFICANT CHANGE UP (ref 80–100)
MONOCYTES # BLD AUTO: 0.4 K/UL — SIGNIFICANT CHANGE UP (ref 0–0.9)
MONOCYTES NFR BLD AUTO: 2.9 % — SIGNIFICANT CHANGE UP (ref 2–14)
NEUTROPHILS # BLD AUTO: 12.92 K/UL — HIGH (ref 1.8–7.4)
NEUTROPHILS NFR BLD AUTO: 92.9 % — HIGH (ref 43–77)
NRBC # BLD: 0 /100 WBCS — SIGNIFICANT CHANGE UP (ref 0–0)
PHOSPHATE SERPL-MCNC: 6.7 MG/DL — HIGH (ref 2.5–4.5)
PLATELET # BLD AUTO: 107 K/UL — LOW (ref 150–400)
POTASSIUM SERPL-MCNC: 4.7 MMOL/L — SIGNIFICANT CHANGE UP (ref 3.5–5.3)
POTASSIUM SERPL-MCNC: 5.4 MMOL/L — HIGH (ref 3.5–5.3)
POTASSIUM SERPL-SCNC: 4.7 MMOL/L — SIGNIFICANT CHANGE UP (ref 3.5–5.3)
POTASSIUM SERPL-SCNC: 5.4 MMOL/L — HIGH (ref 3.5–5.3)
PROT SERPL-MCNC: 5.1 G/DL — LOW (ref 6–8.3)
PROTHROM AB SERPL-ACNC: 13.3 SEC — HIGH (ref 10–12.9)
RBC # BLD: 3.4 M/UL — LOW (ref 4.2–5.8)
RBC # FLD: 14.8 % — HIGH (ref 10.3–14.5)
SODIUM SERPL-SCNC: 143 MMOL/L — SIGNIFICANT CHANGE UP (ref 135–145)
SODIUM SERPL-SCNC: 145 MMOL/L — SIGNIFICANT CHANGE UP (ref 135–145)
WBC # BLD: 13.89 K/UL — HIGH (ref 3.8–10.5)
WBC # FLD AUTO: 13.89 K/UL — HIGH (ref 3.8–10.5)

## 2020-02-08 PROCEDURE — 99233 SBSQ HOSP IP/OBS HIGH 50: CPT

## 2020-02-08 PROCEDURE — 76604 US EXAM CHEST: CPT | Mod: 26

## 2020-02-08 PROCEDURE — 99291 CRITICAL CARE FIRST HOUR: CPT

## 2020-02-08 PROCEDURE — 99232 SBSQ HOSP IP/OBS MODERATE 35: CPT | Mod: 25

## 2020-02-08 RX ORDER — CALCIUM GLUCONATE 100 MG/ML
1 VIAL (ML) INTRAVENOUS ONCE
Refills: 0 | Status: COMPLETED | OUTPATIENT
Start: 2020-02-08 | End: 2020-02-08

## 2020-02-08 RX ORDER — INSULIN HUMAN 100 [IU]/ML
5 INJECTION, SOLUTION SUBCUTANEOUS ONCE
Refills: 0 | Status: COMPLETED | OUTPATIENT
Start: 2020-02-08 | End: 2020-02-08

## 2020-02-08 RX ORDER — DIGOXIN 250 MCG
0.12 TABLET ORAL EVERY OTHER DAY
Refills: 0 | Status: DISCONTINUED | OUTPATIENT
Start: 2020-02-08 | End: 2020-02-09

## 2020-02-08 RX ORDER — DEXTROSE 50 % IN WATER 50 %
50 SYRINGE (ML) INTRAVENOUS ONCE
Refills: 0 | Status: COMPLETED | OUTPATIENT
Start: 2020-02-08 | End: 2020-02-08

## 2020-02-08 RX ORDER — SODIUM BICARBONATE 1 MEQ/ML
50 SYRINGE (ML) INTRAVENOUS ONCE
Refills: 0 | Status: COMPLETED | OUTPATIENT
Start: 2020-02-08 | End: 2020-02-08

## 2020-02-08 RX ORDER — HYDROMORPHONE HYDROCHLORIDE 2 MG/ML
0.5 INJECTION INTRAMUSCULAR; INTRAVENOUS; SUBCUTANEOUS ONCE
Refills: 0 | Status: DISCONTINUED | OUTPATIENT
Start: 2020-02-08 | End: 2020-02-08

## 2020-02-08 RX ORDER — CALCIUM GLUCONATE 100 MG/ML
1 VIAL (ML) INTRAVENOUS ONCE
Refills: 0 | Status: DISCONTINUED | OUTPATIENT
Start: 2020-02-08 | End: 2020-02-08

## 2020-02-08 RX ORDER — INSULIN HUMAN 100 [IU]/ML
10 INJECTION, SOLUTION SUBCUTANEOUS ONCE
Refills: 0 | Status: DISCONTINUED | OUTPATIENT
Start: 2020-02-08 | End: 2020-02-08

## 2020-02-08 RX ADMIN — HYDROMORPHONE HYDROCHLORIDE 0.5 MILLIGRAM(S): 2 INJECTION INTRAMUSCULAR; INTRAVENOUS; SUBCUTANEOUS at 11:25

## 2020-02-08 RX ADMIN — SODIUM CHLORIDE 4 MILLILITER(S): 9 INJECTION INTRAMUSCULAR; INTRAVENOUS; SUBCUTANEOUS at 01:51

## 2020-02-08 RX ADMIN — Medication 0.12 MILLIGRAM(S): at 12:21

## 2020-02-08 RX ADMIN — Medication 50 MILLILITER(S): at 08:13

## 2020-02-08 RX ADMIN — Medication 3 MILLILITER(S): at 20:17

## 2020-02-08 RX ADMIN — Medication 3 MILLILITER(S): at 14:38

## 2020-02-08 RX ADMIN — HYDROMORPHONE HYDROCHLORIDE 0.5 MILLIGRAM(S): 2 INJECTION INTRAMUSCULAR; INTRAVENOUS; SUBCUTANEOUS at 11:28

## 2020-02-08 RX ADMIN — Medication 50 MILLIEQUIVALENT(S): at 08:12

## 2020-02-08 RX ADMIN — Medication 3 MILLILITER(S): at 08:45

## 2020-02-08 RX ADMIN — INSULIN HUMAN 5 UNIT(S): 100 INJECTION, SOLUTION SUBCUTANEOUS at 08:32

## 2020-02-08 RX ADMIN — HYDROMORPHONE HYDROCHLORIDE 0.25 MILLIGRAM(S): 2 INJECTION INTRAMUSCULAR; INTRAVENOUS; SUBCUTANEOUS at 23:35

## 2020-02-08 RX ADMIN — HEPARIN SODIUM 700 UNIT(S)/HR: 5000 INJECTION INTRAVENOUS; SUBCUTANEOUS at 07:20

## 2020-02-08 RX ADMIN — Medication 100 GRAM(S): at 08:49

## 2020-02-08 RX ADMIN — HYDROMORPHONE HYDROCHLORIDE 0.25 MILLIGRAM(S): 2 INJECTION INTRAMUSCULAR; INTRAVENOUS; SUBCUTANEOUS at 18:46

## 2020-02-08 RX ADMIN — HEPARIN SODIUM 700 UNIT(S)/HR: 5000 INJECTION INTRAVENOUS; SUBCUTANEOUS at 00:04

## 2020-02-08 RX ADMIN — HYDROMORPHONE HYDROCHLORIDE 0.25 MILLIGRAM(S): 2 INJECTION INTRAMUSCULAR; INTRAVENOUS; SUBCUTANEOUS at 18:42

## 2020-02-08 RX ADMIN — SODIUM CHLORIDE 4 MILLILITER(S): 9 INJECTION INTRAMUSCULAR; INTRAVENOUS; SUBCUTANEOUS at 20:17

## 2020-02-08 RX ADMIN — SODIUM CHLORIDE 4 MILLILITER(S): 9 INJECTION INTRAMUSCULAR; INTRAVENOUS; SUBCUTANEOUS at 08:47

## 2020-02-08 RX ADMIN — Medication 3 MILLILITER(S): at 01:51

## 2020-02-08 RX ADMIN — SODIUM CHLORIDE 4 MILLILITER(S): 9 INJECTION INTRAMUSCULAR; INTRAVENOUS; SUBCUTANEOUS at 14:38

## 2020-02-08 RX ADMIN — LATANOPROST 1 DROP(S): 0.05 SOLUTION/ DROPS OPHTHALMIC; TOPICAL at 23:36

## 2020-02-08 NOTE — PROGRESS NOTE ADULT - ASSESSMENT
Acute on CKD Stage 3  Metabolic Acidosis  Hyperkalemia  Sepsis/PNA  Dehydration  Hyperphosphatemia      -GERMAN is likely due to dehydration along with septic ATN. GERMAN now is improving slowly   -FeUrea 31%  -Renal sonogram reviewed showing no evidence of Hydro, but echogenic kidneys noted  -Kayexalate per ICU  -Abx per ID; renal dosing  -Monitor urine output; poor output documented  -No acute indication for dialysis; poor candidate especially with Lung Ca and overall debility. Dialysis not recommended  -Pulm follow up noted  -Hospice eval  -Added renvela  -family meeting     D/W MICU  D/W heme/onc    Thank you

## 2020-02-08 NOTE — PROGRESS NOTE ADULT - SUBJECTIVE AND OBJECTIVE BOX
[facility] - Aziza Valdivia, Edmond Cisneros, Melanie, Randee Qureshi  Office Number:  [phone]    Patient resting comfortably in bed in NAD.  Lethargic and minimally responsive in bed.  Family considering comfort measures.  Converted to NSR overnight.    F/U for:  AF    Telemetry:  AF to NSR    MEDICATIONS  (STANDING):  albuterol/ipratropium for Nebulization 3 milliLiter(s) Nebulizer every 6 hours  aMIOdarone    Tablet 200 milliGRAM(s) Oral daily  atorvastatin 20 milliGRAM(s) Oral at bedtime  bisacodyl 5 milliGRAM(s) Oral every 12 hours  digoxin     Tablet 0.125 milliGRAM(s) Oral every other day  dronabinol 5 milliGRAM(s) Oral every 12 hours  ezetimibe 10 milliGRAM(s) Oral daily  folic acid 1 milliGRAM(s) Oral daily  heparin  Infusion.  Unit(s)/Hr (11 mL/Hr) IV Continuous <Continuous>  latanoprost 0.005% Ophthalmic Solution 1 Drop(s) Both EYES at bedtime  midodrine 20 milliGRAM(s) Oral every 8 hours  pantoprazole    Tablet 40 milliGRAM(s) Oral before breakfast  phenylephrine    Infusion 1 MICROgram(s)/kG/Min (23.813 mL/Hr) IV Continuous <Continuous>  predniSONE   Tablet 20 milliGRAM(s) Oral daily  sevelamer carbonate 800 milliGRAM(s) Oral three times a day with meals  sodium chloride 3%  Inhalation 4 milliLiter(s) Inhalation every 6 hours    MEDICATIONS  (PRN):  guaiFENesin   Syrup  (Sugar-Free) 200 milliGRAM(s) Oral every 6 hours PRN Cough  HYDROmorphone  Injectable 0.25 milliGRAM(s) IV Push every 4 hours PRN pain, distress, suffering, dyspnea, palliative measures      Allergies    No Known Allergies    Intolerances        Vital Signs Last 24 Hrs  T(C): 36.7 (08 Feb 2020 04:00), Max: 36.7 (08 Feb 2020 04:00)  T(F): 98 (08 Feb 2020 04:00), Max: 98 (08 Feb 2020 04:00)  HR: 77 (08 Feb 2020 06:30) (68 - 134)  BP: 133/64 (08 Feb 2020 06:00) (76/56 - 148/70)  BP(mean): 92 (08 Feb 2020 06:00) (61 - 101)  RR: 15 (08 Feb 2020 06:30) (13 - 35)  SpO2: 100% (08 Feb 2020 06:30) (89% - 100%)    I&O's Summary    06 Feb 2020 07:01  -  07 Feb 2020 07:00  --------------------------------------------------------  IN: 2265.2 mL / OUT: 700 mL / NET: 1565.2 mL    07 Feb 2020 07:01  -  08 Feb 2020 06:48  --------------------------------------------------------  IN: 471 mL / OUT: 1300 mL / NET: -829 mL        ON EXAM:    General: NAD, awake and alert, lethargic  HEENT: Mucous membranes are dry, anicteric  Lungs: Non-labored, breath sounds are clear bilaterally, No wheezing, rales or rhonchi  Cardiovascular: Regular, S1 and S2, no murmurs, rubs, or gallops  Gastrointestinal: Bowel Sounds present, soft, nontender.   Lymph: No peripheral edema. No lymphadenopathy.  Skin: No rashes or ulcers  Psych:  Unable to properly assess  LABS: All Labs Reviewed:                        9.9    13.89 )-----------( 107      ( 08 Feb 2020 06:06 )             30.4                         10.6   20.88 )-----------( 160      ( 07 Feb 2020 06:01 )             33.3                         11.1   21.95 )-----------( 168      ( 06 Feb 2020 21:23 )             33.7     08 Feb 2020 06:06    143    |  112    |  121    ----------------------------<  74     5.4     |  17     |  6.00   07 Feb 2020 06:01    141    |  110    |  118    ----------------------------<  93     5.1     |  18     |  6.20   06 Feb 2020 04:44    141    |  108    |  121    ----------------------------<  120    5.1     |  20     |  6.30     Ca    7.7        08 Feb 2020 06:06  Ca    8.2        07 Feb 2020 06:01  Ca    8.2        06 Feb 2020 04:44  Phos  6.7       08 Feb 2020 06:06  Phos  6.6       07 Feb 2020 06:01  Phos  7.1       06 Feb 2020 04:44  Mg     2.3       08 Feb 2020 06:06  Mg     2.4       07 Feb 2020 06:01  Mg     2.4       06 Feb 2020 04:44    TPro  5.1    /  Alb  2.0    /  TBili  0.5    /  DBili  x      /  AST  19     /  ALT  20     /  AlkPhos  88     08 Feb 2020 06:06  TPro  5.3    /  Alb  2.2    /  TBili  0.6    /  DBili  x      /  AST  24     /  ALT  24     /  AlkPhos  103    07 Feb 2020 06:01  TPro  5.3    /  Alb  2.1    /  TBili  0.5    /  DBili  x      /  AST  22     /  ALT  24     /  AlkPhos  104    05 Feb 2020 22:12    PT/INR - ( 08 Feb 2020 06:06 )   PT: 13.3 sec;   INR: 1.18 ratio         PTT - ( 08 Feb 2020 06:06 )  PTT:82.3 sec  CARDIAC MARKERS ( 07 Feb 2020 12:54 )  .104 ng/mL / x     / x     / x     / x      CARDIAC MARKERS ( 07 Feb 2020 06:01 )  .101 ng/mL / x     / 46 U/L / x     / 2.6 ng/mL  CARDIAC MARKERS ( 06 Feb 2020 16:45 )  .094 ng/mL / x     / 40 U/L / x     / 2.8 ng/mL  CARDIAC MARKERS ( 06 Feb 2020 10:30 )  .095 ng/mL / x     / 40 U/L / x     / 2.8 ng/mL      Blood Culture:

## 2020-02-08 NOTE — PROGRESS NOTE ADULT - SUBJECTIVE AND OBJECTIVE BOX
Date/Time Patient Seen:  		  Referring MD:   Data Reviewed	       Patient is a 86y old  Male who presents with a chief complaint of shortness of breath (08 Feb 2020 06:48)      Subjective/HPI     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  S/P skin neoplasm resection  Hip pain: Right hip replacement in 2008  FH: CABG (coronary artery bypass surgery): 1994 (double)        Medication list         MEDICATIONS  (STANDING):  albuterol/ipratropium for Nebulization 3 milliLiter(s) Nebulizer every 6 hours  aMIOdarone    Tablet 200 milliGRAM(s) Oral daily  atorvastatin 20 milliGRAM(s) Oral at bedtime  bisacodyl 5 milliGRAM(s) Oral every 12 hours  digoxin     Tablet 0.125 milliGRAM(s) Oral every other day  dronabinol 5 milliGRAM(s) Oral every 12 hours  ezetimibe 10 milliGRAM(s) Oral daily  folic acid 1 milliGRAM(s) Oral daily  heparin  Infusion.  Unit(s)/Hr (11 mL/Hr) IV Continuous <Continuous>  latanoprost 0.005% Ophthalmic Solution 1 Drop(s) Both EYES at bedtime  midodrine 20 milliGRAM(s) Oral every 8 hours  pantoprazole    Tablet 40 milliGRAM(s) Oral before breakfast  phenylephrine    Infusion 1 MICROgram(s)/kG/Min (23.813 mL/Hr) IV Continuous <Continuous>  predniSONE   Tablet 20 milliGRAM(s) Oral daily  sevelamer carbonate 800 milliGRAM(s) Oral three times a day with meals  sodium chloride 3%  Inhalation 4 milliLiter(s) Inhalation every 6 hours    MEDICATIONS  (PRN):  guaiFENesin   Syrup  (Sugar-Free) 200 milliGRAM(s) Oral every 6 hours PRN Cough  HYDROmorphone  Injectable 0.25 milliGRAM(s) IV Push every 4 hours PRN pain, distress, suffering, dyspnea, palliative measures         Vitals log        ICU Vital Signs Last 24 Hrs  T(C): 36.7 (08 Feb 2020 04:00), Max: 36.7 (08 Feb 2020 04:00)  T(F): 98 (08 Feb 2020 04:00), Max: 98 (08 Feb 2020 04:00)  HR: 77 (08 Feb 2020 06:30) (68 - 134)  BP: 127/67 (08 Feb 2020 06:30) (76/56 - 148/70)  BP(mean): 91 (08 Feb 2020 06:30) (61 - 101)  ABP: --  ABP(mean): --  RR: 15 (08 Feb 2020 06:30) (13 - 35)  SpO2: 100% (08 Feb 2020 06:30) (89% - 100%)           Input and Output:  I&O's Detail    07 Feb 2020 07:01  -  08 Feb 2020 07:00  --------------------------------------------------------  IN:    heparin  Infusion.: 163 mL    Oral Fluid: 240 mL    phenylephrine   Infusion: 24 mL    phenylephrine   Infusion: 44 mL  Total IN: 471 mL    OUT:    Voided: 1300 mL  Total OUT: 1300 mL    Total NET: -829 mL          Lab Data                        9.9    13.89 )-----------( 107      ( 08 Feb 2020 06:06 )             30.4     02-08    143  |  112<H>  |  121<H>  ----------------------------<  74  5.4<H>   |  17<L>  |  6.00<H>    Ca    7.7<L>      08 Feb 2020 06:06  Phos  6.7     02-08  Mg     2.3     02-08    TPro  5.1<L>  /  Alb  2.0<L>  /  TBili  0.5  /  DBili  x   /  AST  19  /  ALT  20  /  AlkPhos  88  02-08      CARDIAC MARKERS ( 07 Feb 2020 12:54 )  .104 ng/mL / x     / x     / x     / x      CARDIAC MARKERS ( 07 Feb 2020 06:01 )  .101 ng/mL / x     / 46 U/L / x     / 2.6 ng/mL  CARDIAC MARKERS ( 06 Feb 2020 16:45 )  .094 ng/mL / x     / 40 U/L / x     / 2.8 ng/mL  CARDIAC MARKERS ( 06 Feb 2020 10:30 )  .095 ng/mL / x     / 40 U/L / x     / 2.8 ng/mL        Review of Systems	      Objective     Physical Examination    heart s1s2  lung dc BS  abd soft  head nc  poor dentition  on o2 support        Pertinent Lab findings & Imaging      Jose Guadalupe:  NO   Adequate UO     I&O's Detail    07 Feb 2020 07:01  -  08 Feb 2020 07:00  --------------------------------------------------------  IN:    heparin  Infusion.: 163 mL    Oral Fluid: 240 mL    phenylephrine   Infusion: 24 mL    phenylephrine   Infusion: 44 mL  Total IN: 471 mL    OUT:    Voided: 1300 mL  Total OUT: 1300 mL    Total NET: -829 mL               Discussed with:     Cultures:	        Radiology

## 2020-02-08 NOTE — PROGRESS NOTE ADULT - PROBLEM SELECTOR PLAN 1
cardio follow up reviewed this am   family is leaning toward comfort care  pt remains NPO - failed swallow eval  on Amio  resp distress - lung ca with mets - progressive disease - multifocal pna - s/p ABX  CKD and GERMAN - Dehydration - s/p IVF  Renal EVAL noted - replete lytes and serial renal indices - avoid nephrotoxins - not a good candidate for Dialysis   cachexia and mets ca progression - hospice appropriate -   COPD - Emphysema - NEBS and Steroids (taper in progress) - Hypertonic Saline NEBS  pt was on Keytruda in the past - currently off - as per Family - Follows with Dr. ADAMSON Onc in [place] Office -   prognosis is very poor - pt is DNR DNI  discussed plan of care with family - hospice discussion ongoing -   CT chest reviewed  Dilaudid 0.25 mg IVP prn for resp distress

## 2020-02-08 NOTE — PROGRESS NOTE ADULT - ASSESSMENT
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 Admit for acute hypoxic respiratory failure likely 2/2 PNA in setting of cancer also noted to have GERMAN.    2/6/2020: Now in ICU after sustained RRT called for sustained WCT and hypotension, s/p amio loads x2, lidocaine push. Now on pressors, lidocaine and amio drips, currently in Afib with rates in 120s. Upon review of EKG from time of rapid, doubt true Vtach. Rate is irregular, no evidence of AV dissociation with no change in axis; patient does meet brugada criteria. Most likely  Afib with aberrancy.  Now converted to NSR overnight    Afib   - Failed swallow evaluation.  Not on any oral or IV medications for AF  - Need to clarify goals of care with family.  IF he goes comfort, no further therapy.  IF not, should place an NGT and start amiodarone 200 POQD  - Troponin elevation with normal CKs likely demand ischemia in the setting of Afib with RVR  - Continue heparin gtt  - Hold digoxin for now.  CHeck level  - Off esrgio.  eventural bb    Shock  - cardiogenic vs septic shock  - Currently off pressors  - Repeat echo  - Currently on midodrine    Acute hypoxic respiratory failure in setting of advanced lung cancer with mets with multifocal pneumonia  - continue steroids  - continue nebs  - s/p ketruda 6 rounds-now off  - continue cefepime IV ABX      GOALS OF CARE:  -Patient is DNR/DNI, poor prognosis, hospice referral done as per primary team    - keep K>4, monitor for hyperkalemia  - mag >2   - echo reviewed, EF 55-60, grossly normal LV  - All other care per ICU  - Will follow

## 2020-02-08 NOTE — PROGRESS NOTE ADULT - ASSESSMENT
86M h/o CAD, HLD, HTN, CKD III, neoplasm of R scapula, lung Ca on Keytuda, COPD (not on home O2) initially a/w SOB 2/2 PNA, s/p RRT o/n for rapid AFib with aberrancy s/p amio load and lidocaine, requiring sergio gtt for pressure support, now with conversion back to sinus rhythm, off pressors and with subjective dyspnea    Neuro: prn pain control  CV: AFib now converted to sinus rhythm, will c/w every other day digoxin dosing, check level in AM, unable to give Amio at this time as NPO for failed S/S and pt does not eish NG/PEG tube; elevated troponin likely 2/2 rapid rate, demand ischemia and renal impairment, level appears stable now, will continue to trend; continue midodrine for MAP > 65; c/w heparin gtt for full AC for now, unable to dose coumadin since cannot take PO   Pulm: O2 levels stable on nasal canula, will give dialudid prn for dyspnea; c/w duonebs and hypersal nebs, guanifisen for cough; no plans to restart keytruda or other chemo agents  GI: pt NPO after failed S/S eval, after discussion with pt he WOULD NOT want PEG or NGT placement, does not wish pleasure feeds at this time; will maintain NPO for now; c/w protonix; c/w dronabinol, bowel regimen   Renal: remains in oliguric renal failure, with uptrending K+, bladder US performed, straight cath to be done for residual urine; not HD candidate due to metastatic disease continue sevelemar, trend UOP and electrolytes  ID: completed course of cefepime and monitor off abx  Heme: started on full AC with heparin gtt 2/2 AFib, unable to transition to coumadin since cannot take PO   Dispo: pt DNR/DNI, would likely be good candidate for home hospice as onc with no plan to continue chemo and pt with progression of metastatic disease on recent CT, will continue discussion with pt and his family when they arrive today, though he seems amenable to pursuiong palliative care rather than aggressive treatment measures.

## 2020-02-08 NOTE — PROGRESS NOTE ADULT - SUBJECTIVE AND OBJECTIVE BOX
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 Pt states he has baseline HADLEY, but dyspea has increased in the last week. Of note he is in week 6 of chemo treatment keytruda, gets chemo every 3 weeks, tolerating chemo appropriately. Pt also has been urinating less for the last 4 days. Pt denies ever having dialysis. Pt denies fever, chills, chest pain, orthopnea, dysuria, hematuria, diarrhea, melena, hematochezia.     ----  INTERVAL HPI/OVERNIGHT EVENTS: Pt seen and evaluated at the bedside. No acute overnight events occurred. No complaints or concerns. Pt is awake and interactive but a limited historian. He feels a little confused but is awake and interactive. NPO as pt  Failed swallow eval. 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  Hip pain: Right hip replacement in 2008  FH: CABG (coronary artery bypass surgery): 1994 (double)      FAMILY HISTORY:  No pertinent family history in first degree relatives      Allergies    No Known Allergies    Intolerances        ----  REVIEW OF SYSTEMS:  CONSTITUTIONAL: admits feeling "okay" no acue complaints at this time  HEENT: denies blurred vision, sore throat  SKIN: denies new lesions, rash  CARDIOVASCULAR: denies chest pain, chest pressure, palpitations  RESPIRATORY: denies shortness of breath, sputum production  GASTROINTESTINAL: feels a little more hungry today but failed bedside swallow evaluation  GENITOURINARY: denies dysuria, discharge  NEUROLOGICAL: denies numbness, headache, focal weakness  MUSCULOSKELETAL: denies new joint pain, muscle aches  HEMATOLOGIC: denies gross bleeding, bruising  LYMPHATICS: denies enlarged lymph nodes, denies exremity edema    ----  PHYSICAL EXAM:  GENERAL: patient appears chronically ill but no acute distress   ENMT: oropharynx clear without erythema, dry mucous membranes, poor dentition  LUNGS: diminished breath sounds b/l, coarse breath sounds in R upper chest  HEART: soft S1/S2, regular rate and rhythm, systolic murmur noted, 2+ dependent pitting edema to b/l LE  GASTROINTESTINAL: abdomen is soft, nontender, nondistended, normoactive bowel sounds, no palpable masses  INTEGUMENT: diffuse ecchymoses of b/l UE and LE  MUSCULOSKELETAL: no clubbing or cyanosis, no obvious deformity, LUE swelling at IV site without warmth or tenderness to palpation  NEUROLOGIC: awake, alert, oriented x3 but poor insight, limited insight, good muscle tone in 4 extremities, no reported sensory deficit on 4 extremities  HEME/LYMPH: no palpable supraclavicular nodules    ICU Vital Signs Last 24 Hrs  T(C): 36.3 (08 Feb 2020 11:50), Max: 36.7 (08 Feb 2020 04:00)  T(F): 97.3 (08 Feb 2020 11:50), Max: 98 (08 Feb 2020 04:00)  HR: 81 (08 Feb 2020 11:30) (68 - 134)  BP: 133/63 (08 Feb 2020 11:30) (77/56 - 160/70)  BP(mean): 90 (08 Feb 2020 11:30) (61 - 101)  ABP: --  ABP(mean): --  RR: 19 (08 Feb 2020 11:30) (13 - 60)  SpO2: 100% (08 Feb 2020 11:30) (89% - 100%)      LABS:                        9.9    13.89 )-----------( 107      ( 08 Feb 2020 06:06 )             30.4     08 Feb 2020 06:06    143    |  112    |  121    ----------------------------<  74     5.4     |  17     |  6.00     Ca    7.7        08 Feb 2020 06:06  Phos  6.7       08 Feb 2020 06:06  Mg     2.3       08 Feb 2020 06:06    TPro  5.1    /  Alb  2.0    /  TBili  0.5    /  DBili  x      /  AST  19     /  ALT  20     /  AlkPhos  88     08 Feb 2020 06:06    PT/INR - ( 08 Feb 2020 06:06 )   PT: 13.3 sec;   INR: 1.18 ratio         PTT - ( 08 Feb 2020 06:06 )  PTT:82.3 sec    CAPILLARY BLOOD GLUCOSE      POCT Blood Glucose.: 231 mg/dL (08 Feb 2020 08:30)    UCx       RADIOLOGY & ADDITIONAL TESTS:            ----  Personally reviewed:  Vital sign trends: [ x ] yes    [  ] no     [  ] n/a  Laboratory results: [ x ] yes    [  ] no     [  ] n/a  Radiology results: [ x ] yes    [  ] no     [  ] n/a  Culture results: [ x ] yes    [  ] no     [  ] n/a  Consultant recommendations: [ x ] yes    [  ] no     [  ] n/a

## 2020-02-08 NOTE — PROGRESS NOTE ADULT - SUBJECTIVE AND OBJECTIVE BOX
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 Pt states he has baseline HADLEY, but dyspnea has increased in the last week. Of note he is in week 6 of chemo treatment keytruda, gets chemo every 3 weeks, tolerating chemo appropriately. Pt also has been urinating less for the last 4 days. Pt denies ever having dialysis. Pt denies fever, chills, chest pain, orthopnea, dysuria, hematuria, diarrhea, melena, hematochezia.   Renal consulted for GERMAN. Per family, patient had GERMAN a year ago when he previously had PNA. However, it improved after the PNA was treated. Patient admits to decreased urination. Also with poor intake.     States his breathing is improved compared to yesterday, States he is not urinating much.      Follow up Acute on CKD stage 3  No acute complaints this morning; still with poor appetite,  No N/V.  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  Hip pain: Right hip replacement in   FH: CABG (coronary artery bypass surgery):  (double)       FAMILY HISTORY:  No pertinent family history in first degree relatives  [place]    Social History:Non smoker    MEDICATIONS  (STANDING):  albuterol/ipratropium for Nebulization 3 milliLiter(s) Nebulizer every 6 hours  cefepime   IVPB 500 milliGRAM(s) IV Intermittent every 24 hours  heparin  Injectable 5000 Unit(s) SubCutaneous every 12 hours  methylPREDNISolone sodium succinate Injectable 20 milliGRAM(s) IV Push every 8 hours  pantoprazole    Tablet 40 milliGRAM(s) Oral before breakfast  sodium chloride 0.45% 1000 milliLiter(s) (75 mL/Hr) IV Continuous <Continuous>    MEDICATIONS  (PRN):  guaiFENesin   Syrup  (Sugar-Free) 200 milliGRAM(s) Oral every 6 hours PRN Cough  HYDROmorphone  Injectable 0.25 milliGRAM(s) IV Push every 4 hours PRN pain, distress, suffering, dyspnea, palliative measures   Meds reviewed    Allergies    No Known Allergies    Intolerances         REVIEW OF SYSTEMS:    CONSTITUTIONAL:  No weight loss , +Poor intake  EYES: No eye pain, visual disturbances, or discharge  ENMT:  No difficulty hearing, tinnitus, vertigo; No sinus or throat pain  NECK: No pain or stiffness  BREASTS: No pain, masses, or nipple discharge  RESPIRATORY: no SOB, no coughing  CARDIOVASCULAR: No chest pain, palpitations, dizziness,   GASTROINTESTINAL: No abdominal or epigastric pain. No nausea, vomiting, or hematemesis; No diarrhea or constipation. No melena   GENITOURINARY: No dysuria, frequency, hematuria, or incontinence  NEUROLOGICAL: No headaches, memory loss, loss of strength, numbness, or tremors  SKIN: No Diffuse erythema, no blisters  LYMPH NODES: No enlarged glands  ENDOCRINE: No heat or cold intolerance; No hair loss  MUSCULOSKELETAL: No joint pain or swelling   PSYCHIATRIC: No depression, anxiety, mood swings, or difficulty sleeping  HEME/LYMPH: No easy bruising, or bleeding gums  ALLERGY AND IMMUNOLOGIC: No hives or eczema      Vital Signs Last 24 Hrs  T(C): 36.3 (2020 17:48), Max: 36.9 (2020 16:36)  T(F): 97.3 (2020 17:48), Max: 98.4 (2020 16:36)  HR: 99 (2020 17:48) (92 - 99)  BP: 112/66 (2020 17:48) (108/58 - 124/72)  BP(mean): --  RR: 17 (2020 17:48) (17 - 20)  SpO2: 90% (2020 17:48) (84% - 96%)  Daily Height in cm: 182.88 (2020 12:13)    Daily Weight in k.3 (2020 17:48)    PHYSICAL EXAM:    GENERAL: No Distress  HEAD:  Atraumatic, Normocephalic  EYES: EOMI, conjunctiva and sclera clear  ENMT: No tonsillar erythema, exudates, or enlargement; dry mucous membranes, NECK: Supple, neck veins full  NERVOUS SYSTEM:  Alert & Oriented X3, Good concentration;   CHEST/LUNG: Reduced basilar breath sounds  HEART: Regular rate and rhythm; No murmurs, rubs, or gallops  ABDOMEN: Soft, Nontender, Nondistended; Bowel sounds present, No hepatomegaly  EXTREMITIES: trace Edema  SKIN: No rashes or lesions, dry; poor turgor      LABS:  Reviewed

## 2020-02-08 NOTE — PROGRESS NOTE ADULT - SUBJECTIVE AND OBJECTIVE BOX
Patient seen and examined;  Chart reviewed and events noted;   resting comfortably    MEDICATIONS  (STANDING):  albuterol/ipratropium for Nebulization 3 milliLiter(s) Nebulizer every 6 hours  aMIOdarone    Tablet 200 milliGRAM(s) Oral daily  atorvastatin 20 milliGRAM(s) Oral at bedtime  bisacodyl 5 milliGRAM(s) Oral every 12 hours  digoxin  Injectable 0.125 milliGRAM(s) IV Push every other day  dronabinol 5 milliGRAM(s) Oral every 12 hours  ezetimibe 10 milliGRAM(s) Oral daily  folic acid 1 milliGRAM(s) Oral daily  heparin  Infusion.  Unit(s)/Hr (11 mL/Hr) IV Continuous <Continuous>  latanoprost 0.005% Ophthalmic Solution 1 Drop(s) Both EYES at bedtime  midodrine 20 milliGRAM(s) Oral every 8 hours  pantoprazole    Tablet 40 milliGRAM(s) Oral before breakfast  predniSONE   Tablet 20 milliGRAM(s) Oral daily  sevelamer carbonate 800 milliGRAM(s) Oral three times a day with meals  sodium chloride 3%  Inhalation 4 milliLiter(s) Inhalation every 6 hours    MEDICATIONS  (PRN):  guaiFENesin   Syrup  (Sugar-Free) 200 milliGRAM(s) Oral every 6 hours PRN Cough  HYDROmorphone  Injectable 0.25 milliGRAM(s) IV Push every 4 hours PRN pain, distress, suffering, dyspnea, palliative measures      Vital Signs Last 24 Hrs  T(C): 36.3 (08 Feb 2020 11:50), Max: 36.7 (08 Feb 2020 04:00)  T(F): 97.3 (08 Feb 2020 11:50), Max: 98 (08 Feb 2020 04:00)  HR: 85 (08 Feb 2020 15:30) (68 - 134)  BP: 136/59 (08 Feb 2020 15:30) (85/55 - 160/70)  BP(mean): 85 (08 Feb 2020 15:30) (61 - 106)  RR: 15 (08 Feb 2020 15:30) (13 - 60)  SpO2: 100% (08 Feb 2020 15:30) (89% - 100%)    PHYSICAL EXAM  General: adult frail elderly gentleman  HEENT: clear oropharynx, anicteric sclera, pink conjunctivae  Neck: supple  CV: normal S1S2 with no murmur rubs or gallops  Lungs: course breath sounds  Abdomen: soft non-tender non-distended, no hepato/splenomegaly  Ext: extensive bruising on bilateral LEs      LABS:                        9.9    13.89 )-----------( 107      ( 08 Feb 2020 06:06 )             30.4     02-08    145  |  112<H>  |  119<H>  ----------------------------<  69<L>  4.7   |  20<L>  |  5.90<H>    Ca    8.7      08 Feb 2020 15:05  Phos  6.7     02-08  Mg     2.3     02-08    TPro  5.1<L>  /  Alb  2.0<L>  /  TBili  0.5  /  DBili  x   /  AST  19  /  ALT  20  /  AlkPhos  88  02-08    PT/INR - ( 08 Feb 2020 06:06 )   PT: 13.3 sec;   INR: 1.18 ratio    PTT - ( 08 Feb 2020 06:06 )  PTT:82.3 sec

## 2020-02-08 NOTE — PROGRESS NOTE ADULT - ASSESSMENT
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 Resolved with steroids and resumed treatment with Pembrolizumab alone (stable disease on imaging in 10/2019 and 1/2020); admitted now with acute on chronic renal failure and further clinical decline      RECOMMENDATIONS  - Forgo further imaging and treatment for cancer as performance status has declined  - Unclear if hepatic and renal toxicities are associated with immunotherapy   - Comfort measures only - extensively discussed with patient's son and wife by Dr. Mahan on 2/7/20.   - case discussed with ICU attending Dr. Ta attending; will sign off but our group clearly available for any additional questions regarding patient's oncologic status

## 2020-02-08 NOTE — PROGRESS NOTE ADULT - PROBLEM SELECTOR PLAN 5
- was receiving chemotherapy [id number] prior to arrival  - will hold chemorx for now so as to not induce further immunosuppression in setting of active infection on IV abx  - pt is a poor candidate to continue chemo at this point. unlikely to tolerate moving fwd  - Follows with outpatient oncologist Dr. Mahan in [place]

## 2020-02-08 NOTE — PROGRESS NOTE ADULT - PROBLEM SELECTOR PLAN 7
- Poor prognosis, hospice eval appropriate  - Family not willing for PEG.   - Pt is DNR/DNI, MOLST completed

## 2020-02-08 NOTE — PROGRESS NOTE ADULT - SUBJECTIVE AND OBJECTIVE BOX
Interval events: No acute o/n events. Reports continued dyspnea. Failed S/S eval. Does not want PEG/NG tube placed. At this time, does not wish to eat / drink.    Review of Systems:  Constitutional: no fever, chills, fatigue  Neuro: no headache, numbness, weakness  Resp: dyspnea, cough, shortness of breath; no wheezing, shortness of breath  CVS: no chest pain, palpitations, leg swelling  GI: no abdominal pain, nausea, vomiting, diarrhea   : anuric  Skin: no itching, burning, rashes, or lesions   Msk: no joint pain or swelling      T(F): 97.4 (02-08-20 @ 08:16), Max: 98 (02-08-20 @ 04:00)  HR: 81 (02-08-20 @ 11:30) (68 - 134)  BP: 133/63 (02-08-20 @ 11:30) (77/56 - 160/70)  RR: 19 (02-08-20 @ 11:30) (13 - 60)  SpO2: 100% (02-08-20 @ 11:30) (89% - 100%)  Wt(kg): --        CAPILLARY BLOOD GLUCOSE      POCT Blood Glucose.: 231 mg/dL (08 Feb 2020 08:30)    I&O's Summary    07 Feb 2020 07:01  -  08 Feb 2020 07:00  --------------------------------------------------------  IN: 471 mL / OUT: 1400 mL / NET: -929 mL    08 Feb 2020 07:01  -  08 Feb 2020 12:11  --------------------------------------------------------  IN: 221 mL / OUT: 0 mL / NET: 221 mL        Physical Exam:     Gen: cachectic, resting in bed, subjective shortness of breath  HEENT: dry MMM  CV: rrr, no murmur  Pulm: course breath sounds b/l  GI: soft, nt, nd  Ext: 2+ b/l LE edema   Skin: UE ecchymosis with abrasions from blood draws; abrasion and ecchymosis to b/l LE with LLE w/ single fluid filled bullae left leg (> 3cm) now wrapped with clean and dry dressing      Meds:  heparin  Infusion.  Unit(s)/Hr IV Continuous <Continuous>  aMIOdarone    Tablet 200 milliGRAM(s) Oral daily  digoxin  Injectable 0.125 milliGRAM(s) IV Push every other day  midodrine 20 milliGRAM(s) Oral every 8 hours  atorvastatin 20 milliGRAM(s) Oral at bedtime  ezetimibe 10 milliGRAM(s) Oral daily  predniSONE   Tablet 20 milliGRAM(s) Oral daily  albuterol/ipratropium for Nebulization 3 milliLiter(s) Nebulizer every 6 hours  guaiFENesin   Syrup  (Sugar-Free) 200 milliGRAM(s) Oral every 6 hours PRN  sodium chloride 3%  Inhalation 4 milliLiter(s) Inhalation every 6 hours  dronabinol 5 milliGRAM(s) Oral every 12 hours  HYDROmorphone  Injectable 0.25 milliGRAM(s) IV Push every 4 hours PRN  bisacodyl 5 milliGRAM(s) Oral every 12 hours  pantoprazole    Tablet 40 milliGRAM(s) Oral before breakfast  folic acid 1 milliGRAM(s) Oral daily  latanoprost 0.005% Ophthalmic Solution 1 Drop(s) Both EYES at bedtime  sevelamer carbonate 800 milliGRAM(s) Oral three times a day with meals                                9.9    13.89 )-----------( 107      ( 08 Feb 2020 06:06 )             30.4       02-08    143  |  112<H>  |  121<H>  ----------------------------<  74  5.4<H>   |  17<L>  |  6.00<H>    Ca    7.7<L>      08 Feb 2020 06:06  Phos  6.7     02-08  Mg     2.3     02-08    TPro  5.1<L>  /  Alb  2.0<L>  /  TBili  0.5  /  DBili  x   /  AST  19  /  ALT  20  /  AlkPhos  88  02-08      CARDIAC MARKERS ( 07 Feb 2020 12:54 )  .104 ng/mL / x     / x     / x     / x      CARDIAC MARKERS ( 07 Feb 2020 06:01 )  .101 ng/mL / x     / 46 U/L / x     / 2.6 ng/mL  CARDIAC MARKERS ( 06 Feb 2020 16:45 )  .094 ng/mL / x     / 40 U/L / x     / 2.8 ng/mL      PT/INR - ( 08 Feb 2020 06:06 )   PT: 13.3 sec;   INR: 1.18 ratio         PTT - ( 08 Feb 2020 06:06 )  PTT:82.3 sec      CENTRAL LINE: N     FREIRE: Y/N      DATE INSERTED:        REMOVE: Y/N  A-LINE: N         GLOBAL ISSUE/BEST PRACTICE:  Analgesia: dialudid prn   Sedation: n/a  CAM-ICU: negative  HOB elevation: yes  Stress ulcer prophylaxis: n/a  VTE prophylaxis: heparin gtt  Glycemic control: n/a  Nutrition: NPO    CODE STATUS: DNR/DNI

## 2020-02-09 LAB
ALBUMIN SERPL ELPH-MCNC: 1.7 G/DL — LOW (ref 3.3–5)
ALP SERPL-CCNC: 91 U/L — SIGNIFICANT CHANGE UP (ref 40–120)
ALT FLD-CCNC: 20 U/L — SIGNIFICANT CHANGE UP (ref 12–78)
ANION GAP SERPL CALC-SCNC: 11 MMOL/L — SIGNIFICANT CHANGE UP (ref 5–17)
APTT BLD: 64.7 SEC — HIGH (ref 28.5–37)
AST SERPL-CCNC: 31 U/L — SIGNIFICANT CHANGE UP (ref 15–37)
BASOPHILS # BLD AUTO: 0.02 K/UL — SIGNIFICANT CHANGE UP (ref 0–0.2)
BASOPHILS NFR BLD AUTO: 0.1 % — SIGNIFICANT CHANGE UP (ref 0–2)
BILIRUB SERPL-MCNC: 0.6 MG/DL — SIGNIFICANT CHANGE UP (ref 0.2–1.2)
BUN SERPL-MCNC: 115 MG/DL — HIGH (ref 7–23)
CALCIUM SERPL-MCNC: 8.4 MG/DL — LOW (ref 8.5–10.1)
CHLORIDE SERPL-SCNC: 116 MMOL/L — HIGH (ref 96–108)
CO2 SERPL-SCNC: 18 MMOL/L — LOW (ref 22–31)
CREAT SERPL-MCNC: 5.9 MG/DL — HIGH (ref 0.5–1.3)
DIGOXIN SERPL-MCNC: 2.7 NG/ML — CRITICAL HIGH (ref 0.8–2)
EOSINOPHIL # BLD AUTO: 0 K/UL — SIGNIFICANT CHANGE UP (ref 0–0.5)
EOSINOPHIL NFR BLD AUTO: 0 % — SIGNIFICANT CHANGE UP (ref 0–6)
GLUCOSE SERPL-MCNC: 62 MG/DL — LOW (ref 70–99)
HCT VFR BLD CALC: 30.5 % — LOW (ref 39–50)
HGB BLD-MCNC: 10.1 G/DL — LOW (ref 13–17)
IMM GRANULOCYTES NFR BLD AUTO: 1 % — SIGNIFICANT CHANGE UP (ref 0–1.5)
LYMPHOCYTES # BLD AUTO: 0.8 K/UL — LOW (ref 1–3.3)
LYMPHOCYTES # BLD AUTO: 4.6 % — LOW (ref 13–44)
MAGNESIUM SERPL-MCNC: 2.9 MG/DL — HIGH (ref 1.6–2.6)
MCHC RBC-ENTMCNC: 29.6 PG — SIGNIFICANT CHANGE UP (ref 27–34)
MCHC RBC-ENTMCNC: 33.1 GM/DL — SIGNIFICANT CHANGE UP (ref 32–36)
MCV RBC AUTO: 89.4 FL — SIGNIFICANT CHANGE UP (ref 80–100)
MONOCYTES # BLD AUTO: 0.27 K/UL — SIGNIFICANT CHANGE UP (ref 0–0.9)
MONOCYTES NFR BLD AUTO: 1.5 % — LOW (ref 2–14)
NEUTROPHILS # BLD AUTO: 16.24 K/UL — HIGH (ref 1.8–7.4)
NEUTROPHILS NFR BLD AUTO: 92.8 % — HIGH (ref 43–77)
NRBC # BLD: 0 /100 WBCS — SIGNIFICANT CHANGE UP (ref 0–0)
PHOSPHATE SERPL-MCNC: 5.5 MG/DL — HIGH (ref 2.5–4.5)
PLATELET # BLD AUTO: 128 K/UL — LOW (ref 150–400)
POTASSIUM SERPL-MCNC: 5 MMOL/L — SIGNIFICANT CHANGE UP (ref 3.5–5.3)
POTASSIUM SERPL-SCNC: 5 MMOL/L — SIGNIFICANT CHANGE UP (ref 3.5–5.3)
PROT SERPL-MCNC: 5.1 G/DL — LOW (ref 6–8.3)
RBC # BLD: 3.41 M/UL — LOW (ref 4.2–5.8)
RBC # FLD: 14.9 % — HIGH (ref 10.3–14.5)
SODIUM SERPL-SCNC: 145 MMOL/L — SIGNIFICANT CHANGE UP (ref 135–145)
WBC # BLD: 17.51 K/UL — HIGH (ref 3.8–10.5)
WBC # FLD AUTO: 17.51 K/UL — HIGH (ref 3.8–10.5)

## 2020-02-09 PROCEDURE — 99233 SBSQ HOSP IP/OBS HIGH 50: CPT

## 2020-02-09 RX ORDER — AMIODARONE HYDROCHLORIDE 400 MG/1
0.5 TABLET ORAL
Qty: 900 | Refills: 0 | Status: DISCONTINUED | OUTPATIENT
Start: 2020-02-09 | End: 2020-02-10

## 2020-02-09 RX ORDER — HYDROMORPHONE HYDROCHLORIDE 2 MG/ML
0.5 INJECTION INTRAMUSCULAR; INTRAVENOUS; SUBCUTANEOUS
Refills: 0 | Status: DISCONTINUED | OUTPATIENT
Start: 2020-02-09 | End: 2020-02-12

## 2020-02-09 RX ORDER — MAGNESIUM SULFATE 500 MG/ML
2 VIAL (ML) INJECTION ONCE
Refills: 0 | Status: COMPLETED | OUTPATIENT
Start: 2020-02-09 | End: 2020-02-09

## 2020-02-09 RX ORDER — METOPROLOL TARTRATE 50 MG
5 TABLET ORAL ONCE
Refills: 0 | Status: COMPLETED | OUTPATIENT
Start: 2020-02-09 | End: 2020-02-09

## 2020-02-09 RX ADMIN — SODIUM CHLORIDE 4 MILLILITER(S): 9 INJECTION INTRAMUSCULAR; INTRAVENOUS; SUBCUTANEOUS at 19:41

## 2020-02-09 RX ADMIN — Medication 5 MILLIGRAM(S): at 03:34

## 2020-02-09 RX ADMIN — Medication 2 MILLIGRAM(S): at 03:21

## 2020-02-09 RX ADMIN — Medication 3 MILLILITER(S): at 07:58

## 2020-02-09 RX ADMIN — Medication 50 GRAM(S): at 03:25

## 2020-02-09 RX ADMIN — SODIUM CHLORIDE 4 MILLILITER(S): 9 INJECTION INTRAMUSCULAR; INTRAVENOUS; SUBCUTANEOUS at 01:42

## 2020-02-09 RX ADMIN — SODIUM CHLORIDE 4 MILLILITER(S): 9 INJECTION INTRAMUSCULAR; INTRAVENOUS; SUBCUTANEOUS at 07:58

## 2020-02-09 RX ADMIN — HYDROMORPHONE HYDROCHLORIDE 0.25 MILLIGRAM(S): 2 INJECTION INTRAMUSCULAR; INTRAVENOUS; SUBCUTANEOUS at 00:05

## 2020-02-09 RX ADMIN — Medication 5 MILLIGRAM(S): at 02:58

## 2020-02-09 RX ADMIN — HEPARIN SODIUM 700 UNIT(S)/HR: 5000 INJECTION INTRAVENOUS; SUBCUTANEOUS at 08:36

## 2020-02-09 RX ADMIN — SODIUM CHLORIDE 4 MILLILITER(S): 9 INJECTION INTRAMUSCULAR; INTRAVENOUS; SUBCUTANEOUS at 13:14

## 2020-02-09 RX ADMIN — HYDROMORPHONE HYDROCHLORIDE 0.5 MILLIGRAM(S): 2 INJECTION INTRAMUSCULAR; INTRAVENOUS; SUBCUTANEOUS at 14:40

## 2020-02-09 RX ADMIN — Medication 3 MILLILITER(S): at 13:13

## 2020-02-09 RX ADMIN — HYDROMORPHONE HYDROCHLORIDE 0.5 MILLIGRAM(S): 2 INJECTION INTRAMUSCULAR; INTRAVENOUS; SUBCUTANEOUS at 14:55

## 2020-02-09 RX ADMIN — Medication 3 MILLILITER(S): at 19:40

## 2020-02-09 RX ADMIN — LATANOPROST 1 DROP(S): 0.05 SOLUTION/ DROPS OPHTHALMIC; TOPICAL at 21:31

## 2020-02-09 RX ADMIN — AMIODARONE HYDROCHLORIDE 16.67 MG/MIN: 400 TABLET ORAL at 09:42

## 2020-02-09 RX ADMIN — Medication 3 MILLILITER(S): at 01:42

## 2020-02-09 NOTE — CHART NOTE - NSCHARTNOTEFT_GEN_A_CORE
Patient went into afib w/ RVR to 140's, also complaining of feeling anxious.   Given 5mg IV lopressor x2, 2mg IV Ativan and 2mg Magsulfate  BP remained stable throughout  Converted back to NSR.

## 2020-02-09 NOTE — PROGRESS NOTE ADULT - PROBLEM SELECTOR PLAN 5
- was receiving chemotherapy s2ckxep prior to arrival  - will hold chemorx for now so as to not induce further immunosuppression in setting of active infection on IV abx  - pt is a poor candidate to continue chemo at this point. unlikely to tolerate moving fwd  - Follows with outpatient oncologist Dr. Mahan in [place]

## 2020-02-09 NOTE — PROGRESS NOTE ADULT - SUBJECTIVE AND OBJECTIVE BOX
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 Pt states he has baseline HADLEY, but dyspnea has increased in the last week. Of note he is in week 6 of chemo treatment keytruda, gets chemo every 3 weeks, tolerating chemo appropriately. Pt also has been urinating less for the last 4 days. Pt denies ever having dialysis. Pt denies fever, chills, chest pain, orthopnea, dysuria, hematuria, diarrhea, melena, hematochezia.   Renal consulted for GERMAN. Per family, patient had GERMAN a year ago when he previously had PNA. However, it improved after the PNA was treated. Patient admits to decreased urination. Also with poor intake.     States his breathing is improved compared to yesterday, States he is not urinating much.      Follow up Acute on CKD stage 3  No acute complaints this morning; still with poor appetite,  No N/V.  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  Hip pain: Right hip replacement in   FH: CABG (coronary artery bypass surgery):  (double)       FAMILY HISTORY:  No pertinent family history in first degree relatives  [place]    Social History:Non smoker    MEDICATIONS  (STANDING):  albuterol/ipratropium for Nebulization 3 milliLiter(s) Nebulizer every 6 hours  cefepime   IVPB 500 milliGRAM(s) IV Intermittent every 24 hours  heparin  Injectable 5000 Unit(s) SubCutaneous every 12 hours  methylPREDNISolone sodium succinate Injectable 20 milliGRAM(s) IV Push every 8 hours  pantoprazole    Tablet 40 milliGRAM(s) Oral before breakfast  sodium chloride 0.45% 1000 milliLiter(s) (75 mL/Hr) IV Continuous <Continuous>    MEDICATIONS  (PRN):  guaiFENesin   Syrup  (Sugar-Free) 200 milliGRAM(s) Oral every 6 hours PRN Cough  HYDROmorphone  Injectable 0.25 milliGRAM(s) IV Push every 4 hours PRN pain, distress, suffering, dyspnea, palliative measures   Meds reviewed    Allergies    No Known Allergies    Intolerances         REVIEW OF SYSTEMS:    CONSTITUTIONAL:  No weight loss , +Poor intake  EYES: No eye pain, visual disturbances, or discharge  ENMT:  No difficulty hearing, tinnitus, vertigo; No sinus or throat pain  NECK: No pain or stiffness  BREASTS: No pain, masses, or nipple discharge  RESPIRATORY: no SOB, no coughing  CARDIOVASCULAR: No chest pain, palpitations, dizziness,   GASTROINTESTINAL: No abdominal or epigastric pain. No nausea, vomiting, or hematemesis; No diarrhea or constipation. No melena   GENITOURINARY: No dysuria, frequency, hematuria, or incontinence  NEUROLOGICAL: No headaches, memory loss, loss of strength, numbness, or tremors  SKIN: No Diffuse erythema, no blisters  LYMPH NODES: No enlarged glands  ENDOCRINE: No heat or cold intolerance; No hair loss  MUSCULOSKELETAL: No joint pain or swelling   PSYCHIATRIC: No depression, anxiety, mood swings, or difficulty sleeping  HEME/LYMPH: No easy bruising, or bleeding gums  ALLERGY AND IMMUNOLOGIC: No hives or eczema      Vital Signs Last 24 Hrs  T(C): 36.3 (2020 17:48), Max: 36.9 (2020 16:36)  T(F): 97.3 (2020 17:48), Max: 98.4 (2020 16:36)  HR: 99 (2020 17:48) (92 - 99)  BP: 112/66 (2020 17:48) (108/58 - 124/72)  BP(mean): --  RR: 17 (2020 17:48) (17 - 20)  SpO2: 90% (2020 17:48) (84% - 96%)  Daily Height in cm: 182.88 (2020 12:13)    Daily Weight in k.3 (2020 17:48)    PHYSICAL EXAM:    GENERAL: No Distress  HEAD:  Atraumatic, Normocephalic  EYES: EOMI, conjunctiva and sclera clear  ENMT: No tonsillar erythema, exudates, or enlargement; dry mucous membranes  NECK: Supple, neck veins full  NERVOUS SYSTEM:  Alert & Oriented X3, Good concentration;   CHEST/LUNG: Reduced basilar breath sounds  HEART: Regular rate and rhythm; No murmurs, rubs, or gallops  ABDOMEN: Soft, Nontender, Nondistended; Bowel sounds present, No hepatomegaly  EXTREMITIES: trace Edema  SKIN: No rashes or lesions, dry; poor turgor      LABS:               10.1   17.51 )-----------( 128      ( 2020 05:42 )             30.5     02    145  |  116<H>  |  115<H>  ----------------------------<  62<L>  5.0   |  18<L>  |  5.90<H>    Ca    8.4<L>      2020 05:42  Phos  5.5       Mg     2.9         TPro  5.1<L>  /  Alb  1.7<L>  /  TBili  0.6  /  DBili  x   /  AST  31  /  ALT  20  /  AlkPhos  91      PT/INR - ( 2020 06:06 )   PT: 13.3 sec;   INR: 1.18 ratio         PTT - ( 2020 05:42 )  PTT:64.7 sec    Magnesium, Serum: 2.9 mg/dL ( @ 05:42)  Phosphorus Level, Serum: 5.5 mg/dL ( @ 05:42)          RADIOLOGY & ADDITIONAL TESTS:

## 2020-02-09 NOTE — PROGRESS NOTE ADULT - PROBLEM SELECTOR PLAN 1
care geared more toward comfort now  on low dose dilaudid prn now -   overnight events noted  heme onc follow up reviewed - mets ca - prognosis very poor  cardio follow up reviewed -   would be a good candidate to review for [facility] vs [facility]  pt is DNR DNI  assist with ADL  supportive measures  monitor for sx of distress, suffering, dyspnea,

## 2020-02-09 NOTE — PROGRESS NOTE ADULT - ASSESSMENT
Acute on CKD Stage 3  Metabolic Acidosis  Hyperkalemia  Sepsis/PNA  Dehydration  Hyperphosphatemia      -GERMAN is likely due to dehydration along with septic ATN.   -Renal indices remain quite elevated, but BUN downtrending  -FeUrea 31%  -Renal sonogram reviewed showing no evidence of Hydro, but echogenic kidneys noted  -Potassium within normal range; Kayexalate PRN  -Abx per ID; renal dosing  -Monitor urine output; poor output documented  -No acute indication for dialysis; poor candidate especially with Lung Ca and overall debility. Dialysis not recommended  -Pulm follow up noted  -Palliative eval reviewed; care geared toward comfort; pending hospice evaluation    D/W Pulm    Thank you

## 2020-02-09 NOTE — CHART NOTE - NSCHARTNOTEFT_GEN_A_CORE
Called by RN for patient with 9 beats of Vtach. Patient asymptomatic. Per chart review, patient may be candidate for hospice but is currently not yet comfort. Will add mg and phos for AM. Consider d/c telemetry if family agree to comfort measures and/or pt goes to hospice. Cardiology following- Dr. Navarro.

## 2020-02-09 NOTE — PROGRESS NOTE ADULT - SUBJECTIVE AND OBJECTIVE BOX
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  Unit(s)/Hr (11 mL/Hr) IV Continuous <Continuous>  latanoprost 0.005% Ophthalmic Solution 1 Drop(s) Both EYES at bedtime  sodium chloride 3%  Inhalation 4 milliLiter(s) Inhalation every 6 hours    MEDICATIONS  (PRN):  HYDROmorphone  Injectable 0.25 milliGRAM(s) IV Push every 4 hours PRN pain, distress, suffering, dyspnea, palliative measures         Vitals log        ICU Vital Signs Last 24 Hrs  T(C): 36.5 (09 Feb 2020 04:02), Max: 36.5 (09 Feb 2020 00:04)  T(F): 97.7 (09 Feb 2020 04:02), Max: 97.7 (09 Feb 2020 00:04)  HR: 93 (09 Feb 2020 05:30) (77 - 164)  BP: 132/66 (09 Feb 2020 05:30) (104/67 - 168/78)  BP(mean): 92 (09 Feb 2020 05:30) (79 - 111)  ABP: --  ABP(mean): --  RR: 30 (09 Feb 2020 05:30) (15 - 60)  SpO2: 100% (09 Feb 2020 05:30) (96% - 100%)           Input and Output:  I&O's Detail    07 Feb 2020 07:01  -  08 Feb 2020 07:00  --------------------------------------------------------  IN:    heparin  Infusion.: 163 mL    Oral Fluid: 240 mL    phenylephrine   Infusion: 24 mL    phenylephrine   Infusion: 44 mL  Total IN: 471 mL    OUT:    Voided: 1400 mL  Total OUT: 1400 mL    Total NET: -929 mL      08 Feb 2020 07:01  -  09 Feb 2020 06:12  --------------------------------------------------------  IN:    heparin  Infusion.: 105 mL    Solution: 100 mL    Solution: 50 mL    Solution: 50 mL  Total IN: 305 mL    OUT:    Voided: 550 mL  Total OUT: 550 mL    Total NET: -245 mL          Lab Data                        10.1   17.51 )-----------( 128      ( 09 Feb 2020 05:42 )             30.5     02-09    145  |  116<H>  |  115<H>  ----------------------------<  62<L>  5.0   |  18<L>  |  5.90<H>    Ca    8.4<L>      09 Feb 2020 05:42  Phos  5.5     02-09  Mg     2.9     02-09    TPro  5.1<L>  /  Alb  1.7<L>  /  TBili  0.6  /  DBili  x   /  AST  31  /  ALT  20  /  AlkPhos  91  02-09      CARDIAC MARKERS ( 07 Feb 2020 12:54 )  .104 ng/mL / x     / x     / x     / x            Review of Systems	      Objective     Physical Examination    heart s1s2  lung dc BS  abd soft      Pertinent Lab findings & Imaging      Jose Guadalupe:  NO   Adequate UO     I&O's Detail    07 Feb 2020 07:01  -  08 Feb 2020 07:00  --------------------------------------------------------  IN:    heparin  Infusion.: 163 mL    Oral Fluid: 240 mL    phenylephrine   Infusion: 24 mL    phenylephrine   Infusion: 44 mL  Total IN: 471 mL    OUT:    Voided: 1400 mL  Total OUT: 1400 mL    Total NET: -929 mL      08 Feb 2020 07:01  -  09 Feb 2020 06:12  --------------------------------------------------------  IN:    heparin  Infusion.: 105 mL    Solution: 100 mL    Solution: 50 mL    Solution: 50 mL  Total IN: 305 mL    OUT:    Voided: 550 mL  Total OUT: 550 mL    Total NET: -245 mL               Discussed with:     Cultures:	        Radiology

## 2020-02-09 NOTE — PROGRESS NOTE ADULT - ATTENDING COMMENTS
I personally saw and examined the patient in detail.  I have spoken to the above provider regarding the assessment and plan of care.  I reviewed the above assessment and plan of care, and agree.  I have made changes in the body of the note where appropriate.  Total time spent taking care of patient, reviewing data, and discussing case with medical team and staff in excess of 35 minutes.  Transferred out of the ICU and had AF with RVR when we arrived this AM.  We started him back on IV amiodarone infusion, and he has now converted to NSR.  Continue IV amiodarone infusion.  Need to clarify goals of care.  IF not comfort, will need some for of GI access for PO Meds.

## 2020-02-09 NOTE — PROGRESS NOTE ADULT - PROBLEM SELECTOR PLAN 1
afib w/ RVR  - s/p amio load and now on maintenance dose  - renally dosed digoxin while pt remains on vasopressors  - cardiology following  - c/w therapeutic AC w/ heparin.   - poor candidate for long term AC given overall poor prognosis

## 2020-02-09 NOTE — PROVIDER CONTACT NOTE (OTHER) - ASSESSMENT
awake alert resp reg no c/o chest pain or fast heart beat  as per pt.
denies chest pain, no SOB, received Dilaudid IVP earlier, amiodarone and heparin drip infusing
patient denies shortness of breath, denies chest pain
per Digna in tele pt on remote tele having 7 beats of vtach at this time.  Marcie CARBAJAL made aware.
BP: 125/66, HR: 94, O2 sat 95% on 2L O2, RR 22
pt on remote tele . sage Nguyen in tele room pt having 4 beats of v tach at this time.
pt. stable
Pt is a&ox4 and asymptomatic

## 2020-02-09 NOTE — PROGRESS NOTE ADULT - PROBLEM SELECTOR PROBLEM 9
Hypertension
Need for prophylactic measure
Hypertension

## 2020-02-09 NOTE — PROVIDER CONTACT NOTE (CRITICAL VALUE NOTIFICATION) - ACTION/TREATMENT ORDERED:
Follow Heparin protocol.
will evaluate med regimen
No new orders at this time.
Dr. Ziegler made aware, no new orders, will continue to monitor
No new orders given

## 2020-02-09 NOTE — PROGRESS NOTE ADULT - PROBLEM SELECTOR PLAN 4
- pt p/w SOB and productive cough x 1wk  - CXR and CT chest demonstrating multifocal PNA with ELIZABETH mass extension through intercostal space suspicious for disease progression  - Pulm Dr. Yu following  - off antibx

## 2020-02-09 NOTE — CHART NOTE - NSCHARTNOTEFT_GEN_A_CORE
Assessment:   Pt seen as malnutrition follow-up. (2/7) pt failed bedside swallow, placed NPO. Prior to NPO status, pt with poor intake (10-15% per EMR). Per chart, family decided on no PEG. Upon visit, spoke with pt's wife and son. Family verified no tube feeding and considering hospice. Poor prognosis. Discussed comfort care measures, family to decide on pleasure feeds if team is agreeable. No GI distress noted. +BM 2/8.     Factors impacting intake: [ ] none [ ] nausea  [ ] vomiting [ ] diarrhea [ ] constipation  [ x ]chewing problems [ x ] swallowing issues  [ ] other:     Diet Presciption: Diet, NPO:   Except Medications (02-07-20 @ 12:20)    Intake: Previously poor (was receiving regular, no conc. phos diet + Ensure two times per day)    Current Weight: (2/9) 118.3 lb    Pertinent Medications: MEDICATIONS  (STANDING):  albuterol/ipratropium for Nebulization 3 milliLiter(s) Nebulizer every 6 hours  aMIOdarone Infusion 0.5 mG/Min (16.667 mL/Hr) IV Continuous <Continuous>  heparin  Infusion.  Unit(s)/Hr (11 mL/Hr) IV Continuous <Continuous>  latanoprost 0.005% Ophthalmic Solution 1 Drop(s) Both EYES at bedtime  sodium chloride 3%  Inhalation 4 milliLiter(s) Inhalation every 6 hours    MEDICATIONS  (PRN):  bisacodyl 5 milliGRAM(s) Oral every 12 hours PRN Constipation  HYDROmorphone  Injectable 0.5 milliGRAM(s) IV Push every 90 minutes PRN pain, distress, suffering, dyspnea, palliative measures    Pertinent Labs: 02-09 Na145 mmol/L Glu 62 mg/dL<L> K+ 5.0 mmol/L Cr  5.90 mg/dL<H>  mg/dL<H> 02-09 Phos 5.5 mg/dL<H> 02-09 Alb 1.7 g/dL<L>     CAPILLARY BLOOD GLUCOSE        Skin: 3+ dependent, L ankle, L arm. 1+ generalized. Multiple pressure injuries documented.    Estimated Needs:   [ x ] no change since previous assessment  [ ] recalculated:     Previous Nutrition Diagnosis:   [ ] Inadequate Energy Intake [ ]Inadequate Oral Intake [ ] Excessive Energy Intake   [ ] Underweight [ ] Increased Nutrient Needs [ ] Overweight/Obesity   [ ] Altered GI Function [ ] Unintended Weight Loss [ ] Food & Nutrition Related Knowledge Deficit [ x ] Malnutrition     Nutrition Diagnosis is [ x ] ongoing, pending comfort care measures     New Nutrition Diagnosis: [ x ] not applicable       Interventions:   Recommend  [ ] Change Diet To:  [ ] Nutrition Supplement  [ ] Nutrition Support  [ x ] Other:   1) If team/family agreeable, provide comfort care measures. f/u with GOC.    Monitoring and Evaluation:   [ ] PO intake [ x ] Tolerance to diet prescription [ x ] weights [ x ] labs[ x ] follow up per protocol  [ ] other:

## 2020-02-09 NOTE — PROGRESS NOTE ADULT - SUBJECTIVE AND OBJECTIVE BOX
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  Unit(s)/Hr (11 mL/Hr) IV Continuous <Continuous>  latanoprost 0.005% Ophthalmic Solution 1 Drop(s) Both EYES at bedtime  sodium chloride 3%  Inhalation 4 milliLiter(s) Inhalation every 6 hours    MEDICATIONS  (PRN):  bisacodyl 5 milliGRAM(s) Oral every 12 hours PRN Constipation  HYDROmorphone  Injectable 0.5 milliGRAM(s) IV Push every 90 minutes PRN pain, distress, suffering, dyspnea, palliative measures    Allergies    No Known Allergies    Intolerances    Vital Signs Last 24 Hrs  T(C): 36.4 (09 Feb 2020 07:50), Max: 36.5 (09 Feb 2020 00:04)  T(F): 97.5 (09 Feb 2020 07:50), Max: 97.7 (09 Feb 2020 00:04)  HR: 110 (09 Feb 2020 07:59) (81 - 164)  BP: 120/65 (09 Feb 2020 07:50) (104/67 - 168/78)  BP(mean): 92 (09 Feb 2020 05:30) (79 - 111)  RR: 18 (09 Feb 2020 07:50) (15 - 38)  SpO2: 95% (09 Feb 2020 07:50) (95% - 100%)  I&O's Summary    08 Feb 2020 07:01  -  09 Feb 2020 07:00  --------------------------------------------------------  IN: 305 mL / OUT: 550 mL / NET: -245 mL     PHYSICAL EXAM:  TELE: NSR  Constitutional: NAD, awake and alert, well-developed  HEENT: Moist Mucous Membranes, Anicteric  Pulmonary: Non-labored, breath sounds are clear bilaterally, No wheezing, rales or rhonchi  Cardiovascular: Regular, S1 and S2, No murmurs, rubs, gallops or clicks  Gastrointestinal: Bowel Sounds present, soft, nontender.   Lymph: No peripheral edema. No lymphadenopathy.  Skin: No visible rashes or ulcers.  Psych:  Mood & affect appropriate  LABS: All Labs Reviewed:                        10.1   17.51 )-----------( 128      ( 09 Feb 2020 05:42 )             30.5                         9.9    13.89 )-----------( 107      ( 08 Feb 2020 06:06 )             30.4                         10.6   20.88 )-----------( 160      ( 07 Feb 2020 06:01 )             33.3     09 Feb 2020 05:42    145    |  116    |  115    ----------------------------<  62     5.0     |  18     |  5.90   08 Feb 2020 15:05    145    |  112    |  119    ----------------------------<  69     4.7     |  20     |  5.90   08 Feb 2020 06:06    143    |  112    |  121    ----------------------------<  74     5.4     |  17     |  6.00     Ca    8.4        09 Feb 2020 05:42  Ca    8.7        08 Feb 2020 15:05  Ca    7.7        08 Feb 2020 06:06  Phos  5.5       09 Feb 2020 05:42  Phos  6.7       08 Feb 2020 06:06  Phos  6.6       07 Feb 2020 06:01  Mg     2.9       09 Feb 2020 05:42  Mg     2.3       08 Feb 2020 06:06  Mg     2.4       07 Feb 2020 06:01    TPro  5.1    /  Alb  1.7    /  TBili  0.6    /  DBili  x      /  AST  31     /  ALT  20     /  AlkPhos  91     09 Feb 2020 05:42  TPro  5.1    /  Alb  2.0    /  TBili  0.5    /  DBili  x      /  AST  19     /  ALT  20     /  AlkPhos  88     08 Feb 2020 06:06  TPro  5.3    /  Alb  2.2    /  TBili  0.6    /  DBili  x      /  AST  24     /  ALT  24     /  AlkPhos  103    07 Feb 2020 06:01    PT/INR - ( 08 Feb 2020 06:06 )   PT: 13.3 sec;   INR: 1.18 ratio    PTT - ( 09 Feb 2020 05:42 )  PTT:64.7 sec  CARDIAC MARKERS ( 07 Feb 2020 12:54 )  .104 ng/mL / x     / x     / x     / x        < from: TTE Echo Doppler w/o Cont (02.06.20 @ 20:24) >     EXAM:  ECHO TTE WO CON COMP W DOPPLR         PROCEDURE DATE:  02/06/2020        INTERPRETATION:  INDICATION: Abnormal EKG    Blood Pressure unavailable    Height 182.8 cm     Weight 63.5 kg       BSA 1.8 sq m    Dimensions:    LA 4.0       Normal Values: 2.0 - 4.0 cm    Ao 3.3        Normal Values: 2.0 - 3.8 cm  SEPTUM 1.2       Normal Values: 0.6 - 1.2 cm  PWT 1.0       Normal Values: 0.6 - 1.1 cm  LVIDd 3.85       Normal Values: 3.0 - 5.6 cm  LVIDs 3.7         Normal Values: 1.8 - 4.0 cm      OBSERVATIONS:  Technically difficult and limited study, patient tachycardic with irregular rhythm  Mitral Valve: normal, trace physiologic MR.  Aortic Valve/Aorta: Calcified trileaflet aortic valve with decreased opening. Peak transaortic valve gradient is 12.8 mmHg with a mean transaortic valve graft 6.5 mmHg. The aortic valve area is calculated to be 1.65 sq cm by continuity equation. This consistent with mild aortic stenosis  Tricuspid Valve: normal with mild TR.  Pulmonic Valve: Not well-visualized  Left Atrium: normal  Right Atrium: Not well-visualized  Left Ventricle: Mild global left ventricular systolic dysfunction. LVEF appears to be about 45% by visual estimation. Endocardium is not well-visualized, cannot rule out segmental dysfunction.  Right Ventricle: Not well-visualized  Pericardium/Pleura: normal, no significant pericardial effusion.    Conclusion:   Technically difficult and limited study, patient tachycardic with irregular rhythm  Mild global left ventricular systolicdysfunction. LVEF appears to be about 45% by visual estimation. Endocardium is not well-visualized, cannot rule out segmental dysfunction.   Right ventricle is not well-visualized  Calcified trileaflet aortic valve with mild aortic stenosis   Mild MRand TR.     No significant pericardial effusion.     MADAI BRADLEY   This document has been electronically signed. Feb 7 2020  4:38PM      < end of copied text >    < from: CT Chest No Cont (02.02.20 @ 15:13) >    EXAM:  CT CHEST                            PROCEDURE DATE:  02/02/2020          INTERPRETATION:  Clinical information: Suspected pneumonia. History of lung cancer.    Comparison: 1/13/2019 CT scan of the chest.    PROCEDURE:   CT of the Chest was performed without intravenous contrast.     FINDINGS:    Lungs and airways: Emphysema. Interval development of multifocal opacities in the right upper lobe and right lower lobe compatible with multifocal pneumonia. Again noted is a left upper lobe and para-mediastinal mass adjacent to surgical clips inseparable from the proximal aortic arch. The area on series 2 image 45 measures 4.1 x 3.7 cm previously 3.8 x 2.3 cm. Slightly more superiorly there is soft tissue that extends anteriorly through the intercostal space on series 2 image 36 measuring 3.1 x 3.0 cm.  Findings are suspicious for progression of disease.    Pleura: Within normal limits. No pleural effusion.    Mediastinum and Ginny: No abnormally enlarged lymph nodes. The esophagus is distended with fluid.    Heart: Normal size. No pericardial effusion.     Vessels: There has been median sternotomy and CABG. Main pulmonary artery is enlarged to 4.2 cm.    Chest wall and lower neck: Within normal limits.    Upper abdomen: Bilateral renal hypodensities again noted including some which are not simple cysts. For example there is a 3.0 cm lesion in the right mid kidney which is not a simple cyst and a 1.4 cm lesion in the left mid kidney which is not a simple cyst are stable. The gallbladder is distended containing a stone.    Bones: Within normal limits.    IMPRESSION:     Interval development of multifocal pneumonia involving the right upper and lower lobes.    Increase in size of soft tissue mass associated with surgical clips in the left upper lobe paramediastinal location inseparable from the aortic arch which now extends anteriorly in an intercostal location highly suspicious for progression of disease.    BLANCA GOODRICH M.D., ATTENDING RADIOLOGIST  This document has been electronically signed. Feb 2 2020  4:00PM      < end of copied text >    < from: 12 Lead ECG (02.05.20 @ 21:43) >    Ventricular Rate 202 BPM    Atrial Rate 170 BPM    QRS Duration 142 ms    Q-T Interval 274 ms    QTC Calculation(Bezet) 502 ms    R Axis -56 degrees    T Axis 124 degrees    Diagnosis Line Wide QRS tachycardia with premature supraventricular complexes and with frequent premature ventricular complexes  - likely AF with aberrant conduction  Left axis deviation  Nonspecific intraventricular block  Abnormal ECG  No previous ECGs available  Confirmed by Blayne HOUSER, Victor M ([id number]) on 2/6/2020 10:28:47 AM     < end of copied text > [facility] -- Aziza Valdivia, Edmond Cisneros, Titus Navarro Savella, Goodger  Office # [phone]    Follow Up:  Afib    Subjective/Observations:  Awake and verbally responsive.  Admits to have worsening SOB, however, tolerating NC.  Denies any chest discomfort..  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  Unit(s)/Hr (11 mL/Hr) IV Continuous <Continuous>  latanoprost 0.005% Ophthalmic Solution 1 Drop(s) Both EYES at bedtime  sodium chloride 3%  Inhalation 4 milliLiter(s) Inhalation every 6 hours    MEDICATIONS  (PRN):  bisacodyl 5 milliGRAM(s) Oral every 12 hours PRN Constipation  HYDROmorphone  Injectable 0.5 milliGRAM(s) IV Push every 90 minutes PRN pain, distress, suffering, dyspnea, palliative measures    Allergies    No Known Allergies    Intolerances    Vital Signs Last 24 Hrs  T(C): 36.4 (09 Feb 2020 07:50), Max: 36.5 (09 Feb 2020 00:04)  T(F): 97.5 (09 Feb 2020 07:50), Max: 97.7 (09 Feb 2020 00:04)  HR: 110 (09 Feb 2020 07:59) (81 - 164)  BP: 120/65 (09 Feb 2020 07:50) (104/67 - 168/78)  BP(mean): 92 (09 Feb 2020 05:30) (79 - 111)  RR: 18 (09 Feb 2020 07:50) (15 - 38)  SpO2: 95% (09 Feb 2020 07:50) (95% - 100%)  I&O's Summary    08 Feb 2020 07:01  -  09 Feb 2020 07:00  --------------------------------------------------------  IN: 305 mL / OUT: 550 mL / NET: -245 mL     PHYSICAL EXAM:  TELE: NSR  Constitutional: NAD, awake and alert, cachectic  HEENT: Moist Mucous Membranes, Anicteric  Pulmonary: Non-labored, breath sounds are diminished bilaterally, No wheezing, rales or rhonchi  Cardiovascular: Regular, S1 and S2, + murmurs, rubs, gallops or clicks  Gastrointestinal: Bowel Sounds present, soft, nontender.   Lymph: LUE edema, ankle edema. No lymphadenopathy.  Skin: No visible rashes or ulcers.  Generalized ecchymoses  Psych:  Mood & affect: flat  LABS: All Labs Reviewed:                        10.1   17.51 )-----------( 128      ( 09 Feb 2020 05:42 )             30.5                         9.9    13.89 )-----------( 107      ( 08 Feb 2020 06:06 )             30.4                         10.6   20.88 )-----------( 160      ( 07 Feb 2020 06:01 )             33.3     09 Feb 2020 05:42    145    |  116    |  115    ----------------------------<  62     5.0     |  18     |  5.90   08 Feb 2020 15:05    145    |  112    |  119    ----------------------------<  69     4.7     |  20     |  5.90   08 Feb 2020 06:06    143    |  112    |  121    ----------------------------<  74     5.4     |  17     |  6.00     Ca    8.4        09 Feb 2020 05:42  Ca    8.7        08 Feb 2020 15:05  Ca    7.7        08 Feb 2020 06:06  Phos  5.5       09 Feb 2020 05:42  Phos  6.7       08 Feb 2020 06:06  Phos  6.6       07 Feb 2020 06:01  Mg     2.9       09 Feb 2020 05:42  Mg     2.3       08 Feb 2020 06:06  Mg     2.4       07 Feb 2020 06:01    TPro  5.1    /  Alb  1.7    /  TBili  0.6    /  DBili  x      /  AST  31     /  ALT  20     /  AlkPhos  91     09 Feb 2020 05:42  TPro  5.1    /  Alb  2.0    /  TBili  0.5    /  DBili  x      /  AST  19     /  ALT  20     /  AlkPhos  88     08 Feb 2020 06:06  TPro  5.3    /  Alb  2.2    /  TBili  0.6    /  DBili  x      /  AST  24     /  ALT  24     /  AlkPhos  103    07 Feb 2020 06:01    PT/INR - ( 08 Feb 2020 06:06 )   PT: 13.3 sec;   INR: 1.18 ratio    PTT - ( 09 Feb 2020 05:42 )  PTT:64.7 sec  CARDIAC MARKERS ( 07 Feb 2020 12:54 )  .104 ng/mL / x     / x     / x     / x        < from: TTE Echo Doppler w/o Cont (02.06.20 @ 20:24) >     EXAM:  ECHO TTE WO CON COMP W DOPPLR         PROCEDURE DATE:  02/06/2020        INTERPRETATION:  INDICATION: Abnormal EKG    Blood Pressure unavailable    Height 182.8 cm     Weight 63.5 kg       BSA 1.8 sq m    Dimensions:    LA 4.0       Normal Values: 2.0 - 4.0 cm    Ao 3.3        Normal Values: 2.0 - 3.8 cm  SEPTUM 1.2       Normal Values: 0.6 - 1.2 cm  PWT 1.0       Normal Values: 0.6 - 1.1 cm  LVIDd 3.85       Normal Values: 3.0 - 5.6 cm  LVIDs 3.7         Normal Values: 1.8 - 4.0 cm      OBSERVATIONS:  Technically difficult and limited study, patient tachycardic with irregular rhythm  Mitral Valve: normal, trace physiologic MR.  Aortic Valve/Aorta: Calcified trileaflet aortic valve with decreased opening. Peak transaortic valve gradient is 12.8 mmHg with a mean transaortic valve graft 6.5 mmHg. The aortic valve area is calculated to be 1.65 sq cm by continuity equation. This consistent with mild aortic stenosis  Tricuspid Valve: normal with mild TR.  Pulmonic Valve: Not well-visualized  Left Atrium: normal  Right Atrium: Not well-visualized  Left Ventricle: Mild global left ventricular systolic dysfunction. LVEF appears to be about 45% by visual estimation. Endocardium is not well-visualized, cannot rule out segmental dysfunction.  Right Ventricle: Not well-visualized  Pericardium/Pleura: normal, no significant pericardial effusion.    Conclusion:   Technically difficult and limited study, patient tachycardic with irregular rhythm  Mild global left ventricular systolic dysfunction. LVEF appears to be about 45% by visual estimation. Endocardium is not well-visualized, cannot rule out segmental dysfunction.   Right ventricle is not well-visualized  Calcified trileaflet aortic valve with mild aortic stenosis   Mild MRand TR.     No significant pericardial effusion.     MADAI BRADLEY   This document has been electronically signed. Feb 7 2020  4:38PM      < end of copied text >    < from: CT Chest No Cont (02.02.20 @ 15:13) >    EXAM:  CT CHEST                          PROCEDURE DATE:  02/02/2020      INTERPRETATION:  Clinical information: Suspected pneumonia. History of lung cancer.    Comparison: 1/13/2019 CT scan of the chest.    PROCEDURE:   CT of the Chest was performed without intravenous contrast.     FINDINGS:    Lungs and airways: Emphysema. Interval development of multifocal opacities in the right upper lobe and right lower lobe compatible with multifocal pneumonia. Again noted is a left upper lobe and para-mediastinal mass adjacent to surgical clips inseparable from the proximal aortic arch. The area on series 2 image 45 measures 4.1 x 3.7 cm previously 3.8 x 2.3 cm. Slightly more superiorly there is soft tissue that extends anteriorly through the intercostal space on series 2 image 36 measuring 3.1 x 3.0 cm.  Findings are suspicious for progression of disease.    Pleura: Within normal limits. No pleural effusion.    Mediastinum and Ginny: No abnormally enlarged lymph nodes. The esophagus is distended with fluid.    Heart: Normal size. No pericardial effusion.     Vessels: There has been median sternotomy and CABG. Main pulmonary artery is enlarged to 4.2 cm.    Chest wall and lower neck: Within normal limits.    Upper abdomen: Bilateral renal hypodensities again noted including some which are not simple cysts. For example there is a 3.0 cm lesion in the right mid kidney which is not a simple cyst and a 1.4 cm lesion in the left mid kidney which is not a simple cyst are stable. The gallbladder is distended containing a stone.    Bones: Within normal limits.    IMPRESSION:     Interval development of multifocal pneumonia involving the right upper and lower lobes.    Increase in size of soft tissue mass associated with surgical clips in the left upper lobe paramediastinal location inseparable from the aortic arch which now extends anteriorly in an intercostal location highly suspicious for progression of disease.    BLANCA GOODRICH M.D., ATTENDING RADIOLOGIST  This document has been electronically signed. Feb 2 2020  4:00PM      < end of copied text >    < from: 12 Lead ECG (02.05.20 @ 21:43) >    Ventricular Rate 202 BPM    Atrial Rate 170 BPM    QRS Duration 142 ms    Q-T Interval 274 ms    QTC Calculation(Bezet) 502 ms    R Axis -56 degrees    T Axis 124 degrees    Diagnosis Line Wide QRS tachycardia with premature supraventricular complexes and with frequent premature ventricular complexes  - likely AF with aberrant conduction  Left axis deviation  Nonspecific intraventricular block  Abnormal ECG  No previous ECGs available  Confirmed by Blayne HOUSER, Victor M ([id number]) on 2/6/2020 10:28:47 AM     < end of copied text >

## 2020-02-09 NOTE — PROGRESS NOTE ADULT - PROBLEM SELECTOR PLAN 3
- severe sepsis POA  - CXR and CT chest demonstrating multifocal PNA with ELIZABETH mass extension through intercostal space suspicious for disease progression  - Pulm Dr. Yu following  - ID Dr. Junior following - no abx  - BCx NGTD  - Leukocytosis still present   - Pt has remained afebrile without clinical symptoms to suggest uncontrolled infection   - Monitor daily CBC and temperature curve

## 2020-02-09 NOTE — PROGRESS NOTE ADULT - ASSESSMENT
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 Admit for acute hypoxic respiratory failure likely 2/2 PNA in setting of cancer also noted to have GERMAN.    2/6/2020: Now in ICU after sustained RRT called for sustained WCT and hypotension, s/p amio loads x2, lidocaine push. Now on pressors, lidocaine and amio drips, currently in Afib with rates in 120s. Upon review of EKG from time of rapid, doubt true Vtach. Rate is irregular, no evidence of AV dissociation with no change in axis; patient does meet brugada criteria. Most likely  Afib with aberrancy.  Now converted to NSR overnight    Afib   - Continue Heparin drip for now and being transitioned to Coumadin.  Long-term AC needs to be discussed with family members  - Need to clarify goals of care with family.  IF he goes comfort, no further therapy.   - He converted to NSR and remains in SR with PVC's  - In the setting of NPO status and failed swallow eval, he had to be started on Amio drip this am to maintain regular rhythm  - He will eventually need a mode of taking PO meds, ie., PEG tube, if in line with patient/family's wishes  - He is s/p Dig load followed by QOD.  His Dig level = 2.7 today but not Dig toxic.  Discontinue Dig  - Monitor electrolytes.  Replete to keep K>4 and Mag>2  - Troponin elevation with normal CKs likely demand ischemia in the setting of Afib with RVR    Shock  - Cardiogenic vs septic shock requiring pressors  - His BP has been stable off pressor  - TTE showed mild LVSD, EF 45% but unable to r/o SWMA.  No significant valvular disease noted    Acute hypoxic respiratory failure in setting of advanced lung cancer with mets with multifocal pneumonia  - Now off Abx.  Leukocytosis stable  - Continue bronchodilators  - Supplemental O2  - Pulm following.  Follow recs    GOALS OF CARE:  - Patient is DNR/DNI, poor prognosis, hospice referral done as per primary team  - Palliative following    all other care per Primary  Will follow as inpatient    [place] DNP, NP-C  Cardiology   Spectra #[phone]

## 2020-02-09 NOTE — PROGRESS NOTE ADULT - PROBLEM SELECTOR PLAN 9
- Chronic, stable  - Hold HTN meds as current BP stable  - Monitor routine hemodynamics
- DVT ppx: heparin 5000 q 12     BB IMPROVE VTE Individual Risk Assessment        RISK                                                          Points  [  ] Previous VTE                                                3  [  ] Thrombophilia                                             2  [  ] Lower limb paralysis                                   2        (unable to hold up >15 seconds)    [ x ] Current Cancer                                            2         (within 6 months)  [  ] Immobilization > 24 hrs                              1  [  ] ICU/CCU stay > 24 hours                            1  [  x] Age > 60                                                    1    IMPROVE VTE Score: 3
- DVT ppx: heparin infusion, dosed warfarin today    BB IMPROVE VTE Individual Risk Assessment        RISK                                                          Points  [  ] Previous VTE                                                3  [  ] Thrombophilia                                             2  [  ] Lower limb paralysis                                   2        (unable to hold up >15 seconds)    [ x ] Current Cancer                                            2         (within 6 months)  [  ] Immobilization > 24 hrs                              1  [  ] ICU/CCU stay > 24 hours                            1  [  x] Age > 60                                                    1    IMPROVE VTE Score: 3
- Chronic, stable  - Hold HTN meds as current BP stable  - Monitor routine hemodynamics

## 2020-02-09 NOTE — PROGRESS NOTE ADULT - SUBJECTIVE AND OBJECTIVE BOX
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 Pt states he has baseline HADLEY, but dyspea has increased in the last week. Of note he is in week 6 of chemo treatment keytruda, gets chemo every 3 weeks, tolerating chemo appropriately. Pt also has been urinating less for the last 4 days. Pt denies ever having dialysis. Pt denies fever, chills, chest pain, orthopnea, dysuria, hematuria, diarrhea, melena, hematochezia.     ----  INTERVAL HPI/OVERNIGHT EVENTS: Pt seen and evaluated at the bedside. No acute overnight events occurred. No complaints or concerns. Pt is lethargic and comfortable. On dilaudid prn. NPO as pt  Failed swallow eval. Family not willing for PEG. Gearing more towards hospice care in NH.     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 UTO  ----  PHYSICAL EXAM:  GENERAL: patient appears chronically ill but no acute distress   LUNGS: diminished breath sounds b/l, coarse breath sounds in R upper chest  HEART: soft S1/S2, regular rate and rhythm, systolic murmur noted, 2+ dependent pitting edema to b/l LE  GASTROINTESTINAL: abdomen is soft, nontender, nondistended, normoactive bowel sounds, no palpable masses  INTEGUMENT: diffuse ecchymoses of b/l UE and LE  MUSCULOSKELETAL: no clubbing or cyanosis, no obvious deformity, LUE swelling at IV site without warmth or tenderness to palpation  NEUROLOGIC: awake, alert, oriented x3 but poor insight, limited insight, good muscle tone in 4 extremities, no reported sensory deficit on 4 extremities  HEME/LYMPH: no palpable supraclavicular nodules    ICU Vital Signs Last 24 Hrs  T(C): 36.4 (09 Feb 2020 11:27), Max: 36.5 (09 Feb 2020 00:04)  T(F): 97.6 (09 Feb 2020 11:27), Max: 97.7 (09 Feb 2020 00:04)  HR: 111 (09 Feb 2020 11:27) (83 - 164)  BP: 139/73 (09 Feb 2020 11:27) (104/67 - 168/78)  BP(mean): 92 (09 Feb 2020 05:30) (79 - 111)  ABP: --  ABP(mean): --  RR: 18 (09 Feb 2020 11:27) (15 - 38)  SpO2: 95% (09 Feb 2020 11:27) (95% - 100%)      LABS:                        10.1   17.51 )-----------( 128      ( 09 Feb 2020 05:42 )             30.5     09 Feb 2020 05:42    145    |  116    |  115    ----------------------------<  62     5.0     |  18     |  5.90     Ca    8.4        09 Feb 2020 05:42  Phos  5.5       09 Feb 2020 05:42  Mg     2.9       09 Feb 2020 05:42    TPro  5.1    /  Alb  1.7    /  TBili  0.6    /  DBili  x      /  AST  31     /  ALT  20     /  AlkPhos  91     09 Feb 2020 05:42    PT/INR - ( 08 Feb 2020 06:06 )   PT: 13.3 sec;   INR: 1.18 ratio         PTT - ( 09 Feb 2020 05:42 )  PTT:64.7 sec    CAPILLARY BLOOD GLUCOSE        UCx       RADIOLOGY & ADDITIONAL TESTS:          ----  Personally reviewed:  Vital sign trends: [ x ] yes    [  ] no     [  ] n/a  Laboratory results: [ x ] yes    [  ] no     [  ] n/a  Radiology results: [ x ] yes    [  ] no     [  ] n/a  Culture results: [ x ] yes    [  ] no     [  ] n/a  Consultant recommendations: [ x ] yes    [  ] no     [  ] n/a    MEDICATIONS  (STANDING):  albuterol/ipratropium for Nebulization 3 milliLiter(s) Nebulizer every 6 hours  aMIOdarone Infusion 0.5 mG/Min (16.667 mL/Hr) IV Continuous <Continuous>  heparin  Infusion.  Unit(s)/Hr (11 mL/Hr) IV Continuous <Continuous>  latanoprost 0.005% Ophthalmic Solution 1 Drop(s) Both EYES at bedtime  sodium chloride 3%  Inhalation 4 milliLiter(s) Inhalation every 6 hours    MEDICATIONS  (PRN):  bisacodyl 5 milliGRAM(s) Oral every 12 hours PRN Constipation  HYDROmorphone  Injectable 0.5 milliGRAM(s) IV Push every 90 minutes PRN pain, distress, suffering, dyspnea, palliative measures

## 2020-02-10 LAB
APTT BLD: 61.8 SEC — HIGH (ref 28.5–37)
DIGOXIN SERPL-MCNC: 2.4 NG/ML — HIGH (ref 0.8–2)
MAGNESIUM SERPL-MCNC: 3 MG/DL — HIGH (ref 1.6–2.6)
PHOSPHATE SERPL-MCNC: 6.1 MG/DL — HIGH (ref 2.5–4.5)

## 2020-02-10 PROCEDURE — 99497 ADVNCD CARE PLAN 30 MIN: CPT | Mod: GC

## 2020-02-10 PROCEDURE — 99232 SBSQ HOSP IP/OBS MODERATE 35: CPT

## 2020-02-10 PROCEDURE — 99233 SBSQ HOSP IP/OBS HIGH 50: CPT | Mod: GC,25

## 2020-02-10 RX ORDER — ATROPINE SULFATE 1 %
2 DROPS OPHTHALMIC (EYE)
Refills: 0 | Status: DISCONTINUED | OUTPATIENT
Start: 2020-02-10 | End: 2020-02-12

## 2020-02-10 RX ORDER — METOPROLOL TARTRATE 50 MG
2.5 TABLET ORAL EVERY 6 HOURS
Refills: 0 | Status: DISCONTINUED | OUTPATIENT
Start: 2020-02-10 | End: 2020-02-10

## 2020-02-10 RX ORDER — SODIUM CHLORIDE 0.65 %
1 AEROSOL, SPRAY (ML) NASAL EVERY 4 HOURS
Refills: 0 | Status: DISCONTINUED | OUTPATIENT
Start: 2020-02-10 | End: 2020-02-12

## 2020-02-10 RX ORDER — HYDROMORPHONE HYDROCHLORIDE 2 MG/ML
1 INJECTION INTRAMUSCULAR; INTRAVENOUS; SUBCUTANEOUS
Qty: 100 | Refills: 0 | Status: DISCONTINUED | OUTPATIENT
Start: 2020-02-10 | End: 2020-02-12

## 2020-02-10 RX ADMIN — Medication 3 MILLILITER(S): at 14:38

## 2020-02-10 RX ADMIN — SODIUM CHLORIDE 4 MILLILITER(S): 9 INJECTION INTRAMUSCULAR; INTRAVENOUS; SUBCUTANEOUS at 14:36

## 2020-02-10 RX ADMIN — Medication 1 SPRAY(S): at 19:10

## 2020-02-10 RX ADMIN — HYDROMORPHONE HYDROCHLORIDE 0.5 MILLIGRAM(S): 2 INJECTION INTRAMUSCULAR; INTRAVENOUS; SUBCUTANEOUS at 06:00

## 2020-02-10 RX ADMIN — HYDROMORPHONE HYDROCHLORIDE 1 MG/HR: 2 INJECTION INTRAMUSCULAR; INTRAVENOUS; SUBCUTANEOUS at 19:39

## 2020-02-10 RX ADMIN — HYDROMORPHONE HYDROCHLORIDE 0.5 MG/HR: 2 INJECTION INTRAMUSCULAR; INTRAVENOUS; SUBCUTANEOUS at 19:09

## 2020-02-10 RX ADMIN — SODIUM CHLORIDE 4 MILLILITER(S): 9 INJECTION INTRAMUSCULAR; INTRAVENOUS; SUBCUTANEOUS at 08:22

## 2020-02-10 RX ADMIN — SODIUM CHLORIDE 4 MILLILITER(S): 9 INJECTION INTRAMUSCULAR; INTRAVENOUS; SUBCUTANEOUS at 00:44

## 2020-02-10 RX ADMIN — Medication 1 SPRAY(S): at 21:56

## 2020-02-10 RX ADMIN — Medication 3 MILLILITER(S): at 00:44

## 2020-02-10 RX ADMIN — Medication 1 SPRAY(S): at 13:50

## 2020-02-10 RX ADMIN — HEPARIN SODIUM 700 UNIT(S)/HR: 5000 INJECTION INTRAVENOUS; SUBCUTANEOUS at 08:03

## 2020-02-10 RX ADMIN — Medication 3 MILLILITER(S): at 08:22

## 2020-02-10 RX ADMIN — HYDROMORPHONE HYDROCHLORIDE 0.5 MILLIGRAM(S): 2 INJECTION INTRAMUSCULAR; INTRAVENOUS; SUBCUTANEOUS at 05:25

## 2020-02-10 NOTE — PROGRESS NOTE ADULT - ASSESSMENT
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 Admit for acute hypoxic respiratory failure likely 2/2 PNA in setting of cancer also noted to have GERMAN.    2/6/2020: Now in ICU after sustained RRT called for sustained WCT and hypotension, s/p amio loads x2, lidocaine push. Now on pressors, lidocaine and amio drips, currently in Afib with rates in 120s. Upon review of EKG from time of rapid, doubt true Vtach. Rate is irregular, no evidence of AV dissociation with no change in axis; patient does meet brugada criteria. Most likely  Afib with aberrancy.  Now converted to NSR overnight    Afib   - Remains in uncontrolled Afib but asymptomatic  - Continue Heparin drip for now.  Long-term AC needs to be discussed with family members  - Need to clarify goals of care with family.  IF he goes comfort, no further therapy.  Awaiting for possible Hospice transfer  - On Amio drip but remains tachycardic.  Will discontinue Amio gtt.    - Start Lopressor 2.5 mg IV q6H.  Discontinue telemetry  - He is s/p Dig load followed by QOD.  His Dig level = 2.7 but not Dig toxic.  Now off Dig  - Monitor electrolytes.  Replete to keep K>4 and Mag>2  - Troponin elevation with normal CKs likely demand ischemia in the setting of Afib with RVR    Shock  - Cardiogenic vs septic shock requiring pressors  - His BP has been stable off pressor  - TTE showed mild LVSD, EF 45% but unable to r/o SWMA.  No significant valvular disease noted    Acute hypoxic respiratory failure in setting of advanced lung cancer with mets with multifocal pneumonia  - Now off Abx.  Leukocytosis improving  - Continue bronchodilators  - Supplemental O2  - Pulm following.  Follow recs    GOALS OF CARE:  - Patient is DNR/DNI, poor prognosis.  Awaiting possible Hospice transfer  - Palliative following    All other care per Primary  Will follow as inpatient    Vielka Shirley DNP, NP-C  Cardiology   Spectra #[phone]      Vielka Shirley DNP, NP-C  Cardiology   Spectra #[phone]

## 2020-02-10 NOTE — PROGRESS NOTE ADULT - ATTENDING COMMENTS
I personally conducted a physical examination of the patient. I personally gathered the patient's history. I edited the above listed findings which were prepared by the listed resident physician. I personally discussed the plan of care with the patient. The questions and concerns were addressed to the best of my ability. The patient is in agreement with the listed treatment plan.     - see [facility] conversation. d/c planning to inpatient hospice.   - I personally reviewed the patient's vital signs, labs, microbiology data, radiographic findings and consultant recommendations with the residency team

## 2020-02-10 NOTE — PROGRESS NOTE ADULT - ASSESSMENT
Acute on CKD Stage 3  Metabolic Acidosis  Hyperkalemia  Sepsis/PNA  Dehydration  Hyperphosphatemia      -GERMAN is likely due to dehydration along with septic ATN.   -Renal indices remain quite elevated, but BUN downtrending. Awaiting today's lab result   -FeUrea 31%  -Renal sonogram reviewed showing no evidence of Hydro, but echogenic kidneys noted  -Potassium within normal range; Kayexalate PRN  -Abx per ID; renal dosing  -Monitor urine output; poor output documented  -No acute indication for dialysis; poor candidate especially with Lung Ca and overall debility. Dialysis not recommended  -Pulm follow up noted  -Palliative eval reviewed; care geared toward comfort; pending hospice evaluation    D/W Pulm    Thank you

## 2020-02-10 NOTE — PROGRESS NOTE ADULT - PROBLEM SELECTOR PLAN 6
- Poor prognosis, hospice eval appropriate  - Family not willing for PEG.   - Pt is DNR/DNI, MOLST completed H/o emphysema  - Pulm Dr. Yu following  - supportive care, and nebs  - off steroids now

## 2020-02-10 NOTE — PROGRESS NOTE ADULT - PROBLEM SELECTOR PLAN 3
Severe sepsis POA  - CXR and CT chest demonstrated multifocal PNA with ELIZABETH mass extension through intercostal space suspicious for disease progression  - PNA appears clinically improved s/p Abx  - Leukocytosis likely 2/2 steroid use  - Pulm Dr. Yu following  - ID Dr. Junior following  - BCx NGTD  - Pt has remained afebrile without clinical symptoms to suggest uncontrolled infection   - Monitor daily CBC and temperature curve Oliguric renal failure  - renal indices marginally improved but outlook for renal function is poor  - nephro Dr. Brooks following, suspecting GERMAN 2/2 dehydration and septic ATN,   - not a candidate for dialysis now given lung ca and poor performance status

## 2020-02-10 NOTE — PROGRESS NOTE ADULT - PROBLEM SELECTOR PLAN 5
- H/o emphysema  - Pulm Dr. Yu following  - supportive care, and nebs  - off steroids now Comfort measures per GOC discussion  - Previously recieved chemotherapy [id number] prior to arrival  - pt is a poor candidate to continue chemo at this point. unlikely to tolerate moving fwd  - Follows with outpatient oncologist Dr. Mahan in [place]

## 2020-02-10 NOTE — PROGRESS NOTE ADULT - SUBJECTIVE AND OBJECTIVE BOX
Date/Time Patient Seen:  		  Referring MD:   Data Reviewed	       Patient is a 86y old  Male who presents with a chief complaint of shortness of breath (09 Feb 2020 11:27)      Subjective/HPI     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  S/P skin neoplasm resection  Hip pain: Right hip replacement in 2008  FH: CABG (coronary artery bypass surgery): 1994 (double)        Medication list         MEDICATIONS  (STANDING):  albuterol/ipratropium for Nebulization 3 milliLiter(s) Nebulizer every 6 hours  aMIOdarone Infusion 0.5 mG/Min (16.667 mL/Hr) IV Continuous <Continuous>  heparin  Infusion.  Unit(s)/Hr (11 mL/Hr) IV Continuous <Continuous>  latanoprost 0.005% Ophthalmic Solution 1 Drop(s) Both EYES at bedtime  sodium chloride 3%  Inhalation 4 milliLiter(s) Inhalation every 6 hours    MEDICATIONS  (PRN):  bisacodyl 5 milliGRAM(s) Oral every 12 hours PRN Constipation  HYDROmorphone  Injectable 0.5 milliGRAM(s) IV Push every 90 minutes PRN pain, distress, suffering, dyspnea, palliative measures         Vitals log        ICU Vital Signs Last 24 Hrs  T(C): 36.3 (10 Feb 2020 04:54), Max: 36.4 (09 Feb 2020 07:50)  T(F): 97.4 (10 Feb 2020 04:54), Max: 97.6 (09 Feb 2020 11:27)  HR: 87 (10 Feb 2020 04:54) (87 - 116)  BP: 102/66 (10 Feb 2020 04:54) (102/66 - 147/73)  BP(mean): --  ABP: --  ABP(mean): --  RR: 19 (10 Feb 2020 04:54) (18 - 19)  SpO2: 95% (10 Feb 2020 04:54) (93% - 98%)           Input and Output:  I&O's Detail    09 Feb 2020 07:01  -  10 Feb 2020 07:00  --------------------------------------------------------  IN:    amiodarone Infusion: 182.6 mL    heparin  Infusion.: 84 mL  Total IN: 266.6 mL    OUT:  Total OUT: 0 mL    Total NET: 266.6 mL          Lab Data                        10.1   17.51 )-----------( 128      ( 09 Feb 2020 05:42 )             30.5     02-09    145  |  116<H>  |  115<H>  ----------------------------<  62<L>  5.0   |  18<L>  |  5.90<H>    Ca    8.4<L>      09 Feb 2020 05:42  Phos  5.5     02-09  Mg     2.9     02-09    TPro  5.1<L>  /  Alb  1.7<L>  /  TBili  0.6  /  DBili  x   /  AST  31  /  ALT  20  /  AlkPhos  91  02-09            Review of Systems	      Objective     Physical Examination    heart s1s2  lung dec BS  abd soft  head nc      Pertinent Lab findings & Imaging      Jose Guadalupe:  NO   Adequate UO     I&O's Detail    09 Feb 2020 07:01  -  10 Feb 2020 07:00  --------------------------------------------------------  IN:    amiodarone Infusion: 182.6 mL    heparin  Infusion.: 84 mL  Total IN: 266.6 mL    OUT:  Total OUT: 0 mL    Total NET: 266.6 mL               Discussed with:     Cultures:	        Radiology

## 2020-02-10 NOTE — PROGRESS NOTE ADULT - PROBLEM SELECTOR PLAN 8
- DVT ppx: heparin infusion, dosed warfarin today    BB IMPROVE VTE Individual Risk Assessment        RISK                                                          Points  [  ] Previous VTE                                                3  [  ] Thrombophilia                                             2  [  ] Lower limb paralysis                                   2        (unable to hold up >15 seconds)    [ x ] Current Cancer                                            2         (within 6 months)  [  ] Immobilization > 24 hrs                              1  [  ] ICU/CCU stay > 24 hours                            1  [  x] Age > 60                                                    1    IMPROVE VTE Score: 3 - DVT ppx: no A/c due to comfort care.    BB IMPROVE VTE Individual Risk Assessment        RISK                                                          Points  [  ] Previous VTE                                                3  [  ] Thrombophilia                                             2  [  ] Lower limb paralysis                                   2        (unable to hold up >15 seconds)    [ x ] Current Cancer                                            2         (within 6 months)  [  ] Immobilization > 24 hrs                              1  [  ] ICU/CCU stay > 24 hours                            1  [  x] Age > 60                                                    1    IMPROVE VTE Score: 3

## 2020-02-10 NOTE — PROGRESS NOTE ADULT - SUBJECTIVE AND OBJECTIVE BOX
infectious diseases progress note:    WILDA PORTILLO is a 86y y. o. Male patient    Patient with noc concerning overnight events    Allergies    No Known Allergies    Intolerances        ANTIBIOTICS/RELEVANT:  antimicrobials    immunologic:    OTHER:  albuterol/ipratropium for Nebulization 3 milliLiter(s) Nebulizer every 6 hours  aMIOdarone Infusion 0.5 mG/Min IV Continuous <Continuous>  bisacodyl 5 milliGRAM(s) Oral every 12 hours PRN  heparin  Infusion.  Unit(s)/Hr IV Continuous <Continuous>  HYDROmorphone  Injectable 0.5 milliGRAM(s) IV Push every 90 minutes PRN  latanoprost 0.005% Ophthalmic Solution 1 Drop(s) Both EYES at bedtime  sodium chloride 3%  Inhalation 4 milliLiter(s) Inhalation every 6 hours      Objective:  Last 24-Vital Signs Last 24 Hrs  T(C): 36.3 (10 Feb 2020 07:37), Max: 36.4 (09 Feb 2020 11:27)  T(F): 97.3 (10 Feb 2020 07:37), Max: 97.6 (09 Feb 2020 11:27)  HR: 121 (10 Feb 2020 07:37) (87 - 121)  BP: 104/74 (10 Feb 2020 07:37) (102/66 - 147/73)  BP(mean): --  RR: 20 (10 Feb 2020 07:37) (18 - 20)  SpO2: 96% (10 Feb 2020 07:37) (93% - 98%)    T(C): 36.3 (02-10-20 @ 07:37), Max: 36.5 (02-09-20 @ 00:04)  T(F): 97.3 (02-10-20 @ 07:37), Max: 97.7 (02-09-20 @ 00:04)  T(C): 36.3 (02-10-20 @ 07:37), Max: 36.7 (02-08-20 @ 04:00)  T(F): 97.3 (02-10-20 @ 07:37), Max: 98 (02-08-20 @ 04:00)  T(C): 36.3 (02-10-20 @ 07:37), Max: 36.7 (02-08-20 @ 04:00)  T(F): 97.3 (02-10-20 @ 07:37), Max: 98 (02-08-20 @ 04:00)    PHYSICAL EXAM:  Constitutional: Well-developed, well nourished  Eyes: PERRLA, EOMI  Ear/Nose/Throat: oropharynx normal	  Neck: no JVD, no lymphadenopathy, supple  Respiratory: no accessory muscle use, lung fields bilaterally with coarse BS  Cardiovascular: distant  Gastrointestinal: soft, NT, no HSM, BS-normal  Extremities: no clubbing, no cyanosis, edema absent  Neuro: patient alert,   Skin: no sig lesions      LABS:                        10.1   17.51 )-----------( 128      ( 09 Feb 2020 05:42 )             30.5       WBC 17.51  02-09 @ 05:42  WBC 13.89  02-08 @ 06:06  WBC 20.88  02-07 @ 06:01  WBC 21.95  02-06 @ 21:23  WBC 20.66  02-06 @ 04:44  WBC 15.12  02-05 @ 08:58  WBC 15.94  02-04 @ 08:53      02-09    145  |  116<H>  |  115<H>  ----------------------------<  62<L>  5.0   |  18<L>  |  5.90<H>    Ca    8.4<L>      09 Feb 2020 05:42  Phos  6.1     02-10  Mg     3.0     02-10    TPro  5.1<L>  /  Alb  1.7<L>  /  TBili  0.6  /  DBili  x   /  AST  31  /  ALT  20  /  AlkPhos  91  02-09      Creatinine, Serum: 5.90 mg/dL (02-09-20 @ 05:42)  Creatinine, Serum: 5.90 mg/dL (02-08-20 @ 15:05)  Creatinine, Serum: 6.00 mg/dL (02-08-20 @ 06:06)  Creatinine, Serum: 6.20 mg/dL (02-07-20 @ 06:01)  Creatinine, Serum: 6.30 mg/dL (02-06-20 @ 04:44)  Creatinine, Serum: 6.80 mg/dL (02-05-20 @ 22:12)  Creatinine, Serum: 6.60 mg/dL (02-05-20 @ 08:58)  Creatinine, Serum: 6.70 mg/dL (02-04-20 @ 08:53)      PTT - ( 10 Feb 2020 07:14 )  PTT:61.8 sec          MICROBIOLOGY:              RADIOLOGY & ADDITIONAL STUDIES:

## 2020-02-10 NOTE — PROGRESS NOTE ADULT - PROBLEM SELECTOR PLAN 2
Oliguric renal failure  - renal indices marginally improved but outlook for renal function is poor  - nephro Dr. Brooks following, suspecting GERMAN 2/2 dehydration and septic ATN,   - not a candidate for dialysis now given lung ca and poor performance status Afib w/ RVR  - Amio gtt d/analy, switched to IV Lopressor.  - IV lopressor then d/analy due to GOC discussion with comfort care as end goal.  - Heparin gtt d/analy due to GOC discussion.  - Poor candidate for long time AC due to poor prognosis.

## 2020-02-10 NOTE — PROGRESS NOTE ADULT - PROBLEM SELECTOR PLAN 4
- was receiving chemotherapy [id number] prior to arrival  - will hold chemorx for now so as to not induce further immunosuppression in setting of active infection on IV abx  - pt is a poor candidate to continue chemo at this point. unlikely to tolerate moving fwd  - Follows with outpatient oncologist Dr. Mahan in [place] Severe sepsis POA  - CXR and CT chest demonstrated multifocal PNA with ELIZABETH mass extension through intercostal space suspicious for disease progression  - PNA appears clinically improved s/p Abx  - Leukocytosis likely 2/2 steroid use  - Pulm Dr. Yu following  - ID Dr. Junior following  - BCx NGTD  - Pt has remained afebrile without clinical symptoms to suggest uncontrolled infection   - Monitor daily CBC and temperature curve

## 2020-02-10 NOTE — PROGRESS NOTE ADULT - ASSESSMENT
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 Goals of care discussion had, patient now comfort measures. 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 Goals of care discussion had, patient now comfort measures.

## 2020-02-10 NOTE — PROGRESS NOTE ADULT - SUBJECTIVE AND OBJECTIVE BOX
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 Pt states he has baseline HADLEY, but dyspea has increased in the last week. Of note he is in week 6 of chemo treatment keytruda, gets chemo every 3 weeks, tolerating chemo appropriately. Pt also has been urinating less for the last 4 days. Pt denies ever having dialysis. Pt denies fever, chills, chest pain, orthopnea, dysuria, hematuria, diarrhea, melena, hematochezia.      ED vitals: T: 96.8, HR: 99, BP: 108/58, RR: 18, O2 Saturation: 96% on 2L  Pt recieved albuterol neb x 3, ipratropium x 1 for neb, solumedrol injectable  mg x1, kayexalate 30 mg x1, 2L NS   Labs significant for: WBC: 18.45 w/ left shift, Lactate: 2.5, Potassium 5.4, Chloride: 109, CO2: 19, BUN/Cr: 109/6.3, Albumin: 2.7, Alk Phos: 164, ProCal: 4.5   EKG: sinus tachycardia 106 bpm  CXR: R sided PNA (02 Feb 2020 14:08)      ----  INTERVAL HPI/OVERNIGHT EVENTS: Pt seen and evaluated at the bedside. Patient states that he is "not fine," and appears distressed and anxious, and developed a nosebleed. Patient lethargic at this time. Overnight patient had 9 beats of Vtach, mg and phos added to am labs. Goals of care discussion had with family. Patient family wishes for comfort care at this time.     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 Admits to fatigue.  CARDIOVASCULAR: denies chest pain, chest pressure, palpitations  RESPIRATORY: Admits to dyspnea  GASTROINTESTINAL: denies nausea, vomiting, diarrhea, abdominal pain  GENITOURINARY: denies dysuria, discharge  NEUROLOGICAL: denies numbness, headache, focal weakness  MUSCULOSKELETAL: denies new joint pain, muscle aches  HEMATOLOGIC: denies gross bleeding, bruising  LYMPHATICS: denies enlarged lymph nodes, extremity swelling    ----  PHYSICAL EXAM:  GENERAL: patient appears ill with anxiety.  LUNGS: diminished breath sounds b/l  HEART: soft S1/S2, regular rate and rhythm, systolic murmur noted, 2+ pitting edema b/l LE  GASTROINTESTINAL: abdomen is soft, nontender, nondistended, normoactive bowel sounds, no palpable masses  INTEGUMENT: diffuse ecchymoses of b/l UE and LE  MUSCULOSKELETAL: no clubbing or cyanosis, no obvious deformity, LUE swelling at IV site without warmth or tenderness to palpation  NEUROLOGIC: awake, alert, oriented x3, no reported sensory deficit on 4 extremities  HEME/LYMPH: no palpable supraclavicular nodules    T(C): 36.3 (02-10-20 @ 12:04), Max: 36.4 (02-09-20 @ 19:28)  HR: 87 (02-10-20 @ 15:54) (85 - 127)  BP: 127/93 (02-10-20 @ 12:04) (102/66 - 147/73)  RR: 19 (02-10-20 @ 12:04) (19 - 20)  SpO2: 94% (02-10-20 @ 15:54) (93% - 98%)  Wt(kg): --    ----  I&O's Summary    09 Feb 2020 07:01  -  10 Feb 2020 07:00  --------------------------------------------------------  IN: 266.6 mL / OUT: 0 mL / NET: 266.6 mL    10 Feb 2020 07:01  -  10 Feb 2020 16:31  --------------------------------------------------------  IN: 0 mL / OUT: 100 mL / NET: -100 mL        LABS:                        10.1   17.51 )-----------( 128      ( 09 Feb 2020 05:42 )             30.5     02-09    145  |  116<H>  |  115<H>  ----------------------------<  62<L>  5.0   |  18<L>  |  5.90<H>    Ca    8.4<L>      09 Feb 2020 05:42  Phos  6.1     02-10  Mg     3.0     02-10    TPro  5.1<L>  /  Alb  1.7<L>  /  TBili  0.6  /  DBili  x   /  AST  31  /  ALT  20  /  AlkPhos  91  02-09    PTT - ( 10 Feb 2020 07:14 )  PTT:61.8 sec    CAPILLARY BLOOD GLUCOSE        ----  Personally reviewed:  Vital sign trends: [ x ] yes    [  ] no     [  ] n/a  Laboratory results: [ x ] yes    [  ] no     [  ] n/a  Consultant recommendations: [ x ] yes    [  ] no     [  ] n/a Patient is a 86y old  Male who presents with a chief complaint of shortness of breath (10 Feb 2020 13:01)      FROM ADMISSION H+P:   HPI:  86 year old male PMH coronary artery disease s/p CABG 1994, HLD, HTN, CKD stage III, 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 Pt states he has baseline HADLEY, but dyspea has increased in the last week. Of note he is in week 6 of chemo treatment keytruda, gets chemo every 3 weeks, tolerating chemo appropriately. Pt also has been urinating less for the last 4 days. Pt denies ever having dialysis. Pt denies fever, chills, chest pain, orthopnea, dysuria, hematuria, diarrhea, melena, hematochezia.     ----  INTERVAL HPI/OVERNIGHT EVENTS: Pt seen and evaluated at the bedside. Patient states that he is "not fine," and appears distressed and anxious, and developed a nosebleed. Patient lethargic at this time. Overnight patient had 9 beats of Vtach, mg and phos added to am labs. Goals of care discussion had with family. Patient family wishes for comfort care at this time.     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 Admits to fatigue.  CARDIOVASCULAR: denies chest pain, chest pressure, palpitations  RESPIRATORY: Admits to dyspnea  GASTROINTESTINAL: denies nausea, vomiting, diarrhea, abdominal pain  GENITOURINARY: denies dysuria, discharge  NEUROLOGICAL: denies numbness, headache, focal weakness  MUSCULOSKELETAL: denies new joint pain, muscle aches  HEMATOLOGIC: denies gross bleeding, bruising  LYMPHATICS: denies enlarged lymph nodes, extremity swelling    ----  PHYSICAL EXAM:  GENERAL: patient appears ill with anxiety  LUNGS: diminished breath sounds b/l  HEART: soft S1/S2, regular rate and rhythm, systolic murmur noted, 2+ pitting edema b/l LE  GASTROINTESTINAL: abdomen is soft, nontender, nondistended, normoactive bowel sounds, no palpable masses  INTEGUMENT: diffuse ecchymoses of b/l UE and LE  MUSCULOSKELETAL: no clubbing or cyanosis, no obvious deformity, LUE swelling at IV site without warmth or tenderness to palpation  NEUROLOGIC: awake, alert, oriented x3, no reported sensory deficit on 4 extremities  HEME/LYMPH: no palpable supraclavicular nodules    T(C): 36.3 (02-10-20 @ 12:04), Max: 36.4 (02-09-20 @ 19:28)  HR: 87 (02-10-20 @ 15:54) (85 - 127)  BP: 127/93 (02-10-20 @ 12:04) (102/66 - 147/73)  RR: 19 (02-10-20 @ 12:04) (19 - 20)  SpO2: 94% (02-10-20 @ 15:54) (93% - 98%)  Wt(kg): --    ----  I&O's Summary    09 Feb 2020 07:01  -  10 Feb 2020 07:00  --------------------------------------------------------  IN: 266.6 mL / OUT: 0 mL / NET: 266.6 mL    10 Feb 2020 07:01  -  10 Feb 2020 16:31  --------------------------------------------------------  IN: 0 mL / OUT: 100 mL / NET: -100 mL        LABS:                        10.1   17.51 )-----------( 128      ( 09 Feb 2020 05:42 )             30.5     02-09    145  |  116<H>  |  115<H>  ----------------------------<  62<L>  5.0   |  18<L>  |  5.90<H>    Ca    8.4<L>      09 Feb 2020 05:42  Phos  6.1     02-10  Mg     3.0     02-10    TPro  5.1<L>  /  Alb  1.7<L>  /  TBili  0.6  /  DBili  x   /  AST  31  /  ALT  20  /  AlkPhos  91  02-09    PTT - ( 10 Feb 2020 07:14 )  PTT:61.8 sec    CAPILLARY BLOOD GLUCOSE        ----  Personally reviewed:  Vital sign trends: [ x ] yes    [  ] no     [  ] n/a  Laboratory results: [ x ] yes    [  ] no     [  ] n/a  Consultant recommendations: [ x ] yes    [  ] no     [  ] n/a

## 2020-02-10 NOTE — PROGRESS NOTE ADULT - SUBJECTIVE AND OBJECTIVE BOX
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 Pt states he has baseline HADLEY, but dyspnea has increased in the last week. Of note he is in week 6 of chemo treatment keytruda, gets chemo every 3 weeks, tolerating chemo appropriately. Pt also has been urinating less for the last 4 days. Pt denies ever having dialysis. Pt denies fever, chills, chest pain, orthopnea, dysuria, hematuria, diarrhea, melena, hematochezia.   Renal consulted for GERMAN. Per family, patient had GERMAN a year ago when he previously had PNA. However, it improved after the PNA was treated. Patient admits to decreased urination. Also with poor intake.     States his breathing is improved compared to yesterday, States he is not urinating much.      Follow up Acute on CKD stage 3  No acute complaints this morning; still with poor appetite,  No N/V.  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  Hip pain: Right hip replacement in   FH: CABG (coronary artery bypass surgery):  (double)       FAMILY HISTORY:  No pertinent family history in first degree relatives  [place]    Social History:Non smoker    MEDICATIONS  (STANDING):  albuterol/ipratropium for Nebulization 3 milliLiter(s) Nebulizer every 6 hours  cefepime   IVPB 500 milliGRAM(s) IV Intermittent every 24 hours  heparin  Injectable 5000 Unit(s) SubCutaneous every 12 hours  methylPREDNISolone sodium succinate Injectable 20 milliGRAM(s) IV Push every 8 hours  pantoprazole    Tablet 40 milliGRAM(s) Oral before breakfast  sodium chloride 0.45% 1000 milliLiter(s) (75 mL/Hr) IV Continuous <Continuous>    MEDICATIONS  (PRN):  guaiFENesin   Syrup  (Sugar-Free) 200 milliGRAM(s) Oral every 6 hours PRN Cough  HYDROmorphone  Injectable 0.25 milliGRAM(s) IV Push every 4 hours PRN pain, distress, suffering, dyspnea, palliative measures   Meds reviewed    Allergies    No Known Allergies    Intolerances         REVIEW OF SYSTEMS:    CONSTITUTIONAL:  No weight loss , +Poor intake  EYES: No eye pain, visual disturbances, or discharge  ENMT:  No difficulty hearing, tinnitus, vertigo; No sinus or throat pain  NECK: No pain or stiffness  BREASTS: No pain, masses, or nipple discharge  RESPIRATORY: no SOB, no coughing  CARDIOVASCULAR: No chest pain, palpitations, dizziness,   GASTROINTESTINAL: No abdominal or epigastric pain. No nausea, vomiting, or hematemesis; No diarrhea or constipation. No melena   GENITOURINARY: No dysuria, frequency, hematuria, or incontinence  NEUROLOGICAL: No headaches, memory loss, loss of strength, numbness, or tremors  SKIN: No Diffuse erythema, no blisters  LYMPH NODES: No enlarged glands  ENDOCRINE: No heat or cold intolerance; No hair loss  MUSCULOSKELETAL: No joint pain or swelling   PSYCHIATRIC: No depression, anxiety, mood swings, or difficulty sleeping  HEME/LYMPH: No easy bruising, or bleeding gums  ALLERGY AND IMMUNOLOGIC: No hives or eczema      Vital Signs Last 24 Hrs  T(C): 36.3 (2020 17:48), Max: 36.9 (2020 16:36)  T(F): 97.3 (2020 17:48), Max: 98.4 (2020 16:36)  HR: 99 (2020 17:48) (92 - 99)  BP: 112/66 (2020 17:48) (108/58 - 124/72)  BP(mean): --  RR: 17 (2020 17:48) (17 - 20)  SpO2: 90% (2020 17:48) (84% - 96%)  Daily Height in cm: 182.88 (2020 12:13)    Daily Weight in k.3 (2020 17:48)    PHYSICAL EXAM:    GENERAL: No Distress  HEAD:  Atraumatic, Normocephalic  EYES: EOMI, conjunctiva and sclera clear  ENMT: No tonsillar erythema, exudates, or enlargement; dry mucous membranes  NECK: Supple, neck veins full  NERVOUS SYSTEM:  Alert & Oriented X3, Good concentration;   CHEST/LUNG: Reduced basilar breath sounds  HEART: Regular rate and rhythm; No murmurs, rubs, or gallops  ABDOMEN: Soft, Nontender, Nondistended; Bowel sounds present, No hepatomegaly  EXTREMITIES: trace Edema  SKIN: No rashes or lesions, dry; poor turgor      LABS:  Reviewed

## 2020-02-10 NOTE — PROGRESS NOTE ADULT - PROBLEM SELECTOR PLAN 1
pt does not feel well - overnight events noted - will dc TELE monitor - planned for poss Hospice Transfer - DNR DNI  Dilaudid on order for PRN use - DULCOLAX PRN use -   prognosis very poor  mets ca - copd - arrhythmia - frail and weak elderly - OP - OA - poor nutritional status  Karnofsky Performance Status score 20 - 30

## 2020-02-10 NOTE — PROGRESS NOTE ADULT - SUBJECTIVE AND OBJECTIVE BOX
[facility] -- Aziza Valdivia, Edmond Cisneros, Titus Navarro Savella, Goodger  Office # [phone]    Follow Up:  Afib with RVR    Subjective/Observations: Awake and alert, NC in use.  Not in respiratory distress.  Denies any chest discomfort.  Tele noted.  Remains in uncontrolled Afib    REVIEW OF SYSTEMS: All other review of systems is negative unless indicated above  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  Hip pain: Right hip replacement in 2008  FH: CABG (coronary artery bypass surgery): 1994 (double)    MEDICATIONS  (STANDING):  albuterol/ipratropium for Nebulization 3 milliLiter(s) Nebulizer every 6 hours  aMIOdarone Infusion 0.5 mG/Min (16.667 mL/Hr) IV Continuous <Continuous>  heparin  Infusion.  Unit(s)/Hr (11 mL/Hr) IV Continuous <Continuous>  latanoprost 0.005% Ophthalmic Solution 1 Drop(s) Both EYES at bedtime  sodium chloride 0.65% Nasal 1 Spray(s) Both Nostrils every 4 hours  sodium chloride 3%  Inhalation 4 milliLiter(s) Inhalation every 6 hours    MEDICATIONS  (PRN):  bisacodyl 5 milliGRAM(s) Oral every 12 hours PRN Constipation  HYDROmorphone  Injectable 0.5 milliGRAM(s) IV Push every 90 minutes PRN pain, distress, suffering, dyspnea, palliative measures    Allergies    No Known Allergies    Intolerances    Vital Signs Last 24 Hrs  T(C): 36.3 (10 Feb 2020 12:04), Max: 36.4 (09 Feb 2020 19:28)  T(F): 97.4 (10 Feb 2020 12:04), Max: 97.5 (09 Feb 2020 19:28)  HR: 127 (10 Feb 2020 12:29) (85 - 127)  BP: 127/93 (10 Feb 2020 12:04) (102/66 - 147/73)  BP(mean): --  RR: 19 (10 Feb 2020 12:04) (19 - 20)  SpO2: 95% (10 Feb 2020 12:04) (93% - 98%)  I&O's Summary    09 Feb 2020 07:01  -  10 Feb 2020 07:00  --------------------------------------------------------  IN: 266.6 mL / OUT: 0 mL / NET: 266.6 mL    10 Feb 2020 07:01  -  10 Feb 2020 13:01  --------------------------------------------------------  IN: 0 mL / OUT: 100 mL / NET: -100 mL      PHYSICAL EXAM:  TELE: Afib with RVR  Constitutional: NAD, awake, cachectic  HEENT: Moist Mucous Membranes, Anicteric  Pulmonary: Non-labored, breath sounds are [id number] bilaterally, No wheezing, rales or rhonchi  Cardiovascular: IRRR, S1 and S2, + murmurs.  No rubs, gallops or clicks  Gastrointestinal: Bowel Sounds present, soft, nontender.   Lymph: No peripheral edema. No lymphadenopathy.  Skin: No visible rashes or ulcers.  Psych:  Mood & affect: flat  LABS: All Labs Reviewed:                        10.1   17.51 )-----------( 128      ( 09 Feb 2020 05:42 )             30.5                         9.9    13.89 )-----------( 107      ( 08 Feb 2020 06:06 )             30.4     09 Feb 2020 05:42    145    |  116    |  115    ----------------------------<  62     5.0     |  18     |  5.90   08 Feb 2020 15:05    145    |  112    |  119    ----------------------------<  69     4.7     |  20     |  5.90   08 Feb 2020 06:06    143    |  112    |  121    ----------------------------<  74     5.4     |  17     |  6.00     Ca    8.4        09 Feb 2020 05:42  Ca    8.7        08 Feb 2020 15:05  Ca    7.7        08 Feb 2020 06:06  Phos  6.1       10 Feb 2020 07:14  Phos  5.5       09 Feb 2020 05:42  Phos  6.7       08 Feb 2020 06:06  Mg     3.0       10 Feb 2020 07:14  Mg     2.9       09 Feb 2020 05:42  Mg     2.3       08 Feb 2020 06:06    TPro  5.1    /  Alb  1.7    /  TBili  0.6    /  DBili  x      /  AST  31     /  ALT  20     /  AlkPhos  91     09 Feb 2020 05:42  TPro  5.1    /  Alb  2.0    /  TBili  0.5    /  DBili  x      /  AST  19     /  ALT  20     /  AlkPhos  88     08 Feb 2020 06:06    PTT - ( 10 Feb 2020 07:14 )  PTT:61.8 sec    < from: TTE Echo Doppler w/o Cont (02.06.20 @ 20:24) >     EXAM:  ECHO TTE WO CON COMP W DOPPLR         PROCEDURE DATE:  02/06/2020        INTERPRETATION:  INDICATION: Abnormal EKG    Blood Pressure unavailable    Height 182.8 cm     Weight 63.5 kg       BSA 1.8 sq m    Dimensions:    LA 4.0       Normal Values: 2.0 - 4.0 cm    Ao 3.3        Normal Values: 2.0 - 3.8 cm  SEPTUM 1.2       Normal Values: 0.6 - 1.2 cm  PWT 1.0       Normal Values: 0.6 - 1.1 cm  LVIDd 3.85       Normal Values: 3.0 - 5.6 cm  LVIDs 3.7         Normal Values: 1.8 - 4.0 cm      OBSERVATIONS:  Technically difficult and limited study, patient tachycardic with irregular rhythm  Mitral Valve: normal, trace physiologic MR.  Aortic Valve/Aorta: Calcified trileaflet aortic valve with decreased opening. Peak transaortic valve gradient is 12.8 mmHg with a mean transaortic valve graft 6.5 mmHg. The aortic valve area is calculated to be 1.65 sq cm by continuity equation. This consistent with mild aortic stenosis  Tricuspid Valve: normal with mild TR.  Pulmonic Valve: Not well-visualized  Left Atrium: normal  Right Atrium: Not well-visualized  Left Ventricle: Mild global left ventricular systolic dysfunction. LVEF appears to be about 45% by visual estimation. Endocardium is not well-visualized, cannot rule out segmental dysfunction.  Right Ventricle: Not well-visualized  Pericardium/Pleura: normal, no significant pericardial effusion.    Conclusion:   Technically difficult and limited study, patient tachycardic with irregular rhythm  Mild global left ventricular systolic dysfunction. LVEF appears to be about 45% by visual estimation. Endocardium is not well-visualized, cannot rule out segmental dysfunction.   Right ventricle is not well-visualized  Calcified trileaflet aortic valve with mild aortic stenosis   Mild MRand TR.     No significant pericardial effusion.     MADAI BRADLEY   This document has been electronically signed. Feb 7 2020  4:38PM      < end of copied text >    < from: CT Chest No Cont (02.02.20 @ 15:13) >    EXAM:  CT CHEST                          PROCEDURE DATE:  02/02/2020      INTERPRETATION:  Clinical information: Suspected pneumonia. History of lung cancer.    Comparison: 1/13/2019 CT scan of the chest.    PROCEDURE:   CT of the Chest was performed without intravenous contrast.     FINDINGS:    Lungs and airways: Emphysema. Interval development of multifocal opacities in the right upper lobe and right lower lobe compatible with multifocal pneumonia. Again noted is a left upper lobe and para-mediastinal mass adjacent to surgical clips inseparable from the proximal aortic arch. The area on series 2 image 45 measures 4.1 x 3.7 cm previously 3.8 x 2.3 cm. Slightly more superiorly there is soft tissue that extends anteriorly through the intercostal space on series 2 image 36 measuring 3.1 x 3.0 cm.  Findings are suspicious for progression of disease.    Pleura: Within normal limits. No pleural effusion.    Mediastinum and Ginny: No abnormally enlarged lymph nodes. The esophagus is distended with fluid.    Heart: Normal size. No pericardial effusion.     Vessels: There has been median sternotomy and CABG. Main pulmonary artery is enlarged to 4.2 cm.    Chest wall and lower neck: Within normal limits.    Upper abdomen: Bilateral renal hypodensities again noted including some which are not simple cysts. For example there is a 3.0 cm lesion in the right mid kidney which is not a simple cyst and a 1.4 cm lesion in the left mid kidney which is not a simple cyst are stable. The gallbladder is distended containing a stone.    Bones: Within normal limits.    IMPRESSION:     Interval development of multifocal pneumonia involving the right upper and lower lobes.    Increase in size of soft tissue mass associated with surgical clips in the left upper lobe paramediastinal location inseparable from the aortic arch which now extends anteriorly in an intercostal location highly suspicious for progression of disease.    BLANCA GOODRICH M.D., ATTENDING RADIOLOGIST  This document has been electronically signed. Feb 2 2020  4:00PM      < end of copied text >    < from: 12 Lead ECG (02.05.20 @ 21:43) >    Ventricular Rate 202 BPM    Atrial Rate 170 BPM    QRS Duration 142 ms    Q-T Interval 274 ms    QTC Calculation(Bezet) 502 ms    R Axis -56 degrees    T Axis 124 degrees    Diagnosis Line Wide QRS tachycardia with premature supraventricular complexes and with frequent premature ventricular complexes  - likely AF with aberrant conduction  Left axis deviation  Nonspecific intraventricular block  Abnormal ECG  No previous ECGs available  Confirmed by Blayne HOUSER, Victor M ([id number]) on 2/6/2020 10:28:47 AM     < end of copied text >

## 2020-02-10 NOTE — PROGRESS NOTE ADULT - ASSESSMENT
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

## 2020-02-10 NOTE — PROGRESS NOTE ADULT - PROBLEM SELECTOR PLAN 1
pt appears to be somewhat clinically improved on current therapy and although there is an increase in wbc this may be due to steroids, overall prognosis appears poor with pt not wanting to eat and general dispo decisions  -off abx now that we have finished a course and recommend observation off abx

## 2020-02-10 NOTE — PROGRESS NOTE ADULT - PROBLEM SELECTOR PLAN 7
- Chronic, stable  - Home ASA was stopped  - Statin restarted in hospital, continue  - increase metoprolol to 25mg q6h restarted per cardio  - TTE with EF 55-60%, mild LVH and AS Chronic, stable  - Home ASA was stopped  - Statin restarted in hospital, dced due to comfort care.  - TTE with EF 55-60%, mild LVH and AS

## 2020-02-10 NOTE — PROGRESS NOTE ADULT - PROBLEM SELECTOR PLAN 1
Afib w/ RVR  - Amio gtt d/analy, switched to IV Lopressor.  - IV lopressor then d/analy due to GOC discussion with comfort care as end goal.  - Heparin gtt d/analy due to GOC discussion.  - Poor candidate for long time AC due to poor prognosis. Poor prognosis  - D/w family due to poor prognosis, anxiety, pain and suffering with patient's spouse and son.  - Inpatient hospice route for care pursued by family at this time.  - Pt is DNR/DNI, MOLST completed  - Dilaudid infusion  - Ativan PRN agitation and/or distress.  - Atropine drops sublingual PRN  - Referral to inpatient hospice.

## 2020-02-11 VITALS
RESPIRATION RATE: 119 BRPM | OXYGEN SATURATION: 96 % | DIASTOLIC BLOOD PRESSURE: 72 MMHG | HEART RATE: 90 BPM | TEMPERATURE: 97 F | SYSTOLIC BLOOD PRESSURE: 131 MMHG

## 2020-02-11 PROCEDURE — 99233 SBSQ HOSP IP/OBS HIGH 50: CPT | Mod: GC

## 2020-02-11 RX ADMIN — HYDROMORPHONE HYDROCHLORIDE 1 MG/HR: 2 INJECTION INTRAMUSCULAR; INTRAVENOUS; SUBCUTANEOUS at 09:15

## 2020-02-11 RX ADMIN — Medication 1 SPRAY(S): at 09:16

## 2020-02-11 RX ADMIN — Medication 1 SPRAY(S): at 05:51

## 2020-02-11 RX ADMIN — Medication 1 SPRAY(S): at 23:08

## 2020-02-11 RX ADMIN — HYDROMORPHONE HYDROCHLORIDE 1 MG/HR: 2 INJECTION INTRAMUSCULAR; INTRAVENOUS; SUBCUTANEOUS at 09:45

## 2020-02-11 NOTE — PROGRESS NOTE ADULT - PROBLEM SELECTOR PLAN 1
Poor prognosis   - Inpatient hospice route for care pursued by family at this time.  - Pt is DNR/DNI, MOLST completed  - Continue Dilaudid infusion @ 1mG/hr  - Continue Ativan PRN agitation and/or distress.  - Continue Atropine drops sublingual PRN  - No serial morning labs  - For transfer to inpatient hospice, awaiting bed. Poor prognosis   - Inpatient hospice route for care pursued by family at this time.  - Pt is DNR/DNI, MOLST completed  - Continue Dilaudid infusion @ 1mG/hr  - Continue Ativan PRN agitation and/or distress.  - Continue Atropine drops sublingual PRN  - No serial morning labs  - For transfer to inpatient hospice, awaiting bed. family is undecided about whether they would be agreeable w/ transfer, will have family meeting today

## 2020-02-11 NOTE — PROGRESS NOTE ADULT - SUBJECTIVE AND OBJECTIVE BOX
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  S/P skin neoplasm resection  Hip pain: Right hip replacement in 2008  FH: CABG (coronary artery bypass surgery): 1994 (double)        Medication list         MEDICATIONS  (STANDING):  HYDROmorphone Infusion 1 mG/Hr (1 mL/Hr) IV Continuous <Continuous>  sodium chloride 0.65% Nasal 1 Spray(s) Both Nostrils every 4 hours    MEDICATIONS  (PRN):  atropine 1% Ophthalmic Solution for SubLingual Use 2 Drop(s) SubLingual four times a day PRN excess secretions  bisacodyl 5 milliGRAM(s) Oral every 12 hours PRN Constipation  HYDROmorphone  Injectable 0.5 milliGRAM(s) IV Push every 90 minutes PRN pain, distress, suffering, dyspnea, palliative measures  LORazepam   Injectable 1 milliGRAM(s) IV Push every 2 hours PRN respiratory distress, anxiety, agitation         Vitals log        ICU Vital Signs Last 24 Hrs  T(C): 36.3 (10 Feb 2020 12:04), Max: 36.3 (10 Feb 2020 12:04)  T(F): 97.4 (10 Feb 2020 12:04), Max: 97.4 (10 Feb 2020 12:04)  HR: 87 (10 Feb 2020 15:54) (87 - 127)  BP: 127/93 (10 Feb 2020 12:04) (127/93 - 127/93)  BP(mean): --  ABP: --  ABP(mean): --  RR: 19 (10 Feb 2020 12:04) (19 - 19)  SpO2: 94% (10 Feb 2020 15:54) (94% - 95%)           Input and Output:  I&O's Detail    10 Feb 2020 07:01  -  11 Feb 2020 07:00  --------------------------------------------------------  IN:  Total IN: 0 mL    OUT:    Voided: 200 mL  Total OUT: 200 mL    Total NET: -200 mL          Lab Data      Phos  6.1     02-10  Mg     3.0     02-10              Review of Systems	      Objective     Physical Examination    heart s1s2  lung dec BS  frail  weak  poor dentition  on dilaudid gtt      Pertinent Lab findings & Imaging      Jose Guadalupe:  NO   Adequate UO     I&O's Detail    10 Feb 2020 07:01  -  11 Feb 2020 07:00  --------------------------------------------------------  IN:  Total IN: 0 mL    OUT:    Voided: 200 mL  Total OUT: 200 mL    Total NET: -200 mL               Discussed with:     Cultures:	        Radiology

## 2020-02-11 NOTE — PROGRESS NOTE ADULT - PROBLEM SELECTOR PLAN 2
Afib w/ RVR  - No active treatment due to comfort measures  - Poor candidate for long time AC due to poor prognosis.

## 2020-02-11 NOTE — PROGRESS NOTE ADULT - PROBLEM SELECTOR PLAN 4
as above  Thank you for consulting us and involving us in the management of this most interesting and challenging case.     Please Call with any further questions

## 2020-02-11 NOTE — PROGRESS NOTE ADULT - ATTENDING COMMENTS
I personally conducted a physical examination of the patient. I personally gathered the patient's history. I edited the above listed findings which were prepared by the listed resident physician. I personally discussed the plan of care with the patient. The questions and concerns were addressed to the best of my ability. The patient is in agreement with the listed treatment plan.     - no events today. increased dilaudid gtt for improved comfort and w/ good effect. family aware of poor prognosis. will have family meeting to discuss possible transfer to [facility]. strict npo as he remains at elevated aspiration risk and failed bedside slp evals. appropriate for inpatient hospice. prognosis is grave I personally conducted a physical examination of the patient. I personally gathered the patient's history. I edited the above listed findings which were prepared by the listed resident physician. I personally discussed the plan of care with the patient. The questions and concerns were addressed to the best of my ability. The patient is in agreement with the listed treatment plan.     - no events today. increased dilaudid gtt for improved comfort and w/ good effect. family aware of poor prognosis. will have family meeting to discuss possible transfer to hosp[facility]. strict npo as he remains at elevated aspiration risk and failed bedside slp evals. appropriate for inpatient hospice. prognosis is grave    -- see GO note documented by me later today

## 2020-02-11 NOTE — PROGRESS NOTE ADULT - PROBLEM SELECTOR PLAN 3
Oliguric renal failure  - renal indices marginally improved but outlook for renal function is poor  - nephro Dr. Brooks following, suspecting GERMAN 2/2 dehydration and septic ATN,   - not a candidate for dialysis now given lung ca and poor performance status

## 2020-02-11 NOTE — CHART NOTE - NSCHARTNOTEFT_GEN_A_CORE
Met w/ family w/ SW and hospice @ bedside. Had another GOC discussion w/ the pt and family (2 sons and spouse were present). We again discussed potential dispo to inpatient hospice. The family feels that the pt is quite frail and at risk for abrupt decompensation. They are reluctant to transfer him today and request another "few days". He was ACC[facility]PT[facility] to [facility] pending family decision to transport. Emotional support was provided to the pt and family. Pt clinically appears more lethargic on increased hydromorphone infusion. He slept during our entire conversation which took place @ bedside.   - 15 minutes spent @ bedside

## 2020-02-11 NOTE — PROGRESS NOTE ADULT - SUBJECTIVE AND OBJECTIVE BOX
infectious diseases progress note:    WILDA PORTILLO is a 86y y. o. Male patient    Patient now made comfort care    Allergies    No Known Allergies    Intolerances        ANTIBIOTICS/RELEVANT:  antimicrobials    immunologic:    OTHER:  atropine 1% Ophthalmic Solution for SubLingual Use 2 Drop(s) SubLingual four times a day PRN  bisacodyl 5 milliGRAM(s) Oral every 12 hours PRN  HYDROmorphone  Injectable 0.5 milliGRAM(s) IV Push every 90 minutes PRN  HYDROmorphone Infusion 1 mG/Hr IV Continuous <Continuous>  LORazepam   Injectable 1 milliGRAM(s) IV Push every 2 hours PRN  sodium chloride 0.65% Nasal 1 Spray(s) Both Nostrils every 4 hours      Objective:  Vital Signs Last 24 Hrs  T(C): 36.3 (10 Feb 2020 12:04), Max: 36.3 (10 Feb 2020 12:04)  T(F): 97.4 (10 Feb 2020 12:04), Max: 97.4 (10 Feb 2020 12:04)  HR: 87 (10 Feb 2020 15:54) (87 - 127)  BP: 127/93 (10 Feb 2020 12:04) (127/93 - 127/93)  BP(mean): --  RR: 19 (10 Feb 2020 12:04) (19 - 19)  SpO2: 94% (10 Feb 2020 15:54) (94% - 95%)    T(C): 36.3 (02-10-20 @ 12:04), Max: 36.4 (02-09-20 @ 11:27)  T(C): 36.3 (02-10-20 @ 12:04), Max: 36.5 (02-09-20 @ 00:04)  T(C): 36.3 (02-10-20 @ 12:04), Max: 36.7 (02-08-20 @ 04:00)    PHYSICAL EXAM:  Constitutional: Well-developed, well nourished  Eyes: PERRLA, EOMI  Ear/Nose/Throat: oropharynx normal	  Neck: no JVD, no lymphadenopathy, supple  Respiratory: no accessory muscle use  Cardiovascular: RRR,   Gastrointestinal: soft, NT  Extremities: no clubbing, no cyanosis, edema absent      LABS:      17.51 02-09 @ 05:42  13.89 02-08 @ 06:06  20.88 02-07 @ 06:01  21.95 02-06 @ 21:23  20.66 02-06 @ 04:44  15.12 02-05 @ 08:58        Phos  6.1     02-10  Mg     3.0     02-10        Creatinine, Serum: 5.90 mg/dL (02-09-20 @ 05:42)  Creatinine, Serum: 5.90 mg/dL (02-08-20 @ 15:05)  Creatinine, Serum: 6.00 mg/dL (02-08-20 @ 06:06)  Creatinine, Serum: 6.20 mg/dL (02-07-20 @ 06:01)  Creatinine, Serum: 6.30 mg/dL (02-06-20 @ 04:44)  Creatinine, Serum: 6.80 mg/dL (02-05-20 @ 22:12)  Creatinine, Serum: 6.60 mg/dL (02-05-20 @ 08:58)      PTT - ( 10 Feb 2020 07:14 )  PTT:61.8 sec          MICROBIOLOGY:              RADIOLOGY & ADDITIONAL STUDIES:

## 2020-02-11 NOTE — PROGRESS NOTE ADULT - SUBJECTIVE AND OBJECTIVE BOX
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 Pt states he has baseline HADLEY, but dyspea has increased in the last week. Of note he is in week 6 of chemo treatment keytruda, gets chemo every 3 weeks, tolerating chemo appropriately. Pt also has been urinating less for the last 4 days. Pt denies ever having dialysis. Pt denies fever, chills, chest pain, orthopnea, dysuria, hematuria, diarrhea, melena, hematochezia.      ED vitals: T: 96.8, HR: 99, BP: 108/58, RR: 18, O2 Saturation: 96% on 2L  Pt recieved albuterol neb x 3, ipratropium x 1 for neb, solumedrol injectable  mg x1, kayexalate 30 mg x1, 2L NS   Labs significant for: WBC: 18.45 w/ left shift, Lactate: 2.5, Potassium 5.4, Chloride: 109, CO2: 19, BUN/Cr: 109/6.3, Albumin: 2.7, Alk Phos: 164, ProCal: 4.5   EKG: sinus tachycardia 106 bpm  CXR: R sided PNA (02 Feb 2020 14:08)      ----  INTERVAL HPI/OVERNIGHT EVENTS: Pt seen and evaluated at the bedside. No acute overnight events occurred.     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  Hip pain: Right hip replacement in 2008  FH: CABG (coronary artery bypass surgery): 1994 (double)      FAMILY HISTORY:  No pertinent family history in first degree relatives      Allergies    No Known Allergies    Intolerances        ----  REVIEW OF SYSTEMS:  CONSTITUTIONAL: denies fever, chills, fatigue, weakness  HEENT: denies blurred vision, sore throat  SKIN: denies new lesions, rash  CARDIOVASCULAR: denies chest pain, chest pressure, palpitations  RESPIRATORY: denies shortness of breath, sputum production  GASTROINTESTINAL: denies nausea, vomiting, diarrhea, abdominal pain  GENITOURINARY: denies dysuria, discharge  NEUROLOGICAL: denies numbness, headache, focal weakness  MUSCULOSKELETAL: denies new joint pain, muscle aches  HEMATOLOGIC: denies gross bleeding, bruising  LYMPHATICS: denies enlarged lymph nodes, extremity swelling  PSYCHIATRIC: denies recent changes in anxiety, depression  ENDOCRINOLOGIC: denies sweating, cold or heat intolerance    ----  PHYSICAL EXAM:  GENERAL: patient appears well, no acute distress, appropriately interactive  EYES: sclera clear, no exudates  ENMT: oropharynx clear without erythema, moist mucous membranes  NECK: supple, soft  LUNGS: good air entry bilaterally, clear to auscultation, symmetric breath sounds, no wheezing or rhonchi appreciated  HEART: soft S1/S2, regular rate and rhythm, no murmurs noted, no noted edema to b/l LE  GASTROINTESTINAL: abdomen is soft, nontender, nondistended, normoactive bowel sounds, no palpable masses  INTEGUMENT: good skin turgor, appropriate for ethnicity, appears well perfused, no jaundice noted  MUSCULOSKELETAL: no clubbing or cyanosis, no obvious deformity  NEUROLOGIC: awake, alert, oriented x3, good muscle tone in 4 extremities, no obvious sensory deficits  PSYCHIATRIC: mood is good, affect is congruent with mood, linear and logical thought process  HEME/LYMPH: no obvious ecchymosis     T(C): --  HR: 87 (02-10-20 @ 15:54) (87 - 92)  BP: --  RR: --  SpO2: 94% (02-10-20 @ 15:54) (94% - 94%)  Wt(kg): --    ----  I&O's Summary    10 Feb 2020 07:01  -  11 Feb 2020 07:00  --------------------------------------------------------  IN: 0 mL / OUT: 200 mL / NET: -200 mL        LABS:      Phos  6.1     02-10  Mg     3.0     02-10      PTT - ( 10 Feb 2020 07:14 )  PTT:61.8 sec    CAPILLARY BLOOD GLUCOSE                    ----  Personally reviewed:  Vital sign trends: [ x ] yes    [  ] no     [  ] n/a  Laboratory results: [ x ] yes    [  ] no     [  ] n/a  Radiology results: [  ] yes    [  ] no     [  ] n/a  Culture results: [  ] yes    [  ] no     [  ] n/a  Consultant recommendations: [  ] yes    [  ] no     [  ] n/a Patient is a 86y old  Male who presents with a chief complaint of shortness of breath (11 Feb 2020 10:37)      FROM ADMISSION H+P:   HPI:  86 year old male PMH coronary artery disease s/p CABG 1994, HLD, HTN, CKD stage III, 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 Pt states he has baseline HADLEY, but dyspea has increased in the last week. Of note he is in week 6 of chemo treatment keytruda, gets chemo every 3 weeks, tolerating chemo appropriately. Pt also has been urinating less for the last 4 days. Pt denies ever having dialysis. Pt denies fever, chills, chest pain, orthopnea, dysuria, hematuria, diarrhea, melena, hematochezia.     ----  INTERVAL HPI/OVERNIGHT EVENTS: Pt seen and evaluated at the bedside. No acute overnight events occurred. Remains on dilaudid gtt. Reports that pain is better. He is awake and interactive but is a limited historian now and some answers to questions are inappropriate (this is likely related to opiate infusion). I encouraged ativan use as pt admits still feeling anxious and the pt is agreeable. He hasn't yet had a dose of ativan.     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 admits   CARDIOVASCULAR: denies chest pain, chest pressure, palpitations  RESPIRATORY: Admits to dyspnea  GASTROINTESTINAL: denies nausea, vomiting, diarrhea, abdominal pain  GENITOURINARY: denies dysuria, discharge  NEUROLOGICAL: denies numbness, headache, focal weakness  MUSCULOSKELETAL: denies new joint pain, muscle aches  HEMATOLOGIC: denies gross bleeding, bruising  LYMPHATICS: denies enlarged lymph nodes, extremity swelling    ----  PHYSICAL EXAM:  GENERAL: patient appears ill with anxiety  LUNGS: diminished breath sounds b/l  HEART: soft S1/S2, regular rate and rhythm, systolic murmur noted, 2+ pitting edema b/l LE  GASTROINTESTINAL: abdomen is soft, nontender, nondistended, normoactive bowel sounds, no palpable masses  INTEGUMENT: diffuse ecchymoses of b/l UE and LE  MUSCULOSKELETAL: no clubbing or cyanosis, no obvious deformity, LUE swelling at IV site without warmth or tenderness to palpation  NEUROLOGIC: awake, alert, oriented x3, no reported sensory deficit on 4 extremities  HEME/LYMPH: no palpable supraclavicular nodules    T(C): --  HR: 87 (02-10-20 @ 15:54) (87 - 92)  BP: --  RR: --  SpO2: 94% (02-10-20 @ 15:54) (94% - 94%)  Wt(kg): --    ----  I&O's Summary    10 Feb 2020 07:01  -  11 Feb 2020 07:00  --------------------------------------------------------  IN: 0 mL / OUT: 200 mL / NET: -200 mL        LABS:      Phos  6.1     02-10  Mg     3.0     02-10      PTT - ( 10 Feb 2020 07:14 )  PTT:61.8 sec    CAPILLARY BLOOD GLUCOSE                    ----  Personally reviewed:  Vital sign trends: [ x ] yes    [  ] no     [  ] n/a  Laboratory results: [ x ] yes    [  ] no     [  ] n/a  Radiology results: [  ] yes    [  ] no     [  ] n/a  Culture results: [  ] yes    [  ] no     [  ] n/a  Consultant recommendations: [  ] yes    [  ] no     [  ] n/a Patient is a 86y old  Male who presents with a chief complaint of shortness of breath (11 Feb 2020 10:37)      FROM ADMISSION H+P:   HPI:  86 year old male PMH coronary artery disease s/p CABG 1994, HLD, HTN, CKD stage III, 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 Pt states he has baseline HADLEY, but dyspea has increased in the last week. Of note he is in week 6 of chemo treatment keytruda, gets chemo every 3 weeks, tolerating chemo appropriately. Pt also has been urinating less for the last 4 days. Pt denies ever having dialysis. Pt denies fever, chills, chest pain, orthopnea, dysuria, hematuria, diarrhea, melena, hematochezia.     ----  INTERVAL HPI/OVERNIGHT EVENTS: Pt seen and evaluated at the bedside. No acute overnight events occurred. Remains on dilaudid gtt. Reports that pain is better. He is awake and interactive but is a limited historian now and some answers to questions are inappropriate (this is likely related to opiate infusion). I encouraged ativan use as pt admits still feeling anxious and the pt is agreeable. He hasn't yet had a dose of ativan.     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 Admits to fatigue  CARDIOVASCULAR: denies chest pain, chest pressure, palpitations  RESPIRATORY: Admits to dyspnea  GASTROINTESTINAL: denies nausea, vomiting, diarrhea, abdominal pain  GENITOURINARY: denies dysuria, discharge  NEUROLOGICAL: denies numbness, headache, focal weakness  MUSCULOSKELETAL: denies new joint pain, muscle aches  HEMATOLOGIC: denies gross bleeding, bruising  LYMPHATICS: denies enlarged lymph nodes, extremity swelling    ----  PHYSICAL EXAM:  GENERAL: patient appears ill with anxiety as well as lethargy (attributed to both ill condition and Dilaudid infusion)   LUNGS: diminished breath sounds b/l  HEART: soft S1/S2, regular rate and rhythm, systolic murmur noted, 2+ pitting edema b/l LE  GASTROINTESTINAL: abdomen is soft, nontender, nondistended, normoactive bowel sounds, no palpable masses  INTEGUMENT: diffuse ecchymoses of b/l UE and LE  MUSCULOSKELETAL: no clubbing or cyanosis, no obvious deformity, LUE swelling at IV site without warmth or tenderness to palpation  NEUROLOGIC: awake, alert, oriented x3, no reported sensory deficit on 4 extremities  HEME/LYMPH: no palpable supraclavicular nodules    T(C): --  HR: 87 (02-10-20 @ 15:54) (87 - 92)  BP: --  RR: --  SpO2: 94% (02-10-20 @ 15:54) (94% - 94%)  Wt(kg): --    ----  I&O's Summary    10 Feb 2020 07:01  -  11 Feb 2020 07:00  --------------------------------------------------------  IN: 0 mL / OUT: 200 mL / NET: -200 mL        LABS:      Phos  6.1     02-10  Mg     3.0     02-10      PTT - ( 10 Feb 2020 07:14 )  PTT:61.8 sec    CAPILLARY BLOOD GLUCOSE                    ----  Personally reviewed:  Vital sign trends: [ x ] yes    [  ] no     [  ] n/a  Laboratory results: [ x ] yes    [  ] no     [  ] n/a  Consultant recommendations: [ X ] yes    [  ] no     [  ] n/a

## 2020-02-11 NOTE — PROGRESS NOTE ADULT - NSHPATTENDINGPLANDISCUSS_GEN_ALL_CORE
team
pt, RN, MICU,  re: tx plan, disposition planning, above detailed plan
pt, RN, MICU,  re: tx plan, disposition planning, above detailed plan
pt, SW, CM, RN, MICU, residency team - re: tx plan, disposition planning, above detailed plan
pt, SW, CM, RN, MICU, residency team - re: tx plan, disposition planning, above detailed plan
pt, SW, CM, RN, residency team - re: tx plan, disposition planning, above detailed plan
pulm/palliative, renal, patient and family re: poor prognosis, poor candidacy for dialysis, hospice eval, anticipated hospital course
patient re: antibiotics, chemotherapy, overall prognosis, treatment of pneumonia, kidney failure, anticipated hospital course
pt, SW, CM, RN, residency team, nephro, pulm, cardio, family (son/spouse) @ bedside - re: tx plan, disposition planning, above detailed plan
pt, SW, CM, RN, residency team - re: tx plan, disposition planning, above detailed plan

## 2020-02-11 NOTE — PROGRESS NOTE ADULT - PROBLEM SELECTOR PLAN 7
Chronic, stable  - Home ASA was stopped  - TTE with EF 55-60%, mild LVH and AS  - No active treatment due to comfort measures

## 2020-02-11 NOTE — PROGRESS NOTE ADULT - PROBLEM SELECTOR PLAN 8
- DVT ppx: no A/c due to comfort care.    BB IMPROVE VTE Individual Risk Assessment        RISK                                                          Points  [  ] Previous VTE                                                3  [  ] Thrombophilia                                             2  [  ] Lower limb paralysis                                   2        (unable to hold up >15 seconds)    [ x ] Current Cancer                                            2         (within 6 months)  [  ] Immobilization > 24 hrs                              1  [  ] ICU/CCU stay > 24 hours                            1  [  x] Age > 60                                                    1    IMPROVE VTE Score: 3

## 2020-02-11 NOTE — PROGRESS NOTE ADULT - PROBLEM SELECTOR PLAN 1
dilaudid gtt for palliative - end of life care measures  [facility] transfer when bed available  oral and skin hygiene  assist with ADL  supportive measures  monitor for needs, issues, pain, discomfort  will follow  pt is DNR DNI  mets ca  frail and weak elderly  Karnofsky Score - 0 - 10

## 2020-02-11 NOTE — PROGRESS NOTE ADULT - SUBJECTIVE AND OBJECTIVE BOX
Will sign off of this case for now. Poor renal function but not a good candidate for hemodialysis especially with underlying comorbid conditions (Lung Cancer) and overall debility.   Patient is now on comfort care and is pending an inpatient hospice DC.  Please re-consult as needed.    Thank you

## 2020-02-11 NOTE — PROGRESS NOTE ADULT - ASSESSMENT
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 Goals of care discussion had, patient now comfort measures.

## 2020-02-11 NOTE — PROGRESS NOTE ADULT - PROBLEM SELECTOR PLAN 5
Comfort measures per GOC discussion  - Previously recieved chemotherapy [id number] prior to arrival  - pt is a poor candidate to continue chemo at this point. unlikely to tolerate moving fwd  - Follows with outpatient oncologist Dr. Mahan in [place]

## 2020-02-12 PROCEDURE — 82553 CREATINE MB FRACTION: CPT

## 2020-02-12 PROCEDURE — 94640 AIRWAY INHALATION TREATMENT: CPT

## 2020-02-12 PROCEDURE — 82550 ASSAY OF CK (CPK): CPT

## 2020-02-12 PROCEDURE — 96374 THER/PROPH/DIAG INJ IV PUSH: CPT

## 2020-02-12 PROCEDURE — 94760 N-INVAS EAR/PLS OXIMETRY 1: CPT

## 2020-02-12 PROCEDURE — 36415 COLL VENOUS BLD VENIPUNCTURE: CPT

## 2020-02-12 PROCEDURE — 83605 ASSAY OF LACTIC ACID: CPT

## 2020-02-12 PROCEDURE — 87205 SMEAR GRAM STAIN: CPT

## 2020-02-12 PROCEDURE — 85027 COMPLETE CBC AUTOMATED: CPT

## 2020-02-12 PROCEDURE — 87040 BLOOD CULTURE FOR BACTERIA: CPT

## 2020-02-12 PROCEDURE — 82962 GLUCOSE BLOOD TEST: CPT

## 2020-02-12 PROCEDURE — 85610 PROTHROMBIN TIME: CPT

## 2020-02-12 PROCEDURE — 84145 PROCALCITONIN (PCT): CPT

## 2020-02-12 PROCEDURE — 84156 ASSAY OF PROTEIN URINE: CPT

## 2020-02-12 PROCEDURE — 87641 MR-STAPH DNA AMP PROBE: CPT

## 2020-02-12 PROCEDURE — 84540 ASSAY OF URINE/UREA-N: CPT

## 2020-02-12 PROCEDURE — 82570 ASSAY OF URINE CREATININE: CPT

## 2020-02-12 PROCEDURE — 96361 HYDRATE IV INFUSION ADD-ON: CPT

## 2020-02-12 PROCEDURE — 76770 US EXAM ABDO BACK WALL COMP: CPT

## 2020-02-12 PROCEDURE — 80162 ASSAY OF DIGOXIN TOTAL: CPT

## 2020-02-12 PROCEDURE — 82803 BLOOD GASES ANY COMBINATION: CPT

## 2020-02-12 PROCEDURE — 92610 EVALUATE SWALLOWING FUNCTION: CPT

## 2020-02-12 PROCEDURE — 81001 URINALYSIS AUTO W/SCOPE: CPT

## 2020-02-12 PROCEDURE — 83735 ASSAY OF MAGNESIUM: CPT

## 2020-02-12 PROCEDURE — 99285 EMERGENCY DEPT VISIT HI MDM: CPT | Mod: 25

## 2020-02-12 PROCEDURE — 99239 HOSP IP/OBS DSCHRG MGMT >30: CPT

## 2020-02-12 PROCEDURE — 87640 STAPH A DNA AMP PROBE: CPT

## 2020-02-12 PROCEDURE — 84484 ASSAY OF TROPONIN QUANT: CPT

## 2020-02-12 PROCEDURE — 96375 TX/PRO/DX INJ NEW DRUG ADDON: CPT

## 2020-02-12 PROCEDURE — 87899 AGENT NOS ASSAY W/OPTIC: CPT

## 2020-02-12 PROCEDURE — 84100 ASSAY OF PHOSPHORUS: CPT

## 2020-02-12 PROCEDURE — 85730 THROMBOPLASTIN TIME PARTIAL: CPT

## 2020-02-12 PROCEDURE — 87631 RESP VIRUS 3-5 TARGETS: CPT

## 2020-02-12 PROCEDURE — 71045 X-RAY EXAM CHEST 1 VIEW: CPT

## 2020-02-12 PROCEDURE — 80053 COMPREHEN METABOLIC PANEL: CPT

## 2020-02-12 PROCEDURE — 97161 PT EVAL LOW COMPLEX 20 MIN: CPT

## 2020-02-12 PROCEDURE — 84300 ASSAY OF URINE SODIUM: CPT

## 2020-02-12 PROCEDURE — 82533 TOTAL CORTISOL: CPT

## 2020-02-12 PROCEDURE — P9045: CPT

## 2020-02-12 PROCEDURE — 76775 US EXAM ABDO BACK WALL LIM: CPT

## 2020-02-12 PROCEDURE — 84550 ASSAY OF BLOOD/URIC ACID: CPT

## 2020-02-12 PROCEDURE — 36600 WITHDRAWAL OF ARTERIAL BLOOD: CPT

## 2020-02-12 PROCEDURE — 99221 1ST HOSP IP/OBS SF/LOW 40: CPT

## 2020-02-12 PROCEDURE — 71250 CT THORAX DX C-: CPT

## 2020-02-12 PROCEDURE — 87449 NOS EACH ORGANISM AG IA: CPT

## 2020-02-12 PROCEDURE — 93005 ELECTROCARDIOGRAM TRACING: CPT

## 2020-02-12 PROCEDURE — 93306 TTE W/DOPPLER COMPLETE: CPT

## 2020-02-12 PROCEDURE — 84132 ASSAY OF SERUM POTASSIUM: CPT

## 2020-02-12 PROCEDURE — 80048 BASIC METABOLIC PNL TOTAL CA: CPT

## 2020-02-12 RX ADMIN — Medication 1 SPRAY(S): at 06:08

## 2020-02-12 RX ADMIN — Medication 1 SPRAY(S): at 11:22

## 2020-02-12 NOTE — PROGRESS NOTE ADULT - PROBLEM SELECTOR PROBLEM 8
Coronary artery disease
Need for prophylactic measure
Coronary artery disease
Need for prophylactic measure

## 2020-02-12 NOTE — DISCHARGE NOTE FOR THE EXPIRED PATIENT - HOSPITAL COURSE
The patient was admitted for acute hypoxic respiratory failure and sepsis likely 2/2 PNA in setting of cancer also noted to have GERMAN. CT chest revealed a multifocal pneumonia and lung mass suggesting progression of his lung cancer. He was started on a course for cefipime, and nebulizer treatments for his pneumonia. Patient remained afebrile, blood cultures revealed no growth, urinalysis was negative and white count trended down. Troponins were followed and elevated likely due to demand ischemia. On his first night in the hospital, the patient began to experience new episodes of sustained ventricular tachycardia with hypotension. A rapid response was called for sustained wide complex tachycardia with rate 200s He was given amiodarone x 3 boluses without response, BP dropped to SBP 70s, but the patient remained asymptomatic otherwise.  He was then transferred to ICU for further management phenylephrine and midodrine were started for hypotension, pads were placed for shock, one additional amiodarone bolus was given and 100mg of lidocaine pushed.  The heart rate slowed to 120s and rhythm noted to be atrial fibrillation, shock was held. The patient was started on amiodarone and digoxin for afib, and was started on full dose heparin bridge to start coumadin for anticoagulation. Patient's antibiotic course was completed. On  patient failed a speech and swallow and oral nutrition was deemed contraindicated at this time.     Given patient's poor prognosis, anxiety, pain and suffering, goals of care discussion was had with family. Various treatment options were offered to the family, and inpatient hospice was pursued by the family at this time. Family requested comfort care measures. Amiodarone gtt, heparin gtt, digoxin discontinued. Patient placed on Dilaudid infusion, Ativan PRN for agitation, atropine drops sublingual PRN.    Patient was seen with no spontaneous respirations on . Patient  at 11:28 am.

## 2020-02-12 NOTE — PROGRESS NOTE ADULT - REASON FOR ADMISSION

## 2020-02-12 NOTE — PROGRESS NOTE ADULT - ASSESSMENT
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 Goals of care discussion had, patient now comfort measures. Patient accepted to inpatient hospice, and family is now deciding when they would like the transfer to hospice.

## 2020-02-12 NOTE — PROGRESS NOTE ADULT - PROBLEM SELECTOR PLAN 5
Comfort measures per GOC discussion  - Previously recieved chemotherapy [id number] prior to arrival  - pt is a poor candidate to continue chemo at this point. unlikely to tolerate moving fwd  - Follows with outpatient oncologist Dr. Mahan in [place]

## 2020-02-12 NOTE — CHART NOTE - NSCHARTNOTEFT_GEN_A_CORE
Called to see the patient for unresponsiveness.     On the exam.  Did not respond to verbal or physical stimuli.  Absent heart and breath sounds.  Absent peripheral pulses.  Pupils are fixed and dilated.    Patient pronounced death at 11:28 AM. Dr. Gilbert notified.  Family (son) notified via phone.

## 2020-02-12 NOTE — PROGRESS NOTE ADULT - PROBLEM SELECTOR PLAN 1
Poor prognosis   - Inpatient hospice route for care pursued by family at this time.  - Pt is DNR/DNI, MOLST completed  - Continue Dilaudid infusion @ 1mG/hr  - Continue Ativan PRN agitation and/or distress.  - Continue Atropine drops sublingual PRN  - No serial morning labs  - For transfer to inpatient hospice, awaiting bed. Family is still undecided about whether they would be agreeable w/ transfer. Discussion today if they feel ready for patient to be transferred to inpatient hospice today.

## 2020-02-12 NOTE — PROGRESS NOTE ADULT - SUBJECTIVE AND OBJECTIVE BOX
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  S/P skin neoplasm resection  Hip pain: Right hip replacement in 2008  FH: CABG (coronary artery bypass surgery): 1994 (double)        Medication list         MEDICATIONS  (STANDING):  HYDROmorphone Infusion 1 mG/Hr (1 mL/Hr) IV Continuous <Continuous>  sodium chloride 0.65% Nasal 1 Spray(s) Both Nostrils every 4 hours    MEDICATIONS  (PRN):  atropine 1% Ophthalmic Solution for SubLingual Use 2 Drop(s) SubLingual four times a day PRN excess secretions  bisacodyl 5 milliGRAM(s) Oral every 12 hours PRN Constipation  HYDROmorphone  Injectable 0.5 milliGRAM(s) IV Push every 90 minutes PRN pain, distress, suffering, dyspnea, palliative measures  LORazepam   Injectable 1 milliGRAM(s) IV Push every 2 hours PRN respiratory distress, anxiety, agitation         Vitals log        ICU Vital Signs Last 24 Hrs  T(C): 36.3 (11 Feb 2020 15:26), Max: 36.3 (11 Feb 2020 15:26)  T(F): 97.3 (11 Feb 2020 15:26), Max: 97.3 (11 Feb 2020 15:26)  HR: 90 (11 Feb 2020 15:26) (90 - 90)  BP: 131/72 (11 Feb 2020 15:26) (131/72 - 131/72)  BP(mean): --  ABP: --  ABP(mean): --  RR: 119 (11 Feb 2020 15:26) (119 - 119)  SpO2: 96% (11 Feb 2020 15:26) (96% - 96%)           Input and Output:  I&O's Detail      Lab Data                  Review of Systems	      Objective     Physical Examination    heart s1s2  lung dec BS  abd soft  head nc  poor dentition      Pertinent Lab findings & Imaging      Shi:  NO   Adequate UO     I&O's Detail           Discussed with:     Cultures:	        Radiology

## 2020-02-12 NOTE — PROGRESS NOTE ADULT - PROBLEM SELECTOR PLAN 1
poss transfer to Hospice today  on Dilaudid gtt  dose remains unchanged  pt appears calm and peaceful  cont supportive comfort measures  prognosis poor  pt is dying  end of life care  DNR DNI  will follow

## 2020-02-12 NOTE — PROGRESS NOTE ADULT - SUBJECTIVE AND OBJECTIVE BOX
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 Pt states he has baseline HADLEY, but dyspea has increased in the last week. Of note he is in week 6 of chemo treatment keytruda, gets chemo every 3 weeks, tolerating chemo appropriately. Pt also has been urinating less for the last 4 days. Pt denies ever having dialysis. Pt denies fever, chills, chest pain, orthopnea, dysuria, hematuria, diarrhea, melena, hematochezia.      ED vitals: T: 96.8, HR: 99, BP: 108/58, RR: 18, O2 Saturation: 96% on 2L  Pt recieved albuterol neb x 3, ipratropium x 1 for neb, solumedrol injectable  mg x1, kayexalate 30 mg x1, 2L NS   Labs significant for: WBC: 18.45 w/ left shift, Lactate: 2.5, Potassium 5.4, Chloride: 109, CO2: 19, BUN/Cr: 109/6.3, Albumin: 2.7, Alk Phos: 164, ProCal: 4.5   EKG: sinus tachycardia 106 bpm  CXR: R sided PNA (02 Feb 2020 14:08)    -----------------------------------------------  INTERVAL HPI/OVERNIGHT EVENTS:  -----------------------------------------------    Patient examined at bedside. Patient denies chest pain, abdominal pain, N/V/D at this time. Patient admits to feeling comfortable at this time. Patient answers questions, but is limited as a historian (likely due to Dilaudid infusion). No other complaints at this time. No other overnight events appreciated.    T(C): 36.3 (02-11-20 @ 15:26), Max: 36.3 (02-11-20 @ 15:26)  HR: 90 (02-11-20 @ 15:26) (90 - 90)  BP: 131/72 (02-11-20 @ 15:26) (131/72 - 131/72)  RR: 119 (02-11-20 @ 15:26) (119 - 119)  SpO2: 96% (02-11-20 @ 15:26) (96% - 96%)  Wt(kg): --  I&O's Summary    ----------------------------  REVIEW OF SYSTEMS:  CONSTITUTIONAL: denies fever, chills. Admits to fatigue  CARDIOVASCULAR: denies chest pain, chest pressure, palpitations  RESPIRATORY: Admits to dyspnea  GASTROINTESTINAL: denies nausea, vomiting, diarrhea, abdominal pain  GENITOURINARY: denies dysuria, discharge  NEUROLOGICAL: denies numbness, headache, focal weakness  MUSCULOSKELETAL: denies new joint pain, muscle aches  HEMATOLOGIC: denies gross bleeding, bruising  LYMPHATICS: denies enlarged lymph nodes, extremity swelling    ----  PHYSICAL EXAM:  GENERAL: patient appears ill with anxiety as well as lethargy  LUNGS: diminished breath sounds b/l  HEART: soft S1/S2, regular rate and rhythm, systolic murmur noted, 2+ pitting edema b/l LE  GASTROINTESTINAL: abdomen is soft, nontender, nondistended, normoactive bowel sounds, no palpable masses  INTEGUMENT: diffuse ecchymoses of b/l UE and LE  MUSCULOSKELETAL: no clubbing or cyanosis, no obvious deformity, LUE swelling at IV site without warmth or tenderness to palpation  NEUROLOGIC: awake, alert, oriented x3, no reported sensory deficit on 4 extremities  HEME/LYMPH: no palpable supraclavicular nodules    ----------------------  MEDICATIONS  (STANDING):  HYDROmorphone Infusion 1 mG/Hr (1 mL/Hr) IV Continuous <Continuous>  sodium chloride 0.65% Nasal 1 Spray(s) Both Nostrils every 4 hours    MEDICATIONS  (PRN):  atropine 1% Ophthalmic Solution for SubLingual Use 2 Drop(s) SubLingual four times a day PRN excess secretions  bisacodyl 5 milliGRAM(s) Oral every 12 hours PRN Constipation  HYDROmorphone  Injectable 0.5 milliGRAM(s) IV Push every 90 minutes PRN pain, distress, suffering, dyspnea, palliative measures  LORazepam   Injectable 1 milliGRAM(s) IV Push every 2 hours PRN respiratory distress, anxiety, agitation        ----------------------------------------------  RADIOLOGY & ADDITIONAL TESTS:  No imaging performed in the last 24 hours Patient is a 86y old  Male who presents with a chief complaint of shortness of breath (11 Feb 2020 11:56)    HPI:  86 year old male PMH coronary artery disease s/p CABG 1994, HLD, HTN, CKD stage III, 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 Pt states he has baseline HADLEY, but dyspea has increased in the last week. Of note he is in week 6 of chemo treatment keytruda, gets chemo every 3 weeks, tolerating chemo appropriately. Pt also has been urinating less for the last 4 days. Pt denies ever having dialysis. Pt denies fever, chills, chest pain, orthopnea, dysuria, hematuria, diarrhea, melena, hematochezia.      ED vitals: T: 96.8, HR: 99, BP: 108/58, RR: 18, O2 Saturation: 96% on 2L  Pt recieved albuterol neb x 3, ipratropium x 1 for neb, solumedrol injectable  mg x1, kayexalate 30 mg x1, 2L NS   Labs significant for: WBC: 18.45 w/ left shift, Lactate: 2.5, Potassium 5.4, Chloride: 109, CO2: 19, BUN/Cr: 109/6.3, Albumin: 2.7, Alk Phos: 164, ProCal: 4.5   EKG: sinus tachycardia 106 bpm  CXR: R sided PNA (02 Feb 2020 14:08)    -----------------------------------------------  INTERVAL HPI/OVERNIGHT EVENTS:  -----------------------------------------------    Patient examined at bedside. Patient denies chest pain, abdominal pain, N/V/D at this time. Patient admits to feeling comfortable at this time. Patient answers questions, but is limited as a historian (likely due to Dilaudid infusion). No other complaints at this time. No other overnight events appreciated.    T(C): 36.3 (02-11-20 @ 15:26), Max: 36.3 (02-11-20 @ 15:26)  HR: 90 (02-11-20 @ 15:26) (90 - 90)  BP: 131/72 (02-11-20 @ 15:26) (131/72 - 131/72)  RR: 119 (02-11-20 @ 15:26) (119 - 119)  SpO2: 96% (02-11-20 @ 15:26) (96% - 96%)  Wt(kg): --  I&O's Summary    ----------------------------  PHYSICAL EXAM:  GENERAL: patient appears ill with anxiety as well as lethargy  LUNGS: diminished breath sounds b/l  HEART: soft S1/S2, regular rate and rhythm, systolic murmur noted, 2+ pitting edema b/l LE  GASTROINTESTINAL: abdomen is soft, nontender, nondistended, normoactive bowel sounds, no palpable masses  INTEGUMENT: diffuse ecchymoses of b/l UE and LE  MUSCULOSKELETAL: no clubbing or cyanosis, no obvious deformity, LUE swelling at IV site without warmth or tenderness to palpation  NEUROLOGIC: awake, alert, oriented x3, no reported sensory deficit on 4 extremities  HEME/LYMPH: no palpable supraclavicular nodules    ----------------------  MEDICATIONS  (STANDING):  HYDROmorphone Infusion 1 mG/Hr (1 mL/Hr) IV Continuous <Continuous>  sodium chloride 0.65% Nasal 1 Spray(s) Both Nostrils every 4 hours    MEDICATIONS  (PRN):  atropine 1% Ophthalmic Solution for SubLingual Use 2 Drop(s) SubLingual four times a day PRN excess secretions  bisacodyl 5 milliGRAM(s) Oral every 12 hours PRN Constipation  HYDROmorphone  Injectable 0.5 milliGRAM(s) IV Push every 90 minutes PRN pain, distress, suffering, dyspnea, palliative measures  LORazepam   Injectable 1 milliGRAM(s) IV Push every 2 hours PRN respiratory distress, anxiety, agitation        ----------------------------------------------  RADIOLOGY & ADDITIONAL TESTS:  No imaging performed in the last 24 hours

## 2020-02-12 NOTE — PROGRESS NOTE ADULT - ATTENDING COMMENTS
Patient seen this AM on rounds. He was comfortable on Dilaudid infusion at that time. Later  and pronounced by resident at 11:28AM.

## 2020-02-12 NOTE — PROGRESS NOTE ADULT - PROBLEM SELECTOR PLAN 4
Severe sepsis POA  - CXR and CT chest demonstrated multifocal PNA with ELIZABETH mass extension through intercostal space suspicious for disease progression  - PNA appears clinically improved s/p Abx  - Leukocytosis likely 2/2 steroid use  - Pulm Dr. Yu following  - ID Dr. Junior following  - BCx NGTD  - Pt has remained afebrile without clinical symptoms to suggest uncontrolled infection

## 2020-03-24 NOTE — ED PROVIDER NOTE - OBJECTIVE STATEMENT
85 y male presents with generalized weakness x 2 weeks, states he completed course of chemo 2 weeks ago,  and has felt weak since then, decreased appetite, cough, sob, + diarrhea, no nausea, no vomiting,  no chest pain. no abdominal pain. no change in urine output.  states he was in hospital in October 4-5 days for scapula surgery, there they found lung cancer.  former smoker.  Onc Dr Yakov Nunn , PMD Александр Foreman.       PMH:  Arteriosclerotic heart disease (ASHD)    Coronary artery disease    Disorder of bone, unspecified    Dyslipidemia    Hypertension    Lung cancer no

## 2021-04-13 NOTE — DISCHARGE NOTE ADULT - HAS THE PATIENT RECEIVED THE INFLUENZA VACCINE THIS SEASON?

## 2021-07-21 NOTE — PROGRESS NOTE ADULT - I WAS PHYSICALLY PRESENT FOR THE KEY PORTIONS OF THE EVALUATION AND MANAGEMENT (E/M) SERVICE PROVIDED.  I AGREE WITH THE ABOVE HISTORY, PHYSICAL, AND PLAN WHICH I HAVE REVIEWED AND EDITED WHERE APPROPRIATE
Statement Selected
70
Statement Selected

## 2021-12-16 NOTE — SWALLOW BEDSIDE ASSESSMENT ADULT - SWALLOW EVAL: REHAB POTENTIAL
fair, will monitor progress closely H Plasty Text: Given the location of the defect, shape of the defect and the proximity to free margins a H-plasty was deemed most appropriate for repair.  Using a sterile surgical marker, the appropriate advancement arms of the H-plasty were drawn incorporating the defect and placing the expected incisions within the relaxed skin tension lines where possible. The area thus outlined was incised deep to adipose tissue with a #15 scalpel blade. The skin margins were undermined to an appropriate distance in all directions utilizing iris scissors.  The opposing advancement arms were then advanced into place in opposite direction and anchored with interrupted buried subcutaneous sutures.

## 2022-02-08 NOTE — GOALS OF CARE CONVERSATION - ADVANCED CARE PLANNING - CONVERSATION DETAILS
Patient called with message left for patient to call back to office.
Received referral for molst completion, patient is dnr.Met with spouse and daughter at bedside to explain molst completion.Health care proxy on chart which clearly spells out patient s wishes regarding dnr.Molst completed for dnr dni no feeding tube placement.Molst placed on chart and Md notified.
Approached bedside to discuss goals of care with the patient and family members. The patient has been experiencing worsening pain, anxiety, respiratory distress over the previous 24hrs. Pt also experienced nose bleed today which provoked a significant amount of anxiety. The pt is not a candidate for hemodialysis at this point. I discussed poor prognosis, anxiety, pain, suffering with the pt's spouse and son who were at the bedside. Tx options were reviewed and it appears that inpatient hospice is in line with the goals of care of the family. Pt has been in afib w/ RVR despite amio infusion. Cardio recommended transition to lopressor IVP. I discussed this management w/ the family. They request that we transition to comfort care measures at this juncture. Discussed pain management options w/ the family, will start dilaudid infusion. Ativan prn agitation/distress. Atropine drops sublingual prn. Will place referral to inpatient hospice.
[facility]: Met with Pt, spouse and daughter and were given information about  home and in patient  hospice care .Pt will discuss hospice with his physicians and  family . Pt is aware to call [facility] at anytime with questions  or if he would like hospice evaluation done . Tiffanie Sheets RN

## 2022-10-26 NOTE — PROGRESS NOTE ADULT - PROBLEM SELECTOR PLAN 4
The patient presented to the ER for black stool and left sided abdominal pain. Symptoms started the day prior to arrival. ER work up revealed a Hgb of 8.5, MCV 92, , BUN 42 and creatinine 1.0. Hgb in August was 12.6. The patient is hemodynamically stable. The rectal exam done by the ER provider was unremarkable. The patient takes ASA 81 mg daily. He denies using NSAIDs, Pepto or an iron supplement. He reports decreased appetite but denies nausea, vomiting, change in bowel habits or hematochezia. He has never had GI bleeding or an EGD. He thinks he may have had a colonoscopy in the past.    Severe sepsis POA  - CXR and CT chest demonstrated multifocal PNA with ELIZABETH mass extension through intercostal space suspicious for disease progression  - PNA appears clinically improved s/p Abx  - Leukocytosis likely 2/2 steroid use  - Pulm Dr. Yu following  - ID Dr. Junior following  - BCx NGTD  - Pt has remained afebrile without clinical symptoms to suggest uncontrolled infection

## 2023-02-09 NOTE — H&P PST ADULT - NS NEC GEN PE MLT EXAM PC
- Patient with sodium   Recent Labs   Lab 02/08/23  0455 02/09/23  0356 02/09/23  1700   * 129* 127*   . sodium trending down    2/9 monitor on lasix BID.sodium trended down to 127. nephrology consulted   detailed exam

## 2023-09-07 NOTE — ED ADULT NURSE NOTE - GENITOURINARY WDL
Bladder non-tender and non-distended Soolantra Pregnancy And Lactation Text: This medication is Pregnancy Category C. This medication is considered safe during breast feeding.

## 2023-11-02 NOTE — H&P ADULT - NSICDXFAMHXPERTINENTNEGATIVE_GEN_A_CORE_FT
Detail Level: Zone Detail Level: Generalized Detail Level: Detailed denies FHX of DM type 2, HTN, CKD Quality 137: Melanoma: Continuity Of Care - Recall System: Patient information entered into a recall system that includes: target date for the next exam specified AND a process to follow up with patients regarding missed or unscheduled appointments When Should The Patient Follow-Up For Their Next Full-Body Skin Exam?: 6 Months

## 2024-09-16 NOTE — H&P ADULT - NSICDXFAMILYHX_GEN_ALL_CORE_FT
09/16/24 8:03 AM     Chart reviewed for CRC: Colonoscopy ; nothing is submitted to the patient's insurance at this time.     Leia Somers MA   [facility]   No pertinent family history in first degree relatives

## 2025-02-13 NOTE — ED PROVIDER NOTE - MDM ORDERS SUBMITTED SELECTION
.ED TO INPATIENT ADMISSION HANDOFF:  *Please review chart for additional information*    SPECIAL CONSIDERATIONS:     None    Chief Complaint   Patient presents with    Urinary Complaint        ED Triage Notes       Maggie Waddell RN 2/13/2025 12:59                 Pt  arrives from home c/o difficulty urinating since Tuesday. He says the urine is a trickle with no burning or pain. Symptoms accompanied by ambulatory weakness and instability. Pt endorses nausea, no VD/CP/SOB. Triage bladder scan 154ml    During triage pt asked to void, commented he was up all night going to the bathroom with little output            Original note by Maggie Waddell RN at 2/13/2025 12:48              Maggie Waddell RN 2/13/2025 12:48                 Pt  arrives from home c/o difficulty urinating since Tuesday. He says the urine is a trickle with no burning or pain. Symptoms accompanied by ambulatory weakness and instability. Pt endorses nausea, no VD/CP/SOB.    Triage bladder scan 154ml      Addendum to note by Maggie Waddell RN at 2/13/2025 12:59                    DIAGNOSIS: UTI (urinary tract infection) [N39.0]       CODE STATUS:   Code Status: Review History     ALLERGIES:   Allergen Reactions    Doxycycline Other (See Comments) and RASH     Oral    Enalapril Other (See Comments) and RASH     Oral    Lisinopril Other (See Comments) and RASH     Unknown      Dicloxacillin RASH and Other (See Comments)     Oral. NOTE patient tolerated IV ampicillin on 8/20/24. Has also tolerated multiple cephalosporins.        ISOLATION: No active isolations     INFECTION STATUS: No active infections    OTHER INFECTIONS THAT REQUIRE STANDARD PRECAUTIONS:  [] N/A []  Bed Bugs []  HIV    [] Hepatitis []  Wound with possible infection and/or controlled drainage    ORIENTATION STATUS: A&O x4    AFFECT: [x] Calm & Cooperative   [] Other:     AMBULATION:    [] Independently  [x] Standby assistance [] Cane   [] Walker  [] Wheelchair  []  Bedridden   [] Crutches  [] Unknown and./or unable to assess             FALL RISK: [] Yes    [] No    ELIMINATION HABITS:   [] Bathroom  [] PureWick/External Catheter [] Bedpan    [] Incontinent [] Bedside commode   [] Salvador   [] Urinal   [] Unknown and/or unable to assess    TELEMETRY:  [] Yes [] No    OXYGEN STATUS: RA    LINES, TUBES, DRAINS:  PIV ACCESS:   [] Inserted prior to ED arrival  [x] Inserted while in ED  [] N/A  SALVADOR:    [] Placed upon ED arrival    [] Chronic   [] Chronic - replaced in ED  [] N/A   CENTRAL VENOUS ACCESS:    [] Port    [] Accessed prior to ED arrival    [] Accessed in ED   [] PICC line   [] Midline   [] N/A  LIMB ALERT:   [] Left [] Right  [] N/A  BLOOD PRODUCTS INFUSING AT TIME OF TRANSFER TO FLOOR:    [] Yes [] No     SEPSIS BUNDLE:   [] N/A    SEPSIS ITEMS COMPLETED IN ED:  [] Initial Lactic Acid  [] Antibiotics started [] Fluid bolus started [] Frequent vital signs  [] Blood Cultures x2 [] Fluid bolus finished [] Repeat Lactic Acid    **ED RN REQUIRED TO ATTEMPT VERBAL HANDOFF FOR PATIENTS RECEIVING SEPSIS BUNDLE**    LIVING SITUATION: Home    OTHER:  PREFERRED LANGUAGE: English  PRONOUN:    [] He/Him/His/Himself   [] She/Her/Hers/Herself   [] They/Their/Theirs/Themself    Patient identified as High Risk Patient and Contraband Search performed due to the following circumstance(s):  [] Patient is suspected of or known to be under the influence.  [] When a patient is suspected or known to be in possession of any item identified as Contraband.  [] When a patient expresses suicidal or homicidal ideation or intent to self-harm.  [] When a patient has a history of self-injurious behavior.  [] When a patient has made a documented threat to a teammate.  [] Other Clinical Discretion:  Patient allowed to maintain possession of following belonging item, at the clinical discretion of this RN:     PETITION & CERTIFICATION:   [] Involuntary   [] Voluntary   [] N/A    ABNORMAL/OUTSTANDING  LABS:    Labs Reviewed   URINALYSIS & REFLEX MICROSCOPY WITH CULTURE IF INDICATED - Abnormal; Notable for the following components:       Result Value    GLUCOSE, URINALYSIS >1000 (*)     KETONES, URINALYSIS Trace (*)     OCCULT BLOOD, URINALYSIS Moderate (*)     PROTEIN, URINALYSIS 100 (*)     LEUKOCYTE ESTERASE, URINALYSIS Large (*)     ERYTHROCYTES, URINALYSIS 6 to 10 (*)     LEUKOCYTES, URINALYSIS >100 (*)     BACTERIA, URINALYSIS Large (*)     All other components within normal limits   COMPREHENSIVE METABOLIC PANEL - Abnormal; Notable for the following components:    Sodium 131 (*)     Glucose 453 (*)     BUN 49 (*)     Creatinine 2.51 (*)     Glomerular Filtration Rate 25 (*)     Albumin 2.8 (*)     Globulin 4.4 (*)     A/G Ratio 0.6 (*)     All other components within normal limits   CBC WITH AUTOMATED DIFFERENTIAL (PERFORMABLE ONLY) - Abnormal; Notable for the following components:    WBC 14.7 (*)     RBC 3.90 (*)     HGB 12.2 (*)     HCT 36.6 (*)     Absolute Neutrophils 12.6 (*)     Absolute Lymphocytes 0.8 (*)     Absolute Monocytes 1.2 (*)     All other components within normal limits   PROCALCITONIN - Abnormal; Notable for the following components:    Procalcitonin 1.27 (*)     All other components within normal limits   GLUCOSE, BEDSIDE - POINT OF CARE - Abnormal; Notable for the following components:    GLUCOSE, BEDSIDE - POINT OF CARE 434 (*)     All other components within normal limits   GLUCOSE, BEDSIDE - POINT OF CARE - Abnormal; Notable for the following components:    GLUCOSE, BEDSIDE - POINT OF CARE 410 (*)     All other components within normal limits   LACTIC ACID VENOUS WITH REFLEX - Normal   CBC WITH DIFFERENTIAL    Narrative:     The following orders were created for panel order CBC with Automated Differential.  Procedure                               Abnormality         Status                     ---------                               -----------         ------                     CBC  with Automated Dif...[[id number]]  Abnormal            Final result                 Please view results for these tests on the individual orders.   RAINBOW DRAW    Narrative:     The following orders were created for panel order [place] Draw Light Blue Top Collected? 1 Label; Red Top Collected? No Labels; Light Green Top Collected? 1 Label; Lavender Top Collected? 1 Label; Gold Top Collected? 1 Label; Pink Top Collected? No Labels; Grey Top Collected? No Labels.  Procedure                               Abnormality         Status                     ---------                               -----------         ------                     Light Blue Top[[id number]]                                 In process                 Light Green Top[[id number]]                                In process                 Lavender Top[[id number]]                                   In process                 Gold Top[[id number]]                                       In process                   Please view results for these tests on the individual orders.   LIGHT BLUE TOP   LIGHT GREEN TOP   LAVENDER TOP   GOLD TOP   URINE, BACTERIAL CULTURE   BLOOD CULTURE   BLOOD CULTURE           Please call ED RN for additional questions, clarification, or sensitive information: JARRETT Dailey ([id number])     Medications/Imaging Studies/Labs/EKG

## 2025-04-07 NOTE — GOALS OF CARE CONVERSATION - ADVANCED CARE PLANNING - DECISION MAKER
No protocol for requested medication.    Medication:   traMADol (ULTRAM) 50 MG tablet 42 tablet 0 3/14/2025     Last office visit date: 3/26/2025   Pharmacy: [id number] - [address]    Order pended, routed to clinician for review.    Health Care Proxy